# Patient Record
Sex: FEMALE | Race: WHITE | NOT HISPANIC OR LATINO | Employment: OTHER | URBAN - METROPOLITAN AREA
[De-identification: names, ages, dates, MRNs, and addresses within clinical notes are randomized per-mention and may not be internally consistent; named-entity substitution may affect disease eponyms.]

---

## 2017-01-03 ENCOUNTER — OFFICE VISIT (OUTPATIENT)
Dept: CARDIOLOGY | Facility: HOSPITAL | Age: 77
End: 2017-01-03

## 2017-01-03 VITALS
BODY MASS INDEX: 19.15 KG/M2 | WEIGHT: 122 LBS | HEIGHT: 67 IN | SYSTOLIC BLOOD PRESSURE: 101 MMHG | TEMPERATURE: 97.5 F | RESPIRATION RATE: 22 BRPM | HEART RATE: 60 BPM | OXYGEN SATURATION: 99 % | DIASTOLIC BLOOD PRESSURE: 57 MMHG

## 2017-01-03 DIAGNOSIS — I10 ESSENTIAL HYPERTENSION: ICD-10-CM

## 2017-01-03 DIAGNOSIS — I48.0 PAROXYSMAL ATRIAL FIBRILLATION (HCC): Primary | ICD-10-CM

## 2017-01-03 DIAGNOSIS — R00.1 BRADYCARDIA: ICD-10-CM

## 2017-01-03 PROCEDURE — 99214 OFFICE O/P EST MOD 30 MIN: CPT | Performed by: NURSE PRACTITIONER

## 2017-01-03 RX ORDER — SOTALOL HYDROCHLORIDE 80 MG/1
80 TABLET ORAL NIGHTLY
Refills: 0 | COMMUNITY
Start: 2016-12-26 | End: 2018-01-23 | Stop reason: HOSPADM

## 2017-01-03 RX ORDER — ALPRAZOLAM 0.5 MG/1
0.25 TABLET ORAL 2 TIMES DAILY PRN
Status: ON HOLD | COMMUNITY
End: 2022-03-15 | Stop reason: SDUPTHER

## 2017-01-03 RX ORDER — MULTIVIT-MIN/IRON/FOLIC ACID/K 18-600-40
2000 CAPSULE ORAL DAILY
COMMUNITY

## 2017-01-03 NOTE — MR AVS SNAPSHOT
Jenna Russell   1/3/2017 10:00 AM   Office Visit    Dept Phone:  [phone]   Encounter #:  [id number]    Provider:  NEREYDA Crabtree   Department:  [facility]                Your Full Care Plan              Today's Medication Changes          These changes are accurate as of: 1/3/17 10:46 AM.  If you have any questions, ask your nurse or doctor.               Medication(s)that have changed:     ALPRAZolam 0.5 MG tablet   Commonly known as:  XANAX   Take 0.25 mg by mouth 2 (Two) Times a Day As Needed.   What changed:  Another medication with the same name was removed. Continue taking this medication, and follow the directions you see here.   Changed by:  NEREYDA Crabtree         Stop taking medication(s)listed here:     CALCIUM & VIT D3 BONE HEALTH PO   Stopped by:  NEREYDA Crabtree           gabapentin 100 MG capsule   Commonly known as:  NEURONTIN   Stopped by:  NEREYDA Crabtree                      Your Updated Medication List          This list is accurate as of: 1/3/17 10:46 AM.  Always use your most recent med list.                ALPRAZolam 0.5 MG tablet   Commonly known as:  XANAX       apixaban 5 MG tablet tablet   Commonly known as:  ELIQUIS       aspirin 81 MG EC tablet       atorvastatin 40 MG tablet   Commonly known as:  LIPITOR       calcium carbonate  MG chewable tablet   Commonly known as:  TUMS EX       folic acid 1 MG tablet   Commonly known as:  FOLVITE       hyoscyamine 0.125 MG tablet   Commonly known as:  ANASPAZ,LEVSIN       * sotalol 120 MG tablet   Commonly known as:  BETAPACE       * sotalol 80 MG tablet   Commonly known as:  BETAPACE       Vitamin D 2000 UNITS capsule       * Notice:  This list has 2 medication(s) that are the same as other medications prescribed for you. Read the directions carefully, and ask your doctor or other care provider to review them with you.            Instructions     None    "Patient Instructions History      Upcoming Appointments     Visit Type Date Time Department    NEW PATIENT 1/3/2017 10:00 AM [facility] [place]      MyChart Signup     Our records indicate that you have declined [facility] [employer] signup. If you would like to sign up for [employer], please email [email or web address] or call [phone] to obtain an activation code.             Other Info from Your Visit           Allergies     Penicillins  Anaphylaxis      Reason for Visit     Establish Care s/p ED Visit on 12/15/16     Atrial Fibrillation           Vital Signs     Blood Pressure Pulse Temperature Respirations Height Weight    101/57 (BP Location: Left arm, Patient Position: Standing) 60 97.5 °F (36.4 °C) (Temporal Artery ) 22 67\" (170.2 cm) 122 lb (55.3 kg)    Oxygen Saturation Body Mass Index Smoking Status             99% 19.11 kg/m2 Former Smoker           "

## 2017-01-03 NOTE — PROGRESS NOTES
[facility]      Encounter Date:01/03/2017     Jenna Russell  [address]  [facility] [address]  [phone]    1940    Henrietta Torres MD    Jenna Russell is a 76 y.o. female.      Subjective:     Chief Complaint:  Establish Care (s/p ED Visit on 12/15/16 ) and Atrial Fibrillation       HPI     Pt with a hx of PAF for greater than 4 years.  Hx of CVA.  Chadsvasc 5 (female, CVA, HTN, Age) on Eliquis.  Currently on Sotalol.  Reports her Afib episodes are infrequent, but she had 1 episode lasting 3 hours on 12/15/16 and she when to ED.  Found to be in SR at that time.  Denies CP, pressure, dyspnea, dizzness, syncope while in AFib.  HR in AFib typically 80-90s.  Questioning today if she should increase Sotalol dose or be concerned when she goes into afib.    Patient Active Problem List   Diagnosis   • Pneumothorax, right   • Fracture of multiple ribs of right side with routine healing   • Paroxysmal atrial fibrillation   • High cholesterol   • Hypertension   • Atrial fibrillation   • Bradycardia         Past Surgical History   Procedure Laterality Date   • Total hip arthroplasty Left      Staph aureus wound infection   • Abdominoplasty     • Breast abscess incision and drainage         Allergies   Allergen Reactions   • Penicillins Anaphylaxis         Current Outpatient Prescriptions:   •  ALPRAZolam (XANAX) 0.5 MG tablet, Take 0.25 mg by mouth 2 (Two) Times a Day As Needed., Disp: , Rfl:   •  apixaban (ELIQUIS) 5 MG tablet tablet, Take 5 mg by mouth 2 (two) times a day., Disp: , Rfl:   •  aspirin 81 MG EC tablet, Take 81 mg by mouth daily., Disp: , Rfl:   •  atorvastatin (LIPITOR) 40 MG tablet, Take 40 mg by mouth Every Night., Disp: , Rfl:   •  calcium carbonate EX (TUMS EX) 750 MG chewable tablet, Chew 750 mg 2 (Two) Times a Day., Disp: , Rfl:   •  Cholecalciferol (VITAMIN D) 2000 UNITS capsule, Take 2,000 Units by mouth Daily., Disp: , Rfl:   •  folic acid  (FOLVITE) 1 MG tablet, Take 1 mg by mouth Daily., Disp: , Rfl: 0  •  hyoscyamine (ANASPAZ,LEVSIN) 0.125 MG tablet, Take 0.125 mg by mouth every 4 (four) hours as needed for cramping., Disp: , Rfl:   •  sotalol (BETAPACE) 120 MG tablet, Take 120 mg by mouth Every Morning., Disp: , Rfl:   •  sotalol (BETAPACE) 80 MG tablet, Take 80 mg by mouth Every Night., Disp: , Rfl: 0    The following portions of the patient's history were reviewed and updated as appropriate: allergies, current medications, past family history, past medical history, past social history, past surgical history and problem list.    Review of Systems   Constitution: Negative for chills, decreased appetite, diaphoresis, fever, weakness, malaise/fatigue, night sweats, weight gain and weight loss.   HENT: Positive for congestion and nosebleeds. Negative for headaches.         Patient has a cold   Eyes: Negative for blurred vision, visual disturbance and visual halos.   Cardiovascular: Positive for irregular heartbeat and palpitations. Negative for chest pain, claudication, cyanosis, dyspnea on exertion, leg swelling, near-syncope, orthopnea, paroxysmal nocturnal dyspnea and syncope.   Respiratory: Negative for cough, hemoptysis, shortness of breath, sleep disturbances due to breathing, snoring, sputum production and wheezing.    Endocrine: Negative for cold intolerance, heat intolerance, polydipsia, polyphagia and polyuria.   Hematologic/Lymphatic: Does not bruise/bleed easily.   Skin: Negative for dry skin, itching and rash.   Musculoskeletal: Negative for falls, joint pain, joint swelling, muscle weakness and myalgias.   Gastrointestinal: Positive for diarrhea. Negative for bloating, abdominal pain, constipation, dysphagia, heartburn, melena, nausea and vomiting.   Genitourinary: Negative for dysuria, flank pain, hematuria and nocturia.   Neurological: Positive for difficulty with concentration. Negative for excessive daytime sleepiness, dizziness and  "loss of balance.   Psychiatric/Behavioral: Negative for altered mental status and depression. The patient is not nervous/anxious.    Allergic/Immunologic: Negative for environmental allergies.       Objective:     Vitals:    01/03/17 0957 01/03/17 0958 01/03/17 0959   BP: 112/64 105/63 101/57   BP Location: Right arm Left arm Left arm   Patient Position: Sitting Sitting Standing   Cuff Size: Adult     Pulse: 60  60   Resp: 22     Temp: 97.5 °F (36.4 °C)     TempSrc: Temporal Artery      SpO2: 99%     Weight: 122 lb (55.3 kg)     Height: 67\" (170.2 cm)           Physical Exam   Constitutional: She is oriented to person, place, and time. She appears well-developed and well-nourished. No distress.   HENT:   Head: Normocephalic and atraumatic.   Mouth/Throat: Oropharynx is clear and moist.   Eyes: Conjunctivae are normal. Pupils are equal, round, and reactive to light. No scleral icterus.   Neck: No hepatojugular reflux and no JVD present. Carotid bruit is not present. No tracheal deviation present. No thyromegaly present.   Cardiovascular: Normal rate, regular rhythm, normal heart sounds and intact distal pulses.  Exam reveals no friction rub.    No murmur heard.  Pulmonary/Chest: Effort normal and breath sounds normal.   Abdominal: Soft. Bowel sounds are normal. She exhibits no distension. There is no tenderness.   Musculoskeletal: She exhibits no edema.   Lymphadenopathy:     She has no cervical adenopathy.   Neurological: She is alert and oriented to person, place, and time.   Skin: Skin is warm, dry and intact. No rash noted. No cyanosis or erythema. No pallor.   Psychiatric: She has a normal mood and affect. Her behavior is normal. Thought content normal.   Vitals reviewed.      Lab and Diagnostic Review:    Admission on 12/15/2016, Discharged on 12/15/2016   Component Date Value Ref Range Status   • Glucose 12/15/2016 84  70 - 100 mg/dL Final   • BUN 12/15/2016 12  9 - 23 mg/dL Final   • Creatinine 12/15/2016 " 0.60  0.60 - 1.30 mg/dL Final   • Sodium 12/15/2016 139  132 - 146 mmol/L Final   • Potassium 12/15/2016 3.9  3.5 - 5.5 mmol/L Final   • Chloride 12/15/2016 104  99 - 109 mmol/L Final   • CO2 12/15/2016 33.0* 20.0 - 31.0 mmol/L Final   • Calcium 12/15/2016 9.8  8.7 - 10.4 mg/dL Final   • Total Protein 12/15/2016 7.4  5.7 - 8.2 g/dL Final   • Albumin 12/15/2016 4.40  3.20 - 4.80 g/dL Final   • ALT (SGPT) 12/15/2016 24  7 - 40 U/L Final   • AST (SGOT) 12/15/2016 37* 0 - 33 U/L Final   • Alkaline Phosphatase 12/15/2016 108* 25 - 100 U/L Final   • Total Bilirubin 12/15/2016 0.6  0.3 - 1.2 mg/dL Final   • eGFR Non African Amer 12/15/2016 98  >60 mL/min/1.73 Final   • Globulin 12/15/2016 3.0  gm/dL Final   • A/G Ratio 12/15/2016 1.5  g/dL Final   • BUN/Creatinine Ratio 12/15/2016 20.0  7.0 - 25.0 Final   • Anion Gap 12/15/2016 2.0* 3.0 - 11.0 mmol/L Final   • Lipase 12/15/2016 40  6 - 51 U/L Final   • BNP 12/15/2016 125.0* 0.0 - 100.0 pg/mL Final   • Extra Tube 12/15/2016 hold for add-on   Final    Auto resulted   • Extra Tube 12/15/2016 Hold for add-ons.   Final    Auto resulted.   • Extra Tube 12/15/2016 hold for add-on   Final    Auto resulted   • Extra Tube 12/15/2016 Hold for add-ons.   Final    Auto resulted.   • WBC 12/15/2016 7.68  3.50 - 10.80 10*3/mm3 Final   • RBC 12/15/2016 4.37  3.89 - 5.14 10*6/mm3 Final   • Hemoglobin 12/15/2016 13.1  11.5 - 15.5 g/dL Final   • Hematocrit 12/15/2016 41.1  34.5 - 44.0 % Final   • MCV 12/15/2016 94.1  80.0 - 99.0 fL Final   • MCH 12/15/2016 30.0  27.0 - 31.0 pg Final   • MCHC 12/15/2016 31.9* 32.0 - 36.0 g/dL Final   • RDW 12/15/2016 13.5  11.3 - 14.5 % Final   • RDW-SD 12/15/2016 46.5  37.0 - 54.0 fl Final   • MPV 12/15/2016 9.6  6.0 - 12.0 fL Final   • Platelets 12/15/2016 270  150 - 450 10*3/mm3 Final   • Neutrophil % 12/15/2016 63.7  41.0 - 71.0 % Final   • Lymphocyte % 12/15/2016 27.3  24.0 - 44.0 % Final   • Monocyte % 12/15/2016 7.8  0.0 - 12.0 % Final   • Eosinophil %  12/15/2016 0.8  0.0 - 3.0 % Final   • Basophil % 12/15/2016 0.1  0.0 - 1.0 % Final   • Immature Grans % 12/15/2016 0.3  0.0 - 0.6 % Final   • Neutrophils, Absolute 12/15/2016 4.89  1.50 - 8.30 10*3/mm3 Final   • Lymphocytes, Absolute 12/15/2016 2.10  0.60 - 4.80 10*3/mm3 Final   • Monocytes, Absolute 12/15/2016 0.60  0.00 - 1.00 10*3/mm3 Final   • Eosinophils, Absolute 12/15/2016 0.06* 0.10 - 0.30 10*3/mm3 Final   • Basophils, Absolute 12/15/2016 0.01  0.00 - 0.20 10*3/mm3 Final   • Immature Grans, Absolute 12/15/2016 0.02  0.00 - 0.03 10*3/mm3 Final   • Troponin I 12/15/2016 0.00  0.00 - 0.60 ng/mL Final    Serial Number: [id number]    : [id number]     EKG sinus bradycardia 52 bpm, 12/15/2016  Assessment and Plan:         1. Paroxysmal atrial fibrillation  Heart rate appears regular and controlled  Continue current dose of sotalol.  Will not up titrate due to bradycardia.  Continue Eliquis for stroke prevention    A. fib education completed: What his atrial fibrillation, causes, triggers, signs and symptoms, medication management (rate control versus rhythm control) and stroke prevention, procedural management and indications, and the role of the atrial fibrillation center and when to call.  Patient aware that it is okay to be in atrial fibrillation will and adequately protected from stroke and heart rate is controlled, and asymptomatic or if minimal symptoms.  If there are any questions or concerns encouraged call the atrial fibrillation center prior to going to the emergency room.    2. Essential hypertension  Controlled    3. Bradycardia  Appears asymptomatic.  Reviewed signs and symptoms of bradycardia.    Follow-up as needed in the heart and valve Center.  Encouraged to follow-up as scheduled with Dr. Yan.  Total visit 30 minutes.  Education as listed above, 20 minutes      *Please note that portions of this note were completed with a voice recognition program. Efforts were made to edit the dictations,  but occasionally words are mistranscribed.

## 2017-02-25 ENCOUNTER — APPOINTMENT (OUTPATIENT)
Dept: GENERAL RADIOLOGY | Facility: HOSPITAL | Age: 77
End: 2017-02-25

## 2017-02-25 ENCOUNTER — HOSPITAL ENCOUNTER (EMERGENCY)
Facility: HOSPITAL | Age: 77
Discharge: HOME | End: 2017-02-25
Attending: EMERGENCY MEDICINE | Admitting: EMERGENCY MEDICINE

## 2017-02-25 VITALS
HEART RATE: 48 BPM | WEIGHT: 122 LBS | BODY MASS INDEX: 19.15 KG/M2 | OXYGEN SATURATION: 98 % | DIASTOLIC BLOOD PRESSURE: 81 MMHG | TEMPERATURE: 97.5 F | RESPIRATION RATE: 16 BRPM | HEIGHT: 67 IN | SYSTOLIC BLOOD PRESSURE: 159 MMHG

## 2017-02-25 DIAGNOSIS — M25.411 SHOULDER EFFUSION, RIGHT: Primary | ICD-10-CM

## 2017-02-25 DIAGNOSIS — M25.511 ACUTE PAIN OF RIGHT SHOULDER: ICD-10-CM

## 2017-02-25 LAB
ALBUMIN SERPL-MCNC: 4.6 G/DL (ref 3.2–4.8)
ALBUMIN/GLOB SERPL: 1.4 G/DL (ref 1.5–2.5)
ALP SERPL-CCNC: 107 U/L (ref 25–100)
ALT SERPL W P-5'-P-CCNC: 20 U/L (ref 7–40)
ANION GAP SERPL CALCULATED.3IONS-SCNC: 8 MMOL/L (ref 3–11)
APPEARANCE FLD: ABNORMAL
AST SERPL-CCNC: 34 U/L (ref 0–33)
BASOPHILS # BLD AUTO: 0.01 10*3/MM3 (ref 0–0.2)
BASOPHILS NFR BLD AUTO: 0.1 % (ref 0–1)
BILIRUB SERPL-MCNC: 0.7 MG/DL (ref 0.3–1.2)
BUN BLD-MCNC: 9 MG/DL (ref 9–23)
BUN/CREAT SERPL: 22.5 (ref 7–25)
CALCIUM SPEC-SCNC: 10.1 MG/DL (ref 8.7–10.4)
CHLORIDE SERPL-SCNC: 102 MMOL/L (ref 99–109)
CO2 SERPL-SCNC: 27 MMOL/L (ref 20–31)
COLOR FLD: ABNORMAL
CREAT BLD-MCNC: 0.4 MG/DL (ref 0.6–1.3)
CRP SERPL-MCNC: 14.1 MG/DL (ref 0–10)
CRYSTALS FLD MICRO: NORMAL
DEPRECATED RDW RBC AUTO: 45.9 FL (ref 37–54)
EOSINOPHIL # BLD AUTO: 0 10*3/MM3 (ref 0.1–0.3)
EOSINOPHIL NFR BLD AUTO: 0 % (ref 0–3)
ERYTHROCYTE [DISTWIDTH] IN BLOOD BY AUTOMATED COUNT: 13.2 % (ref 11.3–14.5)
ERYTHROCYTE [SEDIMENTATION RATE] IN BLOOD: 16 MM/HR (ref 0–30)
GFR SERPL CREATININE-BSD FRML MDRD: >150 ML/MIN/1.73
GLOBULIN UR ELPH-MCNC: 3.4 GM/DL
GLUCOSE BLD-MCNC: 199 MG/DL (ref 70–100)
HCT VFR BLD AUTO: 41.8 % (ref 34.5–44)
HGB BLD-MCNC: 13.5 G/DL (ref 11.5–15.5)
IMM GRANULOCYTES # BLD: 0.03 10*3/MM3 (ref 0–0.03)
IMM GRANULOCYTES NFR BLD: 0.2 % (ref 0–0.6)
LYMPHOCYTES # BLD AUTO: 0.9 10*3/MM3 (ref 0.6–4.8)
LYMPHOCYTES NFR BLD AUTO: 5.9 % (ref 24–44)
LYMPHOCYTES NFR FLD MANUAL: 3 %
MCH RBC QN AUTO: 30.7 PG (ref 27–31)
MCHC RBC AUTO-ENTMCNC: 32.3 G/DL (ref 32–36)
MCV RBC AUTO: 95 FL (ref 80–99)
MONOCYTES # BLD AUTO: 0.62 10*3/MM3 (ref 0–1)
MONOCYTES NFR BLD AUTO: 4.1 % (ref 0–12)
MONOCYTES NFR FLD: 1 %
NEUTROPHILS # BLD AUTO: 13.63 10*3/MM3 (ref 1.5–8.3)
NEUTROPHILS NFR BLD AUTO: 89.7 % (ref 41–71)
NEUTROPHILS NFR FLD MANUAL: 96 %
PLATELET # BLD AUTO: 297 10*3/MM3 (ref 150–450)
PMV BLD AUTO: 10.8 FL (ref 6–12)
POTASSIUM BLD-SCNC: 3.7 MMOL/L (ref 3.5–5.5)
PROT SERPL-MCNC: 8 G/DL (ref 5.7–8.2)
RBC # BLD AUTO: 4.4 10*6/MM3 (ref 3.89–5.14)
RBC # FLD AUTO: ABNORMAL /MM3
SODIUM BLD-SCNC: 137 MMOL/L (ref 132–146)
WBC # FLD: ABNORMAL /MM3
WBC NRBC COR # BLD: 15.19 10*3/MM3 (ref 3.5–10.8)

## 2017-02-25 PROCEDURE — 96376 TX/PRO/DX INJ SAME DRUG ADON: CPT

## 2017-02-25 PROCEDURE — 96374 THER/PROPH/DIAG INJ IV PUSH: CPT

## 2017-02-25 PROCEDURE — 85025 COMPLETE CBC W/AUTO DIFF WBC: CPT | Performed by: EMERGENCY MEDICINE

## 2017-02-25 PROCEDURE — 73030 X-RAY EXAM OF SHOULDER: CPT

## 2017-02-25 PROCEDURE — 25010000002 FENTANYL CITRATE (PF) 100 MCG/2ML SOLUTION: Performed by: EMERGENCY MEDICINE

## 2017-02-25 PROCEDURE — 80053 COMPREHEN METABOLIC PANEL: CPT | Performed by: EMERGENCY MEDICINE

## 2017-02-25 PROCEDURE — 89051 BODY FLUID CELL COUNT: CPT | Performed by: EMERGENCY MEDICINE

## 2017-02-25 PROCEDURE — 87070 CULTURE OTHR SPECIMN AEROBIC: CPT | Performed by: EMERGENCY MEDICINE

## 2017-02-25 PROCEDURE — 36415 COLL VENOUS BLD VENIPUNCTURE: CPT

## 2017-02-25 PROCEDURE — 87040 BLOOD CULTURE FOR BACTERIA: CPT | Performed by: EMERGENCY MEDICINE

## 2017-02-25 PROCEDURE — 89060 EXAM SYNOVIAL FLUID CRYSTALS: CPT | Performed by: EMERGENCY MEDICINE

## 2017-02-25 PROCEDURE — 25010000002 KETOROLAC TROMETHAMINE PER 15 MG: Performed by: EMERGENCY MEDICINE

## 2017-02-25 PROCEDURE — 96375 TX/PRO/DX INJ NEW DRUG ADDON: CPT

## 2017-02-25 PROCEDURE — 99284 EMERGENCY DEPT VISIT MOD MDM: CPT

## 2017-02-25 PROCEDURE — 86140 C-REACTIVE PROTEIN: CPT | Performed by: EMERGENCY MEDICINE

## 2017-02-25 PROCEDURE — 87205 SMEAR GRAM STAIN: CPT | Performed by: EMERGENCY MEDICINE

## 2017-02-25 PROCEDURE — 25010000002 MORPHINE PER 10 MG: Performed by: EMERGENCY MEDICINE

## 2017-02-25 PROCEDURE — 85652 RBC SED RATE AUTOMATED: CPT | Performed by: EMERGENCY MEDICINE

## 2017-02-25 RX ORDER — OXYCODONE AND ACETAMINOPHEN 10; 325 MG/1; MG/1
1 TABLET ORAL EVERY 6 HOURS PRN
Qty: 20 TABLET | Refills: 0 | Status: SHIPPED | OUTPATIENT
Start: 2017-02-25 | End: 2017-02-25 | Stop reason: HOSPADM

## 2017-02-25 RX ORDER — LIDOCAINE 50 MG/G
2 PATCH TOPICAL
Status: DISCONTINUED | OUTPATIENT
Start: 2017-02-25 | End: 2017-02-25 | Stop reason: HOSPADM

## 2017-02-25 RX ORDER — KETOROLAC TROMETHAMINE 15 MG/ML
15 INJECTION, SOLUTION INTRAMUSCULAR; INTRAVENOUS ONCE
Status: COMPLETED | OUTPATIENT
Start: 2017-02-25 | End: 2017-02-25

## 2017-02-25 RX ORDER — KETAMINE HCL IN 0.9 % NACL 50 MG/5 ML
20 SYRINGE (ML) INTRAVENOUS ONCE
Status: COMPLETED | OUTPATIENT
Start: 2017-02-25 | End: 2017-02-25

## 2017-02-25 RX ORDER — LIDOCAINE HYDROCHLORIDE 10 MG/ML
INJECTION, SOLUTION EPIDURAL; INFILTRATION; INTRACAUDAL; PERINEURAL
Status: DISCONTINUED
Start: 2017-02-25 | End: 2017-02-25 | Stop reason: HOSPADM

## 2017-02-25 RX ORDER — MORPHINE SULFATE 4 MG/ML
4 INJECTION, SOLUTION INTRAMUSCULAR; INTRAVENOUS ONCE
Status: COMPLETED | OUTPATIENT
Start: 2017-02-25 | End: 2017-02-25

## 2017-02-25 RX ORDER — FENTANYL CITRATE 50 UG/ML
50 INJECTION, SOLUTION INTRAMUSCULAR; INTRAVENOUS
Status: DISCONTINUED | OUTPATIENT
Start: 2017-02-25 | End: 2017-02-25 | Stop reason: HOSPADM

## 2017-02-25 RX ORDER — LIDOCAINE HYDROCHLORIDE 10 MG/ML
10 INJECTION, SOLUTION INFILTRATION; PERINEURAL ONCE
Status: COMPLETED | OUTPATIENT
Start: 2017-02-25 | End: 2017-02-25

## 2017-02-25 RX ORDER — HYDROCODONE BITARTRATE AND ACETAMINOPHEN 10; 325 MG/1; MG/1
1 TABLET ORAL EVERY 6 HOURS PRN
Qty: 20 TABLET | Refills: 0 | Status: SHIPPED | OUTPATIENT
Start: 2017-02-25 | End: 2017-02-25

## 2017-02-25 RX ORDER — SODIUM CHLORIDE 0.9 % (FLUSH) 0.9 %
10 SYRINGE (ML) INJECTION AS NEEDED
Status: DISCONTINUED | OUTPATIENT
Start: 2017-02-25 | End: 2017-02-25 | Stop reason: HOSPADM

## 2017-02-25 RX ORDER — HYDROCODONE BITARTRATE AND ACETAMINOPHEN 10; 325 MG/1; MG/1
1 TABLET ORAL EVERY 6 HOURS PRN
Qty: 20 TABLET | Refills: 0 | Status: SHIPPED | OUTPATIENT
Start: 2017-02-25 | End: 2022-03-15 | Stop reason: HOSPADM

## 2017-02-25 RX ADMIN — KETAMINE HCL-NACL SOLN PREF SY 50 MG/5ML-0.9% (10MG/ML) 20 MG: 10 SOLUTION PREFILLED SYRINGE at 10:55

## 2017-02-25 RX ADMIN — FENTANYL CITRATE 50 MCG: 50 INJECTION, SOLUTION INTRAMUSCULAR; INTRAVENOUS at 12:57

## 2017-02-25 RX ADMIN — LIDOCAINE 2 PATCH: 50 PATCH CUTANEOUS at 10:13

## 2017-02-25 RX ADMIN — FENTANYL CITRATE 50 MCG: 50 INJECTION, SOLUTION INTRAMUSCULAR; INTRAVENOUS at 14:32

## 2017-02-25 RX ADMIN — MORPHINE SULFATE 4 MG: 4 INJECTION, SOLUTION INTRAMUSCULAR; INTRAVENOUS at 10:02

## 2017-02-25 RX ADMIN — KETOROLAC TROMETHAMINE 15 MG: 15 INJECTION, SOLUTION INTRAMUSCULAR; INTRAVENOUS at 10:04

## 2017-02-25 RX ADMIN — LIDOCAINE HYDROCHLORIDE 5 ML: 10 INJECTION, SOLUTION EPIDURAL; INFILTRATION; INTRACAUDAL; PERINEURAL at 13:17

## 2017-02-27 LAB
BACTERIA SPEC AEROBE CULT: NORMAL
GRAM STN SPEC: NORMAL

## 2017-03-02 LAB — BACTERIA SPEC AEROBE CULT: NORMAL

## 2018-01-20 ENCOUNTER — APPOINTMENT (OUTPATIENT)
Dept: CT IMAGING | Facility: HOSPITAL | Age: 78
End: 2018-01-20

## 2018-01-20 ENCOUNTER — APPOINTMENT (OUTPATIENT)
Dept: MRI IMAGING | Facility: HOSPITAL | Age: 78
End: 2018-01-20

## 2018-01-20 ENCOUNTER — HOSPITAL ENCOUNTER (INPATIENT)
Facility: HOSPITAL | Age: 78
LOS: 3 days | Discharge: HOME | End: 2018-01-23
Attending: EMERGENCY MEDICINE | Admitting: HOSPITALIST

## 2018-01-20 ENCOUNTER — APPOINTMENT (OUTPATIENT)
Dept: GENERAL RADIOLOGY | Facility: HOSPITAL | Age: 78
End: 2018-01-20

## 2018-01-20 DIAGNOSIS — Z74.09 IMPAIRED MOBILITY AND ADLS: ICD-10-CM

## 2018-01-20 DIAGNOSIS — E78.00 HIGH CHOLESTEROL: ICD-10-CM

## 2018-01-20 DIAGNOSIS — Z86.79 HISTORY OF ATRIAL FIBRILLATION: ICD-10-CM

## 2018-01-20 DIAGNOSIS — Z78.9 IMPAIRED MOBILITY AND ADLS: ICD-10-CM

## 2018-01-20 DIAGNOSIS — Z86.73 HISTORY OF STROKE: ICD-10-CM

## 2018-01-20 DIAGNOSIS — I10 ESSENTIAL HYPERTENSION: ICD-10-CM

## 2018-01-20 DIAGNOSIS — R47.01 COMBINED RECEPTIVE AND EXPRESSIVE APHASIA: Primary | ICD-10-CM

## 2018-01-20 DIAGNOSIS — Z74.09 IMPAIRED FUNCTIONAL MOBILITY, BALANCE, GAIT, AND ENDURANCE: ICD-10-CM

## 2018-01-20 LAB
ALT SERPL W P-5'-P-CCNC: 23 U/L (ref 7–40)
APTT PPP: 28.3 SECONDS (ref 24–31)
AST SERPL-CCNC: 27 U/L (ref 0–33)
BASOPHILS # BLD AUTO: 0.01 10*3/MM3 (ref 0–0.2)
BASOPHILS NFR BLD AUTO: 0.1 % (ref 0–1)
BUN BLDA-MCNC: 13 MG/DL (ref 8–26)
CA-I BLDA-SCNC: 1.24 MMOL/L (ref 1.2–1.32)
CHLORIDE BLDA-SCNC: 103 MMOL/L (ref 98–109)
CO2 BLDA-SCNC: 25 MMOL/L (ref 24–29)
CREAT BLDA-MCNC: 0.5 MG/DL (ref 0.6–1.3)
DEPRECATED RDW RBC AUTO: 43 FL (ref 37–54)
EOSINOPHIL # BLD AUTO: 0.15 10*3/MM3 (ref 0–0.3)
EOSINOPHIL NFR BLD AUTO: 1.5 % (ref 0–3)
ERYTHROCYTE [DISTWIDTH] IN BLOOD BY AUTOMATED COUNT: 12.7 % (ref 11.3–14.5)
GLUCOSE BLDC GLUCOMTR-MCNC: 128 MG/DL (ref 70–130)
HCT VFR BLD AUTO: 39.2 % (ref 34.5–44)
HCT VFR BLDA CALC: 38 % (ref 38–51)
HGB BLD-MCNC: 12.8 G/DL (ref 11.5–15.5)
HGB BLDA-MCNC: 12.9 G/DL (ref 12–17)
HOLD SPECIMEN: NORMAL
HOLD SPECIMEN: NORMAL
IMM GRANULOCYTES # BLD: 0.02 10*3/MM3 (ref 0–0.03)
IMM GRANULOCYTES NFR BLD: 0.2 % (ref 0–0.6)
INR PPP: 1.1 (ref 0.8–1.2)
LYMPHOCYTES # BLD AUTO: 2.28 10*3/MM3 (ref 0.6–4.8)
LYMPHOCYTES NFR BLD AUTO: 23.2 % (ref 24–44)
MCH RBC QN AUTO: 30.3 PG (ref 27–31)
MCHC RBC AUTO-ENTMCNC: 32.7 G/DL (ref 32–36)
MCV RBC AUTO: 92.7 FL (ref 80–99)
MONOCYTES # BLD AUTO: 0.73 10*3/MM3 (ref 0–1)
MONOCYTES NFR BLD AUTO: 7.4 % (ref 0–12)
NEUTROPHILS # BLD AUTO: 6.65 10*3/MM3 (ref 1.5–8.3)
NEUTROPHILS NFR BLD AUTO: 67.6 % (ref 41–71)
PLATELET # BLD AUTO: 229 10*3/MM3 (ref 150–450)
PMV BLD AUTO: 9.8 FL (ref 6–12)
POTASSIUM BLDA-SCNC: 3.5 MMOL/L (ref 3.5–4.9)
PROTHROMBIN TIME: 12.9 SECONDS (ref 12.8–15.2)
RBC # BLD AUTO: 4.23 10*6/MM3 (ref 3.89–5.14)
SODIUM BLDA-SCNC: 141 MMOL/L (ref 138–146)
TROPONIN I SERPL-MCNC: 0 NG/ML (ref 0–0.07)
WBC NRBC COR # BLD: 9.84 10*3/MM3 (ref 3.5–10.8)
WHOLE BLOOD HOLD SPECIMEN: NORMAL
WHOLE BLOOD HOLD SPECIMEN: NORMAL

## 2018-01-20 PROCEDURE — 25010000003 LEVETIRACETAM IN NACL 0.75% 1000 MG/100ML SOLUTION: Performed by: PSYCHIATRY & NEUROLOGY

## 2018-01-20 PROCEDURE — 99284 EMERGENCY DEPT VISIT MOD MDM: CPT

## 2018-01-20 PROCEDURE — G0378 HOSPITAL OBSERVATION PER HR: HCPCS

## 2018-01-20 PROCEDURE — 70450 CT HEAD/BRAIN W/O DYE: CPT

## 2018-01-20 PROCEDURE — 0 IOPAMIDOL PER 1 ML: Performed by: EMERGENCY MEDICINE

## 2018-01-20 PROCEDURE — 99223 1ST HOSP IP/OBS HIGH 75: CPT | Performed by: PSYCHIATRY & NEUROLOGY

## 2018-01-20 PROCEDURE — 0042T HC CT CEREBRAL PERFUSION W/WO CONTRAST: CPT

## 2018-01-20 PROCEDURE — 85610 PROTHROMBIN TIME: CPT

## 2018-01-20 PROCEDURE — 85014 HEMATOCRIT: CPT

## 2018-01-20 PROCEDURE — 70496 CT ANGIOGRAPHY HEAD: CPT

## 2018-01-20 PROCEDURE — 85025 COMPLETE CBC W/AUTO DIFF WBC: CPT | Performed by: EMERGENCY MEDICINE

## 2018-01-20 PROCEDURE — 70551 MRI BRAIN STEM W/O DYE: CPT

## 2018-01-20 PROCEDURE — 85730 THROMBOPLASTIN TIME PARTIAL: CPT | Performed by: EMERGENCY MEDICINE

## 2018-01-20 PROCEDURE — 84484 ASSAY OF TROPONIN QUANT: CPT

## 2018-01-20 PROCEDURE — 99223 1ST HOSP IP/OBS HIGH 75: CPT | Performed by: FAMILY MEDICINE

## 2018-01-20 PROCEDURE — 84450 TRANSFERASE (AST) (SGOT): CPT | Performed by: EMERGENCY MEDICINE

## 2018-01-20 PROCEDURE — 25010000002 HYDROMORPHONE PER 4 MG: Performed by: FAMILY MEDICINE

## 2018-01-20 PROCEDURE — 71045 X-RAY EXAM CHEST 1 VIEW: CPT

## 2018-01-20 PROCEDURE — 93005 ELECTROCARDIOGRAM TRACING: CPT | Performed by: EMERGENCY MEDICINE

## 2018-01-20 PROCEDURE — 70498 CT ANGIOGRAPHY NECK: CPT

## 2018-01-20 PROCEDURE — 25010000002 PROMETHAZINE PER 50 MG: Performed by: FAMILY MEDICINE

## 2018-01-20 PROCEDURE — 80047 BASIC METABLC PNL IONIZED CA: CPT

## 2018-01-20 PROCEDURE — 84460 ALANINE AMINO (ALT) (SGPT): CPT | Performed by: EMERGENCY MEDICINE

## 2018-01-20 RX ORDER — ALPRAZOLAM 0.25 MG/1
0.25 TABLET ORAL 2 TIMES DAILY PRN
Status: DISCONTINUED | OUTPATIENT
Start: 2018-01-20 | End: 2018-01-23 | Stop reason: HOSPADM

## 2018-01-20 RX ORDER — POTASSIUM CHLORIDE 1.5 G/1.77G
40 POWDER, FOR SOLUTION ORAL AS NEEDED
Status: DISCONTINUED | OUTPATIENT
Start: 2018-01-20 | End: 2018-01-23 | Stop reason: HOSPADM

## 2018-01-20 RX ORDER — HYDROMORPHONE HYDROCHLORIDE 1 MG/ML
0.5 INJECTION, SOLUTION INTRAMUSCULAR; INTRAVENOUS; SUBCUTANEOUS ONCE
Status: COMPLETED | OUTPATIENT
Start: 2018-01-20 | End: 2018-01-20

## 2018-01-20 RX ORDER — ASPIRIN 81 MG/1
81 TABLET ORAL DAILY
Status: DISCONTINUED | OUTPATIENT
Start: 2018-01-21 | End: 2018-01-20

## 2018-01-20 RX ORDER — POTASSIUM CHLORIDE 750 MG/1
40 CAPSULE, EXTENDED RELEASE ORAL AS NEEDED
Status: DISCONTINUED | OUTPATIENT
Start: 2018-01-20 | End: 2018-01-23 | Stop reason: HOSPADM

## 2018-01-20 RX ORDER — SODIUM CHLORIDE 0.9 % (FLUSH) 0.9 %
10 SYRINGE (ML) INJECTION AS NEEDED
Status: DISCONTINUED | OUTPATIENT
Start: 2018-01-20 | End: 2018-01-23 | Stop reason: HOSPADM

## 2018-01-20 RX ORDER — ASPIRIN 300 MG/1
300 SUPPOSITORY RECTAL DAILY
Status: DISCONTINUED | OUTPATIENT
Start: 2018-01-21 | End: 2018-01-23 | Stop reason: HOSPADM

## 2018-01-20 RX ORDER — LEVETIRACETAM 10 MG/ML
1000 INJECTION INTRAVASCULAR EVERY 12 HOURS SCHEDULED
Status: COMPLETED | OUTPATIENT
Start: 2018-01-20 | End: 2018-01-20

## 2018-01-20 RX ORDER — HYDROCODONE BITARTRATE AND ACETAMINOPHEN 10; 325 MG/1; MG/1
1 TABLET ORAL EVERY 6 HOURS PRN
Status: DISCONTINUED | OUTPATIENT
Start: 2018-01-20 | End: 2018-01-23 | Stop reason: HOSPADM

## 2018-01-20 RX ORDER — SODIUM CHLORIDE 0.9 % (FLUSH) 0.9 %
1-10 SYRINGE (ML) INJECTION AS NEEDED
Status: DISCONTINUED | OUTPATIENT
Start: 2018-01-20 | End: 2018-01-23 | Stop reason: HOSPADM

## 2018-01-20 RX ORDER — FOLIC ACID 1 MG/1
1 TABLET ORAL DAILY
Status: DISCONTINUED | OUTPATIENT
Start: 2018-01-21 | End: 2018-01-23 | Stop reason: HOSPADM

## 2018-01-20 RX ORDER — BISACODYL 10 MG
10 SUPPOSITORY, RECTAL RECTAL DAILY PRN
Status: DISCONTINUED | OUTPATIENT
Start: 2018-01-20 | End: 2018-01-23 | Stop reason: HOSPADM

## 2018-01-20 RX ORDER — ASPIRIN 325 MG
325 TABLET ORAL ONCE
Status: DISCONTINUED | OUTPATIENT
Start: 2018-01-20 | End: 2018-01-20

## 2018-01-20 RX ORDER — SOTALOL HYDROCHLORIDE 80 MG/1
80 TABLET ORAL NIGHTLY
Status: DISCONTINUED | OUTPATIENT
Start: 2018-01-20 | End: 2018-01-20

## 2018-01-20 RX ORDER — SOTALOL HYDROCHLORIDE 80 MG/1
120 TABLET ORAL EVERY MORNING
Status: DISCONTINUED | OUTPATIENT
Start: 2018-01-21 | End: 2018-01-20

## 2018-01-20 RX ORDER — BISACODYL 5 MG/1
5 TABLET, DELAYED RELEASE ORAL DAILY PRN
Status: DISCONTINUED | OUTPATIENT
Start: 2018-01-20 | End: 2018-01-23 | Stop reason: HOSPADM

## 2018-01-20 RX ORDER — ACETAMINOPHEN 325 MG/1
650 TABLET ORAL EVERY 4 HOURS PRN
Status: DISCONTINUED | OUTPATIENT
Start: 2018-01-20 | End: 2018-01-23 | Stop reason: HOSPADM

## 2018-01-20 RX ORDER — ATORVASTATIN CALCIUM 40 MG/1
80 TABLET, FILM COATED ORAL NIGHTLY
Status: DISCONTINUED | OUTPATIENT
Start: 2018-01-20 | End: 2018-01-23 | Stop reason: HOSPADM

## 2018-01-20 RX ORDER — ASPIRIN 300 MG/1
300 SUPPOSITORY RECTAL ONCE
Status: COMPLETED | OUTPATIENT
Start: 2018-01-20 | End: 2018-01-20

## 2018-01-20 RX ORDER — PROMETHAZINE HYDROCHLORIDE 25 MG/ML
12.5 INJECTION, SOLUTION INTRAMUSCULAR; INTRAVENOUS EVERY 6 HOURS PRN
Status: DISCONTINUED | OUTPATIENT
Start: 2018-01-20 | End: 2018-01-23 | Stop reason: HOSPADM

## 2018-01-20 RX ORDER — ACETAMINOPHEN 650 MG/1
650 SUPPOSITORY RECTAL EVERY 4 HOURS PRN
Status: DISCONTINUED | OUTPATIENT
Start: 2018-01-20 | End: 2018-01-23 | Stop reason: HOSPADM

## 2018-01-20 RX ORDER — LEVETIRACETAM 5 MG/ML
500 INJECTION INTRAVASCULAR EVERY 12 HOURS
Status: DISCONTINUED | OUTPATIENT
Start: 2018-01-21 | End: 2018-01-22

## 2018-01-20 RX ORDER — ASPIRIN 325 MG
325 TABLET ORAL DAILY
Status: DISCONTINUED | OUTPATIENT
Start: 2018-01-21 | End: 2018-01-23 | Stop reason: HOSPADM

## 2018-01-20 RX ADMIN — PROMETHAZINE HYDROCHLORIDE 12.5 MG: 25 INJECTION INTRAMUSCULAR; INTRAVENOUS at 14:23

## 2018-01-20 RX ADMIN — SODIUM CHLORIDE 1000 ML: 9 INJECTION, SOLUTION INTRAVENOUS at 12:17

## 2018-01-20 RX ADMIN — HYDROMORPHONE HYDROCHLORIDE 0.5 MG: 2 INJECTION INTRAMUSCULAR; INTRAVENOUS; SUBCUTANEOUS at 14:24

## 2018-01-20 RX ADMIN — LEVETIRACETAM 1000 MG: 1000 INJECTION, SOLUTION INTRAVENOUS at 16:21

## 2018-01-20 RX ADMIN — ASPIRIN 300 MG: 300 SUPPOSITORY RECTAL at 12:18

## 2018-01-20 RX ADMIN — IOPAMIDOL 115 ML: 755 INJECTION, SOLUTION INTRAVENOUS at 11:37

## 2018-01-20 NOTE — ED PROVIDER NOTES
"Subjective   HPI Comments: Ms. Jenna Russell is a 77 y.o. female who presents to the ED with c/o neurologic complaints. At 0830 this morning, she had a sudden onset of occipital head pain. She subsequently developed expressive aphasia and cannot speak complete sentences other than \"yup\" or \"no\" responses. 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 She is anticoagulated on Eliquis for afib. Family does note that although she still has some residual aphasia, she is normally able to answer questions perfectly.     Patient is a 77 y.o. female presenting with neurologic complaint.   History provided by:  Patient, EMS personnel and spouse  History limited by:  Acuity of condition (aphasia)  Neurologic Problem   Primary symptoms comment: Difficulty Speaking, headache. This is a new problem. The current episode started today. The neurological problem developed suddenly. The last time the patient was known to be well was 1/20/2018 8:30 AM.  The problem is unchanged. There was no focality noted. Associated symptoms include headaches. Her past medical history is significant for a CVA.       Review of Systems   Unable to perform ROS: Acuity of condition   Neurological: Positive for speech difficulty and headaches.       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 Normal LVSF.Normal LV diastolic filling. Left atrium midly dilated. RV mild- mod dilated. MIld aortic cusp sclerosis. No evidence of mitral valve prolapse. Mild mitral regurgitation.No pulmonary hypertension or perdicardial effusion. No dilation of aortic root. Venous system appears normal   • H/O: stroke  "   • HLD (hyperlipidemia) 9/8/2016   • Left temporal lobe infarction    • LOM (loss of memory)    • Osteoarthritis 9/8/2016   • Paroxysmal atrial fibrillation 9/8/2016   • Thyroid nodule 9/8/2016    1.8x1.2cm   • Weakness generalized    • Wound infection after surgery     Left hip arthroplasty, staph aureus, 6 weeks IV Rocephin       Allergies   Allergen Reactions   • Penicillins Anaphylaxis       Past Surgical History:   Procedure Laterality Date   • ABDOMINOPLASTY     • BREAST ABSCESS INCISION AND DRAINAGE     • TOTAL HIP ARTHROPLASTY Left     Staph aureus wound infection       Family History   Problem Relation Age of Onset   • Stroke Mother    • Other Mother      Cardiac disorder   • Diabetes Mother    • Hypertension Mother    • Mental illness Mother    • Heart attack Father    • Heart failure Father    • No Known Problems Sister    • Heart attack Maternal Grandfather    • Heart disease Paternal Grandfather    • No Known Problems Sister        Social History     Social History   • Marital status:      Spouse name: N/A   • Number of children: 3   • Years of education: N/A     Occupational History   • retired,       Social History Main Topics   • Smoking status: Former Smoker     Years: 2.00     Quit date: 1972   • Smokeless tobacco: Never Used      Comment: OV says she quit smoking within the past year   • Alcohol use 8.4 oz/week     14 Glasses of wine per week   • Drug use: No   • Sexual activity: Not Asked     Other Topics Concern   • None     Social History Narrative    With her partner Lianna for 36yrs.. She drinks 1 cup of coffee per day.         Objective   Physical Exam   Constitutional: She appears well-developed and well-nourished. No distress.   HENT:   Head: Normocephalic and atraumatic.   Nose: Nose normal.   Eyes: Conjunctivae are normal. No scleral icterus.   Neck: Normal range of motion. Neck supple.   Cardiovascular: Normal rate, regular rhythm, normal heart sounds and intact distal  pulses.    No murmur heard.  Pulmonary/Chest: Effort normal and breath sounds normal. No respiratory distress.   Abdominal: Soft. There is no tenderness.   Musculoskeletal: Normal range of motion.   Neurological: She is alert.   Significant expressive aphasia with likely receptive aphasia.    Skin: Skin is warm and dry. She is not diaphoretic.   Psychiatric: She has a normal mood and affect. Her behavior is normal.   Nursing note and vitals reviewed.      Critical Care  Performed by: GA FIGUEROA  Authorized by: GA FIGUEROA     Critical care provider statement:     Critical care time (minutes):  45    Critical care time was exclusive of:  Separately billable procedures and treating other patients and teaching time    Critical care was necessary to treat or prevent imminent or life-threatening deterioration of the following conditions:  CNS failure or compromise    Critical care was time spent personally by me on the following activities:  Evaluation of patient's response to treatment, examination of patient, obtaining history from patient or surrogate, ordering and performing treatments and interventions, ordering and review of laboratory studies, ordering and review of radiographic studies, re-evaluation of patient's condition, development of treatment plan with patient or surrogate, pulse oximetry, discussions with consultants and review of old charts               ED Course  ED Course   Comment By Time   Dr. Figueroa paged for Dr. Godwin [facility] 01/20 1119   Dr. Figueroa discussed case with Dr. Godwin, on call stroke neurology. He agrees plan to with hold tPA and continue with stroke protocol imaging. He recommends admission to the hospital as well. [facility] 01/20 1121   Dr. Figueroa discussed case with Dr. Bai, hospitalist, who will admit the patient. Aum Cooley 01/20 1230     Recent Results (from the past 24 hour(s))   POC Protime / INR    Collection Time: 01/20/18 11:22 AM   Result Value Ref  Range    Protime 12.9 12.8 - 15.2 seconds    INR 1.1 0.8 - 1.2   POC CHEM 8    Collection Time: 01/20/18 11:23 AM   Result Value Ref Range    Glucose 128 70 - 130 mg/dL    BUN, Arterial 13 8 - 26 mg/dL    Creatinine 0.50 (L) 0.60 - 1.30 mg/dL    Sodium 141 138 - 146 mmol/L    Potassium 3.5 3.5 - 4.9 mmol/L    Chloride 103 98 - 109 mmol/L    Total CO2 25 24 - 29 mmol/L    Hemoglobin 12.9 12.0 - 17.0 g/dL    Hematocrit 38 38 - 51 %    Ionized Calcium 1.24 1.20 - 1.32 mmol/L   aPTT    Collection Time: 01/20/18 11:24 AM   Result Value Ref Range    PTT 28.3 24.0 - 31.0 seconds   AST    Collection Time: 01/20/18 11:24 AM   Result Value Ref Range    AST (SGOT) 27 0 - 33 U/L   ALT    Collection Time: 01/20/18 11:24 AM   Result Value Ref Range    ALT (SGPT) 23 7 - 40 U/L   Light Blue Top    Collection Time: 01/20/18 11:24 AM   Result Value Ref Range    Extra Tube hold for add-on    Green Top (Gel)    Collection Time: 01/20/18 11:24 AM   Result Value Ref Range    Extra Tube Hold for add-ons.    Lavender Top    Collection Time: 01/20/18 11:24 AM   Result Value Ref Range    Extra Tube hold for add-on    Gold Top - SST    Collection Time: 01/20/18 11:24 AM   Result Value Ref Range    Extra Tube Hold for add-ons.    CBC Auto Differential    Collection Time: 01/20/18 11:24 AM   Result Value Ref Range    WBC 9.84 3.50 - 10.80 10*3/mm3    RBC 4.23 3.89 - 5.14 10*6/mm3    Hemoglobin 12.8 11.5 - 15.5 g/dL    Hematocrit 39.2 34.5 - 44.0 %    MCV 92.7 80.0 - 99.0 fL    MCH 30.3 27.0 - 31.0 pg    MCHC 32.7 32.0 - 36.0 g/dL    RDW 12.7 11.3 - 14.5 %    RDW-SD 43.0 37.0 - 54.0 fl    MPV 9.8 6.0 - 12.0 fL    Platelets 229 150 - 450 10*3/mm3    Neutrophil % 67.6 41.0 - 71.0 %    Lymphocyte % 23.2 (L) 24.0 - 44.0 %    Monocyte % 7.4 0.0 - 12.0 %    Eosinophil % 1.5 0.0 - 3.0 %    Basophil % 0.1 0.0 - 1.0 %    Immature Grans % 0.2 0.0 - 0.6 %    Neutrophils, Absolute 6.65 1.50 - 8.30 10*3/mm3    Lymphocytes, Absolute 2.28 0.60 - 4.80 10*3/mm3     Monocytes, Absolute 0.73 0.00 - 1.00 10*3/mm3    Eosinophils, Absolute 0.15 0.00 - 0.30 10*3/mm3    Basophils, Absolute 0.01 0.00 - 0.20 10*3/mm3    Immature Grans, Absolute 0.02 0.00 - 0.03 10*3/mm3   POC Troponin, Rapid    Collection Time: 01/20/18 11:30 AM   Result Value Ref Range    Troponin I 0.00 0.00 - 0.07 ng/mL     Note: In addition to lab results from this visit, the labs listed above may include labs taken at another facility or during a different encounter within the last 24 hours. Please correlate lab times with ED admission and discharge times for further clarification of the services performed during this visit.    XR Chest 1 View   Final Result   Borderline cardiomegaly with tortuous ectatic appearance of   the thoracic aorta however no focal consolidation or parenchymal   disease.       DICTATED:     01/20/2018   EDITED    :     01/20/2018        This report was finalized on 1/20/2018 6:28 PM by Dr. Sunny Ramirez.          MRI Brain Without Contrast   Final Result   No acute intracranial abnormality with encephalomalacia from   prior left MCA insult noted involving the left frontotemporal region.       DICTATED:     01/20/2018   EDITED    :     01/20/2018             This report was finalized on 1/20/2018 5:05 PM by Dr. Sunny Ramirez.          CT Head Without Contrast Stroke Protocol   Final Result   1. No acute intracranial abnormality.   2. Encephalomalacia from prior left MCA infarction involving the   frontotemporal region.       Patient scanned 1/20/2018 at 1111 hours. Scan report given to ER   physician and team at 1114 hours on 1/20/2018.       DICTATED:     01/20/2018   EDITED    :     01/20/2018            This report was finalized on 1/20/2018 4:10 PM by Dr. Sunny Ramirez.          CT Cerebral Perfusion With & Without Contrast   Final Result   Normal CT cerebral perfusion.       DICTATED:     01/20/2018   EDITED    :     01/20/2018        This report was finalized on 1/20/2018 4:10 PM by  Dr. Sunny Ramirez.          CT Angiogram Head With & Without Contrast   Final Result   CTA of the head and neck without hemodynamically significant   stenosis, aneurysm or occlusion identified. Minimal atherosclerotic   involvement of the carotid bulbs and distal V4 segments of the vertebral   arteries without hemodynamically significant stenosis noted.       DICTATED:     01/20/2018   EDITED    :     01/20/2018        This report was finalized on 1/20/2018 4:10 PM by Dr. Sunny Ramirez.          CT Angiogram Neck With & Without Contrast   Final Result   CTA of the head and neck without hemodynamically significant   stenosis, aneurysm or occlusion identified. Minimal atherosclerotic   involvement of the carotid bulbs and distal V4 segments of the vertebral   arteries without hemodynamically significant stenosis noted.       DICTATED:     01/20/2018   EDITED    :     01/20/2018        This report was finalized on 1/20/2018 4:10 PM by Dr. Sunny Ramirez.            Vitals:    01/20/18 1207 01/20/18 1300 01/20/18 1624 01/20/18 1900   BP:  171/74 164/78 142/81   BP Location:  Right arm Left arm Left arm   Patient Position:  Lying Lying Lying   Pulse: 72 72 83 83   Resp:  18 18 18   Temp:  98 °F (36.7 °C) 98.9 °F (37.2 °C) 98.9 °F (37.2 °C)   TempSrc:  Oral Oral Oral   SpO2: 99% 96% 96% 93%   Weight:       Height:         Medications   sodium chloride 0.9 % flush 10 mL (not administered)   folic acid (FOLVITE) tablet 1 mg (not administered)   ALPRAZolam (XANAX) tablet 0.25 mg (not administered)   HYDROcodone-acetaminophen (NORCO)  MG per tablet 1 tablet (not administered)   atorvastatin (LIPITOR) tablet 80 mg ( Oral Canceled Entry 1/20/18 2100)   sodium chloride 0.9 % flush 1-10 mL (not administered)   sodium chloride 0.9 % flush 1-10 mL (not administered)   acetaminophen (TYLENOL) tablet 650 mg (not administered)     Or   acetaminophen (TYLENOL) suppository 650 mg (not administered)   potassium chloride (MICRO-K) CR  capsule 40 mEq (not administered)     Or   potassium chloride (KLOR-CON) packet 40 mEq (not administered)     Or   potassium chloride 20 mEq in 250 mL IVPB (not administered)   aspirin tablet 325 mg (not administered)     Or   aspirin suppository 300 mg (not administered)   bisacodyl (DULCOLAX) EC tablet 5 mg (not administered)   bisacodyl (DULCOLAX) suppository 10 mg (not administered)   promethazine (PHENERGAN) injection 12.5 mg (12.5 mg Intravenous Given 1/20/18 1423)   levETIRAcetam in NaCl 0.82% (KEPPRA) IVPB 500 mg (not administered)   iopamidol (ISOVUE-370) 76 % injection 150 mL (115 mL Intravenous Given 1/20/18 1137)   sodium chloride 0.9 % bolus 1,000 mL (1,000 mL Intravenous New Bag 1/20/18 1217)   aspirin suppository 300 mg (300 mg Rectal Given 1/20/18 1218)   HYDROmorphone (DILAUDID) injection 0.5 mg (0.5 mg Intravenous Given 1/20/18 1424)   levETIRAcetam in NaCl 0.75% (KEPPRA) IVPB 1,000 mg (1,000 mg Intravenous New Bag 1/20/18 1621)     ECG/EMG Results (last 24 hours)     ** No results found for the last 24 hours. **                          MDM  Number of Diagnoses or Management Options  Combined receptive and expressive aphasia:   Essential hypertension:   High cholesterol:   History of atrial fibrillation:   History of stroke:   Diagnosis management comments: ECG/EMG Results (last 24 hours)     Procedure Component Value Units Date/Time    ECG 12 Lead ([id number]) Collected:  01/20/18 1141     Updated:  01/20/18 2032    Narrative:       Test Reason : Acute Stroke Protocol (onset < 12 hrs)  Blood Pressure : **/** mmHG  Vent. Rate : 072 BPM     Atrial Rate : 072 BPM     P-R Int : 162 ms          QRS Dur : 082 ms      QT Int : 418 ms       P-R-T Axes : 074 014 064 degrees     QTc Int : 457 ms    Normal sinus rhythm  When compared with ECG of 15-DEC-2016 12:17,  Nonspecific T wave abnormality has replaced inverted T waves in Inferior  leads  Nonspecific T wave abnormality no longer evident in Anterior  leads  QT has lengthened  Confirmed by GA BRENNAN MD ([id number]) on 1/20/2018 8:32:43 PM    Referred By:  HENRY           Confirmed By:GA BRENNAN MD             Amount and/or Complexity of Data Reviewed  Clinical lab tests: reviewed  Tests in the radiology section of CPT®: reviewed  Decide to obtain previous medical records or to obtain history from someone other than the patient: yes  Obtain history from someone other than the patient: yes  Review and summarize past medical records: yes  Discuss the patient with other providers: yes  Independent visualization of images, tracings, or specimens: yes    Critical Care  Total time providing critical care: 30-74 minutes      Final diagnoses:   Combined receptive and expressive aphasia   History of stroke   History of atrial fibrillation   High cholesterol   Essential hypertension       Documentation assistance provided by fabiana MENJIVAR.  Information recorded by the scribe was done at my direction and has been verified and validated by me.     Sloane Menjivar  01/20/18 1132       Sloane Menjivar  01/20/18 1231       Ga Brennan MD  01/20/18 1936

## 2018-01-20 NOTE — NURSING NOTE
Pt transported to MRI via bed when returned to floor iv keppra given pt more awake but still postictal but responding verbally and appropiate daughter and spouse at bedside Dr. Godwin called with his results of MRI and I explained his news to all family members and appears that they understand results will continue to monitor

## 2018-01-20 NOTE — CONSULTS
"Subjective     CC: stroke    History of Present Illness   Jenna Russell is a 77 y.o. female is seen today in consultation at the request of Dr. Brennan for stroke. This morning her partner noted her to have sudden onset speech impairment. Her ability to verbalize was severely limited. She had an associated headache but no other associated symptoms. She has a prior history of stroke with aphasia, but had largely recovered from this.     Her partner noted improvement until about 2:30 pm this afternoon when her face jerilyn and she subsequently had generalized tonic-clonic movements. She is now post-ictal. Ms Russell is unable to provide any history herself, and so the history is primarily obtained through discussions with family, nursing and review of her records.    I have reviewed and confirmed the past family, social and medical history as accurate on 1/20/18.     Review of Systems   Unable to perform ROS: Mental status change       Objective   General appearance today is normal.   Peripheral pulses were present and symmetric.   The ophthalmoscopic exam today is unremarkable. This discs and posterior elements are unremarkable.    /74 (BP Location: Right arm, Patient Position: Lying)  Pulse 72  Temp 98 °F (36.7 °C) (Oral)   Resp 18  Ht 165.1 cm (65\")  Wt 55.3 kg (122 lb)  SpO2 96%  BMI 20.3 kg/m2    Physical Exam   Neurological:   Reflex Scores:       Tricep reflexes are 1+ on the right side and 1+ on the left side.       Bicep reflexes are 1+ on the right side and 1+ on the left side.       Brachioradialis reflexes are 1+ on the right side and 1+ on the left side.       Patellar reflexes are 1+ on the right side and 1+ on the left side.       Achilles reflexes are 1+ on the right side and 1+ on the left side.       Neurologic Exam     Mental Status        Ms Russell is unresponsive with sonorous respirations (post-ictal), making testing of orientation, memory, attention, language and fund of knowledge " impossible     Cranial Nerves        CN testing is limited by depressed mental status    CN II-no blink to threat  CN III, IV, VI- VOR's intact  CN V-corneals intact bilaterally  CN VII-facies symmetric  CN VIII, IX, X, XI, XII-not testable     Motor Exam   Muscle bulk: normal  Overall muscle tone: normal       No withdrawal to noxious stimuli     Sensory Exam        No withdrawal to noxious stimuli     Gait, Coordination, and Reflexes     Gait  Gait: (not testable due to depressed mental status)    Reflexes   Right brachioradialis: 1+  Left brachioradialis: 1+  Right biceps: 1+  Left biceps: 1+  Right triceps: 1+  Left triceps: 1+  Right patellar: 1+  Left patellar: 1+  Right achilles: 1+  Left achilles: 1+  Right plantar: upgoing  Left plantar: normal       Gait, station and coordination is untestable due to depressed mental state       Laboratory and radiological testing are notable for troponin=0.00, normal CBC/AST/ALT. Head CT is consistent with prior left temporal infarct (I reviewed images personally). MRI is pending.      Assessment/Plan     Jenna Russell presents with suspected stroke and now seizure. I will start Keppra initially and await her MRI. If this shows no evidence of new stroke it is possible that her presenting symptoms were related to unrecognized partial seizure. Alternatively, seizure could be related to new or prior stroke. She is on both Eliquis and ASA under the direction of cardiology, and I will not change this unless a large infarct is seen on MRI, at which point de-escalation of treatment may need to be considered.     As part of this visit I reviewed prior lab results, reviewed radiology images, obtained additional history from the family which is incorporated in the HPI and reviewed records from the current hospitalization which is incorporated in the HPI. Please see above for details.

## 2018-01-20 NOTE — H&P
"    [facility] Medicine Services  HISTORY AND PHYSICAL    Patient Name: Jenna Russell  : 1940  MRN: [id number]  Primary Care Physician: Henrietta Torres MD    Subjective   Subjective     Chief Complaint:  Difficulty speaking    HPI:  Jenna Russell is a 77 y.o.  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  She presented to [facility] ER this morning with spouse and family reporting that she had acute onset of worsened expressive aphasia at 0830 today.  CT head showed known left temporal defect, CTA head and neck did not show any significant stenosis, CT perfusion scan is read as \"normal.\"  She has remained in SR since her CHARLI ablation and has weaned off her sotolol.  However, she was left on Eliquis for \"stroke prophylaxis\" per her last cardiology note with Dr. Gilliam on 2017 as well as ASA 81mg.      As I was preparing to evaluate the patient, the bedside RN told me that the patient had just had a witnessed seizure.  Dr. Godwin was also nearby and we evaluated the patient together.  The patient's spouse was present and she stated that the patient's expressive aphasia had continued to improve until the onset of the seizure at approximately 2:30 PM.       Review of Systems   Unable to obtain due to patient's post ictal status    Otherwise 10-system ROS reviewed and is negative except as mentioned in the HPI.    Personal History     Past Medical History:   Diagnosis Date   • Acute ischemic stroke    • Arterial ischemic stroke 2016   • Ataxic aphasia 2016   • BP (high blood pressure) 2016   • Cerebral infarction, left hemisphere 2016   • Cyst of left ovary 2016    3cm   • Expressive aphasia    • H/O echocardiogram 2015    Global LV wall motion and contractility within normal limits. Normal " LVSF.Normal LV diastolic filling. Left atrium midly dilated. RV mild- mod dilated. MIld aortic cusp sclerosis. No evidence of mitral valve prolapse. Mild mitral regurgitation.No pulmonary hypertension or perdicardial effusion. No dilation of aortic root. Venous system appears normal   • H/O: stroke    • HLD (hyperlipidemia) 9/8/2016   • Left temporal lobe infarction    • LOM (loss of memory)    • Osteoarthritis 9/8/2016   • Paroxysmal atrial fibrillation 9/8/2016   • Thyroid nodule 9/8/2016    1.8x1.2cm   • Weakness generalized    • Wound infection after surgery     Left hip arthroplasty, staph aureus, 6 weeks IV Rocephin       Past Surgical History:   Procedure Laterality Date   • ABDOMINOPLASTY     • BREAST ABSCESS INCISION AND DRAINAGE     • TOTAL HIP ARTHROPLASTY Left     Staph aureus wound infection       Family History: family history includes Diabetes in her mother; Heart attack in her father and maternal grandfather; Heart disease in her paternal grandfather; Heart failure in her father; Hypertension in her mother; Mental illness in her mother; No Known Problems in her sister and sister; Other in her mother; Stroke in her mother.     Social History:  reports that she quit smoking about 46 years ago. She quit after 2.00 years of use. She has never used smokeless tobacco. She reports that she drinks about 8.4 oz of alcohol per week  She reports that she does not use illicit drugs.  Social History     Social History Narrative    With her partner Lianna for 36yrs.. She drinks 1 cup of coffee per day.       Medications:  Prescriptions Prior to Admission   Medication Sig Dispense Refill Last Dose   • ALPRAZolam (XANAX) 0.5 MG tablet Take 0.25 mg by mouth 2 (Two) Times a Day As Needed.      • apixaban (ELIQUIS) 5 MG tablet tablet Take 5 mg by mouth 2 (two) times a day.   Taking   • aspirin 81 MG EC tablet Take 81 mg by mouth daily.   Taking   • atorvastatin (LIPITOR) 40 MG tablet Take 40 mg by mouth Every Night.    Taking   • calcium carbonate EX (TUMS EX) 750 MG chewable tablet Chew 750 mg 2 (Two) Times a Day.   Taking   • Cholecalciferol (VITAMIN D) 2000 UNITS capsule Take 2,000 Units by mouth Daily.      • folic acid (FOLVITE) 1 MG tablet Take 1 mg by mouth Daily.  0    • hyoscyamine (ANASPAZ,LEVSIN) 0.125 MG tablet Take 0.125 mg by mouth every 4 (four) hours as needed for cramping.   Taking   • diclofenac (FLECTOR) 1.3 % patch patch Apply 1 patch topically 2 (Two) Times a Day. 14 patch 0    • HYDROcodone-acetaminophen (NORCO)  MG per tablet Take 1 tablet by mouth Every 6 (Six) Hours As Needed for moderate pain (4-6) for up to 20 doses. 20 tablet 0    • sotalol (BETAPACE) 120 MG tablet Take 120 mg by mouth Every Morning.   Taking   • sotalol (BETAPACE) 80 MG tablet Take 80 mg by mouth Every Night.  0        Allergies   Allergen Reactions   • Penicillins Anaphylaxis       Objective   Objective     Vital Signs:   Temp:  [97.9 °F (36.6 °C)-98.9 °F (37.2 °C)] 98.9 °F (37.2 °C)  Heart Rate:  [71-83] 83  Resp:  [14-18] 18  BP: (148-171)/(74-78) 164/78   Total (NIH Stroke Scale): 3    Physical Exam   Constitutional: No acute distress, post-ictal, snoring.  Eyes: PERRLA, sclerae anicteric, no conjunctival injection  HENT: NCAT, mucous membranes moist  Neck: Supple, no thyromegaly, no lymphadenopathy, trachea midline  Respiratory: Clear to auscultation bilaterally, nonlabored respirations   Cardiovascular: RRR,palpable pedal pulses bilaterally  Gastrointestinal: Positive bowel sounds, soft, nontender, nondistended  Musculoskeletal: No bilateral ankle edema, no clubbing or cyanosis to extremities  Psychiatric: Unable to assess  Neurologic: Post-ictal, snoring, see Dr. Godwin's exam for details.  Skin: No rashes      Results Reviewed:  I have personally reviewed current lab, radiology, and data and agree.      Results from last 7 days  Lab Units 01/20/18  1124  01/20/18  1122   WBC 10*3/mm3 9.84  --   --    HEMOGLOBIN g/dL 12.8   --   --    HEMOGLOBIN, POC   --   < >  --    HEMATOCRIT % 39.2  --   --    HEMATOCRIT POC   --   < >  --    PLATELETS 10*3/mm3 229  --   --    INR   --   --  1.1   < > = values in this interval not displayed.    Results from last 7 days  Lab Units 01/20/18  1124 01/20/18  1123   CREATININE mg/dL  --  0.50*   ALT (SGPT) U/L 23  --    AST (SGOT) U/L 27  --      Brief Urine Lab Results     None        No results found for: BNP  No results found for: PHART  Imaging Results (last 24 hours)     Procedure Component Value Units Date/Time    XR Chest 1 View [[id number]] Updated:  01/20/18 1239    CT Angiogram Neck With & Without Contrast [[id number]] Collected:  01/20/18 1543     Updated:  01/20/18 1613    Narrative:       EXAMINATION: CT ANGIOGRAM HEAD W WO CONTRAST, CT ANGIOGRAM NECK W WO  CONTRAST - 01/20/2018     INDICATION: Stroke.      TECHNIQUE: CT angiogram head and neck with and without intravenous  contrast. 2D reconstructions performed.     The radiation dose reduction device was turned on for each scan per the  ALARA (As Low as Reasonably Achievable) protocol.     COMPARISON: None.     FINDINGS:      NECK: Normal 3 vessel arch with patent great vessel origins despite mild  to moderate atherosclerotic involvement of the left subclavian takeoff.  Lung apices grossly clear. Right dominant vertebral artery system  demonstrating patency without evidence for hemodynamically significant  stenosis, aneurysm or occlusion of the cervical segments. Cervical  carotid systems in normal course and branching pattern with minimal  atherosclerotic involvement of the carotid bulbs bilaterally with  approximately 10% luminal narrowing right and 0% left by NASCET  criteria. Noted heterogeneous enhancing thyroid with 17 mm right thyroid  nodule.     HEAD: Distal internal carotid arteries demonstrate minimal  atherosclerotic involvement and calcifications however patent without  aneurysm, hemodynamically significant stenosis or  occlusion. [place] branch vessels including anterior cerebral, middle cerebral and  posterior cerebral arteries without hemodynamically significant  stenosis, aneurysm or occlusion. Vertebrobasilar system demonstrates  atherosclerotic involvement of the V4 segments bilateral vertebral  arteries with only moderate luminal narrowing however no hematemesis,  significant stenosis, aneurysm or occlusion otherwise identified.       Impression:       CTA of the head and neck without hemodynamically significant  stenosis, aneurysm or occlusion identified. Minimal atherosclerotic  involvement of the carotid bulbs and distal V4 segments of the vertebral  arteries without hemodynamically significant stenosis noted.     DICTATED:     01/20/2018  EDITED    :     01/20/2018      This report was finalized on 1/20/2018 4:10 PM by Dr. Sunny Ramirez.       CT Head Without Contrast Stroke Protocol [[id number]] Collected:  01/20/18 1524     Updated:  01/20/18 1613    Narrative:       EXAMINATION: CT HEAD WO CONTRAST - 01/20/2018     INDICATION: Ataxia.      TECHNIQUE: Axial CT of the head without intravenous contrast  administration.     The radiation dose reduction device was turned on for each scan per the  ALARA (As Low as Reasonably Achievable) protocol.     COMPARISON: MRI 10/23/2015.     FINDINGS: Encephalomalacia left frontotemporal region consistent with  prior insult however no new area of infarct,  intracranial hemorrhage or  extra-axial fluid collection is identified with midline structures  symmetric. No evidence for midline shift. Globes and orbits  unremarkable. Visualized paranasal sinuses and mastoid air cells are  grossly clear with calvarium intact.       Impression:       1. No acute intracranial abnormality.  2. Encephalomalacia from prior left MCA infarction involving the  frontotemporal region.     Patient scanned 1/20/2018 at 1111 hours. Scan report given to ER  physician and team at 1114 hours on  1/20/2018.     DICTATED:     01/20/2018  EDITED    :     01/20/2018         This report was finalized on 1/20/2018 4:10 PM by Dr. Sunny Ramirez.       CT Cerebral Perfusion With & Without Contrast [[id number]] Collected:  01/20/18 1540     Updated:  01/20/18 1613    Narrative:       EXAMINATION: CT CEREBRAL PERFUSION W WO CONTRAST - 01/20/2018     INDICATION: Neuro deficit(s).     TECHNIQUE: CT cerebral perfusion with and without intravenous contrast  administration. Multiparametric maps including mean transit time, time  to drain, cerebral blood flow and cerebral blood volume were calculated.     The radiation dose reduction device was turned on for each scan per the  ALARA (As Low as Reasonably Achievable) protocol.     COMPARISON: CT stroke protocol performed earlier same day.     FINDINGS: No focal area of prolongation of mean transit time or time to  drain. Cerebral blood flow and cerebral blood volume are within normal  limits and without defect.       Impression:       Normal CT cerebral perfusion.     DICTATED:     01/20/2018  EDITED    :     01/20/2018      This report was finalized on 1/20/2018 4:10 PM by Dr. Sunny Ramirez.       CT Angiogram Head With & Without Contrast [[id number]] Collected:  01/20/18 1543     Updated:  01/20/18 1613    Narrative:       EXAMINATION: CT ANGIOGRAM HEAD W WO CONTRAST, CT ANGIOGRAM NECK W WO  CONTRAST - 01/20/2018     INDICATION: Stroke.      TECHNIQUE: CT angiogram head and neck with and without intravenous  contrast. 2D reconstructions performed.     The radiation dose reduction device was turned on for each scan per the  ALARA (As Low as Reasonably Achievable) protocol.     COMPARISON: None.     FINDINGS:      NECK: Normal 3 vessel arch with patent great vessel origins despite mild  to moderate atherosclerotic involvement of the left subclavian takeoff.  Lung apices grossly clear. Right dominant vertebral artery system  demonstrating patency without evidence for  hemodynamically significant  stenosis, aneurysm or occlusion of the cervical segments. Cervical  carotid systems in normal course and branching pattern with minimal  atherosclerotic involvement of the carotid bulbs bilaterally with  approximately 10% luminal narrowing right and 0% left by NASCET  criteria. Noted heterogeneous enhancing thyroid with 17 mm right thyroid  nodule.     HEAD: Distal internal carotid arteries demonstrate minimal  atherosclerotic involvement and calcifications however patent without  aneurysm, hemodynamically significant stenosis or occlusion. [place] of  [place] branch vessels including anterior cerebral, middle cerebral and  posterior cerebral arteries without hemodynamically significant  stenosis, aneurysm or occlusion. Vertebrobasilar system demonstrates  atherosclerotic involvement of the V4 segments bilateral vertebral  arteries with only moderate luminal narrowing however no hematemesis,  significant stenosis, aneurysm or occlusion otherwise identified.       Impression:       CTA of the head and neck without hemodynamically significant  stenosis, aneurysm or occlusion identified. Minimal atherosclerotic  involvement of the carotid bulbs and distal V4 segments of the vertebral  arteries without hemodynamically significant stenosis noted.     DICTATED:     2018  EDITED    :     2018      This report was finalized on 2018 4:10 PM by Dr. Sunny Ramirez.       MRI Brain Without Contrast [[id number]] Updated:  18 1614        Results for orders placed during the hospital encounter of 04/04/15   Echo - Converted    Narrative Patient:      IRINA WILBURN    Med Rec#:     [id number]               :          1940            Date:         2015            Age:          74y                   Height:       170.18 cm / 67.0 in  Weight:       58.06 kg / 128.0 lbs  Sex:          F                     BSA:          1.67    Sonographer:  Eben Melgar RDCS,RVT  Referring:     [id number]  Reading:      Eben Calabrese MD  Unit:         3 ICU Excel  ______________________________________________________________________    Transthoracic Echocardiogram    Indication:  CVA  BP:           105/68  Rhythm:       NSR    Conclusions  1. Global left ventricular wall motion and contractility are within  normal limits.  2. There is normal left ventricular systolic function.  3. Normal left ventricular diastolic filling is observed.  4. The left atrium is mildly dilated.  5. The right ventricle is mild to moderately dilated.   6. Mild aortic cusp sclerosis is present.  7. There is no evidence of mitral valve prolapse.  8. There is mild mitral regurgitation.   9. No pulmonary hypertension is noted.  10. There is no evidence of pericardial effusion.  11. There is no dilatation of the aortic root.  12. The venous system appears normal.    Findings       Technical Comments:  The study quality is fair.  The study is technically limited due to poor  apical windows.      Left Ventricle:  The left ventricular chamber size is normal. Global left ventricular  wall motion and contractility are within normal limits. There is normal  left ventricular systolic function. The estimated ejection fraction is  greater than 65%.  Normal left ventricular diastolic filling is  observed.     Left Atrium:  The left atrium is mildly dilated.     Right Ventricle:  The right ventricle is mild to moderately dilated.  The right  ventricular global systolic function is normal.     Right Atrium:  The right atrium is mildly dilated.  No atrial septal defect is  visualized.     Aortic Valve:  The aortic valve is trileaflet. The aortic valve leaflets are mildly  thickened. Mild aortic cusp sclerosis is present. There is no evidence  of aortic regurgitation. There is no evidence of aortic stenosis.     Mitral Valve:  The mitral valve leaflets appear normal. There is no evidence of mitral  valve prolapse. There is mild mitral  regurgitation.  There is no  evidence of mitral stenosis.     Tricuspid Valve:  The tricuspid valve leaflets are normal.  There is mild tricuspid  regurgitation. The right ventricular systolic pressure is estimated to  be 30-35 mmHg.  No pulmonary hypertension is noted.     Pulmonic Valve:  The pulmonic valve appears normal. There is a trace pulmonic  regurgitation.      Pericardium:  There is no evidence of pericardial effusion.     Aorta:  There is no dilatation of the aortic root.     Pulmonary Artery:  The main pulmonary artery appears normal.     Venous:  The venous system appears normal.     Measurements   Chambers  Name                    Value           RVIDd (AP) 2D           3.5 cm          IVSd (2D)               1.1 cm          LVIDd (2D)              4.8 cm          LVIDs (2D)              3 cm            LVPWd (2D)              1.1 cm          EF (2D)                 70 %            Ao root diameter (2D)   3.3 cm          LA dimension (AP) 2D    4.2 cm          LA:Ao ratio (2D)        1.3 ratio         Volumes  Name                    Value           LA ESV SP 4CH (MOD)     63 ml           LA ESV SP 2CH (MOD)     75 ml           LA ESV BP (MOD)         72 ml           LA ESV BP (MOD) index   43.1 ml/m2      LV EDV SP 4CH (MOD)     106 ml          LV ESV SP 4CH (MOD)     34 ml           EF SP 4CH (MOD)         68 %            LV EDV SP 2CH (MOD)     98 ml           LV ESV SP 2CH (MOD)     24 ml           EF SP 2CH (MOD)         76 %            LV EDV BP               104 ml          LV ESV BP               30 ml           BP EF (MOD)             71 %              Diastolic/Systolic Function  Name                    Value           MV E-wave Vmax          0.83 m/sec      MV deceleration time    187 msec        MV A-wave Vmax          0.44 m/sec      MV E:A ratio            1.9 ratio       LV septal e' Vmax       0.1 m/sec       LV lateral e' Vmax      0.1 m/sec       LV E:e' septal ratio    8.2 ratio      "  LV E:e' lateral ratio   8.1 ratio         Aortic Valve  Name                    Value           LVOT diameter           2 cm            LVOT Vmax               0.98 m/sec      LVOT VTI                20.2 cm         LVOT peak gradient      4 mmHg          LVOT mean gradient      2 mmHg          SV LVOT                 63 ml             Tricuspid Valve  Name                    Value           TV Vmax                 2.62 m/sec      TV peak gradient        27 mmHg           Pulmonic Valve/Qp:Qs  Name                    Value           PV acceleration time    120 msec          Electronically signed by: Eben Calabrese MD on 04/05/2015 13:53:51       Assessment/Plan   Assessment / Plan     Hospital Problem List     * (Principal)Aphasia    PAF (paroxysmal atrial fibrillation)    Hypertension    Overview Signed 9/16/2016  9:43 AM by Leonela Sheffield     H/o         History of stroke (Chronic)    Combined receptive and expressive aphasia            Assessment & Plan:  Ms. Russell is a 77 year old  woman who presented to [facility] ED with worsened expressive aphasia and then had a witnessed seizure.  She has a history of prior stroke, atrial fib s/p CHARLI ablation at [facility], in NSR but still on Eliquis.    TIA versus stroke versus generalizing partial seizure  --MRI negative for new stroke, could still be TIA  --Stroke workup including ECHO, CTA complete.  Neuro checks, neuro consultation, statin, continue ASA  --WIll d/c eliquis for now considering cardiology noted it was for \"stroke prophylaxis\" and based on my conversation with Dr. Godwin the eliquis is not required for stroke prophylaxis from his perspective.      Witnessed T/C seizure:  --Kushal per Dr. Andujar  --Seizure precautions    PAF:  --Remains in NSR off sotolol  --COntinue tele  --WIll d/c eliquis for now; may follow up with cardiology as scheduled    HTN: RUnning 140's-160's/70's.  Will allow to ride high for now.  HLD - continue statis      DVT " prophylaxis: Mechanical    CODE STATUS:  Full Code    OBS    Junie Bai MD   01/20/18   4:36 PM

## 2018-01-20 NOTE — NURSING NOTE
Right after I gave pt her dose of dilaudid and phenergan pt had a grandmal seizure .5mg dilaudid and 12.5 phenergan her mouth  Drooped to the right  And she started grunting and clamped her mouth tightly pt nor responsive at this time family was a t bedside and got very upset screaming at saying what did you do to her and what did you give her told them it was dilaudid and phenergan and team members came in to assist until seizure was over seizure pads placed on bed will continue to monitor.

## 2018-01-20 NOTE — SIGNIFICANT NOTE
01/20/18 1443   SLP Deferred Reason   SLP Deferred Reason Unable to evaluate, medical status change  (Unable to eval - pt seizing at time of attempt. With MD. Helder f/u for status 1/21. )

## 2018-01-21 ENCOUNTER — APPOINTMENT (OUTPATIENT)
Dept: CARDIOLOGY | Facility: HOSPITAL | Age: 78
End: 2018-01-21
Attending: FAMILY MEDICINE

## 2018-01-21 LAB
ANION GAP SERPL CALCULATED.3IONS-SCNC: 12 MMOL/L (ref 3–11)
ARTICHOKE IGE QN: 69 MG/DL (ref 0–130)
BH CV ECHO MEAS - LAT PEAK E' VEL: 7.8 CM/SEC
BH CV ECHO MEAS - MED PEAK E' VEL: 7 CM/SEC
BH CV ECHO MEAS - TAPSE (>1.6): 2.2 CM2
BH CV XLRA - RV BASE: 2.1 CM
BH CV XLRA - RV LENGTH: 6 CM
BH CV XLRA - RV MID: 1.5 CM
BH CV XLRA - TDI S': 15.8 CM/SEC
BUN BLD-MCNC: 8 MG/DL (ref 9–23)
BUN/CREAT SERPL: 16 (ref 7–25)
CALCIUM SPEC-SCNC: 8.9 MG/DL (ref 8.7–10.4)
CHLORIDE SERPL-SCNC: 107 MMOL/L (ref 99–109)
CHOLEST SERPL-MCNC: 147 MG/DL (ref 0–200)
CO2 SERPL-SCNC: 23 MMOL/L (ref 20–31)
CREAT BLD-MCNC: 0.5 MG/DL (ref 0.6–1.3)
DEPRECATED RDW RBC AUTO: 43.5 FL (ref 37–54)
E/E' RATIO: 8.8
ERYTHROCYTE [DISTWIDTH] IN BLOOD BY AUTOMATED COUNT: 12.9 % (ref 11.3–14.5)
GFR SERPL CREATININE-BSD FRML MDRD: 120 ML/MIN/1.73
GLUCOSE BLD-MCNC: 75 MG/DL (ref 70–100)
HBA1C MFR BLD: 5.6 % (ref 4.8–5.6)
HCT VFR BLD AUTO: 34.8 % (ref 34.5–44)
HDLC SERPL-MCNC: 62 MG/DL (ref 40–60)
HGB BLD-MCNC: 11.2 G/DL (ref 11.5–15.5)
LEFT ATRIUM VOLUME INDEX: 23.1 ML/M2
MAXIMAL PREDICTED HEART RATE: 145 BPM
MCH RBC QN AUTO: 29.7 PG (ref 27–31)
MCHC RBC AUTO-ENTMCNC: 32.2 G/DL (ref 32–36)
MCV RBC AUTO: 92.3 FL (ref 80–99)
PLATELET # BLD AUTO: 215 10*3/MM3 (ref 150–450)
PMV BLD AUTO: 10.7 FL (ref 6–12)
POTASSIUM BLD-SCNC: 3.3 MMOL/L (ref 3.5–5.5)
RBC # BLD AUTO: 3.77 10*6/MM3 (ref 3.89–5.14)
SODIUM BLD-SCNC: 142 MMOL/L (ref 132–146)
STRESS TARGET HR: 123 BPM
TRIGL SERPL-MCNC: 80 MG/DL (ref 0–150)
WBC NRBC COR # BLD: 11.24 10*3/MM3 (ref 3.5–10.8)

## 2018-01-21 PROCEDURE — 93306 TTE W/DOPPLER COMPLETE: CPT

## 2018-01-21 PROCEDURE — 80061 LIPID PANEL: CPT | Performed by: FAMILY MEDICINE

## 2018-01-21 PROCEDURE — 92610 EVALUATE SWALLOWING FUNCTION: CPT

## 2018-01-21 PROCEDURE — 83036 HEMOGLOBIN GLYCOSYLATED A1C: CPT | Performed by: FAMILY MEDICINE

## 2018-01-21 PROCEDURE — 92523 SPEECH SOUND LANG COMPREHEN: CPT

## 2018-01-21 PROCEDURE — 99233 SBSQ HOSP IP/OBS HIGH 50: CPT | Performed by: INTERNAL MEDICINE

## 2018-01-21 PROCEDURE — G8998 SWALLOW D/C STATUS: HCPCS

## 2018-01-21 PROCEDURE — 99232 SBSQ HOSP IP/OBS MODERATE 35: CPT | Performed by: PSYCHIATRY & NEUROLOGY

## 2018-01-21 PROCEDURE — 85027 COMPLETE CBC AUTOMATED: CPT | Performed by: FAMILY MEDICINE

## 2018-01-21 PROCEDURE — 25010000002 LORAZEPAM PER 2 MG: Performed by: PSYCHIATRY & NEUROLOGY

## 2018-01-21 PROCEDURE — 97166 OT EVAL MOD COMPLEX 45 MIN: CPT

## 2018-01-21 PROCEDURE — 25010000002 LEVETIRACETAM IN NACL 0.82% 500 MG/100ML SOLUTION: Performed by: PSYCHIATRY & NEUROLOGY

## 2018-01-21 PROCEDURE — 80048 BASIC METABOLIC PNL TOTAL CA: CPT | Performed by: FAMILY MEDICINE

## 2018-01-21 PROCEDURE — G8996 SWALLOW CURRENT STATUS: HCPCS

## 2018-01-21 PROCEDURE — G8997 SWALLOW GOAL STATUS: HCPCS

## 2018-01-21 RX ORDER — LEVETIRACETAM 5 MG/ML
500 INJECTION INTRAVASCULAR ONCE
Status: DISCONTINUED | OUTPATIENT
Start: 2018-01-21 | End: 2018-01-23 | Stop reason: HOSPADM

## 2018-01-21 RX ORDER — LORAZEPAM 2 MG/ML
1 INJECTION INTRAMUSCULAR EVERY 6 HOURS PRN
Status: DISCONTINUED | OUTPATIENT
Start: 2018-01-21 | End: 2018-01-22

## 2018-01-21 RX ADMIN — APIXABAN 5 MG: 5 TABLET, FILM COATED ORAL at 12:59

## 2018-01-21 RX ADMIN — LORAZEPAM 1 MG: 2 INJECTION INTRAMUSCULAR; INTRAVENOUS at 19:25

## 2018-01-21 RX ADMIN — ASPIRIN 325 MG ORAL TABLET 325 MG: 325 PILL ORAL at 12:57

## 2018-01-21 RX ADMIN — ATORVASTATIN CALCIUM 80 MG: 40 TABLET, FILM COATED ORAL at 20:44

## 2018-01-21 RX ADMIN — LEVETIRACETAM 500 MG: 5 INJECTION INTRAVENOUS at 03:15

## 2018-01-21 RX ADMIN — APIXABAN 5 MG: 5 TABLET, FILM COATED ORAL at 20:44

## 2018-01-21 RX ADMIN — FOLIC ACID 1 MG: 1 TABLET ORAL at 12:58

## 2018-01-21 RX ADMIN — LEVETIRACETAM 500 MG: 5 INJECTION INTRAVENOUS at 15:26

## 2018-01-21 RX ADMIN — ACETAMINOPHEN 650 MG: 325 TABLET, FILM COATED ORAL at 12:59

## 2018-01-21 NOTE — THERAPY EVALUATION
Acute Care - Speech Language Pathology Initial Evaluation  [facility]   Cognitive-Communication Evaluation       Patient Name: Jenna Russell  : 1940  MRN: [id number]  Today's Date: 2018     Date of Referral to SLP: 18         Admit Date: 2018     Visit Dx:    ICD-10-CM ICD-9-CM   1. Combined receptive and expressive aphasia R47.01 784.3   2. History of stroke Z86.73 V12.54   3. History of atrial fibrillation Z86.79 V12.59   4. High cholesterol E78.00 272.0   5. Essential hypertension I10 401.9   6. Impaired mobility and ADLs Z74.09 799.89     Patient Active Problem List   Diagnosis   • Pneumothorax, right   • Fracture of multiple ribs of right side with routine healing   • PAF (paroxysmal atrial fibrillation)   • High cholesterol   • Hypertension   • Atrial fibrillation   • Bradycardia   • History of stroke   • Aphasia   • Combined receptive and expressive aphasia     Past Medical History:   Diagnosis Date   • Acute ischemic stroke    • Arterial ischemic stroke 2016   • Ataxic aphasia 2016   • BP (high blood pressure) 2016   • Cerebral infarction, left hemisphere 2016   • Cyst of left ovary 2016    3cm   • Expressive aphasia    • H/O echocardiogram 2015    Global LV wall motion and contractility within normal limits. Normal LVSF.Normal LV diastolic filling. Left atrium midly dilated. RV mild- mod dilated. MIld aortic cusp sclerosis. No evidence of mitral valve prolapse. Mild mitral regurgitation.No pulmonary hypertension or perdicardial effusion. No dilation of aortic root. Venous system appears normal   • H/O: stroke    • HLD (hyperlipidemia) 2016   • Left temporal lobe infarction    • LOM (loss of memory)    • Osteoarthritis 2016   • Paroxysmal atrial fibrillation 2016   • Thyroid nodule 2016    1.8x1.2cm   • Weakness generalized    • Wound infection after surgery     Left hip arthroplasty, staph aureus, 6 weeks IV Rocephin     Past Surgical  History:   Procedure Laterality Date   • ABDOMINOPLASTY     • BREAST ABSCESS INCISION AND DRAINAGE     • TOTAL HIP ARTHROPLASTY Left     Staph aureus wound infection          SLP EVALUATION (last 72 hours)      SLP Evaluation       01/21/18 1100                Rehab Evaluation    Patient Effort, Rehab Treatment excellent  -SG        Symptoms Noted During/After Treatment none  -SG        General Information    Patient Profile Review yes  -SG        Onset of Illness/Injury 01/20/18  -SG        Subjective Patient Observations pt alert and cooperative  -SG        Pertinent History Of Current Problem adm w/ seizure, aphasia  -SG        Current Diet Limitations regular solid;thin liquids  -SG        Precautions/Limitations, Vision WFL with corrective lenses   no present at hospital, partner will obtain  -SG        Precautions/Limitations, Hearing WFL  -SG        Prior Level of Function- Communication functional for ADL's without assistance  -SG        Prior Level of Function- Swallowing no diet consistency restrictions  -SG        Plans/Goals Discussed With patient;spouse/S.O.;agreed upon  -SG        Barriers to Rehab none identified  -SG        Clinical Impression    Date of Referral to SLP 01/21/18  -SG        SLP Diagnosis severe expressive/receptive aphasia  -SG        Prognosis Good  -SG        Functional Level At Time Of Evaluation impaired  -SG        Patient's Goals For Discharge return to all previous roles/activities  -SG        Family Goals For Discharge patient able to return to all previous activities/roles  -SG        Criteria for Skilled Therapeutic Interventions Met skilled criteria for speech language intervention met  -SG        Rehab Potential good, to achieve stated therapy goals  -SG        Therapy Frequency 5 times/wk  -SG        Predicted Duration of Therapy Intervention (days/wks) until discharge  -SG        Anticipated Discharge Disposition home with outpatient services  -SG        Pain Assessment     Pain Assessment No/denies pain  -SG        Auditory Comprehen Assess/Intervention    Auditory Comprehension severe impairment  -SG        Auditory Comprehen Assess/Intervention    Able to Identify Objects severe impairment  -SG        Identify Objects Comment Max cues for 1 object identification  -SG        Able to Identify Pictures severe impairment  -SG        Answers Yes/No Questions moderate impairment;severe impairment;successful, basic questions  -SG        Yes/No Questions Comment Used Y/N cards which enhanced pt's ability to communicate Y/N  -SG        Able to Follow Commands moderate impairment;severe impairment;success, 1 step commands   Max cues  -SG        Successful Auditory Strategies repetition;visual cues;verbal cues;eliminate environmental distractions  -SG        Auditory Strategies Intervention [place]', repetition, elimination distractions. Pt appears to process bed auditorily  -        Verbal Expression Assess/Intervention    Automatic Speech WNL/WFL;successful, spontaneous greeting  -        Speech Repetition severe impairment  -SG        Conversational Speech severe impairment  -SG        Initiation of Phonation WNL/WFL  -        Verbal Expression - Sequential Rapid Sounds severe impairment  -SG        Verbal Expression - Alternating Rapid Sounds severe impairment  -SG        Reading Assessment/Intervention    Reading Skills moderate impairment;severe impairment;other (see comments)   at word level and max cues, pt able to match word to picture  -        Reading, Matching Ability moderate impairment;severe impairment;successful, picture/word  -        Oral Reading Ability moderate impairment;severe impairment;unsuccessful, words  -        Reading Comprehension moderate impairment;severe impairment;unsuccessful, single words  -        Writing Assessment/Intervention    Writing Skills moderate impairment;severe impairment;uses dominant hand  -        Writing, Tracing Ability  moderate impairment;successful, shapes;unsuccessful, letters  -SG        Writing to Dictation severe impairment;successful, letters;unsuccessful, names;unsuccessful, words  -SG        Motor Speech Assess/Intervention    Motor Speech-Apraxia Observations substitutions;distortions;unable/difficult to assess   Difficult to assess 2' pt's severe comrehension deficits,  -SG        Motor Speech- Apraxia successful, words   w/ max cueing  -SG        Motor Speech-Dysarthria Observations no concerns  -SG        Aug/Alt Communication Assess/Interv    Aug/Alt Comm, Communication Book Y/N board  -SG        Improve ability to comprehend words/phrases/sentences    Improve ability to comprehend words/phrases/sentences through: identify objects, field of;80%;with consistent cues   2  -SG        Status: Improve ability to comprehend words/phrases/sentences New  -SG        Ability to Comprehend Words/Phrases/Sentences Progress continue to address  -SG        Improve ability to follow directions    Improve ability to follow directions: one-step directions with objects;80%;with consistent cues  -SG        Status: Improve ability to follow directions New  -SG        Ability to Follow Directions Progress continue to address  -SG        Improve word retrieval skills    Improve word retrieval skills by: naming an object/pic;80%;with consistent cues  -SG        Status: Improve word retrieval skills New  -SG        Word Retrieval Skills Progress continue to address  -SG        Improve will implement strategies of (Expressive)    Implement strategies of: pointing to pictures;imitating object use;imitating gestures;writing;80%;with consistent cues  -SG        Status: Implement strategies of New  -SG        Strategy Implementation Progress continue to address  -SG        Improve motor planning    Improve motor planning to reduce apraxia by: imitating mouth postures;80%;with consistent cues  -SG        Status: Improve motor planning to Status:  Reduce apraxia New  -SG        Motor Planning Progress continue to address  -SG          User Key  (r) = Recorded By, (t) = Taken By, (c) = Cosigned By    Initials Name Effective Dates    SG Carolann Vitale-Oli, MS [facility]-SLP 06/22/15 -            EDUCATION  The patient has been educated in the following areas:   Cognitive Impairment Communication Impairment.    SLP Recommendation and Plan  SLP Diagnosis: severe expressive/receptive aphasia  Prognosis: Good  Rehab Potential: good, to achieve stated therapy goals  Criteria for Skilled Therapeutic Interventions Met: skilled criteria for speech language intervention met  Anticipated Discharge Disposition: home with outpatient services     Therapy Frequency: 5 times/wk  Predicted Duration of Therapy Intervention (days/wks): until discharge       Plan of Care Review  Plan Of Care Reviewed With: patient  Progress:  (Evaluation)  Outcome Summary/Follow up Plan: S/L celia completed this date. Pt presented w/ severe expressive and receptive aphasia and characterisitics of apraxia of speech. Pt has h/o CVA, seizure yesterday. SLp used Y/N board, visual cues, imitation, repetition and [place] during evaluation. Pt stimulable for all responses. Pt appeared to process command best when presented in an auditory manner with no distractions and repetition provided prn. Frequent paraphasias and intermittent jargon during spontaneous converstaion. Writing successful only when pt copying responses, colored communication board too high level for pt's current deficits. Reading/writing responses correct at the word level only w/ copying/repetition. All of these deficits are much worse then pt's baseline according to her partner, Lianna, who was present for evaluation. Multiple pages of exp/rec langauge exercises provided to pt/her partner w/ edu on how to complete each task. Pt would benefit from OP SLP services. Rec: ST, edu, arranging OP SLP services for after d/c.           IP SLP Goals        01/21/18 1138          Receptive Language- Participate Meaningfully    Receptive Language Participate Meaningfully- SLP, Date Established 01/21/18  -SG      Receptive Language Participate Meaningfully- SLP, Time to Achieve by discharge  -SG      Receptive Language Participate Meaningfully- SLP, Date Goal Reviewed 01/21/18  -SG      Receptive Language Participate Meaningfully- SLP, Outcome goal ongoing  -SG      Expressive- Express Needs, Wants, and Emotions    Expressive Express Needs, Wants, and Emotions- SLP, Date Established 01/21/18  -SG      Expressive Express Needs, Wants, and Emotions- SLP, Time to Achieve by discharge  -SG      Expressive Express Needs, Wants, and Emotions- SLP, Date Goal Reviewed 01/21/18  -SG      Expressive Express Needs, Wants, and Emotions- SLP, Outcome goal ongoing  -      Apraxia- Express Needs, Wants, and Emotions    Apraxia Express Needs, Wants, and Emotions- SLP, Date Established 01/21/18  -SG      Apraxia Express Needs, Wants, and Emotions- SLP, Time to Achieve by discharge  -SG      Apraxia Express Needs, Wants, and Emotions- SLP, Date Goal Reviewed 01/21/18  -SG      Apraxia Express Needs, Wants, and Emotions- SLP, Outcome goal ongoing  -        User Key  (r) = Recorded By, (t) = Taken By, (c) = Cosigned By    Initials Name Provider Type    WILBERTO Bingham MS CCC-SLP Speech and Language Pathologist              Time Calculation:         Time Calculation- SLP       01/21/18 1145 01/21/18 0926       Time Calculation- SLP    SLP Start Time 1100  -SG 0840  -SG     SLP Received On 01/21/18  -SG 01/21/18  -SG       User Key  (r) = Recorded By, (t) = Taken By, (c) = Cosigned By    Initials Name Provider Type    WILBERTO Bingham MS CCC-SLP Speech and Language Pathologist          Therapy Charges for Today     Code Description Service Date Service Provider Modifiers Qty    89176042078  ST EVAL ORAL PHARYNG SWALLOW 4 1/21/2018 Carolann Hernandes  Zachery, MS CCC-SLP GN 1    [id number] HC ST SWALLOWING CURRENT STATUS 1/21/2018 Carolann Bingham, MS CCC-SLP GN, CH 1    [id number] HC ST SWALLOWING PROJECTED 1/21/2018 Carolann Bingham, MS CCC-SLP GN, CH 1    [id number] HC ST SWALLOWING DISCHARGE 1/21/2018 Carolann Bingham, MS CCC-SLP GN, CH 1    [id number] HC ST SWALLOWING CURRENT STATUS 1/21/2018 Carolann Bingham, MS CCC-SLP GN, CM 1    [id number] HC ST SWALLOWING PROJECTED 1/21/2018 Carolann Bingham, MS CCC-SLP GN, CL 1    [id number] HC ST EVAL SPEECH AND PROD W LANG  5 1/21/2018 Carolann AlonzoOli, MS CCC-SLP GN 1              SLP G-Codes  Functional Limitations: Spoken language expressive  Swallow Current Status (): At least 80 percent but less than 100 percent impaired, limited or restricted  Swallow Goal Status (): At least 60 percent but less than 80 percent impaired, limited or restricted  Swallow Discharge Status (): 0 percent impaired, limited or restricted      Carolann Bingham, MS CCC-SLP  1/21/2018

## 2018-01-21 NOTE — THERAPY DISCHARGE NOTE
Acute Care - Occupational Therapy Initial Eval/Discharge  [facility]     Patient Name: Jenna Russell  : 1940  MRN: [id number]  Today's Date: 2018  Onset of Illness/Injury or Date of Surgery Date: 18  Date of Referral to OT: 18  Referring Physician: Richardson      Admit Date: 2018       ICD-10-CM ICD-9-CM   1. Combined receptive and expressive aphasia R47.01 784.3   2. History of stroke Z86.73 V12.54   3. History of atrial fibrillation Z86.79 V12.59   4. High cholesterol E78.00 272.0   5. Essential hypertension I10 401.9   6. Impaired mobility and ADLs Z74.09 799.89     Patient Active Problem List   Diagnosis   • Pneumothorax, right   • Fracture of multiple ribs of right side with routine healing   • PAF (paroxysmal atrial fibrillation)   • High cholesterol   • Hypertension   • Atrial fibrillation   • Bradycardia   • History of stroke   • Aphasia   • Combined receptive and expressive aphasia     Past Medical History:   Diagnosis Date   • Acute ischemic stroke    • Arterial ischemic stroke 2016   • Ataxic aphasia 2016   • BP (high blood pressure) 2016   • Cerebral infarction, left hemisphere 2016   • Cyst of left ovary 2016    3cm   • Expressive aphasia    • H/O echocardiogram 2015    Global LV wall motion and contractility within normal limits. Normal LVSF.Normal LV diastolic filling. Left atrium midly dilated. RV mild- mod dilated. MIld aortic cusp sclerosis. No evidence of mitral valve prolapse. Mild mitral regurgitation.No pulmonary hypertension or perdicardial effusion. No dilation of aortic root. Venous system appears normal   • H/O: stroke    • HLD (hyperlipidemia) 2016   • Left temporal lobe infarction    • LOM (loss of memory)    • Osteoarthritis 2016   • Paroxysmal atrial fibrillation 2016   • Thyroid nodule 2016    1.8x1.2cm   • Weakness generalized    • Wound infection after surgery     Left hip arthroplasty, staph aureus, 6 weeks IV  Rocephin     Past Surgical History:   Procedure Laterality Date   • ABDOMINOPLASTY     • BREAST ABSCESS INCISION AND DRAINAGE     • TOTAL HIP ARTHROPLASTY Left     Staph aureus wound infection          OT ASSESSMENT FLOWSHEET (last 72 hours)      OT Evaluation       01/21/18 1157 01/21/18 1103 01/21/18 1100 01/21/18 0840 01/20/18 1322    Rehab Evaluation    Document Type  evaluation;discharge summary  -TB evaluation  -SG evaluation  -SG     Subjective Information  no complaints;agree to therapy  -TB no complaints;agree to therapy  -SG no complaints;agree to therapy  -SG     Patient Effort, Rehab Treatment  excellent  -TB excellent  -SG good  -SG     Symptoms Noted During/After Treatment  none  -TB none  -SG none  -SG     General Information    Patient Profile Review  yes  -TB       Onset of Illness/Injury or Date of Surgery Date  01/20/18  -TB       Referring Physician  Bai  -TB       General Observations  Pt rec'vd [facility]IC, room air; partner present and encouraging pt  -TB       Pertinent History Of Current Problem  Pt with h/o remote L MCA CVA and residual aphasia to ED with c/o worsening global aphasia and acute headache. Pt with witnessed seizure; admitted for further work-up r/o CVA vs TIA vs Sz;  Imaging (-) for acute findings; EEG tomorrow  -TB       Precautions/Limitations  fall precautions  -TB       Prior Level of Function  independent:;all household mobility;community mobility;transfer;bed mobility;ADL's  -TB       Equipment Currently Used at Home  shower chair  -TB   none  -KS    Plans/Goals Discussed With  patient;spouse/S.O.;agreed upon  -TB       Risks Reviewed  patient:;spouse/S.O.:;LOB;increased discomfort;lines disloged  -TB       Benefits Reviewed  patient:;spouse/S.O.:;improve function;increase independence;increase knowledge  -TB       Barriers to Rehab  --   global aphasia; mild confusion  -TB       Living Environment    Lives With  significant other  -TB   spouse  -KS    Living Arrangements   condominium  -TB   condominium  -KS    Home Accessibility  no concerns  -TB   no concerns  -KS    Stair Railings at Home     none  -KS    Type of Financial/Environmental Concern     none  -KS    Transportation Available     none  -KS    Living Environment Comment  Pt independent for mobility and ADLs   -TB       Clinical Impression    Date of Referral to OT  01/20/18  -TB       OT Diagnosis  Impaired mobility and ADL  -TB       Criteria for Skilled Therapeutic Interventions Met  no problems identified which require skilled intervention   Pt at baseline independence for mobility and ADL  -TB       Therapy Frequency  evaluation only  -TB       Anticipated Discharge Disposition home with outpatient services  -TB        Functional Level Prior    Ambulation     0-->independent  -KS    Transferring     0-->independent  -KS    Toileting     0-->independent  -KS    Bathing     0-->independent  -KS    Dressing     0-->independent  -KS    Eating     0-->independent  -KS    Communication     2-->difficulty speaking (not related to language barrier)  -KS    Swallowing     0-->swallows foods/liquids without difficulty  -KS    Prior Functional Level Comment  Independent  -TB       Vital Signs    Pre Systolic BP Rehab  --   RN cleared OT  -TB       O2 Delivery Post Treatment  room air  -TB       Pre Patient Position  Sitting  -TB       Intra Patient Position  Standing  -TB       Post Patient Position  Sitting  -TB       Pain Assessment    Pain Assessment  No/denies pain  -TB No/denies pain  -SG No/denies pain  -SG     Vision Assessment/Intervention    Visual Impairment  WFL  -TB       Cognitive Assessment/Intervention    Current Cognitive/Communication Assessment  impaired  -TB       Orientation Status  oriented to;person;place;disoriented to;situation   Intermittent memory for events surrounding ED visit and sz  -TB       Follows Commands/Answers Questions  able to follow single-step instructions;needs cueing;needs repetition;75%  of the time  -TB       Personal Safety  mild impairment  -TB       Personal Safety Interventions  fall prevention program maintained  -TB       Short/Long Term Memory  unable/difficult to assess  -TB       ROM (Range of Motion)    General ROM  no range of motion deficits identified  -TB       MMT (Manual Muscle Testing)    General MMT Assessment  no strength deficits identified  -TB       Muscle Tone Assessment    Muscle Tone Assessment     RUE  -KS    RUE Muscle Tone Assessment     mildly decreased tone  -KS    Bed Mobility, Assessment/Treatment    Bed Mobility, Comment  Deferred - pt rec'vd [facility]IC; partner reports independent  -TB       Transfer Assessment/Treatment    Transfers, Sit-Stand [place]  supervision required  -TB       Transfers, Stand-Sit [place]  supervision required  -TB       Toilet Transfer, [place]  supervision required  -TB       Transfer, Comment  mild confusion due to global aphasia  -TB       Functional Mobility    Functional Mobility- Ind. Level  supervision required  -TB       Functional Mobility- Comment  mild confusion due to global aphasia  -TB       Lower Body Dressing Assessment/Training    LB Dressing Assess/Train, [place]  supervision required  -TB       Toileting Assessment/Training    Toileting Assess/Train, Indepen Level  supervision required  -TB       Grooming Assessment/Training    Grooming Assess/Train, Indepen Level  supervision required  -TB       Balance Skills Training    Sitting-Level of Assistance  Close supervision  -TB       Standing-Level of Assistance  Close supervision  -TB       Therapy Exercises    Bilateral Upper Extremity  AROM:;sitting;shoulder extension/flexion;shoulder abduction/adduction;shoulder horizontal abd/add;shoulder ER/IR;elbow flexion/extension;pronation/supination;hand pumps  -TB       Fine Motor Coordination Training    Opposition  Right:;Left:;intact  -TB       Positioning and Restraints    Pre-Treatment Position  sitting in  chair/recliner  -TB       Post Treatment Position  chair  -TB       In Chair  sitting;call light within reach;encouraged to call for assist;exit alarm on;with family/caregiver  -TB         User Key  (r) = Recorded By, (t) = Taken By, (c) = Cosigned By    Initials Name Effective Dates    TB Rohini Mcleod OT 06/22/15 -     SG Carolann Gretablaise Vitale-Oli, MS CCC-SLP 06/22/15 -     SILVANO Gomez RN 09/18/16 -           Occupational Therapy Education     Title: PT OT SLP Therapies (Done)     Topic: Occupational Therapy (Done)     Point: ADL training (Done)    Description: Instruct learner(s) on proper safety adaptation and remediation techniques during self care or transfers.   Instruct in proper use of assistive devices.    Learning Progress Summary    Learner Readiness Method Response Comment Documented by Status   Patient Acceptance MANSI ALEXIS DU Education completed for benefits of activity/therapy, role of OT and home safety in light of new onset seizure activity TB 01/21/18 1153 Done   Significant Other Acceptance MANSI ALEXIS DU Education completed for benefits of activity/therapy, role of OT and home safety in light of new onset seizure activity TB 01/21/18 1153 Done                      User Key     Initials Effective Dates Name Provider Type Discipline     06/22/15 -  Rohini Mcleod OT Occupational Therapist OT                OT Recommendation and Plan  Anticipated Discharge Disposition: home with outpatient services  Therapy Frequency: evaluation only  Plan of Care Review  Plan Of Care Reviewed With: patient, spouse  Outcome Summary/Follow up Plan: OT IE completed. Pt presents with confusion and global aphasia deficits from baseline aphasia. Pt Supervision for all mobility/transfer and ADL. OT to d/c at this time as skilled OT is not indicating. Recommend home with 24/7 supervision and outpt ST at d/c.           OT Goals       01/21/18 1154          Patient Education OT STG    Patient Education OT  STG, Education Type home safety  -TB      Patient Education OT STG, Education Understanding demonstrates adequately;verbalizes understanding  -TB      Patient Education OT STG Outcome goal met  -TB        User Key  (r) = Recorded By, (t) = Taken By, (c) = Cosigned By    Initials Name Provider Type    BINDU Mcleod OT Occupational Therapist                Outcome Measures       01/21/18 1103          How much help from another is currently needed...    Putting on and taking off regular lower body clothing? 3  -TB      Bathing (including washing, rinsing, and drying) 3  -TB      Toileting (which includes using toilet bed pan or urinal) 3  -TB      Putting on and taking off regular upper body clothing 3  -TB      Taking care of personal grooming (such as brushing teeth) 3  -TB      Eating meals 3  -TB      Score 18  -TB      Modified [place] Scale    Modified [place] Scale 3 - Moderate disability.  Requiring some help, but able to walk without assistance.  -TB      Functional Assessment    Outcome Measure Options AM-PAC 6 Clicks Daily Activity (OT);Modified [place]  -TB        User Key  (r) = Recorded By, (t) = Taken By, (c) = Cosigned By    Initials Name Provider Type    BINDU Mcleod OT Occupational Therapist          Time Calculation:         Time Calculation- OT       01/21/18 1158          Time Calculation- OT    OT Start Time 1103  -TB      OT Received On 01/21/18  -TB        User Key  (r) = Recorded By, (t) = Taken By, (c) = Cosigned By    Initials Name Provider Type    BINDU Mcleod OT Occupational Therapist          Therapy Charges for Today     Code Description Service Date Service Provider Modifiers Qty    [id number]  OT EVAL MOD COMPLEXITY 4 1/21/2018 Rohini Mcleod OT GO 1               OT Discharge Summary  Anticipated Discharge Disposition: home with outpatient services  Reason for Discharge: At baseline function (ADLs)  Discharge Destination: Home with  outpatient services (ST)    Rohini Mcleod, OT  1/21/2018

## 2018-01-21 NOTE — PLAN OF CARE
Problem: Patient Care Overview (Adult)  Goal: Plan of Care Review  Outcome: Outcome(s) achieved Date Met: 01/21/18 01/21/18 1154   Coping/Psychosocial Response Interventions   Plan Of Care Reviewed With patient;spouse   Outcome Evaluation   Outcome Summary/Follow up Plan OT IE completed. Pt presents with confusion and global aphasia deficits from baseline aphasia. Pt Supervision for all mobility/transfer and ADL. OT to d/c at this time as skilled OT is not indicating. Recommend home with 24/7 supervision and outpt ST at d/c.       Problem: Inpatient Occupational Therapy  Goal: Patient Education Goal STG- OT  Outcome: Outcome(s) achieved Date Met: 01/21/18 01/21/18 1154   Patient Education OT STG   Patient Education OT STG, Education Type home safety   Patient Education OT STG, Education Understanding demonstrates adequately;verbalizes understanding   Patient Education OT STG Outcome goal met

## 2018-01-21 NOTE — PLAN OF CARE
Problem: Pain, Acute (Adult)  Goal: Acceptable Pain Control/Comfort Level  Outcome: Ongoing (interventions implemented as appropriate)   01/21/18 0449   Pain, Acute (Adult)   Acceptable Pain Control/Comfort Level making progress toward outcome

## 2018-01-21 NOTE — PLAN OF CARE
Problem: Patient Care Overview (Adult)  Goal: Plan of Care Review  Outcome: Ongoing (interventions implemented as appropriate)   01/21/18 0915   Coping/Psychosocial Response Interventions   Plan Of Care Reviewed With patient   Patient Care Overview   Progress (Evaluation)   Outcome Evaluation   Outcome Summary/Follow up Plan Clinical Dys Eval completed this date. Pt alert and cooperative, neuro testing clear for CVA, recent seizure, presenting w/ significant expressive aphasia. OMEs WFL. No s/s w/ any PO even when pushed. Timing and elevation adequate per palpation w/ all. No oral issue/pocketing/etc. Rec: Reg and thins, S/L eval later today (in-room procedure pending when SLP completed dysphagia eval).

## 2018-01-21 NOTE — PROGRESS NOTES
"Jenna Russell    Subjective     CC: seizure    History of Present Illness     Jenna Russell is followed with new onset seizure. She has improved significantly overnight, though continues to have significant aphasia. No further seizures were reported. No other new concerns were raised this morning.    There have been no other changes in the patient's interval history since my consult note of 1/20/18    I have reviewed and confirmed the past family, social and medical history as accurate on 1/19/18.     Review of Systems   Unable to perform ROS: Other   ROS is prevented by aphasia    Objective     /90 (BP Location: Left arm, Patient Position: Lying)  Pulse 75  Temp 98 °F (36.7 °C) (Oral)   Resp 18  Ht 165.1 cm (65\")  Wt 55.3 kg (122 lb)  SpO2 97%  BMI 20.3 kg/m2    Physical Exam   Neurological: She has normal strength.        Neurologic Exam     Mental Status   Oriented to person.   Attention: normal.   Level of consciousness: alert       On language testing Ms Russell follows some simple commands and is anomic     Cranial Nerves   Cranial nerves II through XII intact.     Motor Exam   Muscle bulk: normal  Overall muscle tone: normal    Strength   Strength 5/5 throughout.       Laboratory and radiological testing is notable for an MRI showing only evidence of prior stroke, without new or acute pathology (I reviewed images personally). CTA's of the head/neck are without significant stenosis. CBC is essentially unremarkable.    Assessment/Plan       Jenna Russell is followed now with new onset seizures. I will request an EEG for tomorrow and continue Keppra. I am hopeful for a continued improvement back to baseline. I now suspect that her initial event yesterday may have represented a partial seizure rather than TIA/stroke. In terms of secondary stroke prevention, I agree that anticoagulation is required only if Afib continues to be a concern, or if needed for other cardiac reasons. In the absence of " the threat of Afib this may not be required. This was all discussed at length with Ms Russell and her partner.        As part of this visit I reviewed prior lab results, reviewed radiology results, reviewed radiology images, obtained additional history from the family which is incorporated in the HPI and reviewed records from the current hospitalization which is incorporated in the HPI.

## 2018-01-21 NOTE — PLAN OF CARE
Problem: Patient Care Overview (Adult)  Goal: Plan of Care Review  Outcome: Ongoing (interventions implemented as appropriate)   01/21/18 0449   Coping/Psychosocial Response Interventions   Plan Of Care Reviewed With patient   Patient Care Overview   Progress progress toward functional goals as expected   Outcome Evaluation   Outcome Summary/Follow up Plan Expressive aphasia. NPO this shift due to failed dysphagia screening

## 2018-01-21 NOTE — PLAN OF CARE
Problem: Patient Care Overview (Adult)  Goal: Plan of Care Review  Outcome: Ongoing (interventions implemented as appropriate)   01/21/18 1138   Coping/Psychosocial Response Interventions   Plan Of Care Reviewed With patient   Patient Care Overview   Progress (Evaluation)   Outcome Evaluation   Outcome Summary/Follow up Plan S/L celia completed this date. Pt presented w/ severe expressive and receptive aphasia and characterisitics of apraxia of speech. Pt has h/o CVA, seizure yesterday. SLp used Y/N board, visual cues, imitation, repetition and [place] during evaluation. Pt stimulable for all responses. Pt appeared to process command best when presented in an auditory manner with no distractions and repetition provided prn. Frequent paraphasias and intermittent jargon during spontaneous converstaion. Writing successful only when pt copying responses, colored communication board too high level for pt's current deficits. Reading/writing responses correct at the word level only w/ copying/repetition. All of these deficits are much worse then pt's baseline according to her partner, Lianna, who was present for evaluation. Multiple pages of exp/rec langauge exercises provided to pt/her partner w/ edu on how to complete each task. Pt would benefit from OP SLP services. Rec: ST, edu, arranging OP SLP services for after d/c.        Problem: Inpatient SLP  Goal: Apraxia-Patient will improve motor speech skills to be able to express needs, wants and emotions  Outcome: Ongoing (interventions implemented as appropriate)   01/21/18 1138   Apraxia- Express Needs, Wants, and Emotions   Apraxia Express Needs, Wants, and Emotions- SLP, Date Established 01/21/18   Apraxia Express Needs, Wants, and Emotions- SLP, Time to Achieve by discharge   Apraxia Express Needs, Wants, and Emotions- SLP, Date Goal Reviewed 01/21/18   Apraxia Express Needs, Wants, and Emotions- SLP, Outcome goal ongoing     Goal: Expressive: Patient will improve expressive  language skills to be able to express needs, wants and emotions  Outcome: Ongoing (interventions implemented as appropriate)   01/21/18 1138   Expressive- Express Needs, Wants, and Emotions   Expressive Express Needs, Wants, and Emotions- SLP, Date Established 01/21/18   Expressive Express Needs, Wants, and Emotions- SLP, Time to Achieve by discharge   Expressive Express Needs, Wants, and Emotions- SLP, Date Goal Reviewed 01/21/18   Expressive Express Needs, Wants, and Emotions- SLP, Outcome goal ongoing     Goal: Receptive Language-Patient will improve receptive language to participate meaningfully in communicative exchanges with medical personnel, family members and peers  Outcome: Ongoing (interventions implemented as appropriate)   01/21/18 1138   Receptive Language- Participate Meaningfully   Receptive Language Participate Meaningfully- SLP, Date Established 01/21/18   Receptive Language Participate Meaningfully- SLP, Time to Achieve by discharge   Receptive Language Participate Meaningfully- SLP, Date Goal Reviewed 01/21/18   Receptive Language Participate Meaningfully- SLP, Outcome goal ongoing

## 2018-01-21 NOTE — PROGRESS NOTES
[facility] Medicine Services  PROGRESS NOTE    Patient Name: Jenna Russell  : 1940  MRN: [id number]    Date of Admission: 2018  Length of Stay: 0  Primary Care Physician: Henrietta Torres MD    Subjective   Subjective     CC: f/u for aphasia and SZ    HPI:No acute events overnight patient remain aphasic, partner is in room, she passed her swallow test and is drinking coffee.    Review of Systems  UTO sec to aphaisa    Objective   Objective     Vital Signs:   Temp:  [97.9 °F (36.6 °C)-98.9 °F (37.2 °C)] 98 °F (36.7 °C)  Heart Rate:  [68-83] 75  Resp:  [14-18] 18  BP: (111-171)/(74-90) 111/90  Total (NIH Stroke Scale): 3     Physical Exam:  Constitutional: No acute distress, awake, alert  HENT: NCAT, mucous membranes moist  Respiratory: Clear to auscultation bilaterally, respiratory effort normal   Cardiovascular: RRR, no murmurs, rubs, or gallops, palpable pedal pulses bilaterally  Gastrointestinal: Positive bowel sounds, soft, nontender, nondistended  Musculoskeletal: No bilateral ankle edema  Psychiatric: Appropriate affect, cooperative  Neurologic: aphasic  Skin: No rashes    Results Reviewed:  I have personally reviewed current lab, radiology, and data and agree.      Results from last 7 days  Lab Units 18  11218  1123 18  1122   WBC 10*3/mm3 11.24* 9.84  --   --    HEMOGLOBIN g/dL 11.2* 12.8  --   --    HEMOGLOBIN, POC g/dL  --   --  12.9  --    HEMATOCRIT % 34.8 39.2  --   --    HEMATOCRIT POC %  --   --  38  --    PLATELETS 10*3/mm3 215 229  --   --    INR   --   --   --  1.1       Results from last 7 days  Lab Units 18  1124 18  1123   SODIUM mmol/L 142  --   --    POTASSIUM mmol/L 3.3*  --   --    CHLORIDE mmol/L 107  --   --    CO2 mmol/L 23.0  --   --    BUN mg/dL 8*  --   --    CREATININE mg/dL 0.50*  --  0.50*   GLUCOSE mg/dL 75  --   --    CALCIUM mg/dL 8.9  --   --    ALT (SGPT) U/L  --  23  --     AST (SGOT) U/L  --  27  --      No results found for: BNP  No results found for: PHART    Microbiology Results Abnormal     None          Imaging Results (last 24 hours)     Procedure Component Value Units Date/Time    CT Angiogram Neck With & Without Contrast [[id number]] Collected:  01/20/18 1543     Updated:  01/20/18 1613    Narrative:       EXAMINATION: CT ANGIOGRAM HEAD W WO CONTRAST, CT ANGIOGRAM NECK W WO  CONTRAST - 01/20/2018     INDICATION: Stroke.      TECHNIQUE: CT angiogram head and neck with and without intravenous  contrast. 2D reconstructions performed.     The radiation dose reduction device was turned on for each scan per the  ALARA (As Low as Reasonably Achievable) protocol.     COMPARISON: None.     FINDINGS:      NECK: Normal 3 vessel arch with patent great vessel origins despite mild  to moderate atherosclerotic involvement of the left subclavian takeoff.  Lung apices grossly clear. Right dominant vertebral artery system  demonstrating patency without evidence for hemodynamically significant  stenosis, aneurysm or occlusion of the cervical segments. Cervical  carotid systems in normal course and branching pattern with minimal  atherosclerotic involvement of the carotid bulbs bilaterally with  approximately 10% luminal narrowing right and 0% left by NASCET  criteria. Noted heterogeneous enhancing thyroid with 17 mm right thyroid  nodule.     HEAD: Distal internal carotid arteries demonstrate minimal  atherosclerotic involvement and calcifications however patent without  aneurysm, hemodynamically significant stenosis or occlusion. [place] branch vessels including anterior cerebral, middle cerebral and  posterior cerebral arteries without hemodynamically significant  stenosis, aneurysm or occlusion. Vertebrobasilar system demonstrates  atherosclerotic involvement of the V4 segments bilateral vertebral  arteries with only moderate luminal narrowing however no hematemesis,  significant  stenosis, aneurysm or occlusion otherwise identified.       Impression:       CTA of the head and neck without hemodynamically significant  stenosis, aneurysm or occlusion identified. Minimal atherosclerotic  involvement of the carotid bulbs and distal V4 segments of the vertebral  arteries without hemodynamically significant stenosis noted.     DICTATED:     01/20/2018  EDITED    :     01/20/2018      This report was finalized on 1/20/2018 4:10 PM by Dr. Sunny Ramirez.       CT Head Without Contrast Stroke Protocol [[id number]] Collected:  01/20/18 1524     Updated:  01/20/18 1613    Narrative:       EXAMINATION: CT HEAD WO CONTRAST - 01/20/2018     INDICATION: Ataxia.      TECHNIQUE: Axial CT of the head without intravenous contrast  administration.     The radiation dose reduction device was turned on for each scan per the  ALARA (As Low as Reasonably Achievable) protocol.     COMPARISON: MRI 10/23/2015.     FINDINGS: Encephalomalacia left frontotemporal region consistent with  prior insult however no new area of infarct,  intracranial hemorrhage or  extra-axial fluid collection is identified with midline structures  symmetric. No evidence for midline shift. Globes and orbits  unremarkable. Visualized paranasal sinuses and mastoid air cells are  grossly clear with calvarium intact.       Impression:       1. No acute intracranial abnormality.  2. Encephalomalacia from prior left MCA infarction involving the  frontotemporal region.     Patient scanned 1/20/2018 at 1111 hours. Scan report given to ER  physician and team at 1114 hours on 1/20/2018.     DICTATED:     01/20/2018  EDITED    :     01/20/2018         This report was finalized on 1/20/2018 4:10 PM by Dr. Sunny Ramirez.       CT Cerebral Perfusion With & Without Contrast [[id number]] Collected:  01/20/18 1540     Updated:  01/20/18 1613    Narrative:       EXAMINATION: CT CEREBRAL PERFUSION W WO CONTRAST - 01/20/2018     INDICATION: Neuro deficit(s).      TECHNIQUE: CT cerebral perfusion with and without intravenous contrast  administration. Multiparametric maps including mean transit time, time  to drain, cerebral blood flow and cerebral blood volume were calculated.     The radiation dose reduction device was turned on for each scan per the  ALARA (As Low as Reasonably Achievable) protocol.     COMPARISON: CT stroke protocol performed earlier same day.     FINDINGS: No focal area of prolongation of mean transit time or time to  drain. Cerebral blood flow and cerebral blood volume are within normal  limits and without defect.       Impression:       Normal CT cerebral perfusion.     DICTATED:     01/20/2018  EDITED    :     01/20/2018      This report was finalized on 1/20/2018 4:10 PM by Dr. Sunny Ramirez.       CT Angiogram Head With & Without Contrast [[id number]] Collected:  01/20/18 1543     Updated:  01/20/18 1613    Narrative:       EXAMINATION: CT ANGIOGRAM HEAD W WO CONTRAST, CT ANGIOGRAM NECK W WO  CONTRAST - 01/20/2018     INDICATION: Stroke.      TECHNIQUE: CT angiogram head and neck with and without intravenous  contrast. 2D reconstructions performed.     The radiation dose reduction device was turned on for each scan per the  ALARA (As Low as Reasonably Achievable) protocol.     COMPARISON: None.     FINDINGS:      NECK: Normal 3 vessel arch with patent great vessel origins despite mild  to moderate atherosclerotic involvement of the left subclavian takeoff.  Lung apices grossly clear. Right dominant vertebral artery system  demonstrating patency without evidence for hemodynamically significant  stenosis, aneurysm or occlusion of the cervical segments. Cervical  carotid systems in normal course and branching pattern with minimal  atherosclerotic involvement of the carotid bulbs bilaterally with  approximately 10% luminal narrowing right and 0% left by NASCET  criteria. Noted heterogeneous enhancing thyroid with 17 mm right thyroid  nodule.     HEAD:  Distal internal carotid arteries demonstrate minimal  atherosclerotic involvement and calcifications however patent without  aneurysm, hemodynamically significant stenosis or occlusion. [place]  [place] branch vessels including anterior cerebral, middle cerebral and  posterior cerebral arteries without hemodynamically significant  stenosis, aneurysm or occlusion. Vertebrobasilar system demonstrates  atherosclerotic involvement of the V4 segments bilateral vertebral  arteries with only moderate luminal narrowing however no hematemesis,  significant stenosis, aneurysm or occlusion otherwise identified.       Impression:       CTA of the head and neck without hemodynamically significant  stenosis, aneurysm or occlusion identified. Minimal atherosclerotic  involvement of the carotid bulbs and distal V4 segments of the vertebral  arteries without hemodynamically significant stenosis noted.     DICTATED:     01/20/2018  EDITED    :     01/20/2018      This report was finalized on 1/20/2018 4:10 PM by Dr. Sunny Ramirez.       MRI Brain Without Contrast [[id number]] Collected:  01/20/18 1648     Updated:  01/20/18 1707    Narrative:       EXAMINATION: MRI BRAIN WO CONTRAST - 01/20/2018     INDICATION: R47.01-Aphasia; Z86.73-Personal history of transient  ischemic attack (TIA), and cerebral infarction without residual  deficits; Z86.79-Personal history of other diseases of the circulatory  system; E78.00-Pure hypercholesterolemia, unspecified; I10-Essential  (primary) hypertension.      TECHNIQUE: Multiplanar MRI of the brain without intravenous contrast.     COMPARISON: CT and CT cerebral perfusion performed earlier same day.     FINDINGS: No restriction on diffusion-weighted sequences. Midline  structures are symmetric without evidence of mass, mass effect or  midline shift. Pituitary and sella within normal limits.  Cervicomedullary junction widely patent. Encephalomalacia is noted  within the left frontotemporal region  consistent with prior insult  creating prominence of the sulci in this region. Ventricles and sulci  otherwise within normal limits. Globes and orbits unremarkable.  Visualized paranasal sinuses and mastoid air cells are grossly clear and  well-pneumatized. Normal signal flow voids of the distal internal  carotid and basilar arteries.       Impression:       No acute intracranial abnormality with encephalomalacia from  prior left MCA insult noted involving the left frontotemporal region.     DICTATED:     2018  EDITED    :     2018          This report was finalized on 2018 5:05 PM by Dr. Sunny Ramirez.       XR Chest 1 View [[id number]] Collected:  18 1823     Updated:  18 1830    Narrative:          EXAMINATION: XR CHEST 1 VW - 2018     INDICATION: Stroke protocol.     COMPARISON: Chest x-ray 12/15/2016.     FINDINGS: Cardiac size is borderline enlarged with pulmonary vascularity  within normal limits. Tortuous appearance of the thoracic aorta  concerning for ectasia. No focal consolidation, pneumothorax or pleural  effusion.           Impression:       Borderline cardiomegaly with tortuous ectatic appearance of  the thoracic aorta however no focal consolidation or parenchymal  disease.     DICTATED:     2018  EDITED    :     2018      This report was finalized on 2018 6:28 PM by Dr. Sunny Ramirez.           Results for orders placed during the hospital encounter of 04/04/15   Echo - Converted    Narrative Patient:      IRINA WILBURN    Med Rec#:     [id number]               :          1940            Date:         2015            Age:          74y                   Height:       170.18 cm / 67.0 in  Weight:       58.06 kg / 128.0 lbs  Sex:          F                     BSA:          1.67    Sonographer:  Eben Melgar RDCS,RVT  Referring:    LEIGH  Reading:      Eben Calabrese MD  Unit:         3 ICU  [place]  ______________________________________________________________________    Transthoracic Echocardiogram    Indication:  CVA  BP:           105/68  Rhythm:       NSR    Conclusions  1. Global left ventricular wall motion and contractility are within  normal limits.  2. There is normal left ventricular systolic function.  3. Normal left ventricular diastolic filling is observed.  4. The left atrium is mildly dilated.  5. The right ventricle is mild to moderately dilated.   6. Mild aortic cusp sclerosis is present.  7. There is no evidence of mitral valve prolapse.  8. There is mild mitral regurgitation.   9. No pulmonary hypertension is noted.  10. There is no evidence of pericardial effusion.  11. There is no dilatation of the aortic root.  12. The venous system appears normal.    Findings       Technical Comments:  The study quality is fair.  The study is technically limited due to poor  apical windows.      Left Ventricle:  The left ventricular chamber size is normal. Global left ventricular  wall motion and contractility are within normal limits. There is normal  left ventricular systolic function. The estimated ejection fraction is  greater than 65%.  Normal left ventricular diastolic filling is  observed.     Left Atrium:  The left atrium is mildly dilated.     Right Ventricle:  The right ventricle is mild to moderately dilated.  The right  ventricular global systolic function is normal.     Right Atrium:  The right atrium is mildly dilated.  No atrial septal defect is  visualized.     Aortic Valve:  The aortic valve is trileaflet. The aortic valve leaflets are mildly  thickened. Mild aortic cusp sclerosis is present. There is no evidence  of aortic regurgitation. There is no evidence of aortic stenosis.     Mitral Valve:  The mitral valve leaflets appear normal. There is no evidence of mitral  valve prolapse. There is mild mitral regurgitation.  There is no  evidence of mitral stenosis.     Tricuspid  Valve:  The tricuspid valve leaflets are normal.  There is mild tricuspid  regurgitation. The right ventricular systolic pressure is estimated to  be 30-35 mmHg.  No pulmonary hypertension is noted.     Pulmonic Valve:  The pulmonic valve appears normal. There is a trace pulmonic  regurgitation.      Pericardium:  There is no evidence of pericardial effusion.     Aorta:  There is no dilatation of the aortic root.     Pulmonary Artery:  The main pulmonary artery appears normal.     Venous:  The venous system appears normal.     Measurements   Chambers  Name                    Value           RVIDd (AP) 2D           3.5 cm          IVSd (2D)               1.1 cm          LVIDd (2D)              4.8 cm          LVIDs (2D)              3 cm            LVPWd (2D)              1.1 cm          EF (2D)                 70 %            Ao root diameter (2D)   3.3 cm          LA dimension (AP) 2D    4.2 cm          LA:Ao ratio (2D)        1.3 ratio         Volumes  Name                    Value           LA ESV SP 4CH (MOD)     63 ml           LA ESV SP 2CH (MOD)     75 ml           LA ESV BP (MOD)         72 ml           LA ESV BP (MOD) index   43.1 ml/m2      LV EDV SP 4CH (MOD)     106 ml          LV ESV SP 4CH (MOD)     34 ml           EF SP 4CH (MOD)         68 %            LV EDV SP 2CH (MOD)     98 ml           LV ESV SP 2CH (MOD)     24 ml           EF SP 2CH (MOD)         76 %            LV EDV BP               104 ml          LV ESV BP               30 ml           BP EF (MOD)             71 %              Diastolic/Systolic Function  Name                    Value           MV E-wave Vmax          0.83 m/sec      MV deceleration time    187 msec        MV A-wave Vmax          0.44 m/sec      MV E:A ratio            1.9 ratio       LV septal e' Vmax       0.1 m/sec       LV lateral e' Vmax      0.1 m/sec       LV E:e' septal ratio    8.2 ratio       LV E:e' lateral ratio   8.1 ratio         Aortic Valve  Name           "          Value           LVOT diameter           2 cm            LVOT Vmax               0.98 m/sec      LVOT VTI                20.2 cm         LVOT peak gradient      4 mmHg          LVOT mean gradient      2 mmHg          SV LVOT                 63 ml             Tricuspid Valve  Name                    Value           TV Vmax                 2.62 m/sec      TV peak gradient        27 mmHg           Pulmonic Valve/Qp:Qs  Name                    Value           PV acceleration time    120 msec          Electronically signed by: Eben Calabrese MD on 04/05/2015 13:53:51       I have reviewed the medications.    Assessment/Plan   Assessment / Plan     Hospital Problem List     * (Principal)Aphasia    PAF (paroxysmal atrial fibrillation)    Hypertension    Overview Signed 9/16/2016  9:43 AM by Leonela Sheffield     H/o         History of stroke (Chronic)    Combined receptive and expressive aphasia         Brief Hospital Course to date:  Jenna Russell is a 77 y.o. female with hx of PAF, hypertension, presented to [facility] ED with worsened expressive aphasia and then had a witnessed seizure.  She has a history of prior stroke, atrial fib s/p CHARLI ablation at [facility], in NSR but still on Eliquis.     # TIA versus stroke versus generalizing partial seizure  --MRI negative for new stroke, etiology likely new onset seizure  --Stroke workup including ECHO, CTA complete.  Neuro checks, neuro following, plan for EEG tommorow, continue statin and ASA  --Eliquis d/c at admission considering cardiology noted it was for \"stroke prophylaxis\" and per Dr. Godwin the eliquis is not required for stroke prophylaxis. However, patient and partner are hesitant on discontinuing this, as such I will restart iit and they will f/u with their cardiologist and EP in [facility] to get some clarity.     # Witnessed T/C seizure:  --Keppra per Dr. Godwin  --Seizure precautions     # PAF:  --Remains in NSR off sotalol, she s/p ablation at " [facility], will continue tele  --will restart Eliquis as above     # HTN: Running 140's-160's/70's. permissive hypertension for now.    # HLD - continue statis      DVT prophylaxis: Mechanical     CODE STATUS:  Full Code    Tess Hilario MD  01/21/18  9:28 AM

## 2018-01-22 ENCOUNTER — APPOINTMENT (OUTPATIENT)
Dept: NEUROLOGY | Facility: HOSPITAL | Age: 78
End: 2018-01-22
Attending: PSYCHIATRY & NEUROLOGY

## 2018-01-22 PROCEDURE — 25010000002 LEVETIRACETAM IN NACL 0.82% 500 MG/100ML SOLUTION: Performed by: PSYCHIATRY & NEUROLOGY

## 2018-01-22 PROCEDURE — 99232 SBSQ HOSP IP/OBS MODERATE 35: CPT | Performed by: PSYCHIATRY & NEUROLOGY

## 2018-01-22 PROCEDURE — 95819 EEG AWAKE AND ASLEEP: CPT

## 2018-01-22 PROCEDURE — 92507 TX SP LANG VOICE COMM INDIV: CPT

## 2018-01-22 PROCEDURE — 99233 SBSQ HOSP IP/OBS HIGH 50: CPT | Performed by: INTERNAL MEDICINE

## 2018-01-22 PROCEDURE — 97161 PT EVAL LOW COMPLEX 20 MIN: CPT

## 2018-01-22 PROCEDURE — 25010000002 LORAZEPAM PER 2 MG: Performed by: PSYCHIATRY & NEUROLOGY

## 2018-01-22 RX ORDER — THIAMINE MONONITRATE (VIT B1) 100 MG
100 TABLET ORAL DAILY
Status: DISCONTINUED | OUTPATIENT
Start: 2018-01-22 | End: 2018-01-23 | Stop reason: HOSPADM

## 2018-01-22 RX ORDER — LEVETIRACETAM 500 MG/1
500 TABLET ORAL 2 TIMES DAILY
Status: DISCONTINUED | OUTPATIENT
Start: 2018-01-22 | End: 2018-01-22

## 2018-01-22 RX ORDER — LORAZEPAM 2 MG/ML
1 INJECTION INTRAMUSCULAR EVERY 6 HOURS PRN
Status: DISCONTINUED | OUTPATIENT
Start: 2018-01-22 | End: 2018-01-23 | Stop reason: HOSPADM

## 2018-01-22 RX ORDER — DIVALPROEX SODIUM 250 MG/1
250 TABLET, DELAYED RELEASE ORAL EVERY 8 HOURS SCHEDULED
Status: DISCONTINUED | OUTPATIENT
Start: 2018-01-22 | End: 2018-01-23 | Stop reason: HOSPADM

## 2018-01-22 RX ADMIN — ATORVASTATIN CALCIUM 80 MG: 40 TABLET, FILM COATED ORAL at 20:46

## 2018-01-22 RX ADMIN — ALPRAZOLAM 0.25 MG: 0.25 TABLET ORAL at 13:30

## 2018-01-22 RX ADMIN — VALPROATE SODIUM 750 MG: 100 INJECTION, SOLUTION INTRAVENOUS at 18:28

## 2018-01-22 RX ADMIN — APIXABAN 5 MG: 5 TABLET, FILM COATED ORAL at 20:46

## 2018-01-22 RX ADMIN — LEVETIRACETAM 500 MG: 5 INJECTION INTRAVENOUS at 04:30

## 2018-01-22 RX ADMIN — Medication 100 MG: at 20:46

## 2018-01-22 RX ADMIN — ASPIRIN 325 MG ORAL TABLET 325 MG: 325 PILL ORAL at 08:44

## 2018-01-22 RX ADMIN — FOLIC ACID 1 MG: 1 TABLET ORAL at 08:44

## 2018-01-22 RX ADMIN — APIXABAN 5 MG: 5 TABLET, FILM COATED ORAL at 08:44

## 2018-01-22 RX ADMIN — LORAZEPAM 1 MG: 2 INJECTION INTRAMUSCULAR; INTRAVENOUS at 08:44

## 2018-01-22 RX ADMIN — DIVALPROEX SODIUM 250 MG: 250 TABLET, DELAYED RELEASE ORAL at 20:46

## 2018-01-22 NOTE — PROGRESS NOTES
[facility] Medicine Services  PROGRESS NOTE    Patient Name: Jenna Russell  : 1940  MRN: [id number]    Date of Admission: 2018  Length of Stay: 1  Primary Care Physician: Henrietta Torres MD    Subjective   Subjective     CC: f/u for aphasia and sz    HPI: No acute events overnight, patient is awake and alert, was confused overnight with some hallucinatins did receive some ativan     Review of Systems  Gen- No fevers, chills  CV- No chest pain, palpitations  Resp- No cough, dyspnea  GI- No N/V/D, abd pain    Otherwise ROS is negative except as mentioned in the HPI.    Objective   Objective     Vital Signs:   Temp:  [97.6 °F (36.4 °C)-97.9 °F (36.6 °C)] 97.9 °F (36.6 °C)  Heart Rate:  [67-75] 67  Resp:  [18] 18  BP: (110-137)/(67-90) 110/67  Total (NIH Stroke Scale): 3     Physical Exam:  Constitutional: No acute distress, awake, alert,   HENT: NCAT, mucous membranes moist  Respiratory: Clear to auscultation bilaterally, respiratory effort normal   Cardiovascular: RRR, no murmurs, rubs, or gallops, palpable pedal pulses bilaterally  Gastrointestinal: Positive bowel sounds, soft, nontender, nondistended  Musculoskeletal: No bilateral ankle edema  Psychiatric: Appropriate affect, cooperative  Neurologic: still aphasic  Skin: No rashes    Results Reviewed:  I have personally reviewed current lab, radiology, and data and agree.      Results from last 7 days  Lab Units 18  04518  11218  1123 18  1122   WBC 10*3/mm3 11.24* 9.84  --   --    HEMOGLOBIN g/dL 11.2* 12.8  --   --    HEMOGLOBIN, POC g/dL  --   --  12.9  --    HEMATOCRIT % 34.8 39.2  --   --    HEMATOCRIT POC %  --   --  38  --    PLATELETS 10*3/mm3 215 229  --   --    INR   --   --   --  1.1       Results from last 7 days  Lab Units 18  04518  1124 18  1123   SODIUM mmol/L 142  --   --    POTASSIUM mmol/L 3.3*  --   --    CHLORIDE mmol/L 107  --   --    CO2 mmol/L 23.0  " --   --    BUN mg/dL 8*  --   --    CREATININE mg/dL 0.50*  --  0.50*   GLUCOSE mg/dL 75  --   --    CALCIUM mg/dL 8.9  --   --    ALT (SGPT) U/L  --  23  --    AST (SGOT) U/L  --  27  --      No results found for: BNP  No results found for: PHART    Microbiology Results Abnormal     None          Results for orders placed during the hospital encounter of 01/20/18   Adult Transthoracic Echo Complete W/ Cont if Necessary Per Protocol (With Agitated Saline)    Narrative · Left ventricular wall thickness is consistent with hypertrophy.  · Left ventricular systolic function is normal.  · Moderate aortic valve regurgitation is present.  · Mild mitral valve regurgitation is present  · Mild tricuspid valve regurgitation is present.  · Mild pulmonic valve regurgitation is present.          I have reviewed the medications.    Assessment/Plan   Assessment / Plan     Hospital Problem List     * (Principal)Aphasia    PAF (paroxysmal atrial fibrillation)    Hypertension    Overview Signed 9/16/2016  9:43 AM by Leonela Sheffield     H/o         History of stroke (Chronic)    Combined receptive and expressive aphasia           Brief Hospital Course to date:  Jenna Russell is a 77 y.o. female with hx of PAF, hypertension, presented to [facility] ED with worsened expressive aphasia and then had a witnessed seizure.  She has a history of prior stroke, atrial fib s/p CHARLI ablation at [facility], in NSR but still on Eliquis.      # TIA versus stroke versus generalizing partial seizure  --MRI negative for new stroke, etiology likely new onset seizure  --Stroke workup including ECHO, CTA complete. neuro following, plan for EEG today, continue statin and ASA  --Eliquis d/c at admission considering cardiology noted it was for \"stroke prophylaxis\" and per Dr. Godwin the eliquis is not required for stroke prophylaxis. However, patient and partner are hesitant on discontinuing this, as such I will restart iit and they will f/u with their " cardiologist and EP in [facility] to get some clarity.      # Witnessed T/C seizure:  --Keppra per Dr. Godwin  --Seizure precautions      # PAF:  --Remains in NSR off sotalol, she s/p ablation at [facility], will continue tele  --will restart Eliquis as above      # HTN: Running 140's-160's/70's. permissive hypertension for now.     # HLD - continue statis       DVT prophylaxis: Mechanical      CODE STATUS:  Full Code    Tess Hilario MD  01/22/18  7:57 AM

## 2018-01-22 NOTE — PROGRESS NOTES
Discharge Planning Assessment  [facility]     Patient Name: Jenna Russell  MRN: [id number]  Today's Date: 1/22/2018    Admit Date: 1/20/2018          Discharge Needs Assessment       01/22/18 1313    Living Environment    Lives With spouse    Living Arrangements condominium    Home Accessibility no concerns    Stair Railings at Home none    Type of Financial/Environmental Concern none    Transportation Available car;family or friend will provide    Living Environment    Provides Primary Care For no one    Quality Of Family Relationships supportive;involved    Able to Return to Prior Living Arrangements yes    Discharge Needs Assessment    Concerns To Be Addressed no discharge needs identified    Readmission Within The Last 30 Days no previous admission in last 30 days    Anticipated Changes Related to Illness none    Equipment Currently Used at Home none    Equipment Needed After Discharge none    Discharge Disposition still a patient            Discharge Plan       01/22/18 1313    Case Management/Social Work Plan    Plan Home    Patient/Family In Agreement With Plan yes    Additional Comments Spoke with patient and spouse in room to initiate discharge planning.  Patient lives with her spouse in [facility].  She is independent with ADL's.  Currently no DME in home.  Patient has had home health services in the past but does not remember the name of the provider.  Patient has RX coverage and has her scripts filled at [facility].  Patient's plan is home with spouse at discharge.  Would benefit from outpatient speech therapy.  Would need an order.  CM will continue to follow.  Lita Montalvo RN [phone]        Discharge Placement     No information found        Expected Discharge Date and Time     Expected Discharge Date Expected Discharge Time    Jan 24, 2018               Demographic Summary       01/22/18 1311    Referral Information    Admission Type inpatient    Arrived From home or  self-care    Referral Source admission list    Reason For Consult discharge planning    Record Reviewed history and physical;medical record    Primary Care Physician Information    Name Henrietta Torres            Functional Status       01/22/18 1312    Functional Status Current    Current Functional Level Comment see nurse's notes    Change in Functional Status Since Onset of Current Illness/Injury no    Functional Status Prior    Ambulation 0-->independent    Transferring 0-->independent    Toileting 0-->independent    Bathing 0-->independent    Dressing 0-->independent    Eating 0-->independent    Communication 0-->understands/communicates without difficulty    IADL    Medications independent    Meal Preparation independent    Housekeeping independent    Laundry independent    Shopping independent    Oral Care independent    Activity Tolerance    Current Activity Limitations none    Usual Activity Tolerance good    Current Activity Tolerance good    Employment/Financial    Employment/Finance Comments Verified with patient that she has [employer] M[employer].  No issues obtaining medications.            Psychosocial     None            Abuse/Neglect     None            Legal     None            Substance Abuse     None            Patient Forms     None          Lita Montalvo RN

## 2018-01-22 NOTE — PLAN OF CARE
Problem: Patient Care Overview (Adult)  Goal: Plan of Care Review  Outcome: Ongoing (interventions implemented as appropriate)   01/22/18 0850   Coping/Psychosocial Response Interventions   Plan Of Care Reviewed With patient;spouse   Outcome Evaluation   Outcome Summary/Follow up Plan Pt. 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 Pt. able to follow 1 and 2 step directions, imitate mouth postures, and ID common objects. Pt. with significant improvement in naming objects, though paraphasias were noted. Goals were advanced based on improved performance. SLP to continue to address speech and language skills. OP SLP is recommended and info was given for [facility] OP SLP services.        Problem: Inpatient SLP  Goal: Apraxia-Patient will improve motor speech skills to be able to express needs, wants and emotions  Outcome: Ongoing (interventions implemented as appropriate)   01/21/18 1138 01/22/18 0850   Apraxia- Express Needs, Wants, and Emotions   Apraxia Express Needs, Wants, and Emotions- SLP, Time to Achieve by discharge --    Apraxia Express Needs, Wants, and Emotions- SLP, Date Goal Reviewed --  01/22/18   Apraxia Express Needs, Wants, and Emotions- SLP, Outcome --  goal ongoing     Goal: Expressive: Patient will improve expressive language skills to be able to express needs, wants and emotions  Outcome: Ongoing (interventions implemented as appropriate)   01/21/18 1138 01/22/18 0850   Expressive- Express Needs, Wants, and Emotions   Expressive Express Needs, Wants, and Emotions- SLP, Date Established 01/21/18 --    Expressive Express Needs, Wants, and Emotions- SLP, Time to Achieve by discharge --    Expressive Express Needs, Wants, and Emotions- SLP, Date Goal Reviewed --  01/22/18   Expressive Express Needs, Wants, and Emotions- SLP, Outcome --  goal ongoing     Goal: Receptive Language-Patient will improve receptive language to participate  meaningfully in communicative exchanges with medical personnel, family members and peers  Outcome: Ongoing (interventions implemented as appropriate)   01/21/18 1138 01/22/18 0850   Receptive Language- Participate Meaningfully   Receptive Language Participate Meaningfully- SLP, Date Established 01/21/18 --    Receptive Language Participate Meaningfully- SLP, Time to Achieve by discharge --    Receptive Language Participate Meaningfully- SLP, Date Goal Reviewed --  01/22/18   Receptive Language Participate Meaningfully- SLP, Outcome --  goal ongoing

## 2018-01-22 NOTE — THERAPY EVALUATION
Acute Care - Physical Therapy Initial Evaluation  [facility]     Patient Name: Jenna Russell  : 1940  MRN: [id number]  Today's Date: 2018   Onset of Illness/Injury or Date of Surgery Date: 18  Date of Referral to PT: 18  Referring Physician: MD LISA      Admit Date: 2018     Visit Dx:    ICD-10-CM ICD-9-CM   1. Combined receptive and expressive aphasia R47.01 784.3   2. History of stroke Z86.73 V12.54   3. History of atrial fibrillation Z86.79 V12.59   4. High cholesterol E78.00 272.0   5. Essential hypertension I10 401.9   6. Impaired mobility and ADLs Z74.09 799.89   7. Impaired functional mobility, balance, gait, and endurance Z74.09 V49.89     Patient Active Problem List   Diagnosis   • Pneumothorax, right   • Fracture of multiple ribs of right side with routine healing   • PAF (paroxysmal atrial fibrillation)   • High cholesterol   • Hypertension   • Atrial fibrillation   • Bradycardia   • History of stroke   • Aphasia   • Combined receptive and expressive aphasia     Past Medical History:   Diagnosis Date   • Acute ischemic stroke    • Arterial ischemic stroke 2016   • Ataxic aphasia 2016   • BP (high blood pressure) 2016   • Cerebral infarction, left hemisphere 2016   • Cyst of left ovary 2016    3cm   • Expressive aphasia    • H/O echocardiogram 2015    Global LV wall motion and contractility within normal limits. Normal LVSF.Normal LV diastolic filling. Left atrium midly dilated. RV mild- mod dilated. MIld aortic cusp sclerosis. No evidence of mitral valve prolapse. Mild mitral regurgitation.No pulmonary hypertension or perdicardial effusion. No dilation of aortic root. Venous system appears normal   • H/O: stroke    • HLD (hyperlipidemia) 2016   • Left temporal lobe infarction    • LOM (loss of memory)    • Osteoarthritis 2016   • Paroxysmal atrial fibrillation 2016   • Thyroid nodule 2016    1.8x1.2cm   • Weakness generalized     • Wound infection after surgery     Left hip arthroplasty, staph aureus, 6 weeks IV Rocephin     Past Surgical History:   Procedure Laterality Date   • ABDOMINOPLASTY     • BREAST ABSCESS INCISION AND DRAINAGE     • TOTAL HIP ARTHROPLASTY Left     Staph aureus wound infection          PT ASSESSMENT (last 72 hours)      PT Evaluation       01/22/18 1313 01/22/18 1115    Rehab Evaluation    Document Type  evaluation  -CD    Subjective Information  no complaints;agree to therapy   PT WITH GLOBAL APHASIA.   -CD    Patient Effort, Rehab Treatment  excellent  -CD    Symptoms Noted During/After Treatment  --   IMPULSIVE- MOVES VERY QUICKLY.   -CD    General Information    Patient Profile Review  yes  -CD    Onset of Illness/Injury or Date of Surgery Date  01/20/18  -CD    Referring Physician  MD LISA  -CD    General Observations  PT IN BATHROOM WITH SPOUSE.   -CD    Pertinent History Of Current Problem  Pt with h/o remote L MCA CVA and residual aphasia to ED with c/o worsening global aphasia and acute headache. Pt with witnessed seizure; admitted for further work-up r/o CVA vs TIA vs Sz; Imaging (-) for acute findings. EEG PENDING.   -CD    Precautions/Limitations  fall precautions  -CD    Prior Level of Function  independent:;all household mobility;community mobility;ADL's  -CD    Equipment Currently Used at Home none  -PS shower chair  -CD    Plans/Goals Discussed With  patient;spouse/S.O.;agreed upon  -CD    Risks Reviewed  patient:;spouse/S.O.:;LOB;change in vital signs  -CD    Benefits Reviewed  patient:;spouse/S.O.:;improve function;increase knowledge;increase independence  -CD    Barriers to Rehab  --   GLOBAL APHASIA  -CD    Living Environment    Lives With spouse  -PS significant other  -CD    Living Arrangements condominium  -PS condominium  -CD    Home Accessibility no concerns  -PS     Stair Railings at Home none  -PS     Type of Financial/Environmental Concern none  -PS     Transportation Available  car;family or friend will provide  -PS     Clinical Impression    Date of Referral to PT  01/20/18  -CD    PT Diagnosis  IMPAIRED FUNCTIONAL MOBILITY.   -CD    Patient/Family Goals Statement  TO GO HOME.   -CD    Criteria for Skilled Therapeutic Interventions Met  yes  -CD    Rehab Potential  good, to achieve stated therapy goals  -CD    Vital Signs    Pre Systolic BP Rehab  130  -CD    Pre Treatment Diastolic BP  79  -CD    Post Systolic BP Rehab  164  -CD    Post Treatment Diastolic BP  85  -CD    Pain Assessment    Pain Assessment  Romero-Doyle FACES  -CD    Vision Assessment/Intervention    Visual Impairment  --   able to identify number of fingers and duplicate on her hand  -CD    Cognitive Assessment/Intervention    Current Cognitive/Communication Assessment  impaired  -CD    Orientation Status  oriented to;person  -CD    Follows Commands/Answers Questions  50% of the time   IMPROVED RESPONSE TO GESTURES FOR MMT/ROM TESTING.   -CD    Personal Safety  moderate impairment;impulsive;decreased insight to deficits;decreased awareness, need for safety;decreased awareness, need for assist  -CD    Personal Safety Interventions  fall prevention program maintained;gait belt;muscle strengthening facilitated;nonskid shoes/slippers when out of bed;supervised activity  -CD    ROM (Range of Motion)    General ROM  no range of motion deficits identified  -CD    MMT (Manual Muscle Testing)    General MMT Assessment  no strength deficits identified  -CD    Bed Mobility, Assessment/Treatment    Bed Mobility, Comment  DEFERRED- PT UP IN ROOM WITH SPOUSE AND THEN SITTING EOB.   -CD    Transfer Assessment/Treatment    Transfers, Sit-Stand [place]  contact guard assist  -CD    Transfers, Stand-Sit [place]  contact guard assist  -CD    Transfers, Sit-Stand-Sit, Assist Device  --   GAIT BELT.   -CD    Transfer, Comment  IMPULSIVE.   -CD    Gait Assessment/Treatment    Gait, Arco Level  contact guard assist  -CD     Gait, Assistive Device  --   GAIT BELT.   -CD    Gait, Distance (Feet)  500  -CD    Gait, Gait Deviations  --   STAGGER GAIT ON OCCASION WITH MINOR LOB.   -CD    Gait, Safety Issues  balance decreased during turns  -CD    Gait, Impairments  impaired balance  -CD    Gait, Comment  FLUCTUATING LINDSEY, MINOR LOB X 3, MORE UNSTEADY WITH TURNS. VERY IMPULSIVE AND QUICK WITH MOVEMENTS AND CHANGING DIRECTION.   -CD    Motor Skills/Interventions    Additional Documentation  Balance Skills Training (Group)  -CD    Balance Skills Training    Sitting-Level of Assistance  Close supervision  -CD    Standing-Level of Assistance  Contact guard  -CD    Gait Balance-Level of Assistance  Contact guard  -CD    Positioning and Restraints    Pre-Treatment Position  bathroom  -CD    Post Treatment Position  chair  -CD    In Chair  reclined;call light within reach;encouraged to call for assist;with family/caregiver;legs elevated;notified nsg  -CD      01/22/18 0850 01/21/18 2000    Rehab Evaluation    Document Type therapy note (daily note)  -AM     Subjective Information no complaints;agree to therapy  -AM     Patient Effort, Rehab Treatment excellent  -AM     Symptoms Noted During/After Treatment none  -AM     Symptoms Noted Comment PER S.O. PT WAS HALLUCINATION AND HEARING ECHOS LAST NIGHT AND GIVEN ATIVAN. NOT SURE IF UNSTEADINESS IS RELATED TO MEDICATION  -CD     Pain Assessment    Pain Assessment No/denies pain  -AM     Muscle Tone Assessment    Bilateral Upper Extremities Muscle Tone Assessment  associated movements noted  -DH    Bilateral Lower Extremities Muscle Tone Assessment  associated movements noted  -DH      01/21/18 1103 01/21/18 1100    Rehab Evaluation    Document Type evaluation;discharge summary  -TB evaluation  -SG    Subjective Information no complaints;agree to therapy  -TB no complaints;agree to therapy  -SG    Patient Effort, Rehab Treatment excellent  -TB excellent  -SG    Symptoms Noted During/After Treatment none   -TB none  -SG    General Information    Patient Profile Review yes  -TB     Onset of Illness/Injury or Date of Surgery Date 01/20/18  -TB     Referring Physician Bai  -TB     General Observations Pt rec'vd [facility], room air; partner present and encouraging pt  -TB     Pertinent History Of Current Problem Pt with h/o remote L MCA CVA and residual aphasia to ED with c/o worsening global aphasia and acute headache. Pt with witnessed seizure; admitted for further work-up r/o CVA vs TIA vs Sz;  Imaging (-) for acute findings; EEG tomorrow  -TB     Precautions/Limitations fall precautions  -TB     Prior Level of Function independent:;all household mobility;community mobility;transfer;bed mobility;ADL's  -TB     Equipment Currently Used at Home shower chair  -TB     Plans/Goals Discussed With patient;spouse/S.O.;agreed upon  -TB     Risks Reviewed patient:;spouse/S.O.:;LOB;increased discomfort;lines disloged  -TB     Benefits Reviewed patient:;spouse/S.O.:;improve function;increase independence;increase knowledge  -TB     Barriers to Rehab --   global aphasia; mild confusion  -TB     Living Environment    Lives With significant other  -TB     Living Arrangements condominium  -TB     Home Accessibility no concerns  -TB     Living Environment Comment Pt independent for mobility and ADLs   -TB     Vital Signs    Pre Systolic BP Rehab --   RN cleared OT  -TB     O2 Delivery Post Treatment room air  -TB     Pre Patient Position Sitting  -TB     Intra Patient Position Standing  -TB     Post Patient Position Sitting  -TB     Pain Assessment    Pain Assessment No/denies pain  -TB No/denies pain  -SG    Vision Assessment/Intervention    Visual Impairment WFL  -TB     Cognitive Assessment/Intervention    Current Cognitive/Communication Assessment impaired  -TB     Orientation Status oriented to;person;place;disoriented to;situation   Intermittent memory for events surrounding ED visit and sz  -TB     Follows Commands/Answers Questions  able to follow single-step instructions;needs cueing;needs repetition;75% of the time  -TB     Personal Safety mild impairment  -TB     Personal Safety Interventions fall prevention program maintained  -TB     Short/Long Term Memory unable/difficult to assess  -TB     ROM (Range of Motion)    General ROM no range of motion deficits identified  -TB     MMT (Manual Muscle Testing)    General MMT Assessment no strength deficits identified  -TB     Bed Mobility, Assessment/Treatment    Bed Mobility, Comment Deferred - pt rec'vd [facility]; partner reports independent  -TB     Transfer Assessment/Treatment    Transfers, Sit-Stand [place] supervision required  -TB     Transfers, Stand-Sit [place] supervision required  -TB     Toilet Transfer, [place] supervision required  -TB     Transfer, Comment mild confusion due to global aphasia  -TB     Balance Skills Training    Sitting-Level of Assistance Close supervision  -TB     Standing-Level of Assistance Close supervision  -TB     Therapy Exercises    Bilateral Upper Extremity AROM:;sitting;shoulder extension/flexion;shoulder abduction/adduction;shoulder horizontal abd/add;shoulder ER/IR;elbow flexion/extension;pronation/supination;hand pumps  -TB     Fine Motor Coordination Training    Opposition Right:;Left:;intact  -TB     Positioning and Restraints    Pre-Treatment Position sitting in chair/recliner  -TB     Post Treatment Position chair  -TB     In Chair sitting;call light within reach;encouraged to call for assist;exit alarm on;with family/caregiver  -TB       01/21/18 0840 01/21/18 0815    Rehab Evaluation    Document Type evaluation  -SG     Subjective Information no complaints;agree to therapy  -SG     Patient Effort, Rehab Treatment good  -SG     Symptoms Noted During/After Treatment none  -SG     Pain Assessment    Pain Assessment No/denies pain  -SG     Muscle Tone Assessment    Muscle Tone Assessment  Bilateral Upper Extremities;Bilateral Lower  Extremities  -SK    Bilateral Upper Extremities Muscle Tone Assessment  associated movements noted  -SK    Bilateral Lower Extremities Muscle Tone Assessment  associated movements noted  -SK      01/20/18 1322       General Information    Equipment Currently Used at Home none  -KS     Living Environment    Lives With spouse  -KS     Living Arrangements condominium  -KS     Home Accessibility no concerns  -KS     Stair Railings at Home none  -KS     Type of Financial/Environmental Concern none  -KS     Transportation Available none  -KS     Muscle Tone Assessment    Muscle Tone Assessment RUE  -KS     RUE Muscle Tone Assessment mildly decreased tone  -KS       User Key  (r) = Recorded By, (t) = Taken By, (c) = Cosigned By    Initials Name Provider Type    TB Rohini Mcleod, OT Occupational Therapist    CD Gabby Salas, PT Physical Therapist    SG Carolann Bingham, MS CCC-SLP Speech and Language Pathologist    PS Lita Montalvo, RN Registered Nurse    KS Lexy Gomez, RN Registered Nurse    ARIANNA Saez, CCC-SLP Speech and Language Pathologist    JACINTO Beckett, RN Registered Nurse    SK Kelli Quintanilla, RN Registered Nurse          Physical Therapy Education     Title: PT OT SLP Therapies (Done)     Topic: Physical Therapy (Done)     Point: Mobility training (Done)    Learning Progress Summary    Learner Readiness Method Response Comment Documented by Status   Patient Acceptance E NR,VU BENEFITS OF OOB ACTIVITY, SAFETY WITH MOBILTY, D/C PLANNING. S.O. VERBALIZED UNDERSTANDING.  01/22/18 1326 Done   Significant Other Acceptance E NR,VU BENEFITS OF OOB ACTIVITY, SAFETY WITH MOBILTY, D/C PLANNING. S.O. VERBALIZED UNDERSTANDING.  01/22/18 1326 Done               Point: Home exercise program (Done)    Learning Progress Summary    Learner Readiness Method Response Comment Documented by Status   Patient Acceptance E NR,VU BENEFITS OF OOB ACTIVITY, SAFETY WITH MOBILTY, D/C PLANNING.  S.O. VERBALIZED UNDERSTANDING.  01/22/18 1326 Done   Significant Other Acceptance E NR,VU BENEFITS OF OOB ACTIVITY, SAFETY WITH MOBILTY, D/C PLANNING. S.O. VERBALIZED UNDERSTANDING.  01/22/18 1326 Done               Point: Body mechanics (Done)    Learning Progress Summary    Learner Readiness Method Response Comment Documented by Status   Patient Acceptance E NR,VU BENEFITS OF OOB ACTIVITY, SAFETY WITH MOBILTY, D/C PLANNING. S.O. VERBALIZED UNDERSTANDING.  01/22/18 1326 Done   Significant Other Acceptance E NR,VU BENEFITS OF OOB ACTIVITY, SAFETY WITH MOBILTY, D/C PLANNING. S.O. VERBALIZED UNDERSTANDING.  01/22/18 1326 Done               Point: Precautions (Done)    Learning Progress Summary    Learner Readiness Method Response Comment Documented by Status   Patient Acceptance E NR,VU BENEFITS OF OOB ACTIVITY, SAFETY WITH MOBILTY, D/C PLANNING. S.O. VERBALIZED UNDERSTANDING.  01/22/18 1326 Done   Significant Other Acceptance E NR,VU BENEFITS OF OOB ACTIVITY, SAFETY WITH MOBILTY, D/C PLANNING. S.O. VERBALIZED UNDERSTANDING.  01/22/18 1326 Done                      User Key     Initials Effective Dates Name Provider Type Discipline     06/19/15 -  Gabby Salas, PT Physical Therapist PT                PT Recommendation and Plan  Anticipated Equipment Needs At Discharge:  (tbd)  Anticipated Discharge Disposition: home with assist, home with outpatient services (depending on inpt progress with balance/gait. )  Planned Therapy Interventions: balance training, gait training, transfer training  PT Frequency: daily  Plan of Care Review  Plan Of Care Reviewed With: patient, spouse  Outcome Summary/Follow up Plan: PT HAS GOOD STRENGTH BUT DEMONSTRATES IMPAIRED BALANCE AND IS IMPULSIVE WITH MOVEMENTS AND CHANGING DIRECTION. DEMONSTRATED SEVERAL EPISODES OF MILD LOB DURING GAIT X 400. CONTINUES WITH GLOBAL APHASIA. RESPONDED BETTER TO GESTURES. RECOMMEND CONTINUED INPT P.T. TO INCREASE INDEPENDENCE/SAFETY WITH  MOBILITY PRIOR TO D/C HOME. MIGHT NEED TO CONSIDER OPPT FOR BALANCE.           IP PT Goals       01/22/18 1327          Bed Mobility PT LTG    Bed Mobility PT LTG, Time to Achieve 2 wks  -CD      Bed Mobility PT LTG, Activity Type all bed mobility  -CD      Bed Mobility PT LTG, [place] Level independent  -CD      Transfer Training PT LTG    Transfer Training PT LTG, Time to Achieve 2 wks  -CD      Transfer Training PT LTG, Activity Type all transfers  -CD      Transfer Training PT LTG, [place] Level independent  -CD      Gait Training PT LTG    Gait Training Goal PT LTG, Time to Achieve 2 wks  -CD      Gait Training Goal PT LTG, [place] Level independent  -CD      Gait Training Goal PT LTG, Distance to Achieve 800  -CD        User Key  (r) = Recorded By, (t) = Taken By, (c) = Cosigned By    Initials Name Provider Type    LITO Salas, PT Physical Therapist                Outcome Measures       01/22/18 1115 01/21/18 1103       How much help from another person do you currently need...    Turning from your back to your side while in flat bed without using bedrails? 4  -CD      Moving from lying on back to sitting on the side of a flat bed without bedrails? 4  -CD      Moving to and from a bed to a chair (including a wheelchair)? 3  -CD      Standing up from a chair using your arms (e.g., wheelchair, bedside chair)? 3  -CD      Climbing 3-5 steps with a railing? 3  -CD      To walk in hospital room? 3  -CD      AM-PAC 6 Clicks Score 20  -CD      How much help from another is currently needed...    Putting on and taking off regular lower body clothing?  3  -TB     Bathing (including washing, rinsing, and drying)  3  -TB     Toileting (which includes using toilet bed pan or urinal)  3  -TB     Putting on and taking off regular upper body clothing  3  -TB     Taking care of personal grooming (such as brushing teeth)  3  -TB     Eating meals  3  -TB     Score  18  -TB     Modified Ankur Scale     Modified McBee Scale 3 - Moderate disability.  Requiring some help, but able to walk without assistance.  -CD 3 - Moderate disability.  Requiring some help, but able to walk without assistance.  -TB     Functional Assessment    Outcome Measure Options AM-PAC 6 Clicks Basic Mobility (PT);Modified McBee  -CD AM-PAC 6 Clicks Daily Activity (OT);Modified Ankur  -TB       User Key  (r) = Recorded By, (t) = Taken By, (c) = Cosigned By    Initials Name Provider Type    TB Rohini Mcleod, OT Occupational Therapist    CD Gabby Salas, PT Physical Therapist           Time Calculation:         PT Charges       01/22/18 1332          Time Calculation    Start Time 1115  -CD      PT Received On 01/22/18  -CD      PT Goal Re-Cert Due Date 02/01/18  -CD        User Key  (r) = Recorded By, (t) = Taken By, (c) = Cosigned By    Initials Name Provider Type    LITO Salas, PT Physical Therapist          Therapy Charges for Today     Code Description Service Date Service Provider Modifiers Qty    74099952413 HC PT EVAL LOW COMPLEXITY 4 1/22/2018 Gabby Salas, PT GP 1          PT G-Codes  Outcome Measure Options: AM-PAC 6 Clicks Basic Mobility (PT), Elizabeth Salas, PT  1/22/2018

## 2018-01-22 NOTE — PLAN OF CARE
Problem: Pain, Acute (Adult)  Goal: Acceptable Pain Control/Comfort Level  Outcome: Ongoing (interventions implemented as appropriate)   01/22/18 0459   Pain, Acute (Adult)   Acceptable Pain Control/Comfort Level making progress toward outcome

## 2018-01-22 NOTE — NURSING NOTE
Unable to complete NIHSS on patient due to patient having expressive aphasia.  Patient also not following commands at this time.  Word salad for speech.

## 2018-01-22 NOTE — PLAN OF CARE
Problem: Patient Care Overview (Adult)  Goal: Plan of Care Review  Outcome: Ongoing (interventions implemented as appropriate)   01/22/18 1327   Coping/Psychosocial Response Interventions   Plan Of Care Reviewed With patient;spouse   Outcome Evaluation   Outcome Summary/Follow up Plan PT HAS GOOD STRENGTH BUT DEMONSTRATES IMPAIRED BALANCE AND IS IMPULSIVE WITH MOVEMENTS AND CHANGING DIRECTION. DEMONSTRATED SEVERAL EPISODES OF MILD LOB DURING GAIT X 400. CONTINUES WITH GLOBAL APHASIA. RESPONDED BETTER TO GESTURES. RECOMMEND CONTINUED INPT P.T. TO INCREASE INDEPENDENCE/SAFETY WITH MOBILITY PRIOR TO D/C HOME. MIGHT NEED TO CONSIDER OPPT FOR BALANCE.        Problem: Inpatient Physical Therapy  Goal: Bed Mobility Goal LTG- PT  Outcome: Ongoing (interventions implemented as appropriate)    Goal: Transfer Training Goal 1 LTG- PT  Outcome: Ongoing (interventions implemented as appropriate)   01/22/18 1327   Transfer Training PT LTG   Transfer Training PT LTG, Time to Achieve 2 wks   Transfer Training PT LTG, Activity Type all transfers   Transfer Training PT LTG, [place] Level independent     Goal: Gait Training Goal LTG- PT  Outcome: Ongoing (interventions implemented as appropriate)   01/22/18 1327   Gait Training PT LTG   Gait Training Goal PT LTG, Time to Achieve 2 wks   Gait Training Goal PT LTG, [place] Level independent   Gait Training Goal PT LTG, Distance to Achieve 800

## 2018-01-22 NOTE — PLAN OF CARE
Problem: Seizure Disorder/Epilepsy (Adult)  Intervention: Support/Optimize Psychosocial Response to Condition   01/22/18 1826   Coping/Psychosocial Interventions   Environmental Support calm environment promoted;caregiver consistency promoted;distractions minimized;environmental consistency promoted   Coping Strategies   Supportive Measures active listening utilized;self-care encouraged;decision-making supported     Intervention: Promote Airway Safety/Patency   01/22/18 1826   Respiratory Interventions   Airway/Ventilation Management airway patency maintained;calming measures promoted   Positioning   Head Of Bed (HOB) Position other (see comments)  (Patient ambulating frequently)

## 2018-01-22 NOTE — THERAPY TREATMENT NOTE
Acute Care - Speech Language Pathology Treatment Note  [facility]     Patient Name: Jenna Russell  : 1940  MRN: [id number]  Today's Date: 2018         Admit Date: 2018    Visit Dx:      ICD-10-CM ICD-9-CM   1. Combined receptive and expressive aphasia R47.01 784.3   2. History of stroke Z86.73 V12.54   3. History of atrial fibrillation Z86.79 V12.59   4. High cholesterol E78.00 272.0   5. Essential hypertension I10 401.9   6. Impaired mobility and ADLs Z74.09 799.89     Patient Active Problem List   Diagnosis   • Pneumothorax, right   • Fracture of multiple ribs of right side with routine healing   • PAF (paroxysmal atrial fibrillation)   • High cholesterol   • Hypertension   • Atrial fibrillation   • Bradycardia   • History of stroke   • Aphasia   • Combined receptive and expressive aphasia              Adult Rehabilitation Note       18 0850          Rehab Assessment/Intervention    Discipline speech language pathologist  -AM      Document Type therapy note (daily note)  -AM      Subjective Information no complaints;agree to therapy  -AM      Patient Effort, Rehab Treatment excellent  -AM      Symptoms Noted During/After Treatment none  -AM      Precautions/Limitations, Vision WFL   for tx  -AM      Precautions/Limitations, Hearing WFL   for tx  -AM      Patient Response to Treatment Positive, good progress noted  -AM      Recorded by [AM] MARIA ALEJANDRA Cote      Pain Assessment    Pain Assessment No/denies pain  -AM      Recorded by [AM] MARIA ALEJANDRA Cote      Communication Treatment Objective and Progress    Receptive Language Treatment Objectives Improve ability to follow directions;Improve ability to comprehend questions  -AM      Expressive Treatment Objectives Improve ability to construct phrase and sentence level responses  -AM      Apraxia Treatment Objectives Improve planning and execution of connected speech  -AM      Recorded by [AM] Dian Saez CCC-SLP       Improve ability to comprehend words/phrases/sentences    Improve ability to comprehend words/phrases/sentences through: identify objects, field of;80%;with consistent cues  -AM      Status: Improve ability to comprehend words/phrases/sentences Achieved  -AM      Ability to Comprehend Words/Phrases/Sentences Progress 90%;with inconsistent cues;achieved target accuracy on task  -AM      Comments: Improve ability to comprehend words/phrases/sentences ID objects on breakfast tray and in room  -AM      Recorded by [AM] MARIA ALEJANDRA Cote      Improve ability to follow directions    Improve ability to follow directions: one-step directions with objects;80%;with consistent cues  -AM      Status: Improve ability to follow directions Achieved  -AM      Ability to Follow Directions Progress 100%;with inconsistent cues;achieved target accuracy on task  -AM      Comments: Improve ability to follow directions 100% 1 step direction, 100% 2 step directions  -AM      Recorded by [AM] MARIA ALEJANDRA Cote      Improve ability to comprehend questions    Improve ability to comprehend questions: simple yes/no questions;80%;with inconsistent cues  -AM      Status: Improve ability to comprehend questions New  -AM      Ability to Comprehend Questions Progress continue to address  -AM      Recorded by [AM] ADRIÁN CoteSLP      Improve word retrieval skills    Improve word retrieval skills by: naming an object/pic;80%;with consistent cues  -AM      Status: Improve word retrieval skills Progressing as expected  -AM      Word Retrieval Skills Progress 70%;with inconsistent cues;continue to address  -AM      Comments: Improve word retrieval skills Some paraphasias noted  -AM      Recorded by [AM] MARIA ALEJANDRA Cote      Improve ability to construct phrase and sentence level responses    Improve ability to construct phrase and sentence level responses by: answering question with phrase;80%;with inconsistent cues  -AM       Status: Improve ability to construct phrase and sentence level responses New  -AM      Constructing Phrase and Sentence Level Responses Progress continue to address  -AM      Recorded by [AM] MARIA ALEJANDRA Cote      Improve will implement strategies of (Expressive)    Implement strategies of: pointing to pictures;imitating object use;imitating gestures;writing;80%;with consistent cues  -AM      Status: Implement strategies of Progressing as expected  -AM      Strategy Implementation Progress 80%;with inconsistent cues  -AM      Comments: Implement strategies of Delay noted  -AM      Recorded by [AM] MARIA ALEJANDRA Cote      Improve motor planning    Improve motor planning to reduce apraxia by: imitating mouth postures;80%;with consistent cues  -AM      Status: Improve motor planning to Status: Reduce apraxia Achieved  -AM      Motor Planning Progress 90%;following model;achieved target accuracy on task  -AM      Comments: Improve motor planning to Comments: Reduce apraxia Imitated mouth postures and sounds post model  -AM      Recorded by [AM] MARIA ALEJANDRA Cote      Improve planning and execution of connected speech    Improve planning and execution of connected speech by: imitating two syllable words;80%;following model;with inconsistent cues  -AM      Status: Improve planning and execution of connected speech New  -AM      Planning and Execution of Connected Speech Progress continue to address  -AM      Recorded by [AM] MARIA ALEJANDRA Cote        User Key  (r) = Recorded By, (t) = Taken By, (c) = Cosigned By    Initials Name Effective Dates    AM MARIA ALEJANDRA Cote 04/19/17 -               IP SLP Goals       01/22/18 0850 01/21/18 1138       Receptive Language- Participate Meaningfully    Receptive Language Participate Meaningfully- SLP, Date Established  01/21/18  -SG     Receptive Language Participate Meaningfully- SLP, Time to Achieve  by discharge  -SG     Receptive Language  Participate Meaningfully- SLP, Date Goal Reviewed 01/22/18  -AM 01/21/18  -SG     Receptive Language Participate Meaningfully- SLP, Outcome goal ongoing  -AM goal ongoing  -SG     Expressive- Express Needs, Wants, and Emotions    Expressive Express Needs, Wants, and Emotions- SLP, Date Established  01/21/18  -SG     Expressive Express Needs, Wants, and Emotions- SLP, Time to Achieve  by discharge  -SG     Expressive Express Needs, Wants, and Emotions- SLP, Date Goal Reviewed 01/22/18  -AM 01/21/18  -SG     Expressive Express Needs, Wants, and Emotions- SLP, Outcome goal ongoing  -AM goal ongoing  -SG     Apraxia- Express Needs, Wants, and Emotions    Apraxia Express Needs, Wants, and Emotions- SLP, Date Established  01/21/18  -SG     Apraxia Express Needs, Wants, and Emotions- SLP, Time to Achieve  by discharge  -SG     Apraxia Express Needs, Wants, and Emotions- SLP, Date Goal Reviewed 01/22/18  -AM 01/21/18  -SG     Apraxia Express Needs, Wants, and Emotions- SLP, Outcome goal ongoing  -AM goal ongoing  -SG       User Key  (r) = Recorded By, (t) = Taken By, (c) = Cosigned By    Initials Name Provider Type    WILBERTO Bingham MS CCC-SLP Speech and Language Pathologist    ARIANNA Saez CCC-SLP Speech and Language Pathologist          EDUCATION  The patient has been educated in the following areas:   Cognitive Impairment Communication Impairment.    SLP Recommendation and Plan                               Plan of Care Review  Plan Of Care Reviewed With: patient, spouse  Outcome Summary/Follow up Plan: Pt. 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 Pt. able to follow 1 and 2 step directions, imitate mouth postures, and  ID common objects. Pt. with significant improvement in naming objects, though paraphasias were noted. Goals were advanced based on improved performance. SLP to continue to address speech and language skills. OP  SLP is recommended and info was given for [facility] OP SLP services.           Time Calculation:         Time Calculation- SLP       01/22/18 0947          Time Calculation- SLP    SLP Start Time 0850  -AM      SLP Received On 01/22/18  -AM        User Key  (r) = Recorded By, (t) = Taken By, (c) = Cosigned By    Initials Name Provider Type    AM MARIA ALEJANDRA Cote Speech and Language Pathologist          Therapy Charges for Today     Code Description Service Date Service Provider Modifiers Qty    [id number] [facility] TREATMENT SPEECH 4 1/22/2018 MARIA ALEJANDRA Cote GN 1          SLP G-Codes  Functional Limitations: Spoken language expressive  Swallow Current Status (): At least 80 percent but less than 100 percent impaired, limited or restricted  Swallow Goal Status (): At least 60 percent but less than 80 percent impaired, limited or restricted  Swallow Discharge Status (): 0 percent impaired, limited or restricted    MARIA ALEJANDRA Cote  1/22/2018

## 2018-01-22 NOTE — PROGRESS NOTES
"Jenna Russell    Subjective     CC: seizure    History of Present Illness     Jenna Russell is followed with new onset seizure. She noted some hallucinations overnight. Her aphasia percepts, though with some fluctuation. There have been no other new symptoms or reported seizures.    There have been no other changes in the patient's interval history since my last progress note of 1/21/18    I have reviewed and confirmed the past family, social and medical history as accurate on 1/19/18.     Review of Systems   Unable to perform ROS: Other   ROS is prevented by aphasia    Objective     /67 (BP Location: Left arm, Patient Position: Lying)  Pulse 67  Temp 97.9 °F (36.6 °C)  Resp 18  Ht 165.1 cm (65\")  Wt 55.3 kg (122 lb)  SpO2 95%  BMI 20.3 kg/m2    Physical Exam   Neurological: She has normal strength.        Neurologic Exam     Mental Status   Oriented to person.   Attention: normal.   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     Cranial Nerves   Cranial nerves II through XII intact.     Motor Exam   Muscle bulk: normal  Overall muscle tone: normal    Strength   Strength 5/5 throughout.       Laboratory and radiological testing is notable for an MRI showing only evidence of prior stroke, without new or acute pathology (I reviewed images personally). CTA's of the head/neck are without significant stenosis. BMP/CBC are essentially unremarkable. EEG is pending.    Assessment/Plan       Jenna Russell is followed now with new onset seizures. I will await her EEG and continue Keppra for now. I am hopeful for a gradual improvement back to baseline. I now suspect that her initial event yesterday may have represented a partial seizure rather than TIA/stroke. In terms of secondary stroke prevention, I agree that anticoagulation is required only if Afib continues to be a concern, or if needed " for other cardiac reasons. In the absence of the threat of Afib this may not be required. I feel that consultation with her cardiologists is reasonable. If she is doing reasonably well today I am supportive of discharge on [facility] with follow-up with Dr. Groves (her current neurologist). This was all discussed at length with Ms Russell and her partner.        As part of this visit I reviewed prior lab results, obtained additional history from the family which is incorporated in the HPI and reviewed records from the current hospitalization which is incorporated in the HPI.

## 2018-01-22 NOTE — PLAN OF CARE
Problem: Pain, Acute (Adult)  Goal: Identify Related Risk Factors and Signs and Symptoms  Outcome: Ongoing (interventions implemented as appropriate)   01/22/18 7381   Pain, Acute   Related Risk Factors (Acute Pain) communication barrier;patient perception   Signs and Symptoms (Acute Pain) impaired thought process/concentration;pacing/restlessness;sleep pattern alteration

## 2018-01-23 VITALS
HEIGHT: 65 IN | DIASTOLIC BLOOD PRESSURE: 80 MMHG | RESPIRATION RATE: 18 BRPM | BODY MASS INDEX: 20.33 KG/M2 | WEIGHT: 122 LBS | SYSTOLIC BLOOD PRESSURE: 130 MMHG | OXYGEN SATURATION: 95 % | TEMPERATURE: 97.8 F | HEART RATE: 74 BPM

## 2018-01-23 PROCEDURE — 99232 SBSQ HOSP IP/OBS MODERATE 35: CPT | Performed by: PSYCHIATRY & NEUROLOGY

## 2018-01-23 PROCEDURE — 99239 HOSP IP/OBS DSCHRG MGMT >30: CPT | Performed by: HOSPITALIST

## 2018-01-23 PROCEDURE — 97116 GAIT TRAINING THERAPY: CPT

## 2018-01-23 RX ORDER — DIVALPROEX SODIUM 250 MG/1
250 TABLET, DELAYED RELEASE ORAL EVERY 8 HOURS SCHEDULED
Qty: 90 TABLET | Refills: 2 | Status: SHIPPED | OUTPATIENT
Start: 2018-01-23

## 2018-01-23 RX ADMIN — DIVALPROEX SODIUM 250 MG: 250 TABLET, DELAYED RELEASE ORAL at 06:46

## 2018-01-23 RX ADMIN — FOLIC ACID 1 MG: 1 TABLET ORAL at 08:25

## 2018-01-23 RX ADMIN — APIXABAN 5 MG: 5 TABLET, FILM COATED ORAL at 08:25

## 2018-01-23 RX ADMIN — Medication 100 MG: at 08:19

## 2018-01-23 RX ADMIN — ASPIRIN 325 MG ORAL TABLET 325 MG: 325 PILL ORAL at 08:20

## 2018-01-23 NOTE — THERAPY TREATMENT NOTE
Acute Care - Physical Therapy Treatment Note  [facility]     Patient Name: Jenna Russell  : 1940  MRN: [id number]  Today's Date: 2018  Onset of Illness/Injury or Date of Surgery Date: 18  Date of Referral to PT: 18  Referring Physician: MD LISA    Admit Date: 2018    Visit Dx:    ICD-10-CM ICD-9-CM   1. Combined receptive and expressive aphasia R47.01 784.3   2. History of stroke Z86.73 V12.54   3. History of atrial fibrillation Z86.79 V12.59   4. High cholesterol E78.00 272.0   5. Essential hypertension I10 401.9   6. Impaired mobility and ADLs Z74.09 799.89   7. Impaired functional mobility, balance, gait, and endurance Z74.09 V49.89     Patient Active Problem List   Diagnosis   • Pneumothorax, right   • Fracture of multiple ribs of right side with routine healing   • PAF (paroxysmal atrial fibrillation)   • High cholesterol   • Hypertension   • Atrial fibrillation   • Bradycardia   • History of stroke   • Aphasia   • Combined receptive and expressive aphasia               Adult Rehabilitation Note       18 0850 18 0850       Rehab Assessment/Intervention    Discipline physical therapy assistant  -AS speech language pathologist  -AM     Document Type therapy note (daily note)  -AS therapy note (daily note)  -AM     Subjective Information no complaints;agree to therapy  -AS no complaints;agree to therapy  -AM     Patient Effort, Rehab Treatment excellent  -AS excellent  -AM     Symptoms Noted During/After Treatment  none  -AM     Precautions/Limitations fall precautions  -AS      Precautions/Limitations, Vision  WFL   for tx  -AM     Precautions/Limitations, Hearing  WFL   for tx  -AM     Patient Response to Treatment  Positive, good progress noted  -AM     Recorded by [AS] Dian Lynne PTA [AM] Dian Saez CCC-SLP     Pain Assessment    Pain Assessment No/denies pain  -AS No/denies pain  -AM     Recorded by [AS] Dian Lynne PTA [AM] Dian MATT  MARIA ALEJANDRA Saez     Cognitive Assessment/Intervention    Current Cognitive/Communication Assessment impaired  -AS      Orientation Status oriented to;person;place  -AS      Follows Commands/Answers Questions 75% of the time;needs cueing  -AS      Personal Safety mild impairment  -AS      Personal Safety Interventions fall prevention program maintained;gait belt;nonskid shoes/slippers when out of bed  -AS      Recorded by [AS] Dian Lynne, CRISTHIAN      Communication Treatment Objective and Progress    Receptive Language Treatment Objectives  Improve ability to follow directions;Improve ability to comprehend questions  -AM     Expressive Treatment Objectives  Improve ability to construct phrase and sentence level responses  -AM     Apraxia Treatment Objectives  Improve planning and execution of connected speech  -AM     Recorded by  [AM] MARIA ALEJANDRA Cote     Improve ability to comprehend words/phrases/sentences    Improve ability to comprehend words/phrases/sentences through:  identify objects, field of;80%;with consistent cues  -AM     Status: Improve ability to comprehend words/phrases/sentences  Achieved  -AM     Ability to Comprehend Words/Phrases/Sentences Progress  90%;with inconsistent cues;achieved target accuracy on task  -AM     Comments: Improve ability to comprehend words/phrases/sentences  ID objects on breakfast tray and in room  -AM     Recorded by  [AM] MARIA ALEJANDRA Cote     Improve ability to follow directions    Improve ability to follow directions:  one-step directions with objects;80%;with consistent cues  -AM     Status: Improve ability to follow directions  Achieved  -AM     Ability to Follow Directions Progress  100%;with inconsistent cues;achieved target accuracy on task  -AM     Comments: Improve ability to follow directions  100% 1 step direction, 100% 2 step directions  -AM     Recorded by  [AM] MARIA ALEJANDRA Cote     Improve ability to comprehend questions    Improve  ability to comprehend questions:  simple yes/no questions;80%;with inconsistent cues  -AM     Status: Improve ability to comprehend questions  New  -AM     Ability to Comprehend Questions Progress  continue to address  -AM     Recorded by  [AM] MARIA ALEJANDRA Cote     Improve word retrieval skills    Improve word retrieval skills by:  naming an object/pic;80%;with consistent cues  -AM     Status: Improve word retrieval skills  Progressing as expected  -AM     Word Retrieval Skills Progress  70%;with inconsistent cues;continue to address  -AM     Comments: Improve word retrieval skills  Some paraphasias noted  -AM     Recorded by  [AM] MARIA ALEJANDRA Cote     Improve ability to construct phrase and sentence level responses    Improve ability to construct phrase and sentence level responses by:  answering question with phrase;80%;with inconsistent cues  -AM     Status: Improve ability to construct phrase and sentence level responses  New  -AM     Constructing Phrase and Sentence Level Responses Progress  continue to address  -AM     Recorded by  [AM] MARIA ALEJANDRA Cote     Improve will implement strategies of (Expressive)    Implement strategies of:  pointing to pictures;imitating object use;imitating gestures;writing;80%;with consistent cues  -AM     Status: Implement strategies of  Progressing as expected  -AM     Strategy Implementation Progress  80%;with inconsistent cues  -AM     Comments: Implement strategies of  Delay noted  -AM     Recorded by  [AM] MARIA ALEJANDRA Cote     Improve motor planning    Improve motor planning to reduce apraxia by:  imitating mouth postures;80%;with consistent cues  -AM     Status: Improve motor planning to Status: Reduce apraxia  Achieved  -AM     Motor Planning Progress  90%;following model;achieved target accuracy on task  -AM     Comments: Improve motor planning to Comments: Reduce apraxia  Imitated mouth postures and sounds post model  -AM     Recorded by   "[AM] MARIA ALEJANDRA Cote     Improve planning and execution of connected speech    Improve planning and execution of connected speech by:  imitating two syllable words;80%;following model;with inconsistent cues  -AM     Status: Improve planning and execution of connected speech  New  -AM     Planning and Execution of Connected Speech Progress  continue to address  -AM     Recorded by  [AM] MARIA ALEJANDRA Cote     Bed Mobility, Assessment/Treatment    Bed Mobility, Comment deferred patient up   -AS      Recorded by [AS] Dian Lynne PTA      Transfer Assessment/Treatment    Transfers, Sit-Stand [place] verbal cues required;stand by assist  -AS      Transfers, Stand-Sit [place] verbal cues required;stand by assist  -AS      Transfers, Sit-Stand-Sit, Assist Device --   no AD needed  -AS      Recorded by [AS] Dian Lynne PTA      Gait Assessment/Treatment    Gait, [place] Level stand by assist  -AS      Gait, Assistive Device --   gait belt  -AS      Gait, Distance (Feet) 500  -AS      Gait, Safety Issues balance decreased during turns;sequencing ability decreased  -AS      Gait, Impairments strength decreased;impaired balance  -AS      Gait, Comment patient did well with no LOB with turns or side stepping, fast pace with increased step length which spouse reported \"that's the way she always walks\".   -AS      Recorded by [AS] Dian Lynne PTA      Positioning and Restraints    Pre-Treatment Position other (comment)  -AS      Post Treatment Position other  -AS      Other Position --   patient standing with spouse in room  -AS      Recorded by [AS] Dian Lynne PTA        User Key  (r) = Recorded By, (t) = Taken By, (c) = Cosigned By    Initials Name Effective Dates    AS Dian Lynne PTA 06/22/15 -     AM MARIA ALEJANDRA Cote 04/19/17 -                 IP PT Goals       01/23/18 0945 01/22/18 1327       Bed Mobility PT LTG    Bed Mobility PT LTG, Time to " Achieve  2 wks  -CD     Bed Mobility PT LTG, Activity Type  all bed mobility  -CD     Bed Mobility PT LTG, [place] Level  independent  -CD     Bed Mobility PT LTG, Date Goal Reviewed 01/23/18  -AS      Bed Mobility PT LTG, Outcome goal ongoing  -AS      Transfer Training PT LTG    Transfer Training PT LTG, Time to Achieve  2 wks  -CD     Transfer Training PT LTG, Activity Type  all transfers  -CD     Transfer Training PT LTG, [place] Level  independent  -CD     Transfer Training PT  LTG, Date Goal Reviewed 01/23/18  -AS      Transfer Training PT LTG, Outcome goal ongoing  -AS      Gait Training PT LTG    Gait Training Goal PT LTG, Time to Achieve  2 wks  -CD     Gait Training Goal PT LTG, [place] Level  independent  -CD     Gait Training Goal PT LTG, Distance to Achieve  800  -CD     Gait Training Goal PT LTG, Date Goal Reviewed 01/23/18  -AS      Gait Training Goal PT LTG, Outcome goal ongoing  -AS        User Key  (r) = Recorded By, (t) = Taken By, (c) = Cosigned By    Initials Name Provider Type    CD Gabby Salas, PT Physical Therapist    AS Dian Lynne, PTA Physical Therapy Assistant          Physical Therapy Education     Title: PT OT SLP Therapies (Active)     Topic: Physical Therapy (Active)     Point: Mobility training (Active)    Learning Progress Summary    Learner Readiness Method Response Comment Documented by Status   Patient Acceptance E NR  AS 01/23/18 0944 Active    Acceptance E NR,VU BENEFITS OF OOB ACTIVITY, SAFETY WITH MOBILTY, D/C PLANNING. S.O. VERBALIZED UNDERSTANDING.  01/22/18 1326 Done   Family Acceptance E NR  AS 01/23/18 0944 Active   Significant Other Acceptance E NR,VU BENEFITS OF OOB ACTIVITY, SAFETY WITH MOBILTY, D/C PLANNING. S.O. VERBALIZED UNDERSTANDING.  01/22/18 1326 Done               Point: Home exercise program (Active)    Learning Progress Summary    Learner Readiness Method Response Comment Documented by Status   Patient Acceptance E NR  AS  01/23/18 0944 Active    Acceptance E NR,VU BENEFITS OF OOB ACTIVITY, SAFETY WITH MOBILTY, D/C PLANNING. S.O. VERBALIZED UNDERSTANDING.  01/22/18 1326 Done   Family Acceptance E NR  AS 01/23/18 0944 Active   Significant Other Acceptance E NR,VU BENEFITS OF OOB ACTIVITY, SAFETY WITH MOBILTY, D/C PLANNING. S.O. VERBALIZED UNDERSTANDING.  01/22/18 1326 Done               Point: Body mechanics (Active)    Learning Progress Summary    Learner Readiness Method Response Comment Documented by Status   Patient Acceptance E NR  AS 01/23/18 0944 Active    Acceptance E NR,VU BENEFITS OF OOB ACTIVITY, SAFETY WITH MOBILTY, D/C PLANNING. S.O. VERBALIZED UNDERSTANDING.  01/22/18 1326 Done   Family Acceptance E NR  AS 01/23/18 0944 Active   Significant Other Acceptance E NR,VU BENEFITS OF OOB ACTIVITY, SAFETY WITH MOBILTY, D/C PLANNING. S.O. VERBALIZED UNDERSTANDING.  01/22/18 1326 Done               Point: Precautions (Active)    Learning Progress Summary    Learner Readiness Method Response Comment Documented by Status   Patient Acceptance E NR  AS 01/23/18 0944 Active    Acceptance E NR,VU BENEFITS OF OOB ACTIVITY, SAFETY WITH MOBILTY, D/C PLANNING. S.O. VERBALIZED UNDERSTANDING.  01/22/18 1326 Done   Family Acceptance E NR  AS 01/23/18 0944 Active   Significant Other Acceptance E NR,VU BENEFITS OF OOB ACTIVITY, SAFETY WITH MOBILTY, D/C PLANNING. S.O. VERBALIZED UNDERSTANDING.  01/22/18 1326 Done                      User Key     Initials Effective Dates Name Provider Type Discipline     06/19/15 -  Gabby Salas, PT Physical Therapist PT    AS 06/22/15 -  Dian Lynne, CRISTHIAN Physical Therapy Assistant PT                    PT Recommendation and Plan  Anticipated Equipment Needs At Discharge:  (tbd)  Anticipated Discharge Disposition: home with assist, home with outpatient services (depending on inpt progress with balance/gait. )  Planned Therapy Interventions: balance training, gait training, transfer  training  PT Frequency: daily  Plan of Care Review  Plan Of Care Reviewed With: patient  Progress: progress toward functional goals as expected  Outcome Summary/Follow up Plan: patient with improved high level balance activities, side stepping and retro walking, did not have any epsiodes of LOB.           Outcome Measures       01/23/18 0850 01/22/18 1115 01/21/18 1103    How much help from another person do you currently need...    Turning from your back to your side while in flat bed without using bedrails? 4  -AS 4  -CD     Moving from lying on back to sitting on the side of a flat bed without bedrails? 4  -AS 4  -CD     Moving to and from a bed to a chair (including a wheelchair)? 3  -AS 3  -CD     Standing up from a chair using your arms (e.g., wheelchair, bedside chair)? 3  -AS 3  -CD     Climbing 3-5 steps with a railing? 3  -AS 3  -CD     To walk in hospital room? 3  -AS 3  -CD     AM-PAC 6 Clicks Score 20  -AS 20  -CD     How much help from another is currently needed...    Putting on and taking off regular lower body clothing?   3  -TB    Bathing (including washing, rinsing, and drying)   3  -TB    Toileting (which includes using toilet bed pan or urinal)   3  -TB    Putting on and taking off regular upper body clothing   3  -TB    Taking care of personal grooming (such as brushing teeth)   3  -TB    Eating meals   3  -TB    Score   18  -TB    Modified [place] Scale    Modified [place] Scale  3 - Moderate disability.  Requiring some help, but able to walk without assistance.  -CD 3 - Moderate disability.  Requiring some help, but able to walk without assistance.  -TB    Functional Assessment    Outcome Measure Options AM-PAC 6 Clicks Basic Mobility (PT)  -AS AM-PAC 6 Clicks Basic Mobility (PT);Modified [place]der  -CD AM-PAC 6 Clicks Daily Activity (OT);Modified [place]  -TB      User Key  (r) = Recorded By, (t) = Taken By, (c) = Cosigned By    Initials Name Provider Type    TB Rohini Mcleod, OT  Occupational Therapist    CD Gabby Salas, PT Physical Therapist    AS Dian Lynne PTA Physical Therapy Assistant           Time Calculation:         PT Charges       01/23/18 0947          Time Calculation    Start Time 0850  -AS      PT Received On 01/23/18  -AS      PT Goal Re-Cert Due Date 02/01/18  -AS      Time Calculation- PT    Total Timed Code Minutes- PT 15 minute(s)  -AS        User Key  (r) = Recorded By, (t) = Taken By, (c) = Cosigned By    Initials Name Provider Type    AS Dian Lynne PTA Physical Therapy Assistant          Therapy Charges for Today     Code Description Service Date Service Provider Modifiers Qty    [id number] HC GAIT TRAINING EA 15 MIN 1/23/2018 Dian Lynne PTA GP 1          PT G-Codes  Outcome Measure Options: AM-PAC 6 Clicks Basic Mobility (PT)    Dian Lynne PTA  1/23/2018

## 2018-01-23 NOTE — PLAN OF CARE
Problem: Patient Care Overview (Adult)  Goal: Plan of Care Review  Outcome: Ongoing (interventions implemented as appropriate)   01/23/18 0945   Coping/Psychosocial Response Interventions   Plan Of Care Reviewed With patient   Patient Care Overview   Progress progress toward functional goals as expected   Outcome Evaluation   Outcome Summary/Follow up Plan patient with improved high level balance activities, side stepping and retro walking, did not have any epsiodes of LOB.        Problem: Inpatient Physical Therapy  Goal: Bed Mobility Goal LTG- PT  Outcome: Ongoing (interventions implemented as appropriate)   01/22/18 1327 01/23/18 0945   Bed Mobility PT LTG   Bed Mobility PT LTG, Time to Achieve 2 wks --    Bed Mobility PT LTG, Activity Type all bed mobility --    Bed Mobility PT LTG, [place] Level independent --    Bed Mobility PT LTG, Date Goal Reviewed --  01/23/18   Bed Mobility PT LTG, Outcome --  goal ongoing     Goal: Transfer Training Goal 1 LTG- PT  Outcome: Ongoing (interventions implemented as appropriate)   01/22/18 1327 01/23/18 0945   Transfer Training PT LTG   Transfer Training PT LTG, Time to Achieve 2 wks --    Transfer Training PT LTG, Activity Type all transfers --    Transfer Training PT LTG, [place] Level independent --    Transfer Training PT LTG, Date Goal Reviewed --  01/23/18   Transfer Training PT LTG, Outcome --  goal ongoing     Goal: Gait Training Goal LTG- PT  Outcome: Ongoing (interventions implemented as appropriate)   01/22/18 1327 01/23/18 0945   Gait Training PT LTG   Gait Training Goal PT LTG, Time to Achieve 2 wks --    Gait Training Goal PT LTG, [place] Level independent --    Gait Training Goal PT LTG, Distance to Achieve 800 --    Gait Training Goal PT LTG, Date Goal Reviewed --  01/23/18   Gait Training Goal PT LTG, Outcome --  goal ongoing

## 2018-01-23 NOTE — PLAN OF CARE
Problem: Patient Care Overview (Adult)  Goal: Plan of Care Review  Outcome: Ongoing (interventions implemented as appropriate)    Goal: Adult Individualization and Mutuality  Outcome: Ongoing (interventions implemented as appropriate)    Goal: Discharge Needs Assessment  Outcome: Ongoing (interventions implemented as appropriate)      Problem: Pain, Acute (Adult)  Goal: Identify Related Risk Factors and Signs and Symptoms  Outcome: Ongoing (interventions implemented as appropriate)    Goal: Acceptable Pain Control/Comfort Level  Outcome: Ongoing (interventions implemented as appropriate)      Problem: Seizure Disorder/Epilepsy (Adult)  Goal: Signs and Symptoms of Listed Potential Problems Will be Absent or Manageable (Seizure Disorder/Epilepsy)  Outcome: Ongoing (interventions implemented as appropriate)

## 2018-01-23 NOTE — PROGRESS NOTES
"Jenna Russell    Subjective     CC: seizure    History of Present Illness     Jenna Russell is followed with new onset seizure. She is much improved this morning, and nearly back to her prior baseline. No further seizures have been reported. However, her partner was concerned about Keppra causing confusion, and so Dr. Velazquez changed her regimen to  mg tid overnight. She is tolerating this well.    There have been no other changes in the patient's interval history since my last progress note of 1/22/18    I have reviewed and confirmed the past family, social and medical history as accurate on 1/19/18.     Review of Systems   Unable to perform ROS: Other   Constitutional: Negative.    Respiratory: Negative.    Cardiovascular: Negative.    Gastrointestinal: Negative.    Genitourinary: Negative.        Objective     /80 (BP Location: Left arm, Patient Position: Sitting)  Pulse 74  Temp 97.8 °F (36.6 °C) (Oral)   Resp 18  Ht 165.1 cm (65\")  Wt 55.3 kg (122 lb)  SpO2 95%  BMI 20.3 kg/m2    Physical Exam   Neurological: She has normal strength.        Neurologic Exam     Mental Status   Oriented to person.   Follows 2 step commands.   Attention: normal.   Level of consciousness: alert       On language testing Ms Russell now has a relatively fluent speech with occasional anomia but no paraphasias in conversation and is able to follow 2-step commands.      Cranial Nerves   Cranial nerves II through XII intact.     Motor Exam   Muscle bulk: normal  Overall muscle tone: normal    Strength   Strength 5/5 throughout.       Laboratory and radiological testing is notable for an EEG showing left fronto-temporal slowing.    Assessment/Plan       Jenna Russell is followed now with new onset seizures. I think it is reasonable to have switched to VPA, and I discussed potential risks/side effects, the need for monitoring as well as risk of seizure recurrence in detail. For now I think it is perfectly " reasonable to go home on  mg tid as ordered by Dr. Velazquez with follow-up with Dr. Groves in the next week or so to check labs and adjust dosing if necessary.        As part of this visit I reviewed prior lab results, obtained additional history from the family which is incorporated in the HPI and reviewed records from the current hospitalization which is incorporated in the HPI.

## 2018-01-23 NOTE — DISCHARGE SUMMARY
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          Hospital Course:  Jenna Russell is a 77 y.o. 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 Her initial work up revealed no CVA. She was evaluated by neurology, and Dr. Godwin felt that this likely represented new onset partial seizures. She was initially started on Keppra, but eventually transitioned to Depakote. She seemed to tolerate this well and her symptoms have resolved. She will follow up with Dr. Groves in 1 week.    She has a history of atrial fibrillation and despite sinus rhythm and ablation has remained, and will remain on Eliquis therapy with aspirin.           Day of Discharge     HPI:   No complaints. Wants to go.    Review of Systems      Otherwise ROS is negative except as mentioned in the HPI.    Vital Signs:   Temp:  [97.8 °F (36.6 °C)-98 °F (36.7 °C)] 97.8 °F (36.6  °C)  Heart Rate:  [58-74] 74  Resp:  [18] 18  BP: (125-134)/(63-80) 130/80     Physical Exam:  NAD, alert and oriented  Seen ambulating in halls  OP clear, MMM  PERRL  Neck supple  RRR  CTAB  +BS, ND, NT  LOREDO  No rashes  Mild speech delay, otherwise fluent and clear    Pertinent  and/or Most Recent Results       Results from last 7 days  Lab Units 01/21/18  0459 01/20/18  1124 01/20/18  1123   WBC 10*3/mm3 11.24* 9.84  --    HEMOGLOBIN g/dL 11.2* 12.8  --    HEMOGLOBIN, POC g/dL  --   --  12.9   HEMATOCRIT % 34.8 39.2  --    HEMATOCRIT POC %  --   --  38   PLATELETS 10*3/mm3 215 229  --    SODIUM mmol/L 142  --   --    POTASSIUM mmol/L 3.3*  --   --    CHLORIDE mmol/L 107  --   --    CO2 mmol/L 23.0  --   --    BUN mg/dL 8*  --   --    CREATININE mg/dL 0.50*  --  0.50*   GLUCOSE mg/dL 75  --   --    CALCIUM mg/dL 8.9  --   --        Results from last 7 days  Lab Units 01/20/18  1124 01/20/18  1122   ALT (SGPT) U/L 23  --    AST (SGOT) U/L 27  --    PROTIME seconds  --  12.9   INR   --  1.1   APTT seconds 28.3  --        Results from last 7 days  Lab Units 01/21/18  0459   CHOLESTEROL mg/dL 147   TRIGLYCERIDES mg/dL 80   HDL CHOL mg/dL 62*   LDL CHOL mg/dL 69       Results from last 7 days  Lab Units 01/21/18  0459   HEMOGLOBIN A1C % 5.60     Brief Urine Lab Results     None          Microbiology Results Abnormal     None          Imaging Results (all)     Procedure Component Value Units Date/Time    CT Angiogram Neck With & Without Contrast [[id number]] Collected:  01/20/18 1543     Updated:  01/20/18 1613    Narrative:       EXAMINATION: CT ANGIOGRAM HEAD W WO CONTRAST, CT ANGIOGRAM NECK W WO  CONTRAST - 01/20/2018     INDICATION: Stroke.      TECHNIQUE: CT angiogram head and neck with and without intravenous  contrast. 2D reconstructions performed.     The radiation dose reduction device was turned on for each scan per the  ALARA (As Low as Reasonably Achievable) protocol.     COMPARISON: None.     FINDINGS:       NECK: Normal 3 vessel arch with patent great vessel origins despite mild  to moderate atherosclerotic involvement of the left subclavian takeoff.  Lung apices grossly clear. Right dominant vertebral artery system  demonstrating patency without evidence for hemodynamically significant  stenosis, aneurysm or occlusion of the cervical segments. Cervical  carotid systems in normal course and branching pattern with minimal  atherosclerotic involvement of the carotid bulbs bilaterally with  approximately 10% luminal narrowing right and 0% left by NASCET  criteria. Noted heterogeneous enhancing thyroid with 17 mm right thyroid  nodule.     HEAD: Distal internal carotid arteries demonstrate minimal  atherosclerotic involvement and calcifications however patent without  aneurysm, hemodynamically significant stenosis or occlusion. [place] of  [place] branch vessels including anterior cerebral, middle cerebral and  posterior cerebral arteries without hemodynamically significant  stenosis, aneurysm or occlusion. Vertebrobasilar system demonstrates  atherosclerotic involvement of the V4 segments bilateral vertebral  arteries with only moderate luminal narrowing however no hematemesis,  significant stenosis, aneurysm or occlusion otherwise identified.       Impression:       CTA of the head and neck without hemodynamically significant  stenosis, aneurysm or occlusion identified. Minimal atherosclerotic  involvement of the carotid bulbs and distal V4 segments of the vertebral  arteries without hemodynamically significant stenosis noted.     DICTATED:     01/20/2018  EDITED    :     01/20/2018      This report was finalized on 1/20/2018 4:10 PM by Dr. Sunny Ramirez.       CT Head Without Contrast Stroke Protocol [[id number]] Collected:  01/20/18 1524     Updated:  01/20/18 1613    Narrative:       EXAMINATION: CT HEAD WO CONTRAST - 01/20/2018     INDICATION: Ataxia.      TECHNIQUE: Axial CT of the head without intravenous  contrast  administration.     The radiation dose reduction device was turned on for each scan per the  ALARA (As Low as Reasonably Achievable) protocol.     COMPARISON: MRI 10/23/2015.     FINDINGS: Encephalomalacia left frontotemporal region consistent with  prior insult however no new area of infarct,  intracranial hemorrhage or  extra-axial fluid collection is identified with midline structures  symmetric. No evidence for midline shift. Globes and orbits  unremarkable. Visualized paranasal sinuses and mastoid air cells are  grossly clear with calvarium intact.       Impression:       1. No acute intracranial abnormality.  2. Encephalomalacia from prior left MCA infarction involving the  frontotemporal region.     Patient scanned 1/20/2018 at 1111 hours. Scan report given to ER  physician and team at 1114 hours on 1/20/2018.     DICTATED:     01/20/2018  EDITED    :     01/20/2018         This report was finalized on 1/20/2018 4:10 PM by Dr. Sunny Ramirez.       CT Cerebral Perfusion With & Without Contrast [[id number]] Collected:  01/20/18 1540     Updated:  01/20/18 1613    Narrative:       EXAMINATION: CT CEREBRAL PERFUSION W WO CONTRAST - 01/20/2018     INDICATION: Neuro deficit(s).     TECHNIQUE: CT cerebral perfusion with and without intravenous contrast  administration. Multiparametric maps including mean transit time, time  to drain, cerebral blood flow and cerebral blood volume were calculated.     The radiation dose reduction device was turned on for each scan per the  ALARA (As Low as Reasonably Achievable) protocol.     COMPARISON: CT stroke protocol performed earlier same day.     FINDINGS: No focal area of prolongation of mean transit time or time to  drain. Cerebral blood flow and cerebral blood volume are within normal  limits and without defect.       Impression:       Normal CT cerebral perfusion.     DICTATED:     01/20/2018  EDITED    :     01/20/2018      This report was finalized on 1/20/2018  4:10 PM by Dr. Sunny Ramirez.       CT Angiogram Head With & Without Contrast [[id number]] Collected:  01/20/18 1543     Updated:  01/20/18 1613    Narrative:       EXAMINATION: CT ANGIOGRAM HEAD W WO CONTRAST, CT ANGIOGRAM NECK W WO  CONTRAST - 01/20/2018     INDICATION: Stroke.      TECHNIQUE: CT angiogram head and neck with and without intravenous  contrast. 2D reconstructions performed.     The radiation dose reduction device was turned on for each scan per the  ALARA (As Low as Reasonably Achievable) protocol.     COMPARISON: None.     FINDINGS:      NECK: Normal 3 vessel arch with patent great vessel origins despite mild  to moderate atherosclerotic involvement of the left subclavian takeoff.  Lung apices grossly clear. Right dominant vertebral artery system  demonstrating patency without evidence for hemodynamically significant  stenosis, aneurysm or occlusion of the cervical segments. Cervical  carotid systems in normal course and branching pattern with minimal  atherosclerotic involvement of the carotid bulbs bilaterally with  approximately 10% luminal narrowing right and 0% left by NASCET  criteria. Noted heterogeneous enhancing thyroid with 17 mm right thyroid  nodule.     HEAD: Distal internal carotid arteries demonstrate minimal  atherosclerotic involvement and calcifications however patent without  aneurysm, hemodynamically significant stenosis or occlusion. [place] of  [place] branch vessels including anterior cerebral, middle cerebral and  posterior cerebral arteries without hemodynamically significant  stenosis, aneurysm or occlusion. Vertebrobasilar system demonstrates  atherosclerotic involvement of the V4 segments bilateral vertebral  arteries with only moderate luminal narrowing however no hematemesis,  significant stenosis, aneurysm or occlusion otherwise identified.       Impression:       CTA of the head and neck without hemodynamically significant  stenosis, aneurysm or occlusion identified.  Minimal atherosclerotic  involvement of the carotid bulbs and distal V4 segments of the vertebral  arteries without hemodynamically significant stenosis noted.     DICTATED:     01/20/2018  EDITED    :     01/20/2018      This report was finalized on 1/20/2018 4:10 PM by Dr. Sunny Ramirez.       MRI Brain Without Contrast [[id number]] Collected:  01/20/18 1648     Updated:  01/20/18 1707    Narrative:       EXAMINATION: MRI BRAIN WO CONTRAST - 01/20/2018     INDICATION: R47.01-Aphasia; Z86.73-Personal history of transient  ischemic attack (TIA), and cerebral infarction without residual  deficits; Z86.79-Personal history of other diseases of the circulatory  system; E78.00-Pure hypercholesterolemia, unspecified; I10-Essential  (primary) hypertension.      TECHNIQUE: Multiplanar MRI of the brain without intravenous contrast.     COMPARISON: CT and CT cerebral perfusion performed earlier same day.     FINDINGS: No restriction on diffusion-weighted sequences. Midline  structures are symmetric without evidence of mass, mass effect or  midline shift. Pituitary and sella within normal limits.  Cervicomedullary junction widely patent. Encephalomalacia is noted  within the left frontotemporal region consistent with prior insult  creating prominence of the sulci in this region. Ventricles and sulci  otherwise within normal limits. Globes and orbits unremarkable.  Visualized paranasal sinuses and mastoid air cells are grossly clear and  well-pneumatized. Normal signal flow voids of the distal internal  carotid and basilar arteries.       Impression:       No acute intracranial abnormality with encephalomalacia from  prior left MCA insult noted involving the left frontotemporal region.     DICTATED:     01/20/2018  EDITED    :     01/20/2018          This report was finalized on 1/20/2018 5:05 PM by Dr. Sunny Ramirez.       XR Chest 1 View [[id number]] Collected:  01/20/18 1823     Updated:  01/20/18 1830    Narrative:           EXAMINATION: XR CHEST 1 VW - 2018     INDICATION: Stroke protocol.     COMPARISON: Chest x-ray 12/15/2016.     FINDINGS: Cardiac size is borderline enlarged with pulmonary vascularity  within normal limits. Tortuous appearance of the thoracic aorta  concerning for ectasia. No focal consolidation, pneumothorax or pleural  effusion.           Impression:       Borderline cardiomegaly with tortuous ectatic appearance of  the thoracic aorta however no focal consolidation or parenchymal  disease.     DICTATED:     2018  EDITED    :     2018      This report was finalized on 2018 6:28 PM by Dr. Sunny Ramirez.                           [facility]  [address] [place], [place] [address] [phone]                 Jenna Russell   Echocardiogram   Order# [id number]    Reading physician: Levy Yan MD   Ordering physician: Junie Bai MD   Study date: 18         Patient Information      Patient Name MRN Sex  (Age)     Jenna Russell [id number] Female 1940 (77 y.o.)       Admission Information      Admission Date/Time Discharge Date/Time Room/Bed     18  1105  S312/1       Sedation Narrator Report      Sedation Narrator Report       Interpretation Summary      · Left ventricular wall thickness is consistent with hypertrophy.  · Left ventricular systolic function is normal.  · Moderate aortic valve regurgitation is present.  · Mild mitral valve regurgitation is present  · Mild tricuspid valve regurgitation is present.  · Mild pulmonic valve regurgitation is present.     Results    EEG (Order [id number])           Procedure Abnormality Status     EEG     EEG   Status:  Final result   Visible to patient:  No (Not Released) Order: [id number]     Details      Reading Physician Reading Date Result Priority     José Miguel Pugh MD 2018 Routine         Narrative & Impression           Reason for referral:     77 y.o.female with  new onset seizure, aphasia     Technical Summary:      A 19 channel digital EEG was performed using the international 10-20 placement system, including eye leads and EKG leads.     Findings:     The awake tracing demonstrates a moderate amplitude 11 Hz posterior rhythm seen symmetrically over the occipital head leads.  Moderate amplitude theta and intermixed EMG artifact are seen over the anterior head leads on the right.  Slower 2-5 Hz intermixed delta and theta activity seen over the left frontal temporal head leads.  Photic stimulation does not change the background.  Hyperventilation does not elicit abnormality.  Sleep is seen with mild slowing of the background and sleep spindles.     IMPRESSION:     Left frontotemporal slowing     No epileptiform activity is seen               Discharge Details      Jenna Russell   Home Medication Instructions DELTA:[id number]    Printed on:01/23/18 3945   Medication Information                      ALPRAZolam (XANAX) 0.5 MG tablet  Take 0.25 mg by mouth 2 (Two) Times a Day As Needed.             apixaban (ELIQUIS) 5 MG tablet tablet  Take 5 mg by mouth 2 (two) times a day.             aspirin 81 MG EC tablet  Take 81 mg by mouth daily.             atorvastatin (LIPITOR) 40 MG tablet  Take 40 mg by mouth Every Night.             calcium carbonate EX (TUMS EX) 750 MG chewable tablet  Chew 750 mg 2 (Two) Times a Day.             Cholecalciferol (VITAMIN D) 2000 UNITS capsule  Take 2,000 Units by mouth Daily.             diclofenac (FLECTOR) 1.3 % patch patch  Apply 1 patch topically 2 (Two) Times a Day.             divalproex (DEPAKOTE) 250 MG DR tablet  Take 1 tablet by mouth Every 8 (Eight) Hours.             folic acid (FOLVITE) 1 MG tablet  Take 1 mg by mouth Daily.             HYDROcodone-acetaminophen (NORCO)  MG per tablet  Take 1 tablet by mouth Every 6 (Six) Hours As Needed for moderate pain (4-6) for up to 20 doses.             hyoscyamine (ANASPAZ,LEVSIN)  0.125 MG tablet  Take 0.125 mg by mouth every 4 (four) hours as needed for cramping.                   Discharge Disposition:  Home or Self Care    Discharge Diet:  Diet Instructions     Advance Diet As Tolerated                     Discharge Activity:   Activity Instructions     Activity as Tolerated                     Special Instructions:  None  No future appointments.    Additional Instructions for the Follow-ups that You Need to Schedule     Discharge Follow-up with PCP    As directed    Follow Up Details:  1-2 weeks           Discharge Follow-up with Specified Provider: Germain 1 week    As directed    To:  Germain 1 week                     Time Spent on Discharge:  40 minutes    Kingsley Walsh MD  01/23/18  10:55 AM

## 2018-04-17 ENCOUNTER — LAB REQUISITION (OUTPATIENT)
Dept: LAB | Facility: HOSPITAL | Age: 78
End: 2018-04-17

## 2018-04-17 DIAGNOSIS — R19.4 CHANGE IN BOWEL HABITS: ICD-10-CM

## 2018-04-17 PROCEDURE — 88305 TISSUE EXAM BY PATHOLOGIST: CPT | Performed by: INTERNAL MEDICINE

## 2018-04-18 LAB
CYTO UR: NORMAL
LAB AP CASE REPORT: NORMAL
LAB AP CLINICAL INFORMATION: NORMAL
Lab: NORMAL
PATH REPORT.FINAL DX SPEC: NORMAL
PATH REPORT.GROSS SPEC: NORMAL

## 2018-08-08 ENCOUNTER — TRANSCRIBE ORDERS (OUTPATIENT)
Dept: ADMINISTRATIVE | Facility: HOSPITAL | Age: 78
End: 2018-08-08

## 2018-08-08 DIAGNOSIS — N93.9 ABNORMAL UTERINE BLEEDING: Primary | ICD-10-CM

## 2018-08-10 ENCOUNTER — HOSPITAL ENCOUNTER (OUTPATIENT)
Dept: ULTRASOUND IMAGING | Facility: HOSPITAL | Age: 78
Discharge: HOME | End: 2018-08-10
Admitting: INTERNAL MEDICINE

## 2018-08-10 DIAGNOSIS — N93.9 ABNORMAL UTERINE BLEEDING: ICD-10-CM

## 2018-08-10 PROCEDURE — 76830 TRANSVAGINAL US NON-OB: CPT

## 2018-12-26 ENCOUNTER — TRANSCRIBE ORDERS (OUTPATIENT)
Dept: ADMINISTRATIVE | Facility: HOSPITAL | Age: 78
End: 2018-12-26

## 2018-12-26 ENCOUNTER — HOSPITAL ENCOUNTER (OUTPATIENT)
Dept: GENERAL RADIOLOGY | Facility: HOSPITAL | Age: 78
Discharge: HOME | End: 2018-12-26
Admitting: INTERNAL MEDICINE

## 2018-12-26 DIAGNOSIS — T14.90XA TRAUMA: ICD-10-CM

## 2018-12-26 DIAGNOSIS — R52 PAIN: Primary | ICD-10-CM

## 2018-12-26 PROCEDURE — 73560 X-RAY EXAM OF KNEE 1 OR 2: CPT

## 2019-01-02 ENCOUNTER — TELEPHONE (OUTPATIENT)
Dept: GASTROENTEROLOGY | Facility: CLINIC | Age: 79
End: 2019-01-02

## 2019-01-02 DIAGNOSIS — R19.7 DIARRHEA, UNSPECIFIED TYPE: Primary | ICD-10-CM

## 2019-01-02 NOTE — TELEPHONE ENCOUNTER
She needs stool cultures and stool for C DIff toxin. If these are negative we can call in Entocort 3 mg, three in the AM,  but please get her in to see me.

## 2019-01-09 ENCOUNTER — TELEPHONE (OUTPATIENT)
Dept: GASTROENTEROLOGY | Facility: CLINIC | Age: 79
End: 2019-01-09

## 2019-01-09 RX ORDER — VANCOMYCIN HYDROCHLORIDE 125 MG/1
125 CAPSULE ORAL 4 TIMES DAILY
Qty: 40 CAPSULE | Refills: 0 | Status: SHIPPED | OUTPATIENT
Start: 2019-01-09 | End: 2019-01-29 | Stop reason: SDUPTHER

## 2019-01-09 NOTE — TELEPHONE ENCOUNTER
Calling regarding stool study results.  Results are positive for C-diff, will send in Vancomycin 125mg, 4 times daily x 10 days.

## 2019-01-18 LAB — C DIFF TOX GENS STL QL NAA+PROBE: POSITIVE

## 2019-01-24 ENCOUNTER — OFFICE VISIT (OUTPATIENT)
Dept: GASTROENTEROLOGY | Facility: CLINIC | Age: 79
End: 2019-01-24

## 2019-01-24 VITALS
WEIGHT: 120 LBS | HEART RATE: 71 BPM | BODY MASS INDEX: 19.97 KG/M2 | SYSTOLIC BLOOD PRESSURE: 154 MMHG | DIASTOLIC BLOOD PRESSURE: 67 MMHG

## 2019-01-24 DIAGNOSIS — R19.7 DIARRHEA, UNSPECIFIED TYPE: Primary | ICD-10-CM

## 2019-01-24 DIAGNOSIS — K52.831 COLLAGENOUS COLITIS: ICD-10-CM

## 2019-01-24 DIAGNOSIS — A04.72 COLITIS, CLOSTRIDIUM DIFFICILE: ICD-10-CM

## 2019-01-24 PROCEDURE — 99214 OFFICE O/P EST MOD 30 MIN: CPT | Performed by: INTERNAL MEDICINE

## 2019-01-24 NOTE — PROGRESS NOTES
PCP:  Judi Rodriguez MD     No referring provider defined for this encounter.    Chief Complaint   Patient presents with   • Diarrhea     follow up         HPI   The patient is a 78-year-old female with a history of microscopic colitis.  She recently had worsening diarrhea.  She had a C. difficile toxin which was positive.  She was treated with vancomycin which seemed to improve her symptoms.  She does periodically get diarrhea.  She had a colonoscopy back in April 2018 and did have biopsies again consistent with collagenous colitis.  She seems to be doing well at this point.  She stopped her medication about 10 days ago.    Allergies   Allergen Reactions   • Penicillins Anaphylaxis          Current Outpatient Medications:   •  ALPRAZolam (XANAX) 0.5 MG tablet, Take 0.25 mg by mouth 2 (Two) Times a Day As Needed., Disp: , Rfl:   •  apixaban (ELIQUIS) 5 MG tablet tablet, Take 5 mg by mouth 2 (two) times a day., Disp: , Rfl:   •  aspirin 81 MG EC tablet, Take 81 mg by mouth daily., Disp: , Rfl:   •  atorvastatin (LIPITOR) 40 MG tablet, Take 40 mg by mouth Every Night., Disp: , Rfl:   •  calcium carbonate EX (TUMS EX) 750 MG chewable tablet, Chew 750 mg 2 (Two) Times a Day., Disp: , Rfl:   •  Cholecalciferol (VITAMIN D) 2000 UNITS capsule, Take 2,000 Units by mouth Daily., Disp: , Rfl:   •  diclofenac (FLECTOR) 1.3 % patch patch, Apply 1 patch topically 2 (Two) Times a Day., Disp: 14 patch, Rfl: 0  •  divalproex (DEPAKOTE) 250 MG DR tablet, Take 1 tablet by mouth Every 8 (Eight) Hours., Disp: 90 tablet, Rfl: 2  •  folic acid (FOLVITE) 1 MG tablet, Take 1 mg by mouth Daily., Disp: , Rfl: 0  •  HYDROcodone-acetaminophen (NORCO)  MG per tablet, Take 1 tablet by mouth Every 6 (Six) Hours As Needed for moderate pain (4-6) for up to 20 doses., Disp: 20 tablet, Rfl: 0  •  hyoscyamine (ANASPAZ,LEVSIN) 0.125 MG tablet, Take 0.125 mg by mouth every 4 (four) hours as needed for cramping., Disp: , Rfl:   •  vancomycin  (VANCOCIN) 125 MG capsule, Take 1 capsule by mouth 4 (Four) Times a Day., Disp: 40 capsule, Rfl: 0     Past Medical History:   Diagnosis Date   • Acute ischemic stroke (CMS/HCC)    • Arterial ischemic stroke (CMS/HCC) 9/8/2016   • 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 Normal LVSF.Normal LV diastolic filling. Left atrium midly dilated. RV mild- mod dilated. MIld aortic cusp sclerosis. No evidence of mitral valve prolapse. Mild mitral regurgitation.No pulmonary hypertension or perdicardial effusion. No dilation of aortic root. Venous system appears normal   • H/O: stroke    • HLD (hyperlipidemia) 9/8/2016   • Left temporal lobe infarction    • LOM (loss of memory)    • Osteoarthritis 9/8/2016   • Paroxysmal atrial fibrillation (CMS/HCC) 9/8/2016   • Thyroid nodule 9/8/2016    1.8x1.2cm   • Weakness generalized    • Wound infection after surgery     Left hip arthroplasty, staph aureus, 6 weeks IV Rocephin       Past Surgical History:   Procedure Laterality Date   • ABDOMINOPLASTY     • BREAST ABSCESS INCISION AND DRAINAGE     • TOTAL HIP ARTHROPLASTY Left     Staph aureus wound infection        Social History     Socioeconomic History   • Marital status:      Spouse name: Not on file   • Number of children: 3   • Years of education: Not on file   • Highest education level: Not on file   Social Needs   • Financial resource strain: Not on file   • Food insecurity - worry: Not on file   • Food insecurity - inability: Not on file   • Transportation needs - medical: Not on file   • Transportation needs - non-medical: Not on file   Occupational History   • Occupation: retired,    Tobacco Use   • Smoking status: Former Smoker     Years: 2.00     Last attempt to quit: 1972      Years since quittin.0   • Smokeless tobacco: Never Used   • Tobacco comment: OV says she quit smoking within the past year   Substance and Sexual Activity   • Alcohol use: Yes     Alcohol/week: 8.4 oz     Types: 14 Glasses of wine per week   • Drug use: No   • Sexual activity: Not on file   Other Topics Concern   • Not on file   Social History Narrative    With her partner Lianna for 36yrs.. She drinks 1 cup of coffee per day.        Family History   Problem Relation Age of Onset   • Stroke Mother    • Other Mother         Cardiac disorder   • Diabetes Mother    • Hypertension Mother    • Mental illness Mother    • Heart attack Father    • Heart failure Father    • No Known Problems Sister    • Heart attack Maternal Grandfather    • Heart disease Paternal Grandfather    • No Known Problems Sister         Review of Systems   Constitutional: Negative for unexpected weight loss.   HENT: Negative for trouble swallowing.    Eyes: Negative.    Respiratory: Negative.    Gastrointestinal: Negative for abdominal distention, abdominal pain, anal bleeding, blood in stool, constipation, diarrhea, nausea, rectal pain, vomiting, GERD and indigestion.   Endocrine: Negative.    Genitourinary: Negative.    Musculoskeletal: Negative.    Skin: Negative.    Allergic/Immunologic: Negative.    Neurological: Negative.    Hematological: Negative.    Psychiatric/Behavioral: Negative.         Vitals:    19 1426   BP: 154/67   Pulse: 71        Physical Exam   General Appearance: Alert, in no acute distress   Head: Normocephalic, without obvious abnormality, atraumatic   Eyes: Lids and lashes normal, conjunctivae and sclerae normal, no icterus, no pallor, corneas clear, PERRLA   Ears: Ears appear intact with no abnormalities noted   Throat: No oral lesions, no thrush, oral mucosa moist   Neck: No adenopathy, supple, trachea midline, no thyromegaly, no JVD   Lungs: Clear to auscultation,respirations regular, even and unlabored  Heart: Regular rhythm and normal rate, normal S1 and S2, no murmur, no gallop, no rub, no click   Chest Wall: Symmetrical respiratory expansion   Abdomen: Normal bowel sounds, no masses, no organomegaly, soft non-tender, non-distended, no guarding, no rebound tenderness   Extremities: Moves all extremities well, no edema, no cyanosis, no redness   Skin: No bleeding, bruising or rash   Neurologic: Cranial nerves 2 - 12 grossly intact, no focal deficits       Jenna was seen today for diarrhea.    Diagnoses and all orders for this visit:    Diarrhea, unspecified type  -     Clostridium Difficile Toxin, PCR - Stool, Per Rectum; Future    Colitis, Clostridium difficile    Collagenous colitis    Impressions and plan #1 C. difficile colitis: The patient had Clostridium difficile colitis.  She is been treated.  She seems to be doing better symptomatically.  Her symptoms were not severe.  She is somewhat used to diarrhea as she has a history of collagenous colitis.  She has finished her course of medications.  She always has some element of diarrhea.  We are going to recheck her C. difficile toxin.  We talked about the fact that C. difficile does make spores which can cause recurrence of the colitis.  The Entocort that she typically uses for her microscopic colitis would not be a good idea if she has C. difficile.  I tried to explain to them that if she has worsening diarrhea we would likely have to recheck C. difficile again.  It would not be wise just to blindly start the Entocort, especially since she had one episode of C. difficile recently.     Freddy Reid MD

## 2019-01-29 RX ORDER — VANCOMYCIN HYDROCHLORIDE 125 MG/1
125 CAPSULE ORAL 4 TIMES DAILY
Qty: 56 CAPSULE | Refills: 0 | Status: SHIPPED | OUTPATIENT
Start: 2019-01-29 | End: 2019-02-12

## 2019-09-13 ENCOUNTER — APPOINTMENT (OUTPATIENT)
Dept: CT IMAGING | Facility: HOSPITAL | Age: 79
End: 2019-09-13

## 2019-09-13 ENCOUNTER — APPOINTMENT (OUTPATIENT)
Dept: GENERAL RADIOLOGY | Facility: HOSPITAL | Age: 79
End: 2019-09-13

## 2019-09-13 ENCOUNTER — HOSPITAL ENCOUNTER (EMERGENCY)
Facility: HOSPITAL | Age: 79
Discharge: HOME | End: 2019-09-13
Attending: EMERGENCY MEDICINE | Admitting: EMERGENCY MEDICINE

## 2019-09-13 VITALS
SYSTOLIC BLOOD PRESSURE: 144 MMHG | DIASTOLIC BLOOD PRESSURE: 88 MMHG | TEMPERATURE: 98.4 F | HEIGHT: 68 IN | RESPIRATION RATE: 16 BRPM | OXYGEN SATURATION: 96 % | HEART RATE: 68 BPM | BODY MASS INDEX: 17.73 KG/M2 | WEIGHT: 117 LBS

## 2019-09-13 DIAGNOSIS — Z79.01 CHRONIC ANTICOAGULATION: ICD-10-CM

## 2019-09-13 DIAGNOSIS — S09.90XA CLOSED HEAD INJURY, INITIAL ENCOUNTER: ICD-10-CM

## 2019-09-13 DIAGNOSIS — T07.XXXA MULTIPLE CONTUSIONS: ICD-10-CM

## 2019-09-13 DIAGNOSIS — D69.6 THROMBOCYTOPENIA (HCC): ICD-10-CM

## 2019-09-13 DIAGNOSIS — S01.81XA LACERATION OF LEFT SIDE OF FOREHEAD WITH COMPLICATION, INITIAL ENCOUNTER: ICD-10-CM

## 2019-09-13 DIAGNOSIS — T07.XXXA MULTIPLE ABRASIONS: ICD-10-CM
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
## 2019-09-13 DIAGNOSIS — R71.8 ELEVATED MCV: ICD-10-CM

## 2019-09-13 LAB
APTT PPP: 35.4 SECONDS (ref 24–37)
BASOPHILS # BLD AUTO: 0.02 10*3/MM3 (ref 0–0.2)
BASOPHILS # BLD AUTO: 0.04 10*3/MM3 (ref 0–0.2)
BASOPHILS NFR BLD AUTO: 0.3 % (ref 0–1.5)
BASOPHILS NFR BLD AUTO: 0.7 % (ref 0–1.5)
BURR CELLS BLD QL SMEAR: NORMAL
DEPRECATED RDW RBC AUTO: 49.1 FL (ref 37–54)
DEPRECATED RDW RBC AUTO: 51.5 FL (ref 37–54)
EOSINOPHIL # BLD AUTO: 0.1 10*3/MM3 (ref 0–0.4)
EOSINOPHIL # BLD AUTO: 0.12 10*3/MM3 (ref 0–0.4)
EOSINOPHIL NFR BLD AUTO: 1.7 % (ref 0.3–6.2)
EOSINOPHIL NFR BLD AUTO: 2.2 % (ref 0.3–6.2)
ERYTHROCYTE [DISTWIDTH] IN BLOOD BY AUTOMATED COUNT: 13 % (ref 12.3–15.4)
ERYTHROCYTE [DISTWIDTH] IN BLOOD BY AUTOMATED COUNT: 13.1 % (ref 12.3–15.4)
FOLATE SERPL-MCNC: >20 NG/ML (ref 4.78–24.2)
HCT VFR BLD AUTO: 37.8 % (ref 34–46.6)
HCT VFR BLD AUTO: 42.1 % (ref 34–46.6)
HGB BLD-MCNC: 12.3 G/DL (ref 12–15.9)
HGB BLD-MCNC: 13 G/DL (ref 12–15.9)
IMM GRANULOCYTES # BLD AUTO: 0.02 10*3/MM3 (ref 0–0.05)
IMM GRANULOCYTES # BLD AUTO: 0.04 10*3/MM3 (ref 0–0.05)
IMM GRANULOCYTES NFR BLD AUTO: 0.4 % (ref 0–0.5)
IMM GRANULOCYTES NFR BLD AUTO: 0.7 % (ref 0–0.5)
INR PPP: 1.4 (ref 0.85–1.16)
LYMPHOCYTES # BLD AUTO: 1.43 10*3/MM3 (ref 0.7–3.1)
LYMPHOCYTES # BLD AUTO: 1.88 10*3/MM3 (ref 0.7–3.1)
LYMPHOCYTES NFR BLD AUTO: 24.7 % (ref 19.6–45.3)
LYMPHOCYTES NFR BLD AUTO: 34.1 % (ref 19.6–45.3)
MCH RBC QN AUTO: 32.8 PG (ref 26.6–33)
MCH RBC QN AUTO: 33 PG (ref 26.6–33)
MCHC RBC AUTO-ENTMCNC: 30.9 G/DL (ref 31.5–35.7)
MCHC RBC AUTO-ENTMCNC: 32.5 G/DL (ref 31.5–35.7)
MCV RBC AUTO: 100.8 FL (ref 79–97)
MCV RBC AUTO: 106.9 FL (ref 79–97)
MONOCYTES # BLD AUTO: 0.53 10*3/MM3 (ref 0.1–0.9)
MONOCYTES # BLD AUTO: 0.6 10*3/MM3 (ref 0.1–0.9)
MONOCYTES NFR BLD AUTO: 10.9 % (ref 5–12)
MONOCYTES NFR BLD AUTO: 9.2 % (ref 5–12)
NEUTROPHILS # BLD AUTO: 2.85 10*3/MM3 (ref 1.7–7)
NEUTROPHILS # BLD AUTO: 3.67 10*3/MM3 (ref 1.7–7)
NEUTROPHILS NFR BLD AUTO: 51.7 % (ref 42.7–76)
NEUTROPHILS NFR BLD AUTO: 63.4 % (ref 42.7–76)
NRBC BLD AUTO-RTO: 0 /100 WBC (ref 0–0.2)
NRBC BLD AUTO-RTO: 1.5 /100 WBC (ref 0–0.2)
PLAT MORPH BLD: NORMAL
PLATELET # BLD AUTO: 110 10*3/MM3 (ref 140–450)
PLATELET # BLD AUTO: 72 10*3/MM3 (ref 140–450)
PMV BLD AUTO: 10.2 FL (ref 6–12)
PMV BLD AUTO: 10.7 FL (ref 6–12)
PROTHROMBIN TIME: 16.6 SECONDS (ref 11.2–14.3)
RBC # BLD AUTO: 3.75 10*6/MM3 (ref 3.77–5.28)
RBC # BLD AUTO: 3.94 10*6/MM3 (ref 3.77–5.28)
VALPROATE SERPL-MCNC: 85.3 MCG/ML (ref 50–125)
VIT B12 BLD-MCNC: 511 PG/ML (ref 211–946)
WBC MORPH BLD: NORMAL
WBC NRBC COR # BLD: 5.51 10*3/MM3 (ref 3.4–10.8)
WBC NRBC COR # BLD: 5.79 10*3/MM3 (ref 3.4–10.8)

## 2019-09-13 PROCEDURE — 70450 CT HEAD/BRAIN W/O DYE: CPT

## 2019-09-13 PROCEDURE — 80164 ASSAY DIPROPYLACETIC ACD TOT: CPT | Performed by: EMERGENCY MEDICINE

## 2019-09-13 PROCEDURE — 82746 ASSAY OF FOLIC ACID SERUM: CPT | Performed by: EMERGENCY MEDICINE

## 2019-09-13 PROCEDURE — 85007 BL SMEAR W/DIFF WBC COUNT: CPT | Performed by: EMERGENCY MEDICINE

## 2019-09-13 PROCEDURE — 99284 EMERGENCY DEPT VISIT MOD MDM: CPT

## 2019-09-13 PROCEDURE — 71046 X-RAY EXAM CHEST 2 VIEWS: CPT

## 2019-09-13 PROCEDURE — 85025 COMPLETE CBC W/AUTO DIFF WBC: CPT | Performed by: EMERGENCY MEDICINE

## 2019-09-13 PROCEDURE — 85730 THROMBOPLASTIN TIME PARTIAL: CPT | Performed by: EMERGENCY MEDICINE

## 2019-09-13 PROCEDURE — 82607 VITAMIN B-12: CPT | Performed by: EMERGENCY MEDICINE

## 2019-09-13 PROCEDURE — 85610 PROTHROMBIN TIME: CPT | Performed by: EMERGENCY MEDICINE

## 2019-09-13 RX ORDER — ACETAMINOPHEN 325 MG/1
650 TABLET ORAL ONCE
Status: DISCONTINUED | OUTPATIENT
Start: 2019-09-13 | End: 2019-09-13 | Stop reason: SDUPTHER

## 2019-09-13 RX ORDER — ACETAMINOPHEN 500 MG
1000 TABLET ORAL ONCE
Status: COMPLETED | OUTPATIENT
Start: 2019-09-13 | End: 2019-09-13

## 2019-09-13 RX ADMIN — ACETAMINOPHEN 1000 MG: 500 TABLET, FILM COATED ORAL at 13:11

## 2019-09-13 NOTE — ED PROVIDER NOTES
Subjective   Jenna Russell is a 78 y.o.female who presents to the ED status post fall. The patient experienced a fall earlier today. During this time, she was walking when she tripped and fell to the ground. She did hit her head on the ground, but she did not lose consciousness. She also complains of bilateral knee pain. She has not taken any medication since the incident. She denies any dizziness, rhinorrhea, cough, chest pain, dysuria, or change in frequency of urination. Additionally, she has a history of a cerebrovascular accident and atrial fibrillation, for which she takes Eliquis daily. Of note, she has been ambulatory since the fall. Her most recent tetanus shot was less than five years ago. There are no other complaints at this time.         History provided by:  Patient  Fall   Mechanism of injury: fall    Injury location:  Face and leg  Facial injury location:  L eyebrow  Leg injury location:  L knee and R knee  Incident location:  Outdoors  Time since incident: earlier today.  Arrived directly from scene: yes    Fall:     Fall occurred:  Walking and tripped    Impact surface:  [place]    Point of impact:  Head    Entrapped after fall: no    Suspicion of alcohol use: no    Suspicion of drug use: no    Prior to arrival data:     Patient ambulatory at scene: yes      Blood loss:  Minimal    Responsiveness at scene:  Alert    Orientation at scene:  Person, place, situation and time    Loss of consciousness: no      Amnesic to event: no    Associated symptoms: headaches    Associated symptoms: no chest pain and no loss of consciousness        Review of Systems   HENT: Negative for rhinorrhea.    Respiratory: Negative for cough.    Cardiovascular: Negative for chest pain.   Genitourinary: Negative for dysuria and frequency.   Musculoskeletal: Positive for arthralgias (bilateral knees).   Skin: Positive for wound (laceration to left eyebrow).   Neurological: Positive for headaches. Negative for dizziness,  loss of consciousness and syncope.   All other systems reviewed and are negative.      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 Normal LVSF.Normal LV diastolic filling. Left atrium midly dilated. RV mild- mod dilated. MIld aortic cusp sclerosis. No evidence of mitral valve prolapse. Mild mitral regurgitation.No pulmonary hypertension or perdicardial effusion. No dilation of aortic root. Venous system appears normal   • H/O: stroke    • HLD (hyperlipidemia) 9/8/2016   • Left temporal lobe infarction    • LOM (loss of memory)    • Osteoarthritis 9/8/2016   • Paroxysmal atrial fibrillation (CMS/HCC) 9/8/2016   • Thyroid nodule 9/8/2016    1.8x1.2cm   • Weakness generalized    • Wound infection after surgery     Left hip arthroplasty, staph aureus, 6 weeks IV Rocephin       Allergies   Allergen Reactions   • Penicillins Anaphylaxis       Past Surgical History:   Procedure Laterality Date   • ABDOMINOPLASTY     • BREAST ABSCESS INCISION AND DRAINAGE     • TOTAL HIP ARTHROPLASTY Left     Staph aureus wound infection       Family History   Problem Relation Age of Onset   • Stroke Mother    • Other Mother         Cardiac disorder   • Diabetes Mother    • Hypertension Mother    • Mental illness Mother    • Heart attack Father    • Heart failure Father    • No Known Problems Sister    • Heart attack Maternal Grandfather    • Heart disease Paternal Grandfather    • No Known Problems Sister        Social History     Socioeconomic History   • Marital status:      Spouse name: Not on file   • Number of children: 3   • Years of education: Not on file   •  Highest education level: Not on file   Occupational History   • Occupation: retired,    Tobacco Use   • Smoking status: Former Smoker     Years: 2.00     Last attempt to quit: 1972     Years since quittin.7   • Smokeless tobacco: Never Used   • Tobacco comment: OV says she quit smoking within the past year   Substance and Sexual Activity   • Alcohol use: Yes     Alcohol/week: 8.4 oz     Types: 14 Glasses of wine per week   • Drug use: No   • Sexual activity: Defer   Social History Narrative    With her partner Lianna for 36yrs.. She drinks 1 cup of coffee per day.         Objective   Physical Exam   Constitutional: She is oriented to person, place, and time. She appears well-developed and well-nourished.   HENT:   Head: Normocephalic.   Mouth/Throat: Oropharynx is clear and moist.   Above the left eyebrow there is a two centimeter abrasion with surrounding bruising. No facial bony tenderness.    Eyes: Conjunctivae and EOM are normal. Pupils are equal, round, and reactive to light. No scleral icterus.   Neck: Normal range of motion. Neck supple.   Cardiovascular: Normal rate, regular rhythm, normal heart sounds and intact distal pulses.   No murmur heard.  Pulmonary/Chest: Effort normal and breath sounds normal. No respiratory distress.   Abdominal: Soft. Bowel sounds are normal. There is no tenderness.   Musculoskeletal: Normal range of motion. She exhibits no edema.   No bony tenderness at the knees with excellent range of motion.    Neurological: She is alert and oriented to person, place, and time. No cranial nerve deficit.   Normal strength to all extremities. Occasional slowed speech consistent with history of stroke.    Skin: Skin is warm and dry.   Bilateral knees with minor abrasions and minimal bleeding. Bilateral hands are scraped.    Psychiatric: She has a normal mood and affect. Her behavior is normal.   Nursing note and vitals reviewed.      Laceration Repair  Date/Time: 2019 2:42  PM  Performed by: Sae Van MD  Authorized by: Sae Van MD     Consent:     Consent obtained:  Verbal    Consent given by:  Patient    Risks discussed:  Infection, need for additional repair, pain, poor cosmetic result and retained foreign body  Anesthesia (see MAR for exact dosages):     Anesthesia method:  None  Laceration details:     Location:  Face    Face location:  L eyebrow    Length (cm):  2  Repair type:     Repair type:  Simple  Pre-procedure details:     Preparation:  Patient was prepped and draped in usual sterile fashion and imaging obtained to evaluate for foreign bodies  Exploration:     Wound exploration: wound explored through full range of motion and entire depth of wound probed and visualized      Contaminated: no    Treatment:     Wound cleansed with: Surgical scrub.    Amount of cleaning:  Standard    Irrigation solution:  Sterile saline    Irrigation method:  Tap and syringe  Skin repair:     Repair method:  Tissue adhesive  Approximation:     Approximation:  Close    [place] border: well-aligned    Post-procedure details:     Dressing:  Open (no dressing)    Patient tolerance of procedure:  Tolerated well, no immediate complications  Comments:      Horizontal laceration through the lateral left eyebrow with the vertical laceration just posterior to it. Small partial thickness 5 millimeter laceration that is inferior to the horizontal one.              ED Course  ED Course as of Sep 13 1700   Fri Sep 13, 2019   1409 I discussed the findings with the patient and significant other.  Her MCV is elevated and this is a new change from previous.  Platelet count is depressed and this is also new.  In view of her treatment with Eliquis and aspirin I felt that repeating the CBC was the most important initial  [HH]   1455 Repeat examination the patient has mild palpable tenderness in the left anterolateral chest wall without significant overlying visible contusion.  Patient reports this  is increased with palpation or movement.  X-ray is pending.  []   1521 I discussed the patient's thrombocytopenia with Dr. Yan who feels she should stay on her Eliquis  []   1658 Patient's MCV is 100.8 on recheck which is only minimally elevated.  Her platelets are 110,000.  We will leave her on her aspirin for the time being but I discussed with the patient and significant other the need for reevaluation by her PCP.  I have asked her to call Dr. Groves on Monday to discuss aspirin use in the setting of mild thrombocytopenia, but also again the need for close monitoring of this to make sure that her platelet count is not further diminished.We discussed indications for immediate return to the ED especially in view of closed head injury with a chronic anticoagulationVery pleasant, bright, and responsible patient and caring spouse verbalized understanding agreement plan of care, need for follow-up, and indications for immediate return.  A copy of the labs and studies have been given to them on discharge to facilitate their follow-up.  I have asked the patient and significant other to have  findable follow-up on the folate and B12 levels next week as they will not be back today  []      ED Course User Index  [] Sae Van MD     Recent Results (from the past 24 hour(s))   Valproic Acid Level, Total    Collection Time: 09/13/19 12:19 PM   Result Value Ref Range    Valproic Acid 85.3 50.0 - 125.0 mcg/mL   CBC Auto Differential    Collection Time: 09/13/19 12:19 PM   Result Value Ref Range    WBC 5.51 3.40 - 10.80 10*3/mm3    RBC 3.94 3.77 - 5.28 10*6/mm3    Hemoglobin 13.0 12.0 - 15.9 g/dL    Hematocrit 42.1 34.0 - 46.6 %    .9 (H) 79.0 - 97.0 fL    MCH 33.0 26.6 - 33.0 pg    MCHC 30.9 (L) 31.5 - 35.7 g/dL    RDW 13.0 12.3 - 15.4 %    RDW-SD 51.5 37.0 - 54.0 fl    MPV 10.7 6.0 - 12.0 fL    Platelets 72 (L) 140 - 450 10*3/mm3    Neutrophil % 51.7 42.7 - 76.0 %    Lymphocyte % 34.1 19.6 - 45.3 %     Monocyte % 10.9 5.0 - 12.0 %    Eosinophil % 2.2 0.3 - 6.2 %    Basophil % 0.7 0.0 - 1.5 %    Immature Grans % 0.4 0.0 - 0.5 %    Neutrophils, Absolute 2.85 1.70 - 7.00 10*3/mm3    Lymphocytes, Absolute 1.88 0.70 - 3.10 10*3/mm3    Monocytes, Absolute 0.60 0.10 - 0.90 10*3/mm3    Eosinophils, Absolute 0.12 0.00 - 0.40 10*3/mm3    Basophils, Absolute 0.04 0.00 - 0.20 10*3/mm3    Immature Grans, Absolute 0.02 0.00 - 0.05 10*3/mm3    nRBC 1.5 (H) 0.0 - 0.2 /100 WBC   Scan Slide    Collection Time: 09/13/19 12:19 PM   Result Value Ref Range    Stephanie Cells Mod/2+ None Seen    WBC Morphology Normal Normal    Platelet Morphology Normal Normal   Protime-INR    Collection Time: 09/13/19  1:19 PM   Result Value Ref Range    Protime 16.6 (H) 11.2 - 14.3 Seconds    INR 1.40 (H) 0.85 - 1.16   aPTT    Collection Time: 09/13/19  1:19 PM   Result Value Ref Range    PTT 35.4 24.0 - 37.0 seconds   CBC Auto Differential    Collection Time: 09/13/19  1:58 PM   Result Value Ref Range    WBC 5.79 3.40 - 10.80 10*3/mm3    RBC 3.75 (L) 3.77 - 5.28 10*6/mm3    Hemoglobin 12.3 12.0 - 15.9 g/dL    Hematocrit 37.8 34.0 - 46.6 %    .8 (H) 79.0 - 97.0 fL    MCH 32.8 26.6 - 33.0 pg    MCHC 32.5 31.5 - 35.7 g/dL    RDW 13.1 12.3 - 15.4 %    RDW-SD 49.1 37.0 - 54.0 fl    MPV 10.2 6.0 - 12.0 fL    Platelets 110 (L) 140 - 450 10*3/mm3    Neutrophil % 63.4 42.7 - 76.0 %    Lymphocyte % 24.7 19.6 - 45.3 %    Monocyte % 9.2 5.0 - 12.0 %    Eosinophil % 1.7 0.3 - 6.2 %    Basophil % 0.3 0.0 - 1.5 %    Immature Grans % 0.7 (H) 0.0 - 0.5 %    Neutrophils, Absolute 3.67 1.70 - 7.00 10*3/mm3    Lymphocytes, Absolute 1.43 0.70 - 3.10 10*3/mm3    Monocytes, Absolute 0.53 0.10 - 0.90 10*3/mm3    Eosinophils, Absolute 0.10 0.00 - 0.40 10*3/mm3    Basophils, Absolute 0.02 0.00 - 0.20 10*3/mm3    Immature Grans, Absolute 0.04 0.00 - 0.05 10*3/mm3    nRBC 0.0 0.0 - 0.2 /100 WBC     Note: In addition to lab results from this visit, the labs listed above may  "include labs taken at another facility or during a different encounter within the last 24 hours. Please correlate lab times with ED admission and discharge times for further clarification of the services performed during this visit.    CT Head Without Contrast   Final Result   Large old left temporoparietal infarct. There are no acute   findings. There has been no change when compared with 01/20/2018.       D:  09/13/2019   E:  09/13/2019               This report was finalized on 9/13/2019 4:25 PM by Dr. Farhad Quiroga MD.          XR Chest 2 View   Preliminary Result   Normal chest x-ray                Vitals:    09/13/19 1100 09/13/19 1430 09/13/19 1500   BP: 146/80 125/77 137/76   Pulse: 68 79 69   Resp: 16     Temp: 98.4 °F (36.9 °C)     TempSrc: Oral     SpO2: 97% 96% 97%   Weight: 53.1 kg (117 lb)     Height: 172.7 cm (68\")       Medications   acetaminophen (TYLENOL) tablet 1,000 mg (1,000 mg Oral Given 9/13/19 1311)     ECG/EMG Results (last 24 hours)     ** No results found for the last 24 hours. **        No orders to display                       MDM    Final diagnoses:   Fall, initial encounter   Closed head injury, initial encounter   Chronic anticoagulation   Laceration of left side of forehead with complication, initial encounter   Multiple contusions   Multiple abrasions   Thrombocytopenia (CMS/HCC)   Elevated MCV       Documentation assistance provided by fabiana Denton.  Information recorded by the scribe was done at my direction and has been verified and validated by me.     Cliff Denton  09/13/19 1146       Cliff Denton  09/13/19 0884       Sae Van MD  09/13/19 1700    "

## 2019-09-13 NOTE — DISCHARGE INSTRUCTIONS
Call Dr. Groves on Monday or Tuesday in order to discuss Aspirin use in view of the low platelet count    Continue Eliquis as prescribed by Dr. Yan.     Return if you develop any acute neurological deficits or worsening symptoms.     Follow up with your primary care provider to discuss elevated MCV and low platelet count.

## 2020-03-03 ENCOUNTER — TRANSCRIBE ORDERS (OUTPATIENT)
Dept: ADMINISTRATIVE | Facility: HOSPITAL | Age: 80
End: 2020-03-03

## 2020-03-03 DIAGNOSIS — E04.1 THYROID NODULE: Primary | ICD-10-CM

## 2020-03-09 ENCOUNTER — HOSPITAL ENCOUNTER (OUTPATIENT)
Dept: ULTRASOUND IMAGING | Facility: HOSPITAL | Age: 80
Discharge: HOME | End: 2020-03-09
Admitting: INTERNAL MEDICINE

## 2020-03-09 DIAGNOSIS — E04.1 THYROID NODULE: ICD-10-CM

## 2020-03-09 PROCEDURE — 76536 US EXAM OF HEAD AND NECK: CPT

## 2021-02-05 ENCOUNTER — APPOINTMENT (OUTPATIENT)
Dept: VACCINE CLINIC | Facility: HOSPITAL | Age: 81
End: 2021-02-05

## 2021-10-03 ENCOUNTER — APPOINTMENT (OUTPATIENT)
Dept: GENERAL RADIOLOGY | Facility: HOSPITAL | Age: 81
End: 2021-10-03

## 2021-10-03 ENCOUNTER — HOSPITAL ENCOUNTER (INPATIENT)
Facility: HOSPITAL | Age: 81
LOS: 3 days | Discharge: NURSING, REHAB OR LONG-TERM CARE | End: 2021-10-06
Attending: EMERGENCY MEDICINE | Admitting: INTERNAL MEDICINE

## 2021-10-03 ENCOUNTER — APPOINTMENT (OUTPATIENT)
Dept: CT IMAGING | Facility: HOSPITAL | Age: 81
End: 2021-10-03

## 2021-10-03 DIAGNOSIS — I10 ELEVATED BLOOD PRESSURE READING WITH DIAGNOSIS OF HYPERTENSION: ICD-10-CM

## 2021-10-03 DIAGNOSIS — R47.01 APHASIA: ICD-10-CM

## 2021-10-03 DIAGNOSIS — I60.9: Primary | ICD-10-CM

## 2021-10-03 DIAGNOSIS — Z79.01 CHRONIC ANTICOAGULATION: ICD-10-CM

## 2021-10-03 DIAGNOSIS — Z86.73 HISTORY OF STROKE: ICD-10-CM

## 2021-10-03 PROBLEM — G40.909 SEIZURE DISORDER (HCC): Status: ACTIVE | Noted: 2021-10-03

## 2021-10-03 PROBLEM — R00.1 BRADYCARDIA: Status: RESOLVED | Noted: 2017-01-03 | Resolved: 2021-10-03

## 2021-10-03 LAB
ALT SERPL W P-5'-P-CCNC: 20 U/L (ref 1–33)
APTT PPP: 35.3 SECONDS (ref 50–95)
AST SERPL-CCNC: 43 U/L (ref 1–32)
BASE EXCESS BLDA CALC-SCNC: 3 MMOL/L (ref -5–5)
BASOPHILS # BLD AUTO: 0.04 10*3/MM3 (ref 0–0.2)
BASOPHILS NFR BLD AUTO: 0.5 % (ref 0–1.5)
CA-I BLDA-SCNC: 1.22 MMOL/L (ref 1.2–1.32)
CO2 BLDA-SCNC: 29 MMOL/L (ref 24–29)
CREAT BLDA-MCNC: 0.6 MG/DL
CREAT BLDA-MCNC: 0.6 MG/DL (ref 0.6–1.3)
DEPRECATED RDW RBC AUTO: 46.9 FL (ref 37–54)
EOSINOPHIL # BLD AUTO: 0.08 10*3/MM3 (ref 0–0.4)
EOSINOPHIL NFR BLD AUTO: 1.1 % (ref 0.3–6.2)
ERYTHROCYTE [DISTWIDTH] IN BLOOD BY AUTOMATED COUNT: 12.5 % (ref 12.3–15.4)
GLUCOSE BLDC GLUCOMTR-MCNC: 114 MG/DL (ref 70–130)
HCO3 BLDA-SCNC: 28 MMOL/L (ref 22–26)
HCT VFR BLD AUTO: 40.3 % (ref 34–46.6)
HCT VFR BLDA CALC: 40 % (ref 38–51)
HGB BLD-MCNC: 13.1 G/DL (ref 12–15.9)
HGB BLDA-MCNC: 13.6 G/DL (ref 12–17)
HOLD SPECIMEN: NORMAL
IMM GRANULOCYTES # BLD AUTO: 0.02 10*3/MM3 (ref 0–0.05)
IMM GRANULOCYTES NFR BLD AUTO: 0.3 % (ref 0–0.5)
INR PPP: 1.3 (ref 0.9–1.1)
LYMPHOCYTES # BLD AUTO: 1.92 10*3/MM3 (ref 0.7–3.1)
LYMPHOCYTES NFR BLD AUTO: 25.5 % (ref 19.6–45.3)
MCH RBC QN AUTO: 33 PG (ref 26.6–33)
MCHC RBC AUTO-ENTMCNC: 32.5 G/DL (ref 31.5–35.7)
MCV RBC AUTO: 101.5 FL (ref 79–97)
MONOCYTES # BLD AUTO: 1 10*3/MM3 (ref 0.1–0.9)
MONOCYTES NFR BLD AUTO: 13.3 % (ref 5–12)
NEUTROPHILS NFR BLD AUTO: 4.46 10*3/MM3 (ref 1.7–7)
NEUTROPHILS NFR BLD AUTO: 59.3 % (ref 42.7–76)
NRBC BLD AUTO-RTO: 0 /100 WBC (ref 0–0.2)
PCO2 BLDA: 47.1 MM HG (ref 35–45)
PH BLDA: 7.38 PH UNITS (ref 7.35–7.6)
PLATELET # BLD AUTO: 145 10*3/MM3 (ref 140–450)
PMV BLD AUTO: 9.9 FL (ref 6–12)
PO2 BLDA: 25 MMHG (ref 80–105)
POTASSIUM BLDA-SCNC: 3.5 MMOL/L (ref 3.5–4.9)
PROTHROMBIN TIME: 15.3 SECONDS
RBC # BLD AUTO: 3.97 10*6/MM3 (ref 3.77–5.28)
SAO2 % BLDA: 43 % (ref 95–98)
SODIUM BLD-SCNC: 135 MMOL/L (ref 138–146)
TROPONIN T SERPL-MCNC: <0.01 NG/ML (ref 0–0.03)
VALPROATE SERPL-MCNC: 66.5 MCG/ML (ref 50–125)
WBC # BLD AUTO: 7.52 10*3/MM3 (ref 3.4–10.8)
WHOLE BLOOD HOLD SPECIMEN: NORMAL
WHOLE BLOOD HOLD SPECIMEN: NORMAL

## 2021-10-03 PROCEDURE — 25010000002 LORAZEPAM PER 2 MG

## 2021-10-03 PROCEDURE — 85025 COMPLETE CBC W/AUTO DIFF WBC: CPT | Performed by: EMERGENCY MEDICINE

## 2021-10-03 PROCEDURE — U0004 COV-19 TEST NON-CDC HGH THRU: HCPCS | Performed by: INTERNAL MEDICINE

## 2021-10-03 PROCEDURE — 70450 CT HEAD/BRAIN W/O DYE: CPT

## 2021-10-03 PROCEDURE — 82565 ASSAY OF CREATININE: CPT

## 2021-10-03 PROCEDURE — 82330 ASSAY OF CALCIUM: CPT

## 2021-10-03 PROCEDURE — 99284 EMERGENCY DEPT VISIT MOD MDM: CPT

## 2021-10-03 PROCEDURE — 84295 ASSAY OF SERUM SODIUM: CPT

## 2021-10-03 PROCEDURE — 70496 CT ANGIOGRAPHY HEAD: CPT

## 2021-10-03 PROCEDURE — 99222 1ST HOSP IP/OBS MODERATE 55: CPT | Performed by: INTERNAL MEDICINE

## 2021-10-03 PROCEDURE — 84132 ASSAY OF SERUM POTASSIUM: CPT

## 2021-10-03 PROCEDURE — 82803 BLOOD GASES ANY COMBINATION: CPT

## 2021-10-03 PROCEDURE — 0 IOPAMIDOL PER 1 ML: Performed by: EMERGENCY MEDICINE

## 2021-10-03 PROCEDURE — 71045 X-RAY EXAM CHEST 1 VIEW: CPT

## 2021-10-03 PROCEDURE — 84450 TRANSFERASE (AST) (SGOT): CPT | Performed by: EMERGENCY MEDICINE

## 2021-10-03 PROCEDURE — 80164 ASSAY DIPROPYLACETIC ACD TOT: CPT | Performed by: PSYCHIATRY & NEUROLOGY

## 2021-10-03 PROCEDURE — 85730 THROMBOPLASTIN TIME PARTIAL: CPT | Performed by: EMERGENCY MEDICINE

## 2021-10-03 PROCEDURE — 85610 PROTHROMBIN TIME: CPT

## 2021-10-03 PROCEDURE — 25010000002 PROTHROMBIN COMPLEX CONC HUMAN 1000 UNITS KIT: Performed by: EMERGENCY MEDICINE

## 2021-10-03 PROCEDURE — 84460 ALANINE AMINO (ALT) (SGPT): CPT | Performed by: EMERGENCY MEDICINE

## 2021-10-03 PROCEDURE — 82947 ASSAY GLUCOSE BLOOD QUANT: CPT

## 2021-10-03 PROCEDURE — 70498 CT ANGIOGRAPHY NECK: CPT

## 2021-10-03 PROCEDURE — 25010000002 PROTHROMBIN COMPLEX CONC HUMAN 500 UNITS KIT: Performed by: EMERGENCY MEDICINE

## 2021-10-03 PROCEDURE — 85014 HEMATOCRIT: CPT

## 2021-10-03 PROCEDURE — 93005 ELECTROCARDIOGRAM TRACING: CPT | Performed by: EMERGENCY MEDICINE

## 2021-10-03 PROCEDURE — 84484 ASSAY OF TROPONIN QUANT: CPT | Performed by: EMERGENCY MEDICINE

## 2021-10-03 RX ORDER — SODIUM CHLORIDE 9 MG/ML
50 INJECTION, SOLUTION INTRAVENOUS CONTINUOUS
Status: DISCONTINUED | OUTPATIENT
Start: 2021-10-03 | End: 2021-10-05

## 2021-10-03 RX ORDER — LORAZEPAM 2 MG/ML
2 INJECTION INTRAMUSCULAR ONCE
Status: COMPLETED | OUTPATIENT
Start: 2021-10-03 | End: 2021-10-03

## 2021-10-03 RX ORDER — ONDANSETRON 2 MG/ML
4 INJECTION INTRAMUSCULAR; INTRAVENOUS EVERY 6 HOURS PRN
Status: DISCONTINUED | OUTPATIENT
Start: 2021-10-03 | End: 2021-10-06 | Stop reason: HOSPADM

## 2021-10-03 RX ORDER — LORAZEPAM 2 MG/ML
INJECTION INTRAMUSCULAR
Status: COMPLETED
Start: 2021-10-03 | End: 2021-10-03

## 2021-10-03 RX ORDER — SODIUM CHLORIDE 0.9 % (FLUSH) 0.9 %
10 SYRINGE (ML) INJECTION AS NEEDED
Status: DISCONTINUED | OUTPATIENT
Start: 2021-10-03 | End: 2021-10-06 | Stop reason: HOSPADM

## 2021-10-03 RX ORDER — DIVALPROEX SODIUM 250 MG/1
250 TABLET, DELAYED RELEASE ORAL EVERY 8 HOURS SCHEDULED
Status: DISCONTINUED | OUTPATIENT
Start: 2021-10-03 | End: 2021-10-03 | Stop reason: ALTCHOICE

## 2021-10-03 RX ORDER — SODIUM CHLORIDE 0.9 % (FLUSH) 0.9 %
10 SYRINGE (ML) INJECTION EVERY 12 HOURS SCHEDULED
Status: DISCONTINUED | OUTPATIENT
Start: 2021-10-03 | End: 2021-10-06 | Stop reason: HOSPADM

## 2021-10-03 RX ADMIN — LORAZEPAM 2 MG: 2 INJECTION INTRAMUSCULAR at 18:32

## 2021-10-03 RX ADMIN — NICARDIPINE HYDROCHLORIDE 5 MG/HR: 2.5 INJECTION, SOLUTION INTRAVENOUS at 12:40

## 2021-10-03 RX ADMIN — IOPAMIDOL 75 ML: 755 INJECTION, SOLUTION INTRAVENOUS at 12:28

## 2021-10-03 RX ADMIN — SODIUM CHLORIDE 50 ML/HR: 9 INJECTION, SOLUTION INTRAVENOUS at 20:20

## 2021-10-03 RX ADMIN — Medication 2500 UNITS: at 12:53

## 2021-10-03 RX ADMIN — LORAZEPAM 2 MG: 2 INJECTION INTRAMUSCULAR; INTRAVENOUS at 18:32

## 2021-10-03 RX ADMIN — SODIUM CHLORIDE, PRESERVATIVE FREE 10 ML: 5 INJECTION INTRAVENOUS at 20:21

## 2021-10-03 RX ADMIN — VALPROATE SODIUM 190 MG: 100 INJECTION, SOLUTION INTRAVENOUS at 20:21

## 2021-10-03 NOTE — H&P
ICU ADMISSION NOTE    Chief complaint     Subjective     Patient is a 80 y.o. female presents to the ED with aphasia. She has a history of left temporal stroke with residual aphasia as well as TIA and seizures and is followed by Dr. José Miguel Groves at [facility]. She  fell on Friday evening but did not hit her head. Last night she attended a wedding and she was in her usual state of health during the event and when she got home and went to bed. She awoke around 0100 complaining of a headache, took some Tylenol and returned to bed. When she awoke this morning her spouse noted that she had difficulty speaking and brought her to the ED. Initial NIHSS was 5. She has a history of PAF and takes Eliquis 5 mg BID.  She was evaluated at [facility] for atrial fibrillation and underwent ablation.  According to her significant other, she took a dose of Eliquis this morning. CT head revealed small subarachnoid hemorrhage in the left temporal parietal region but per report from [facility], Dr. Gamboa feels that it is an intraparenchymal hemorrhage in the old stroke bed and will be managed medically. CTA head/neck revealed moderate/severe stenosis of the distal left vertebral artery but no significant carotid artery disease.  Her previous stroke was in 2015 and was associated with atrial fibrillation.  She had receptive and expressive aphasia with her initial stroke.  Then in 2018 she developed a seizure disorder and has been taking Depakote with good control.    In the ED she received K-Centra and was started on Cardene for BP control.     On arrival to ICU she is alert but disoriented to place, moves all extremities equally, and has no sensory deficits. Face is symmetrical. She continues to have receptive and expressive aphasia.       Review of Systems  Review of Systems   Constitutional: Negative for fever.   HENT: Negative for postnasal drip and sore throat.    Eyes: Negative for visual disturbance.    Respiratory: Positive for cough.    Cardiovascular: Positive for palpitations.   Genitourinary: Negative for difficulty urinating.   Musculoskeletal: Positive for arthralgias.   Skin: Negative for rash.   Neurological: Positive for seizures, speech difficulty and headaches.   Hematological: Bruises/bleeds easily.   Psychiatric/Behavioral: Negative.         Home Medications  Medications Prior to Admission   Medication Sig Dispense Refill Last Dose   • apixaban (ELIQUIS) 5 MG tablet tablet Take 5 mg by mouth 2 (two) times a day.      • aspirin 81 MG EC tablet Take 81 mg by mouth daily.      • atorvastatin (LIPITOR) 40 MG tablet Take 40 mg by mouth Every Night.      • calcium carbonate EX (TUMS EX) 750 MG chewable tablet Chew 750 mg 2 (Two) Times a Day.      • Cholecalciferol (VITAMIN D) 2000 UNITS capsule Take 2,000 Units by mouth Daily.      • divalproex (DEPAKOTE) 250 MG DR tablet Take 1 tablet by mouth Every 8 (Eight) Hours. 90 tablet 2    • hyoscyamine (ANASPAZ,LEVSIN) 0.125 MG tablet Take 0.125 mg by mouth every 4 (four) hours as needed for cramping.      • ALPRAZolam (XANAX) 0.5 MG tablet Take 0.25 mg by mouth 2 (Two) Times a Day As Needed.      • diclofenac (FLECTOR) 1.3 % patch patch Apply 1 patch topically 2 (Two) Times a Day. 14 patch 0    • folic acid (FOLVITE) 1 MG tablet Take 1 mg by mouth Daily.  0    • HYDROcodone-acetaminophen (NORCO)  MG per tablet Take 1 tablet by mouth Every 6 (Six) Hours As Needed for moderate pain (4-6) for up to 20 doses. 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 Normal LVSF.Normal LV  diastolic filling. Left atrium midly dilated. RV mild- mod dilated. MIld aortic cusp sclerosis. No evidence of mitral valve prolapse. Mild mitral regurgitation.No pulmonary hypertension or perdicardial effusion. No dilation of aortic root. Venous system appears normal   • H/O: stroke    • HLD (hyperlipidemia) 2016   • Left temporal lobe infarction (HCC)    • LOM (loss of memory)    • Osteoarthritis 2016   • Paroxysmal atrial fibrillation (HCC) 2016   • Thyroid nodule 2016    1.8x1.2cm   • Weakness generalized    • Wound infection after surgery     Left hip arthroplasty, staph aureus, 6 weeks IV Rocephin     Past Surgical History:   Procedure Laterality Date   • ABDOMINOPLASTY     • BREAST ABSCESS INCISION AND DRAINAGE     • TOTAL HIP ARTHROPLASTY Left     Staph aureus wound infection     Family History   Problem Relation Age of Onset   • Stroke Mother    • Other Mother         Cardiac disorder   • Diabetes Mother    • Hypertension Mother    • Mental illness Mother    • Heart attack Father    • Heart failure Father    • No Known Problems Sister    • Heart attack Maternal Grandfather    • Heart disease Paternal Grandfather    • No Known Problems Sister      Social History     Tobacco Use   • Smoking status: Former Smoker     Years: 2.00     Quit date:      Years since quittin.7   • Smokeless tobacco: Never Used   • Tobacco comment: OV says she quit smoking within the past year   Substance Use Topics   • Alcohol use: Yes     Alcohol/week: 14.0 standard drinks     Types: 14 Glasses of wine per week   • Drug use: No     Medications Prior to Admission   Medication Sig Dispense Refill Last Dose   • apixaban (ELIQUIS) 5 MG tablet tablet Take 5 mg by mouth 2 (two) times a day.      • aspirin 81 MG EC tablet Take 81 mg by mouth daily.      • atorvastatin (LIPITOR) 40 MG tablet Take 40 mg by mouth Every Night.      • calcium carbonate EX (TUMS EX) 750 MG chewable tablet Chew 750 mg 2 (Two) Times a Day.  "     • Cholecalciferol (VITAMIN D) 2000 UNITS capsule Take 2,000 Units by mouth Daily.      • divalproex (DEPAKOTE) 250 MG DR tablet Take 1 tablet by mouth Every 8 (Eight) Hours. 90 tablet 2    • hyoscyamine (ANASPAZ,LEVSIN) 0.125 MG tablet Take 0.125 mg by mouth every 4 (four) hours as needed for cramping.      • ALPRAZolam (XANAX) 0.5 MG tablet Take 0.25 mg by mouth 2 (Two) Times a Day As Needed.      • diclofenac (FLECTOR) 1.3 % patch patch Apply 1 patch topically 2 (Two) Times a Day. 14 patch 0    • folic acid (FOLVITE) 1 MG tablet Take 1 mg by mouth Daily.  0    • HYDROcodone-acetaminophen (NORCO)  MG per tablet Take 1 tablet by mouth Every 6 (Six) Hours As Needed for moderate pain (4-6) for up to 20 doses. 20 tablet 0      Allergies:  Penicillins      Objective     Vital Signs  Blood pressure 110/58, pulse 78, temperature 98.1 °F (36.7 °C), temperature source Oral, resp. rate 14, height 170.2 cm (67\"), weight 53.1 kg (117 lb), SpO2 98 %,     Physical Exam:  General Appearance:   Well-developed older woman in no respiratory distress   Head:   Atraumatic   Eyes:          Pupils equal and reactive to light, conjunctiva pink   Ears:     Throat:  Oral mucosa moist   Neck:  Supple.  Trachea midline   Back:      Lungs:    Symmetric chest expansion without wheeze or rhonchi    Heart:   Normal rate, regular, sinus rhythm, no murmur   Abdomen:    Flat, bowel sounds present, soft   Rectal:     Deferred   Extremities:    No pretibial edema   Pulses:  Pedal pulses present   Skin:  Warm and dry without rash   Lymph nodes:    Neurologic:  Alert.  Both expressive and receptive aphasia.  Expressive aphasia is more dominant.  No focal weakness.  Follows commands with all extremities.  Face is symmetric       Results Review:   Lab Results (last 24 hours)     Procedure Component Value Units Date/Time    [place] Draw [[id number]] Collected: 10/03/21 1220    Specimen: Blood Updated: 10/03/21 1331    Narrative:      The " following orders were created for panel order [place] Draw.  Procedure                               Abnormality         Status                     ---------                               -----------         ------                     Green Top (Gel)[[id number]]                                  Final result               Lavender Top[[id number]]                                     Final result               Gold Top - SST[[id number]]                                   Final result               Barker Top[[id number]]                                         In process                 Light Blue Top[[id number]]                                   Final result                 Please view results for these tests on the individual orders.    Lavender Top [[id number]] Collected: 10/03/21 1220    Specimen: Blood Updated: 10/03/21 1331     Extra Tube hold for add-on     Comment: Auto resulted       Light Blue Top [[id number]] Collected: 10/03/21 1220    Specimen: Blood Updated: 10/03/21 1331     Extra Tube hold for add-on     Comment: Auto resulted       Green Top (Gel) [[id number]] Collected: 10/03/21 1220    Specimen: Blood Updated: 10/03/21 1331     Extra Tube Hold for add-ons.     Comment: Auto resulted.       Gold Top - SST [[id number]] Collected: 10/03/21 1220    Specimen: Blood Updated: 10/03/21 1331     Extra Tube Hold for add-ons.     Comment: Auto resulted.       AST [[id number]]  (Abnormal) Collected: 10/03/21 1220    Specimen: Blood Updated: 10/03/21 1248     AST (SGOT) 43 U/L     ALT [[id number]]  (Normal) Collected: 10/03/21 1220    Specimen: Blood Updated: 10/03/21 1248     ALT (SGPT) 20 U/L     Troponin [[id number]]  (Normal) Collected: 10/03/21 1220    Specimen: Blood Updated: 10/03/21 1247     Troponin T <0.010 ng/mL     Narrative:      Troponin T Reference Range:  <= 0.03 ng/mL-   Negative for AMI  >0.03 ng/mL-     Abnormal for myocardial necrosis.  Clinicians would have to utilize clinical acumen, EKG, Troponin and  serial changes to determine if it is an Acute Myocardial Infarction or myocardial injury due to an underlying chronic condition.       Results may be falsely decreased if patient taking Biotin.      aPTT [[id number]]  (Abnormal) Collected: 10/03/21 1220    Specimen: Blood Updated: 10/03/21 1241     PTT 35.3 seconds     Narrative:      PTT = The equivalent PTT values for the therapeutic range of heparin levels at 0.3 to 0.5 U/ml are 55 to 70 seconds.    POC Creatinine [[id number]]  (Normal) Collected: 10/03/21 1218    Specimen: Blood Updated: 10/03/21 1237     Creatinine 0.60 mg/dL      Comment: Serial Number: [id number]:  [id number]       CBC & Differential [[id number]]  (Abnormal) Collected: 10/03/21 1220    Specimen: Blood Updated: 10/03/21 1229    Narrative:      The following orders were created for panel order CBC & Differential.  Procedure                               Abnormality         Status                     ---------                               -----------         ------                     CBC Auto Differential[[id number]]        Abnormal            Final result                 Please view results for these tests on the individual orders.    CBC Auto Differential [[id number]]  (Abnormal) Collected: 10/03/21 1220    Specimen: Blood Updated: 10/03/21 1229     WBC 7.52 10*3/mm3      RBC 3.97 10*6/mm3      Hemoglobin 13.1 g/dL      Hematocrit 40.3 %      .5 fL      MCH 33.0 pg      MCHC 32.5 g/dL      RDW 12.5 %      RDW-SD 46.9 fl      MPV 9.9 fL      Platelets 145 10*3/mm3      Neutrophil % 59.3 %      Lymphocyte % 25.5 %      Monocyte % 13.3 %      Eosinophil % 1.1 %      Basophil % 0.5 %      Immature Grans % 0.3 %      Neutrophils, Absolute 4.46 10*3/mm3      Lymphocytes, Absolute 1.92 10*3/mm3      Monocytes, Absolute 1.00 10*3/mm3      Eosinophils, Absolute 0.08 10*3/mm3      Basophils, Absolute 0.04 10*3/mm3      Immature Grans, Absolute 0.02 10*3/mm3      nRBC 0.0 /100 WBC     Gray Top  [[id number]] Collected: 10/03/21 1220    Specimen: Blood Updated: 10/03/21 1225    POCT Protime/INR [[id number]]  (Abnormal) Collected: 10/03/21 1222    Specimen: Blood Updated: 10/03/21 1222     Protime 15.3 seconds      INR 1.3    POC Creatinine [[id number]]  (Normal) Collected: 10/03/21 1221    Specimen: Blood Updated: 10/03/21 1222     Creatinine 0.60 mg/dL     POC Surgery Labs [[id number]]  (Abnormal) Collected: 10/03/21 1218    Specimen: Blood Updated: 10/03/21 1221     Ionized Calcium 1.22 mmol/L      POC Potassium 3.5 mmol/L      Sodium 135 mmol/L      Total CO2 29 mmol/L      Hemoglobin 13.6 g/dL      Hematocrit 40 %      pCO2, Arterial 47.1 mm Hg      pO2, Arterial 25 mmHg      Comment: Serial Number: [id number]:  [id number]        Base Excess 3.0000 mmol/L      O2 Saturation, Arterial 43 %      pH, Arterial 7.38 pH units      HCO3, Arterial 28.0 mmol/L      Glucose 114 mg/dL         Imaging Results (Last 24 Hours)     Procedure Component Value Units Date/Time    XR Chest 1 View [[id number]] Collected: 10/03/21 1257     Updated: 10/03/21 1257    Narrative:         EXAMINATION: XR CHEST 1 VW-      INDICATION: Acute Stroke Protocol (onset < 12 hrs); I60.9-Nontraumatic  subarachnoid hemorrhage, unspecified; R47.01-Aphasia; Z86.73-Personal  history of transient ischemic attack (TIA), and cerebral infarction  without residual deficits; I10-Essential (primary) hypertension;  Z79.01-Long term (current) use of anticoagulants      COMPARISON: 09/13/2019     FINDINGS: Portable chest reveals dilated loops of bowel underneath the  hemidiaphragm bilaterally. Cardiac silhouettes borderline enlarged. Lung  fields are grossly clear. No focal right opacification present. A  pleural effusion or pneumothorax. Degenerative changes seen within the  spine. Degenerative changes as well seen within the shoulders  bilaterally. Pulmonary vascularity is within normal limits.           Impression:      No acute cardiopulmonary disease.           CT Angiogram Head w AI Analysis of LVO [[id number]] Collected: 10/03/21 1239     Updated: 10/03/21 1243    Narrative:      EXAMINATION: CT ANGIOGRAM NECK-, CT ANGIOGRAM HEAD W AI ANALYSIS OF LVO-        INDICATION: Stroke, follow up stroke symptoms     TECHNIQUE: Multiple axial CT imaging is obtained of the head from skull  base to skull vertex following the ministration of intravenous contrast  according to the CT anterior protocol. Three-D reformatted images  persuaded to further facilitate diagnostic accuracy and treatment  planning.     Stenosis measurement was performed by the NASCET or similar method.     The radiation dose reduction device was turned on for each scan per the  ALARA (As Low as Reasonably Achievable) protocol.     COMPARISON: 01/20/2018     FINDINGS: Lung apices are grossly clear. The thyroid is heterogeneous  with nodules bilaterally. Great vessels are unremarkable. No bulky  adenopathy in the neck. Both common carotid arteries are without  significant stenosis. There is atherosclerotic disease of the carotid  bifurcations bilaterally with no significant stenosis of either the  internal carotid arteries. The vertebral arteries reveal slight  dominance on the right. No significant stenosis identified of either of  the vertebral arteries. Tortuosity identified of the vertebral arteries  with distally significant atherosclerotic disease bilaterally with  moderate to severe stenosis identified within the distal left vertebral  artery. No significant stenosis on the right. The basilar artery is  intact and unremarkable. There is fetal origin identified of the  posterior cerebral arteries bilaterally. No significant stenosis of the  posterior cerebral arteries. The intracranial internal carotid arteries  reveal some atherosclerotic disease in the cavernous portions. Both  anterior cerebral arteries are unremarkable and patent with no  significant stenosis. The M1 and M2 branches bilaterally  are  unremarkable.             Impression:      Moderate to severe stenosis identified of the distal left  vertebral artery however there is fetal origin identified of the  posterior cerebral arteries bilaterally. The basilar artery is small in  caliber. No significant stenosis in the right vertebral artery. There is  atherosclerotic disease seen in both carotid bifurcations however no  significant stenosis seen of the common carotid arteries or internal  carotid arteries. The anterior circulation is intact and unremarkable.          CT Angiogram Neck [[id number]] Collected: 10/03/21 1239     Updated: 10/03/21 1243    Narrative:      EXAMINATION: CT ANGIOGRAM NECK-, CT ANGIOGRAM HEAD W AI ANALYSIS OF LVO-        INDICATION: Stroke, follow up stroke symptoms     TECHNIQUE: Multiple axial CT imaging is obtained of the head from skull  base to skull vertex following the ministration of intravenous contrast  according to the CT anterior protocol. Three-D reformatted images  persuaded to further facilitate diagnostic accuracy and treatment  planning.     Stenosis measurement was performed by the NASCET or similar method.     The radiation dose reduction device was turned on for each scan per the  ALARA (As Low as Reasonably Achievable) protocol.     COMPARISON: 01/20/2018     FINDINGS: Lung apices are grossly clear. The thyroid is heterogeneous  with nodules bilaterally. Great vessels are unremarkable. No bulky  adenopathy in the neck. Both common carotid arteries are without  significant stenosis. There is atherosclerotic disease of the carotid  bifurcations bilaterally with no significant stenosis of either the  internal carotid arteries. The vertebral arteries reveal slight  dominance on the right. No significant stenosis identified of either of  the vertebral arteries. Tortuosity identified of the vertebral arteries  with distally significant atherosclerotic disease bilaterally with  moderate to severe stenosis  identified within the distal left vertebral  artery. No significant stenosis on the right. The basilar artery is  intact and unremarkable. There is fetal origin identified of the  posterior cerebral arteries bilaterally. No significant stenosis of the  posterior cerebral arteries. The intracranial internal carotid arteries  reveal some atherosclerotic disease in the cavernous portions. Both  anterior cerebral arteries are unremarkable and patent with no  significant stenosis. The M1 and M2 branches bilaterally are  unremarkable.             Impression:      Moderate to severe stenosis identified of the distal left  vertebral artery however there is fetal origin identified of the  posterior cerebral arteries bilaterally. The basilar artery is small in  caliber. No significant stenosis in the right vertebral artery. There is  atherosclerotic disease seen in both carotid bifurcations however no  significant stenosis seen of the common carotid arteries or internal  carotid arteries. The anterior circulation is intact and unremarkable.          CT Head Without Contrast Stroke Protocol [[id number]] Collected: 10/03/21 1217     Updated: 10/03/21 1229    Narrative:      EXAMINATION: CT HEAD WO CONTRAST STROKE PROTOCOL-      INDICATION: Neuro deficit, acute, stroke suspected a Gaitan, headache     TECHNIQUE: Multiple axial CT imaging is obtained of the head from skull  base to skull vertex without the ministration of intravenous contrast.     The radiation dose reduction device was turned on for each scan per the  ALARA (As Low as Reasonably Achievable) protocol.     COMPARISON: NONE     FINDINGS: Small amount of subarachnoid hemorrhage identified in the left  temporoparietal lobe. Just posterior to the area of hemorrhage there is  an cephalization change from large prior insult. No other abnormal  extra-axial fluid collection identified. Bony structures reveal no  evidence of osseous abnormality. Visualized paranasal  sinuses are clear.  The mastoid air cells are patent.             Impression:      Small subarachnoid hemorrhage identified in the left  temporal parietal region just anterior to the encephalomalacia change  within the left temporal parietal lobe from prior insult.     Examination was performed 10/03/2021 at 12 0 9:00 PM. Examination  results were given to the emergency room physician 10/03/2021 at 12:17  PM                 PROBLEM LIST    Subarachnoid hemorrhage without loss of consciousness  Intraparenchymal hemorrhage in her old stroke bed  Expressive and receptive aphasia  Paroxysmal atrial fibrillation, current sinus rhythm  Seizure disorder      Assessment/Plan     80-year-old woman with a previous history of atrial fibrillation, stroke in 2015 with some aphasia.  She was diagnosed with atrial fibrillation and underwent an ablation at the [facility].  She has remained on chronic Eliquis.  Symptoms improved and she subsequently developed a seizure disorder and was placed on Depakote with good symptom control.  She now has an intraparenchymal hemorrhage in the old stroke site with small amount of subarachnoid extension.  Her NIH stroke score is a 5.  She did receive Kcentra for reversal.  She was started on a Cardene drip for blood pressure control.     Monitor NIH stroke score  CT scan for any deterioration  Start Depakote 250 mg IV every 8 hours  Maintain systolic blood pressure less than 140  Monitor for cardiac arrhythmias, use metoprolol for rate control if needed  Obviously stop Eliquis   Speech therapy to evaluate swallowing and aphasia        Estela Gordon MD  10/03/21  15:26 EDT    Time: Critical care 40 min    This note was produced with a voice recognition program and may have uncorrected errors.

## 2021-10-03 NOTE — PLAN OF CARE
Goal Outcome Evaluation:  Plan of Care Reviewed With: patient, spouse        Progress: no change  Outcome Summary: Arrived to 2B ICU around 1445. NIHSS 4-5 expressive and receptive aphasia present. VSS. Cardene to keep SBP <140.

## 2021-10-03 NOTE — SIGNIFICANT NOTE
Stroke Navigator Note    1800: Contacted by primary RN for right upper extremity weakness.  Verbal order given to obtain CT head without contrast.  This was reviewed and was stable from AM scan.      1825: Contacted by charge RN as patient is having rhythmic RUE jerking.  Discussed with Dr. Velazquez.  Will give ativan 2mg IV now, obtain a valproic acid level now, and obtain EEG in AM.    Rhythmic movements ceased once Ativan given.  Please call if patient experiences further seizure activity.    NEREYDA Thornton

## 2021-10-03 NOTE — NURSING NOTE
Stroke Navigator CODE STROKE    TIME NOTIFIED OF PATIENTS ARRIVAL 1202, TIME OF PATIENT EVALUATION 1204    HPI    Jenna Russell is a 80 y.o.  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     His Drew arrives to our facility via private vehicle after awakening this morning with a headache and difficulty speaking.  Her significant other is at the bedside and provides the majority of the history as the patient has severe expressive aphasia.  She states that the patient experienced a fall on Friday evening however did not hit her head, and she was noted to hit her right hip and her right elbow.  They went to a wedding last evening and the patient was in her normal state of health including when she went to bed.  When she awoke this morning at 0100 she complained of a headache and went and took some Tylenol and went back to lie down.  Upon reawakening her significant other found her to have difficulty speaking therefore she brought her to our facility for further evaluation.    My assessment the patient is alert.  She has moderate expressive aphasia and intermittent receptive aphasia.  Pupils are equal, round, and reactive.  EOMI with no evidence of visual deficits.  Face is symmetrical and speech is clear.  She moves all extremities equally bilaterally.  Strength in bilateral upper extremities is 5/5, strength in bilateral lower extremities is 4+/5.  Sensation is intact bilaterally.  No evidence of dysmetria.  Gait is steady.  Blood pressure in CT scan is 143/87.      Per the patient's significant other she is compliant with her ASA 81 mg and Eliquis 5 mg daily.    Code Stroke location: ED    · Last known well: 10/3/2021 0030    · GCS: 14  · Baseline level of function known: yes. Modified [place]: 1;  patient had mild expressive/receptive aphasia from prior stroke however is independent of all of her ADLs.  · Current symptoms include; global aphasia.  Symptoms are currently unchanged.   · Patient is not a candidate for thrombolytic therapy due to Current anticoagulation use      Stroke risk factors: atrial fibrillation, hyperlipidemia, hypertension and prior stroke.     Prior stroke history: yes  Location: Left MCA  Antiplatelet therapy: Aspirin 81mg  Anticoagulation: Eliquis     · Imaging performed: CT head, CTA head and CTA neck    CT head without contrast was reviewed by Dr. Gamboa; intraparenchymal hemorrhage within the stroke bed.      NIHSS    Interval: baseline  1a. Level of Consciousness: 0-->Alert, keenly responsive  1b. LOC Questions: 2-->Answers neither question correctly  1c. LOC Commands: 1-->Performs one task correctly  2. Best Gaze: 0-->Normal  3. Visual: 0-->No visual loss  4. Facial Palsy: 0-->Normal symmetrical movements  5a. Motor Arm, Left: 0-->No drift, limb holds 90 (or 45) degrees for full 10 secs  5b. Motor Arm, Right: 0-->No drift, limb holds 90 (or 45) degrees for full 10 secs  6a. Motor Leg, Left: 0-->No drift, leg holds 30 degree position for full 5 secs  6b. Motor Leg, Right: 0-->No drift, leg holds 30 degree position for full 5 secs  7. Limb Ataxia: 0-->Absent  8. Sensory: 0-->Normal, no sensory loss  9. Best Language: 2-->Severe aphasia, all communication is through fragmentary expression, great need for inference, questioning, and guessing by the listener. Range of information that can be exchanged is limited, listener carries burden of. . . (see row details)  10. Dysarthria: 0-->Normal  11. Extinction and Inattention (formerly Neglect): 0-->No abnormality    Total (NIH Stroke Scale): 5     ICH Score:  0 Points (GCS 13 to 15)  0 Points (ICH volume < 30 cm3)  0 Points (Intraventricular Extension Absent)   0 Points (Infratentorial Origin - No )  0 Points (Age < 80 years)  The total  ICS Score for this patient is 0 at 1225 EDT on 10/03/21    PLAN    CT of the head without contrast reveals evidence of intraparenchymal hemorrhage within the stroke bed of her prior left MCA stroke.  This was reviewed by Dr. Gamboa who will be by shortly to see the patient.    Dr. Brennan has ordered Kcentra for Eliquis reversal and nicardipine to keep SBP <140.  She will be admitted to the ICU for close neurological monitoring.    Hemorrhagic stroke order set has been initiated  Keep SBP <140  Kcentra for Eliquis reversal  Hold all anticoagulant/antithrombotic medications at this time  Neurosurgery to see  Bedrest for now    Discussed plan of care with the patient's significant other who is currently at bedside as well as primary RN.  All questions were answered.    Please call with any questions.      NEREYDA Thornton EXTENDER/STROKE NAVIGATOR

## 2021-10-03 NOTE — ED PROVIDER NOTES
Subjective   Patient is a 80 y.o. female presenting to the ED complaining of aphasia. The patient has baseline aphasia after a history of strokes and seizures. The patient was at a wedding last night where she fell after attempting to get out her car. This morning, the patient woke up and was found to have difficulty getting words out and complaining of a headache. As the morning went on, the patient's speech deteriorated to the point where she would be speaking nonsense words, prompting presentation to the ED. The patient currently is taking Eliquis 2 times per day. There are no other known symptoms at this time.      History provided by:  Spouse  History limited by:  Patient nonverbal  Altered Mental Status  Presenting symptoms comment:  Aphasia  Severity:  Moderate  Most recent episode:  Today  Episode history:  Single  Duration:  5 hours  Timing:  Constant  Progression:  Worsening  Chronicity:  Chronic  Associated symptoms: headaches        Review of Systems   Unable to perform ROS: Patient nonverbal   Neurological: Positive for speech difficulty and headaches.       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 Normal LVSF.Normal LV diastolic filling. Left atrium midly dilated. RV mild- mod dilated. MIld aortic cusp sclerosis. No evidence of mitral valve prolapse. Mild mitral regurgitation.No pulmonary hypertension or perdicardial effusion. No dilation of aortic root. Venous system appears normal   • H/O: stroke    • HLD (hyperlipidemia) 9/8/2016   • Left temporal lobe infarction    • LOM (loss of memory)    • Osteoarthritis 9/8/2016   • Paroxysmal  atrial fibrillation (CMS/HCC) 2016   • Thyroid nodule 2016    1.8x1.2cm   • Weakness generalized    • Wound infection after surgery     Left hip arthroplasty, staph aureus, 6 weeks IV Rocephin       Allergies   Allergen Reactions   • Penicillins Anaphylaxis       Past Surgical History:   Procedure Laterality Date   • ABDOMINOPLASTY     • BREAST ABSCESS INCISION AND DRAINAGE     • TOTAL HIP ARTHROPLASTY Left     Staph aureus wound infection       Family History   Problem Relation Age of Onset   • Stroke Mother    • Other Mother         Cardiac disorder   • Diabetes Mother    • Hypertension Mother    • Mental illness Mother    • Heart attack Father    • Heart failure Father    • No Known Problems Sister    • Heart attack Maternal Grandfather    • Heart disease Paternal Grandfather    • No Known Problems Sister        Social History     Socioeconomic History   • Marital status:      Spouse name: Not on file   • Number of children: 3   • Years of education: Not on file   • Highest education level: Not on file   Tobacco Use   • Smoking status: Former Smoker     Years: 2.00     Quit date:      Years since quittin.7   • Smokeless tobacco: Never Used   • Tobacco comment: OV says she quit smoking within the past year   Substance and Sexual Activity   • Alcohol use: Yes     Alcohol/week: 14.0 standard drinks     Types: 14 Glasses of wine per week   • Drug use: No   • Sexual activity: Defer         Objective   Physical Exam  Vitals and nursing note reviewed.   Constitutional:       General: She is not in acute distress.     Appearance: Normal appearance.   HENT:      Head: Normocephalic and atraumatic.      Left Ear: External ear normal.   Eyes:      Extraocular Movements: Extraocular movements intact.      Pupils: Pupils are equal, round, and reactive to light.   Cardiovascular:      Rate and Rhythm: Normal rate and regular rhythm.      Heart sounds: Normal heart sounds.   Pulmonary:      Effort:  Pulmonary effort is normal.      Breath sounds: Normal breath sounds.   Abdominal:      General: There is no distension.      Palpations: Abdomen is soft.      Tenderness: There is no abdominal tenderness.   Musculoskeletal:         General: Normal range of motion.      Cervical back: Normal range of motion and neck supple.   Skin:     General: Skin is warm and dry.   Neurological:      Mental Status: She is alert.      Comments: Expressive and receptive aphasia.         Critical Care  Performed by: Orestes Brennan MD  Authorized by: Orestes Brennan MD     Critical care provider statement:     Critical care time (minutes):  45    Critical care end time:  10/3/2021 12:51 PM    Critical care was necessary to treat or prevent imminent or life-threatening deterioration of the following conditions:  CNS failure or compromise and circulatory failure    Critical care was time spent personally by me on the following activities:  Discussions with consultants, development of treatment plan with patient or surrogate, discussions with primary provider, examination of patient, ordering and performing treatments and interventions, ordering and review of laboratory studies, ordering and review of radiographic studies, pulse oximetry and review of old charts             ED Course  ED Course as of Oct 03 1252   Sun Oct 03, 2021   1221 BP: 166/87 [RS]   1221 I evaluated the patient with the stroke navigator.  Patient with approximately 11hours aphasia.  CT scan demonstrates hemorrhage into the left temporal lobe.  Evidence of prior CVA noted.  I reviewed the case with Dr. Gamboa with neurosurgery who will evaluate.  Patient is on chronic anticoagulation and we will reverse that.  We will plan admission to the ICU for further evaluation and management.  NIH stroke scale is 4.    [RS]   1238 Case discussed with the intensivist who will admit.    [RS]      ED Course User Index  [RS] Orestes Brennan MD          Recent  Results (from the past 24 hour(s))   POC Surgery Labs    Collection Time: 10/03/21 12:18 PM    Specimen: Blood   Result Value Ref Range    Ionized Calcium 1.22 1.20 - 1.32 mmol/L    POC Potassium 3.5 3.5 - 4.9 mmol/L    Sodium 135 (L) 138 - 146 mmol/L    Total CO2 29 24 - 29 mmol/L    Hemoglobin 13.6 12.0 - 17.0 g/dL    Hematocrit 40 38 - 51 %    pCO2, Arterial 47.1 (H) 35 - 45 mm Hg    pO2, Arterial 25 (L) 80 - 105 mmHg    Base Excess 3.0000 -5 - 5 mmol/L    O2 Saturation, Arterial 43 (L) 95 - 98 %    pH, Arterial 7.38 7.35 - 7.6 pH units    HCO3, Arterial 28.0 (H) 22 - 26 mmol/L    Glucose 114 70 - 130 mg/dL   POC Creatinine    Collection Time: 10/03/21 12:18 PM    Specimen: Blood   Result Value Ref Range    Creatinine 0.60 0.60 - 1.30 mg/dL   Troponin    Collection Time: 10/03/21 12:20 PM    Specimen: Blood   Result Value Ref Range    Troponin T <0.010 0.000 - 0.030 ng/mL   aPTT    Collection Time: 10/03/21 12:20 PM    Specimen: Blood   Result Value Ref Range    PTT 35.3 (L) 50.0 - 95.0 seconds   AST    Collection Time: 10/03/21 12:20 PM    Specimen: Blood   Result Value Ref Range    AST (SGOT) 43 (H) 1 - 32 U/L   ALT    Collection Time: 10/03/21 12:20 PM    Specimen: Blood   Result Value Ref Range    ALT (SGPT) 20 1 - 33 U/L   CBC Auto Differential    Collection Time: 10/03/21 12:20 PM    Specimen: Blood   Result Value Ref Range    WBC 7.52 3.40 - 10.80 10*3/mm3    RBC 3.97 3.77 - 5.28 10*6/mm3    Hemoglobin 13.1 12.0 - 15.9 g/dL    Hematocrit 40.3 34.0 - 46.6 %    .5 (H) 79.0 - 97.0 fL    MCH 33.0 26.6 - 33.0 pg    MCHC 32.5 31.5 - 35.7 g/dL    RDW 12.5 12.3 - 15.4 %    RDW-SD 46.9 37.0 - 54.0 fl    MPV 9.9 6.0 - 12.0 fL    Platelets 145 140 - 450 10*3/mm3    Neutrophil % 59.3 42.7 - 76.0 %    Lymphocyte % 25.5 19.6 - 45.3 %    Monocyte % 13.3 (H) 5.0 - 12.0 %    Eosinophil % 1.1 0.3 - 6.2 %    Basophil % 0.5 0.0 - 1.5 %    Immature Grans % 0.3 0.0 - 0.5 %    Neutrophils, Absolute 4.46 1.70 - 7.00  10*3/mm3    Lymphocytes, Absolute 1.92 0.70 - 3.10 10*3/mm3    Monocytes, Absolute 1.00 (H) 0.10 - 0.90 10*3/mm3    Eosinophils, Absolute 0.08 0.00 - 0.40 10*3/mm3    Basophils, Absolute 0.04 0.00 - 0.20 10*3/mm3    Immature Grans, Absolute 0.02 0.00 - 0.05 10*3/mm3    nRBC 0.0 0.0 - 0.2 /100 WBC   POC Creatinine    Collection Time: 10/03/21 12:21 PM    Specimen: Blood   Result Value Ref Range    Creatinine 0.60 mg/dL   POCT Protime/INR    Collection Time: 10/03/21 12:22 PM    Specimen: Blood   Result Value Ref Range    Protime 15.3 seconds    INR 1.3 (A) 0.9 - 1.1     Note: In addition to lab results from this visit, the labs listed above may include labs taken at another facility or during a different encounter within the last 24 hours. Please correlate lab times with ED admission and discharge times for further clarification of the services performed during this visit.    CT Angiogram Head w AI Analysis of LVO   Preliminary Result   Moderate to severe stenosis identified of the distal left   vertebral artery however there is fetal origin identified of the   posterior cerebral arteries bilaterally. The basilar artery is small in   caliber. No significant stenosis in the right vertebral artery. There is   atherosclerotic disease seen in both carotid bifurcations however no   significant stenosis seen of the common carotid arteries or internal   carotid arteries. The anterior circulation is intact and unremarkable.              CT Angiogram Neck   Preliminary Result   Moderate to severe stenosis identified of the distal left   vertebral artery however there is fetal origin identified of the   posterior cerebral arteries bilaterally. The basilar artery is small in   caliber. No significant stenosis in the right vertebral artery. There is   atherosclerotic disease seen in both carotid bifurcations however no   significant stenosis seen of the common carotid arteries or internal   carotid arteries. The anterior  "circulation is intact and unremarkable.              CT Head Without Contrast Stroke Protocol   Preliminary Result   Small subarachnoid hemorrhage identified in the left   temporal parietal region just anterior to the encephalomalacia change   within the left temporal parietal lobe from prior insult.       Examination was performed 10/03/2021 at 12 0 9:00 PM. Examination   results were given to the emergency room physician 10/03/2021 at 12:17   PM              XR Chest 1 View    (Results Pending)     Vitals:    10/03/21 1201   BP: 166/87   BP Location: Left arm   Patient Position: Sitting   Pulse: 84   Resp: 16   Temp: 98 °F (36.7 °C)   SpO2: 99%   Weight: 53.5 kg (118 lb)   Height: 170.2 cm (67\")     Medications   sodium chloride 0.9 % flush 10 mL (has no administration in time range)   prothrombin complex conc human (KCentra) IV solution 2,500 Units (has no administration in time range)   niCARdipine (CARDENE) 25mg in 250mL NS infusion (5 mg/hr Intravenous New Bag 10/3/21 1240)   iopamidol (ISOVUE-370) 76 % injection 100 mL (75 mL Intravenous Given 10/3/21 1228)     ECG/EMG Results (last 24 hours)     ** No results found for the last 24 hours. **        ECG 12 Lead                                               MDM  Number of Diagnoses or Management Options     Amount and/or Complexity of Data Reviewed  Clinical lab tests: reviewed  Tests in the radiology section of CPT®: reviewed  Decide to obtain previous medical records or to obtain history from someone other than the patient: yes  Obtain history from someone other than the patient: yes  Discuss the patient with other providers: yes  Independent visualization of images, tracings, or specimens: yes    Risk of Complications, Morbidity, and/or Mortality  Presenting problems: high    Critical Care  Total time providing critical care: 30-74 minutes    Admit ICU    Final diagnoses:   Subarachnoid hemorrhage without loss of consciousness (HCC)   Aphasia   History of " stroke   Elevated blood pressure reading with diagnosis of hypertension   Chronic anticoagulation       Documentation assistance provided by fabiana Rosario.  Information recorded by the fabiana was done at my direction and has been verified and validated by me.     Dat Rosario  10/03/21 1232       Orestes Brennan MD  10/03/21 [id number]

## 2021-10-04 ENCOUNTER — APPOINTMENT (OUTPATIENT)
Dept: NEUROLOGY | Facility: HOSPITAL | Age: 81
End: 2021-10-04

## 2021-10-04 ENCOUNTER — APPOINTMENT (OUTPATIENT)
Dept: MRI IMAGING | Facility: HOSPITAL | Age: 81
End: 2021-10-04

## 2021-10-04 LAB
AMMONIA BLD-SCNC: 31 UMOL/L (ref 11–51)
ANION GAP SERPL CALCULATED.3IONS-SCNC: 12 MMOL/L (ref 5–15)
BUN SERPL-MCNC: 6 MG/DL (ref 8–23)
BUN/CREAT SERPL: 13.6 (ref 7–25)
CALCIUM SPEC-SCNC: 8.6 MG/DL (ref 8.6–10.5)
CHLORIDE SERPL-SCNC: 101 MMOL/L (ref 98–107)
CHOLEST SERPL-MCNC: 169 MG/DL (ref 0–200)
CO2 SERPL-SCNC: 23 MMOL/L (ref 22–29)
CREAT SERPL-MCNC: 0.44 MG/DL (ref 0.57–1)
DEPRECATED RDW RBC AUTO: 45 FL (ref 37–54)
ERYTHROCYTE [DISTWIDTH] IN BLOOD BY AUTOMATED COUNT: 12.4 % (ref 12.3–15.4)
GFR SERPL CREATININE-BSD FRML MDRD: 138 ML/MIN/1.73
GLUCOSE BLDC GLUCOMTR-MCNC: 119 MG/DL (ref 70–130)
GLUCOSE BLDC GLUCOMTR-MCNC: 89 MG/DL (ref 70–130)
GLUCOSE SERPL-MCNC: 100 MG/DL (ref 65–99)
HBA1C MFR BLD: 4.8 % (ref 4.8–5.6)
HCT VFR BLD AUTO: 37.9 % (ref 34–46.6)
HDLC SERPL-MCNC: 91 MG/DL (ref 40–60)
HGB BLD-MCNC: 12.6 G/DL (ref 12–15.9)
INR PPP: 1.3 (ref 0.8–1.2)
LDLC SERPL CALC-MCNC: 67 MG/DL (ref 0–100)
LDLC/HDLC SERPL: 0.73 {RATIO}
MAGNESIUM SERPL-MCNC: 1.8 MG/DL (ref 1.6–2.4)
MCH RBC QN AUTO: 32.6 PG (ref 26.6–33)
MCHC RBC AUTO-ENTMCNC: 33.2 G/DL (ref 31.5–35.7)
MCV RBC AUTO: 98.2 FL (ref 79–97)
PLATELET # BLD AUTO: 138 10*3/MM3 (ref 140–450)
PMV BLD AUTO: 10 FL (ref 6–12)
POTASSIUM SERPL-SCNC: 3.2 MMOL/L (ref 3.5–5.2)
POTASSIUM SERPL-SCNC: 4.2 MMOL/L (ref 3.5–5.2)
PROTHROMBIN TIME: 15.3 SECONDS (ref 12.8–15.2)
QT INTERVAL: 386 MS
QTC INTERVAL: 445 MS
RBC # BLD AUTO: 3.86 10*6/MM3 (ref 3.77–5.28)
SARS-COV-2 RNA PNL SPEC NAA+PROBE: NOT DETECTED
SODIUM SERPL-SCNC: 136 MMOL/L (ref 136–145)
TRIGL SERPL-MCNC: 56 MG/DL (ref 0–150)
VLDLC SERPL-MCNC: 11 MG/DL (ref 5–40)
WBC # BLD AUTO: 6.57 10*3/MM3 (ref 3.4–10.8)

## 2021-10-04 PROCEDURE — 80048 BASIC METABOLIC PNL TOTAL CA: CPT | Performed by: INTERNAL MEDICINE

## 2021-10-04 PROCEDURE — 83036 HEMOGLOBIN GLYCOSYLATED A1C: CPT

## 2021-10-04 PROCEDURE — 25010000003 MAGNESIUM SULFATE 4 GM/100ML SOLUTION: Performed by: INTERNAL MEDICINE

## 2021-10-04 PROCEDURE — 0 GADOBENATE DIMEGLUMINE 529 MG/ML SOLUTION: Performed by: INTERNAL MEDICINE

## 2021-10-04 PROCEDURE — 82962 GLUCOSE BLOOD TEST: CPT

## 2021-10-04 PROCEDURE — 92523 SPEECH SOUND LANG COMPREHEN: CPT | Performed by: SPEECH-LANGUAGE PATHOLOGIST

## 2021-10-04 PROCEDURE — 97165 OT EVAL LOW COMPLEX 30 MIN: CPT

## 2021-10-04 PROCEDURE — A9577 INJ MULTIHANCE: HCPCS | Performed by: INTERNAL MEDICINE

## 2021-10-04 PROCEDURE — 84132 ASSAY OF SERUM POTASSIUM: CPT | Performed by: INTERNAL MEDICINE

## 2021-10-04 PROCEDURE — 70553 MRI BRAIN STEM W/O & W/DYE: CPT

## 2021-10-04 PROCEDURE — 92610 EVALUATE SWALLOWING FUNCTION: CPT | Performed by: SPEECH-LANGUAGE PATHOLOGIST

## 2021-10-04 PROCEDURE — 82140 ASSAY OF AMMONIA: CPT | Performed by: STUDENT IN AN ORGANIZED HEALTH CARE EDUCATION/TRAINING PROGRAM

## 2021-10-04 PROCEDURE — 99232 SBSQ HOSP IP/OBS MODERATE 35: CPT | Performed by: INTERNAL MEDICINE

## 2021-10-04 PROCEDURE — 97161 PT EVAL LOW COMPLEX 20 MIN: CPT

## 2021-10-04 PROCEDURE — 95819 EEG AWAKE AND ASLEEP: CPT

## 2021-10-04 PROCEDURE — 83735 ASSAY OF MAGNESIUM: CPT | Performed by: INTERNAL MEDICINE

## 2021-10-04 PROCEDURE — 80061 LIPID PANEL: CPT

## 2021-10-04 PROCEDURE — 85027 COMPLETE CBC AUTOMATED: CPT | Performed by: INTERNAL MEDICINE

## 2021-10-04 PROCEDURE — 99222 1ST HOSP IP/OBS MODERATE 55: CPT | Performed by: STUDENT IN AN ORGANIZED HEALTH CARE EDUCATION/TRAINING PROGRAM

## 2021-10-04 RX ORDER — MAGNESIUM SULFATE HEPTAHYDRATE 40 MG/ML
2 INJECTION, SOLUTION INTRAVENOUS AS NEEDED
Status: DISCONTINUED | OUTPATIENT
Start: 2021-10-04 | End: 2021-10-06 | Stop reason: HOSPADM

## 2021-10-04 RX ORDER — MAGNESIUM SULFATE HEPTAHYDRATE 40 MG/ML
4 INJECTION, SOLUTION INTRAVENOUS AS NEEDED
Status: DISCONTINUED | OUTPATIENT
Start: 2021-10-04 | End: 2021-10-06 | Stop reason: HOSPADM

## 2021-10-04 RX ORDER — METOPROLOL TARTRATE 5 MG/5ML
5 INJECTION INTRAVENOUS EVERY 8 HOURS SCHEDULED
Status: DISCONTINUED | OUTPATIENT
Start: 2021-10-04 | End: 2021-10-05

## 2021-10-04 RX ORDER — POTASSIUM CHLORIDE 7.45 MG/ML
10 INJECTION INTRAVENOUS
Status: DISCONTINUED | OUTPATIENT
Start: 2021-10-04 | End: 2021-10-06 | Stop reason: HOSPADM

## 2021-10-04 RX ORDER — POTASSIUM CHLORIDE 1.5 G/1.77G
40 POWDER, FOR SOLUTION ORAL AS NEEDED
Status: DISCONTINUED | OUTPATIENT
Start: 2021-10-04 | End: 2021-10-06 | Stop reason: HOSPADM

## 2021-10-04 RX ORDER — POTASSIUM CHLORIDE 750 MG/1
40 CAPSULE, EXTENDED RELEASE ORAL AS NEEDED
Status: DISCONTINUED | OUTPATIENT
Start: 2021-10-04 | End: 2021-10-06 | Stop reason: HOSPADM

## 2021-10-04 RX ADMIN — METOPROLOL TARTRATE 5 MG: 1 INJECTION, SOLUTION INTRAVENOUS at 20:42

## 2021-10-04 RX ADMIN — SODIUM CHLORIDE 50 ML/HR: 9 INJECTION, SOLUTION INTRAVENOUS at 18:11

## 2021-10-04 RX ADMIN — POTASSIUM CHLORIDE 40 MEQ: 1.5 POWDER, FOR SOLUTION ORAL at 18:12

## 2021-10-04 RX ADMIN — MAGNESIUM SULFATE HEPTAHYDRATE 4 G: 40 INJECTION, SOLUTION INTRAVENOUS at 10:46

## 2021-10-04 RX ADMIN — GADOBENATE DIMEGLUMINE 10 ML: 529 INJECTION, SOLUTION INTRAVENOUS at 22:15

## 2021-10-04 RX ADMIN — SODIUM CHLORIDE, PRESERVATIVE FREE 10 ML: 5 INJECTION INTRAVENOUS at 21:00

## 2021-10-04 RX ADMIN — VALPROATE SODIUM 190 MG: 100 INJECTION, SOLUTION INTRAVENOUS at 02:03

## 2021-10-04 RX ADMIN — VALPROATE SODIUM 190 MG: 100 INJECTION, SOLUTION INTRAVENOUS at 15:56

## 2021-10-04 RX ADMIN — SODIUM CHLORIDE, PRESERVATIVE FREE 10 ML: 5 INJECTION INTRAVENOUS at 09:12

## 2021-10-04 RX ADMIN — NICARDIPINE HYDROCHLORIDE 2.5 MG/HR: 2.5 INJECTION, SOLUTION INTRAVENOUS at 03:48

## 2021-10-04 RX ADMIN — POTASSIUM CHLORIDE 40 MEQ: 1.5 POWDER, FOR SOLUTION ORAL at 12:49

## 2021-10-04 RX ADMIN — VALPROATE SODIUM 190 MG: 100 INJECTION, SOLUTION INTRAVENOUS at 09:11

## 2021-10-04 RX ADMIN — VALPROATE SODIUM 190 MG: 100 INJECTION, SOLUTION INTRAVENOUS at 20:06

## 2021-10-04 NOTE — PROGRESS NOTES
"Clinical Nutrition Note      Patient Name: Jenna Russell  MRN: [id number]  Admission date: 10/3/2021      Multidisciplinary Rounds    Additional information obtained during MDR:  RN reports pt adm w/ IPH and global aphasia; pt has hx of L MCA stroke and residual mild aphasia.  She has seizure yesterday  ativan given, she takes depakote at home for seizure d.o. SHe's on cardene drip, SLP to evaluate for diet.     Current diet: Diet Regular; Thin; Cardiac    Oral Nutrition Supplement:     Pertinent medical data reviewed:  No nutrition risk identified on nursing screen; MST score \"0\"    Average oral intake per documentation: 25% -1              Intervention:  Plan of care and goals of care reviewed    Monitor:  RD to follow per protocol      Mely Boyce MS,RD,LD  10/04/21 16:37 EDT  Time: 15  mins       "

## 2021-10-04 NOTE — THERAPY EVALUATION
Patient Name: Jenna Russell  : 1940    MRN: [id number]                              Today's Date: 10/4/2021       Admit Date: 10/3/2021    Visit Dx:     ICD-10-CM ICD-9-CM   1. Subarachnoid hemorrhage without loss of consciousness (HCC)  I60.9 430   2. Aphasia  R47.01 784.3   3. History of stroke  Z86.73 V12.54   4. Elevated blood pressure reading with diagnosis of hypertension  I10 401.9   5. Chronic anticoagulation  Z79.01 V58.61     Patient Active Problem List   Diagnosis   • PAF (paroxysmal atrial fibrillation) ([facility])   • High cholesterol   • Hypertension   • Combined receptive and expressive aphasia   • Colitis, Clostridium difficile   • Collagenous colitis   • Subarachnoid hemorrhage without loss of consciousness ([facility])   • Seizure disorder ([facility])     Past Medical History:   Diagnosis Date   • Acute ischemic stroke ([facility])    • 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 Normal LVSF.Normal LV diastolic filling. Left atrium midly dilated. RV mild- mod dilated. MIld aortic cusp sclerosis. No evidence of mitral valve prolapse. Mild mitral regurgitation.No pulmonary hypertension or perdicardial effusion. No dilation of aortic root. Venous system appears normal   • H/O: stroke    • HLD (hyperlipidemia) 2016   • Left temporal lobe infarction (HCC)    • LOM (loss of memory)    • Osteoarthritis 2016   • Paroxysmal atrial fibrillation (HCC) 2016   • Thyroid nodule 2016    1.8x1.2cm   • Weakness generalized    • Wound infection after surgery     Left hip arthroplasty, staph aureus, 6 weeks IV Rocephin     Past Surgical History:   Procedure Laterality Date   • ABDOMINOPLASTY     • BREAST ABSCESS INCISION AND DRAINAGE     •  TOTAL HIP ARTHROPLASTY Left     Staph aureus wound infection     General Information     Row Name 10/04/21 1351          Physical Therapy Time and Intention    Document Type  evaluation  -AY     Mode of Treatment  physical therapy  -AY     Row Name 10/04/21 [facility]          General Information    Patient Profile Reviewed  yes  -AY     Existing Precautions/Restrictions  fall;other (see comments) R sided weakness, impulsive, exp aphasia at baseline  -AY     Barriers to Rehab  medically complex;cognitive status  -AY     Row Name 10/04/21 1351          Living Environment    Lives With  spouse  -AY     Row Name 10/04/21 [facility]          Home Main Entrance    Number of Stairs, Main Entrance  none  -AY     Row Name 10/04/21 [facility]          Stairs Within Home, Primary    Stairs, Within Home, Primary  elevator in building, but apartment on first floor  -AY     Number of Stairs, Within Home, Primary  none  -AY     Row Name 10/04/21 [facility]          Cognition    Orientation Status (Cognition)  oriented to;person;verbal cues/prompts needed for orientation;place;disoriented to;time;situation  -AY     Row Name 10/04/21 [facility]          Safety Issues, Functional Mobility    Safety Issues Affecting Function (Mobility)  ability to follow commands;awareness of need for assistance;impulsivity;insight into deficits/self-awareness  -AY     Impairments Affecting Function (Mobility)  cognition;endurance/activity tolerance;motor planning;strength;balance;postural/trunk control  -AY     Cognitive Impairments, Mobility Safety/Performance  attention;impulsivity  -AY       User Key  (r) = Recorded By, (t) = Taken By, (c) = Cosigned By    Initials Name Provider Type    AY Dian West PT Physical Therapist        Mobility     Row Name 10/04/21 1352          Bed Mobility    Comment (Bed Mobility)  Pt received sitting EOB  -AY     Row Name 10/04/21 [facility]          Transfers    Comment (Transfers)  pt impulsive with standing. VC for sequencing and safety  awareness  -AY     Row Name 10/04/21 1352          Sit-Stand Transfer    Sit-Stand [place] (Transfers)  minimum assist (75% patient effort);1 person assist;verbal cues  -AY     Row Name 10/04/21 1352          Gait/Stairs (Locomotion)    [place] Level (Gait)  1 person assist;1 person to manage equipment;moderate assist (50% patient effort);verbal cues  -AY     Distance in Feet (Gait)  110  -AY     Deviations/Abnormal Patterns (Gait)  bhavik decreased;gait speed decreased;bilateral deviations;stride length decreased  -AY     Bilateral Gait Deviations  leans right;forward flexed posture;heel strike decreased  -AY     Comment (Gait/Stairs)  pt demonstrated step through gait pattern with shuffling decreased bhavik. VC for posture, increased stride length, heel strike, and anterior lean. Moderate posterior and R lateral lean present, which improved temporarily with cueing. Gait distance limited by fatigue.  -AY       User Key  (r) = Recorded By, (t) = Taken By, (c) = Cosigned By    Initials Name Provider Type    AY Dian West, PT Physical Therapist        Obj/Interventions     Row Name 10/04/21 1358          Range of Motion Comprehensive    General Range of Motion  bilateral lower extremity ROM WFL  -AY     Row Name 10/04/21 1358          Strength Comprehensive (MMT)    General Manual Muscle Testing (MMT) Assessment  lower extremity strength deficits identified  -AY     Comment, General Manual Muscle Testing (MMT) Assessment  RLE grossly 3+/5. LLE grossly 4/5  -AY     Row Name 10/04/21 1358          Motor Skills    Motor Skills  coordination  -AY     Coordination  gross motor deficit;right;lower extremity;minimal impairment;heel to elias  -AY     Row Name 10/04/21 1358          Balance    Balance Assessment  sitting static balance;sitting dynamic balance;standing static balance;standing dynamic balance  -AY     Static Sitting Balance  WFL;sitting, edge of bed  -AY     Dynamic Sitting Balance  mild  impairment;sitting, edge of bed  -AY     Static Standing Balance  mild impairment;standing;supported  -AY     Dynamic Standing Balance  moderate impairment;supported;standing  -AY     Row Name 10/04/21 1358          Sensory Assessment (Somatosensory)    Sensory Assessment (Somatosensory)  unable/difficult to assess  -AY       User Key  (r) = Recorded By, (t) = Taken By, (c) = Cosigned By    Initials Name Provider Type    Dian Horne PT Physical Therapist        Goals/Plan     Row Name 10/04/21 1403          Bed Mobility Goal 1 (PT)    Activity/Assistive Device (Bed Mobility Goal 1, PT)  sit to supine/supine to sit  -AY     [place] Level/Cues Needed (Bed Mobility Goal 1, PT)  supervision required  -AY     Time Frame (Bed Mobility Goal 1, PT)  long term goal (LTG);2 weeks  -AY     Row Name 10/04/21 1403          Transfer Goal 1 (PT)    Activity/Assistive Device (Transfer Goal 1, PT)  sit-to-stand/stand-to-sit  -AY     [place] Level/Cues Needed (Transfer Goal 1, PT)  supervision required  -AY     Time Frame (Transfer Goal 1, PT)  long term goal (LTG);2 weeks  -AY     Row Name 10/04/21 1403          Gait Training Goal 1 (PT)    Activity/Assistive Device (Gait Training Goal 1, PT)  gait (walking locomotion)  -AY     [place] Level (Gait Training Goal 1, PT)  supervision required  -AY     Distance (Gait Training Goal 1, PT)  500  -AY     Time Frame (Gait Training Goal 1, PT)  long term goal (LTG);2 weeks  -AY       User Key  (r) = Recorded By, (t) = Taken By, (c) = Cosigned By    Initials Name Provider Type    Dian Horne PT Physical Therapist        Clinical Impression     Row Name 10/04/21 1400          Pain    Additional Documentation  Pain Scale: Numbers Pre/Post-Treatment (Group)  -AY     Row Name 10/04/21 1400          Pain Scale: Numbers Pre/Post-Treatment    Pretreatment Pain Rating  0/10 - no pain  -AY     Posttreatment Pain Rating  0/10 - no pain  -AY     Pain Intervention(s)   Ambulation/increased activity;Repositioned  -AY     Row Name 10/04/21 1400          Plan of Care Review    Plan of Care Reviewed With  patient;spouse  -AY     Outcome Summary  PT initial eval completed. Pt limited by decreased functional endurance, R sided weakness, balance deficit, impaired cognition, and baseline expressive aphasia. Pt amb 110ft with mod Ax1+1 for line management. Mild to moderate posterior and R lateral lean present with mobility. d/c rec for IRF based on current functional status.  -AY     Row Name 10/04/21 1400          Therapy Assessment/Plan (PT)    Rehab Potential (PT)  good, to achieve stated therapy goals  -AY     Criteria for Skilled Interventions Met (PT)  yes;meets criteria;skilled treatment is necessary  -AY     Row Name 10/04/21 1400          Vital Signs    Pre Systolic BP Rehab  124  -AY     Pre Treatment Diastolic BP  61  -AY     Post Systolic BP Rehab  115  -AY     Post Treatment Diastolic BP  64  -AY     Pretreatment Heart Rate (beats/min)  94  -AY     Posttreatment Heart Rate (beats/min)  85  -AY     Pre SpO2 (%)  94  -AY     O2 Delivery Pre Treatment  room air  -AY     O2 Delivery Intra Treatment  room air  -AY     Post SpO2 (%)  92  -AY     O2 Delivery Post Treatment  room air  -AY     Pre Patient Position  Sitting  -AY     Intra Patient Position  Standing  -AY     Post Patient Position  Sitting  -AY     Row Name 10/04/21 1400          Positioning and Restraints    Pre-Treatment Position  in bed sitting EOB  -AY     Post Treatment Position  chair  -AY     In Chair  notified nsg;reclined;sitting;call light within reach;encouraged to call for assist;exit alarm on;waffle cushion;legs elevated;on mechanical lift sling;with family/caregiver;RUE elevated;LUE elevated  -AY       User Key  (r) = Recorded By, (t) = Taken By, (c) = Cosigned By    Initials Name Provider Type    Dian Honre, PT Physical Therapist        Outcome Measures     Row Name 10/04/21 1403          How much  help from another person do you currently need...    Turning from your back to your side while in flat bed without using bedrails?  4  -AY     Moving from lying on back to sitting on the side of a flat bed without bedrails?  3  -AY     Moving to and from a bed to a chair (including a wheelchair)?  3  -AY     Standing up from a chair using your arms (e.g., wheelchair, bedside chair)?  3  -AY     Climbing 3-5 steps with a railing?  2  -AY     To walk in hospital room?  3  -AY     AM-PAC 6 Clicks Score (PT)  18  -AY     Row Name 10/04/21 1403 10/04/21 1224       Modified [place] Scale    Pre-Stroke Modified [place] Scale  1 - No significant disability despite symptoms.  Able to carry out all usual duties and activities.  -AY  1 - No significant disability despite symptoms.  Able to carry out all usual duties and activities.  -CS    Modified [place] Scale  3 - Moderate disability.  Requiring some help, but able to walk without assistance.  -AY  3 - Moderate disability.  Requiring some help, but able to walk without assistance.  -CS    Row Name 10/04/21 1403 10/04/21 1224       Functional Assessment    Outcome Measure Options  AM-PAC 6 Clicks Basic Mobility (PT);Modified [place]  -AY  AM-PAC 6 Clicks Daily Activity (OT);Modified [place]  -CS      User Key  (r) = Recorded By, (t) = Taken By, (c) = Cosigned By    Initials Name Provider Type    January Carter OT Occupational Therapist    Dian Horne, PT Physical Therapist                       Physical Therapy Education                 Title: PT OT SLP Therapies (In Progress)     Topic: Physical Therapy (In Progress)     Point: Mobility training (Done)     Learning Progress Summary           Patient Acceptance, E,TB, VU,NR by AY at 10/4/2021 1407   Significant Other Acceptance, E,TB, VU,NR by AY at 10/4/2021 1407                   Point: Home exercise program (Not Started)     Learner Progress:  Not documented in this visit.          Point: Body mechanics (Done)      Learning Progress Summary           Patient Acceptance, E,TB, VU,NR by AY at 10/4/2021 1407   Significant Other Acceptance, E,TB, VU,NR by AY at 10/4/2021 1407                   Point: Precautions (Done)     Learning Progress Summary           Patient Acceptance, E,TB, VU,NR by AY at 10/4/2021 1407   Significant Other Acceptance, E,TB, VU,NR by AY at 10/4/2021 1407                               User Key     Initials Effective Dates Name Provider Type Discipline    AY 11/10/20 -  Dian West PT Physical Therapist PT              PT Recommendation and Plan  Planned Therapy Interventions (PT): balance training, bed mobility training, gait training, home exercise program, patient/family education, strengthening, transfer training, postural re-education  Plan of Care Reviewed With: patient, spouse  Outcome Summary: PT initial eval completed. Pt limited by decreased functional endurance, R sided weakness, balance deficit, impaired cognition, and baseline expressive aphasia. Pt amb 110ft with mod Ax1+1 for line management. Mild to moderate posterior and R lateral lean present with mobility. d/c rec for IRF based on current functional status.     Time Calculation:   PT Charges     Row Name 10/04/21 1407             Time Calculation    Start Time  1110  -AY      PT Received On  10/04/21  -AY      PT Goal Re-Cert Due Date  10/14/21  -AY         Untimed Charges    PT Eval/Re-eval Minutes  46  -AY         Total Minutes    Untimed Charges Total Minutes  46  -AY       Total Minutes  46  -AY        User Key  (r) = Recorded By, (t) = Taken By, (c) = Cosigned By    Initials Name Provider Type    AY Dian West PT Physical Therapist        Therapy Charges for Today     Code Description Service Date Service Provider Modifiers Qty    65820283129 HC PT EVAL LOW COMPLEXITY 4 10/4/2021 Dian West PT GP 1          PT G-Codes  Outcome Measure Options: AM-PAC 6 Clicks Basic Mobility (PT), Modified [place]  AM-PAC 6 Clicks Score  (PT): 18  AM-PAC 6 Clicks Score (OT): 14  Modified [place] Scale: 3 - Moderate disability.  Requiring some help, but able to walk without assistance.    Dian West, PT  10/4/2021

## 2021-10-04 NOTE — THERAPY EVALUATION
Acute Care - Speech Language Pathology Initial Evaluation  [facility]   Cognitive-Communication Evaluation  Clinical Swallow Evaluation       Patient Name: Jenna Russell  : 1940  MRN: [id number]  Today's Date: 10/4/2021               Admit Date: 10/3/2021     Visit Dx:    ICD-10-CM ICD-9-CM   1. Subarachnoid hemorrhage without loss of consciousness (HCC)  I60.9 430   2. Aphasia  R47.01 784.3   3. History of stroke  Z86.73 V12.54   4. Elevated blood pressure reading with diagnosis of hypertension  I10 401.9   5. Chronic anticoagulation  Z79.01 V58.61     Patient Active Problem List   Diagnosis   • PAF (paroxysmal atrial fibrillation) (Formerly [facility])   • High cholesterol   • Hypertension   • Combined receptive and expressive aphasia   • Colitis, Clostridium difficile   • Collagenous colitis   • Subarachnoid hemorrhage without loss of consciousness (HCC)   • Seizure disorder (HCC)     Past Medical History:   Diagnosis Date   • Acute ischemic stroke (Formerly [facility])    • Arterial ischemic stroke (HCC) 2016   • Ataxic aphasia 2016   • BP (high blood pressure) 2016   • Cerebral infarction, left hemisphere (HCC) 2016   • Cyst of left ovary 2016    3cm   • Expressive aphasia    • H/O echocardiogram 2015    Global LV wall motion and contractility within normal limits. Normal LVSF.Normal LV diastolic filling. Left atrium midly dilated. RV mild- mod dilated. MIld aortic cusp sclerosis. No evidence of mitral valve prolapse. Mild mitral regurgitation.No pulmonary hypertension or perdicardial effusion. No dilation of aortic root. Venous system appears normal   • H/O: stroke    • HLD (hyperlipidemia) 2016   • Left temporal lobe infarction (HCC)    • LOM (loss of memory)    • Osteoarthritis 2016   • Paroxysmal atrial fibrillation (HCC) 2016   • Thyroid nodule 2016    1.8x1.2cm   • Weakness generalized    • Wound infection after surgery     Left hip arthroplasty, staph aureus, 6 weeks IV Rocephin  "    Past Surgical History:   Procedure Laterality Date   • ABDOMINOPLASTY     • BREAST ABSCESS INCISION AND DRAINAGE     • TOTAL HIP ARTHROPLASTY Left     Staph aureus wound infection       SLP Recommendation and Plan  SLP Diagnosis: Per this evaluation Mrs. Russell presents w/ a receptive/expressive aphasia worse than her premorbid baseline. Unable to fully assess 2/2 severity of aphasia. Family member at bedside reports \"getting better by the hour\".  (10/04/21 1030)        Swallow Criteria for Skilled Therapeutic Interventions Met: no problems identified which require skilled intervention (10/04/21 1030)  SLC Criteria for Skilled Therapy Interventions Met: yes (10/04/21 1030)  Anticipated Discharge Disposition (SLP): unknown, anticipate therapy at next level of care (10/04/21 1030)     Therapy Frequency (Swallow): evaluation only (10/04/21 1030)  Predicted Duration Therapy Intervention (Days): until discharge (10/04/21 1030)     Plan for Continued Treatment (SLP): SLC completed, will f/u to address aphasia as pt's family member reports worse than her premorbid baseline.  (10/04/21 1030)       Plan of Care Reviewed With: patient, spouse (10/04/21 1329)  Progress: no change (10/04/21 1329)         SLP EVALUATION (last 72 hours)      SLP SLC Evaluation     Row Name 10/04/21 1030       Communication Assessment/Intervention    Document Type  evaluation  -CJ    Subjective Information  no complaints  -CJ    Patient Observations  alert;cooperative  -CJ    Care Plan Review  evaluation/treatment results reviewed;care plan/treatment goals reviewed;patient/other agree to care plan  -CJ    Patient Effort  good  -CJ    Symptoms Noted During/After Treatment  none  -CJ       General Information    Patient Profile Reviewed  yes  -CJ    Pertinent History Of Current Problem  Pt adm w/ [facility] w/o loss of consciousness; Pt has sig h/o PAF, high cholesterol, HTN, residual aphasia from prior stroke in 2018. Pt failed RN dysphagia screener. " Pt's imaging revealed hemorrhage of L tempoparietal region.  -CJ    Precautions/Limitations, Vision  WFL;for purposes of eval  -CJ    Precautions/Limitations, Hearing  WFL;for purposes of eval  -CJ    Prior Level of Function-Communication  expressive language impairment;receptive language impairment;other (see comments) residual aphasia from prior stroke  -CJ    Plans/Goals Discussed with  patient;agreed upon  -CJ    Barriers to Rehab  previous functional deficit  -CJ    Patient's Goals for Discharge  patient did not state  -CJ    Family Goals for Discharge  family did not state  -CJ       Pain    Additional Documentation  Pain Scale: FACES Pre/Post-Treatment (Group)  -CJ       Pain Scale: FACES Pre/Post-Treatment    Pain: FACES Scale, Pretreatment  0-->no hurt  -CJ    Posttreatment Pain Rating  0-->no hurt  -CJ       Comprehension Assessment/Intervention    Comprehension Assessment/Intervention  Auditory Comprehension  -CJ       Auditory Comprehension Assessment/Intervention    Able to Identify Objects/Pictures (Communication)  familiar objects;pictures of common objects;WFL  -CJ    Answers Questions (Communication)  mulit-unit;concrete;simple;moderate impairment  -CJ    Able to Follow Commands (Communication)  2-step;3-step;body part;moderate impairment  -CJ    Narrative Discourse  unable/difficult to assess  -CJ       Expression Assessment/Intervention    Expression Assessment/Intervention  verbal expression  -CJ       Verbal Expression Assessment/Intervention    Verbal Expression  moderate impairment;severe impairment  -CJ    Automatic Speech (Communication)  response to greeting;alphabet;counting 1-20;days of week;months of year;moderate impairment  -CJ    Repetition  words;phrases;moderate impairment  -CJ    Phrase Completion  unable/difficult to assess  -CJ    Responsive Naming  unable/difficult to assess  -CJ    Confrontational Naming  high frequency;moderate impairment  -CJ    Spontaneous/Functional Words   "simple;moderate impairment;severe impairment  -CJ    Sentence Formulation  unable/difficult to assess  -CJ    Conversational Discourse/Fluency  unable/difficult to assess  -CJ       Oral Motor Structure and Function    Oral Motor Structure and Function  WF  -CJ    Dentition Assessment  natural, present and adequate  -CJ    Mucosal Quality  moist, healthy  -CJ       Oral Musculature and Cranial Nerve Assessment    Oral Motor General Assessment  WFL  -CJ       Motor Speech Assessment/Intervention    Motor Speech Function  WFL  -CJ       Cognitive Assessment Intervention- SLP    Cognitive Function (Cognition)  unable/difficult to assess;other (see comments) 2/2 severity of aphasia  -CJ       SLP Clinical Impressions    SLP Diagnosis  Per this evaluation Mrs. Russell presents w/ a receptive/expressive aphasia worse than her premorbid baseline. Unable to fully assess 2/2 severity of aphasia. Family member at bedside reports \"getting better by the hour\".   -CJ    Rehab Potential/Prognosis  good  -    SLC Criteria for Skilled Therapy Interventions Met  yes  -CJ    Functional Impact  functional impact in ADLs;difficulty communicating wants, needs;difficulty in expressing complex messages;restrictions in personal and social life  -CJ    Plan for Continued Treatment (SLP)  SLC completed, will f/u to address aphasia as pt's family member reports worse than her premorbid baseline.   -CJ       Recommendations    Therapy Frequency (SLP SLC)  5 days per week  -CJ    Predicted Duration Therapy Intervention (Days)  until discharge  -CJ    Anticipated Discharge Disposition (SLP)  unknown;anticipate therapy at next level of care  -      User Key  (r) = Recorded By, (t) = Taken By, (c) = Cosigned By    Initials Name Effective Dates    Eleni Muniz, MS CCC-SLP 06/16/21 -              EDUCATION  The patient has been educated in the following areas:     Cognitive Impairment Communication Impairment.          SLP GOALS     Row " Name 10/04/21 1030             Comprehend Questions Goal 1 (SLP)    Improve Ability to Comprehend Questions Goal 1 (SLP)  simple yes/no questions;simple wh questions;simple general questions;concrete general questions;questions about personal information;90%;with moderate cues (50-74%)  -CJ      Time Frame (Comprehend Questions Goal 1, SLP)  short term goal (STG)  -CJ         Follow Directions Goal 2 (SLP)    Improve Ability to Follow Directions Goal 1 (SLP)  2 step commands;3 step commands;90%;with moderate cues (50-74%)  -CJ      Time Frame (Follow Directions Goal 1, SLP)  short term goal (STG)  -CJ         Word Retrieval Skills Goal 1 (SLP)    Improve Word Retrieval Skills By Goal 1 (SLP)  completing automatic speech task, counting;completing automatic speech task, alphabet;completing automatic speech task, days of the week;completing automatic speech task, months;confrontational naming task;high frequency;responsive naming task;simple;90%;with moderate cues (50-74%)  -CJ      Time Frame (Word Retrieval Goal 1, SLP)  short term goal (STG)  -CJ         Additional Goal 1 (SLP)    Additional Goal 1, SLP  LTG: Pt will improve language skills to be able to effectively communicate wants and needs to participate in her care  -CJ      Time Frame (Additional Goal 1, SLP)  by discharge  -        User Key  (r) = Recorded By, (t) = Taken By, (c) = Cosigned By    Initials Name Provider Type    Eleni Muniz MS CCC-SLP Speech and Language Pathologist                  Time Calculation:     Time Calculation- SLP     Row Name 10/04/21 1330             Time Calculation- SLP    SLP Start Time  1030  -CJ      SLP Received On  10/04/21  -         Untimed Charges    06246-NI Eval Speech and Production w/ Language Minutes  38  -CJ      03279-WQ Eval Oral Pharyng Swallow Minutes  24  -CJ         Total Minutes    Untimed Charges Total Minutes  62  -CJ       Total Minutes  62  -CJ        User Key  (r) = Recorded By, (t) = Taken  By, (c) = Cosigned By    Initials Name Provider Type    CJ Eleni Ho, MS CCC-SLP Speech and Language Pathologist          Therapy Charges for Today     Code Description Service Date Service Provider Modifiers Qty    57770384948 HC ST EVAL ORAL PHARYNG SWALLOW 2 10/4/2021 Eleni Ho, MS CCC-SLP GN 1    58696347020 HC ST EVAL SPEECH AND PROD W LANG  3 10/4/2021 Vic Eleni L, MS CCC-SLP GN 1                     Eleni MARVIN MS Vic CCC-SLP  10/4/2021 and Acute Care - Speech Language Pathology   Swallow Initial Evaluation [facility]     Patient Name: Jenna Russell  : 1940  MRN: [id number]  Today's Date: 10/4/2021               Admit Date: 10/3/2021    Visit Dx:     ICD-10-CM ICD-9-CM   1. Subarachnoid hemorrhage without loss of consciousness (HCC)  I60.9 430   2. Aphasia  R47.01 784.3   3. History of stroke  Z86.73 V12.54   4. Elevated blood pressure reading with diagnosis of hypertension  I10 401.9   5. Chronic anticoagulation  Z79.01 V58.61     Patient Active Problem List   Diagnosis   • PAF (paroxysmal atrial fibrillation) ([facility])   • High cholesterol   • Hypertension   • Combined receptive and expressive aphasia   • Colitis, Clostridium difficile   • Collagenous colitis   • Subarachnoid hemorrhage without loss of consciousness (HCC)   • Seizure disorder (HCC)     Past Medical History:   Diagnosis Date   • Acute ischemic stroke (HCC)    • Arterial ischemic stroke (HCC) 2016   • Ataxic aphasia 2016   • BP (high blood pressure) 2016   • Cerebral infarction, left hemisphere (HCC) 2016   • Cyst of left ovary 2016    3cm   • Expressive aphasia    • H/O echocardiogram 2015    Global LV wall motion and contractility within normal limits. Normal LVSF.Normal LV diastolic filling. Left atrium midly dilated. RV mild- mod dilated. MIld aortic cusp sclerosis. No evidence of mitral valve prolapse. Mild mitral regurgitation.No pulmonary hypertension or perdicardial effusion. No  dilation of aortic root. Venous system appears normal   • H/O: stroke    • HLD (hyperlipidemia) 9/8/2016   • Left temporal lobe infarction (HCC)    • LOM (loss of memory)    • Osteoarthritis 9/8/2016   • Paroxysmal atrial fibrillation (HCC) 9/8/2016   • Thyroid nodule 9/8/2016    1.8x1.2cm   • Weakness generalized    • Wound infection after surgery     Left hip arthroplasty, staph aureus, 6 weeks IV Rocephin     Past Surgical History:   Procedure Laterality Date   • ABDOMINOPLASTY     • BREAST ABSCESS INCISION AND DRAINAGE     • TOTAL HIP ARTHROPLASTY Left     Staph aureus wound infection       SLP Recommendation and Plan  SLP Swallowing Diagnosis: functional oral phase, functional pharyngeal phase (10/04/21 1030)  SLP Diet Recommendation: regular textures, thin liquids (10/04/21 1030)  Recommended Precautions and Strategies: upright posture during/after eating, small bites of food and sips of liquid, general aspiration precautions (10/04/21 1030)  SLP Rec. for Method of Medication Administration: meds whole, with thin liquids, with pudding or applesauce, as tolerated (10/04/21 1030)     Monitor for Signs of Aspiration: yes, notify SLP if any concerns (10/04/21 1030)  Recommended Diagnostics: No further SLP services recommended (10/04/21 1030)  Swallow Criteria for Skilled Therapeutic Interventions Met: no problems identified which require skilled intervention (10/04/21 1030)  Anticipated Discharge Disposition (SLP): unknown, anticipate therapy at next level of care (10/04/21 1030)     Therapy Frequency (Swallow): evaluation only (10/04/21 1030)  Predicted Duration Therapy Intervention (Days): until discharge (10/04/21 1030)          Plan for Continued Treatment (SLP): SLC completed, will f/u to address aphasia as pt's family member reports worse than her premorbid baseline.  (10/04/21 1030)              Plan of Care Reviewed With: patient, spouse  Progress: no change         SWALLOW EVALUATION (last 72 hours)       SLP Adult Swallow Evaluation     Row Name 10/04/21 1030       General Information    Current Method of Nutrition  NPO  -    Prior Level of Function-Communication  receptive language impairment;expressive language impairment  -    Prior Level of Function-Swallowing  no diet consistency restrictions;safe, efficient swallowing in all situations  -    Patient's Goals for Discharge  patient did not state  -    Family Goals for Discharge  patient able to return to PO diet  -       Oral Motor Structure and Function    Secretion Management  WNL/WFL  -CJ    Volitional Swallow  WFL  -CJ    Volitional Cough  WFL  -CJ       General Eating/Swallowing Observations    Respiratory Support Currently in Use  room air  -       Clinical Swallow Eval    Oral Prep Phase  WFL  -CJ    Oral Transit  WFL  -CJ    Oral Residue  WFL  -CJ    Pharyngeal Phase  no overt signs/symptoms of pharyngeal impairment  -    Clinical Swallow Evaluation Summary  CSE completed this am. Mrs. Russell is positioned upright and centered in bed to accept po presentations of pudding, solid cracker, ice chips and thin liquids via spoon, cup and straw. Oral phase is seemingly wfl. No overt s/s of aspiration. No clinical s/s concerning for silent aspiration as pt is w/o changes in vocal quality, respirations or secretions post po presentations. Will initiate regular diet w/ thin liquids. SLP will sign off for dysphagia  -       Clinical Impression    SLP Swallowing Diagnosis  functional oral phase;functional pharyngeal phase  -    Functional Impact  no impact on function  -    Swallow Criteria for Skilled Therapeutic Interventions Met  no problems identified which require skilled intervention  -       Recommendations    Therapy Frequency (Swallow)  evaluation only  -    SLP Diet Recommendation  regular textures;thin liquids  -    Recommended Diagnostics  No further SLP services recommended  -    Recommended Precautions and Strategies   upright posture during/after eating;small bites of food and sips of liquid;general aspiration precautions  -    Oral Care Recommendations  Oral Care BID/PRN  -CJ    SLP Rec. for Method of Medication Administration  meds whole;with thin liquids;with pudding or applesauce;as tolerated  -CJ    Monitor for Signs of Aspiration  yes;notify SLP if any concerns  -      User Key  (r) = Recorded By, (t) = Taken By, (c) = Cosigned By    Initials Name Effective Dates     Eleni Ho, MS CCC-SLP 06/16/21 -           EDUCATION  The patient has been educated in the following areas:   Dysphagia (Swallowing Impairment) Oral Care/Hydration.       SLP GOALS     Row Name 10/04/21 1030             Comprehend Questions Goal 1 (SLP)    Improve Ability to Comprehend Questions Goal 1 (SLP)  simple yes/no questions;simple wh questions;simple general questions;concrete general questions;questions about personal information;90%;with moderate cues (50-74%)  -CJ      Time Frame (Comprehend Questions Goal 1, SLP)  short term goal (STG)  -CJ         Follow Directions Goal 2 (SLP)    Improve Ability to Follow Directions Goal 1 (SLP)  2 step commands;3 step commands;90%;with moderate cues (50-74%)  -CJ      Time Frame (Follow Directions Goal 1, SLP)  short term goal (STG)  -CJ         Word Retrieval Skills Goal 1 (SLP)    Improve Word Retrieval Skills By Goal 1 (SLP)  completing automatic speech task, counting;completing automatic speech task, alphabet;completing automatic speech task, days of the week;completing automatic speech task, months;confrontational naming task;high frequency;responsive naming task;simple;90%;with moderate cues (50-74%)  -CJ      Time Frame (Word Retrieval Goal 1, SLP)  short term goal (STG)  -CJ         Additional Goal 1 (SLP)    Additional Goal 1, SLP  LTG: Pt will improve language skills to be able to effectively communicate wants and needs to participate in her care  -CJ      Time Frame (Additional Goal 1,  SLP)  by discharge  -        User Key  (r) = Recorded By, (t) = Taken By, (c) = Cosigned By    Initials Name Provider Type    Eleni Muniz MS CCC-SLP Speech and Language Pathologist             Time Calculation:   Time Calculation- SLP     Row Name 10/04/21 1330             Time Calculation- SLP    SLP Start Time  1030  -      SLP Received On  10/04/21  -         Untimed Charges    23022-GL Eval Speech and Production w/ Language Minutes  38  -CJ      24544-MD Eval Oral Pharyng Swallow Minutes  24  -CJ         Total Minutes    Untimed Charges Total Minutes  62  -CJ       Total Minutes  62  -CJ        User Key  (r) = Recorded By, (t) = Taken By, (c) = Cosigned By    Initials Name Provider Type    Eleni Muniz MS CCC-SLP Speech and Language Pathologist          Therapy Charges for Today     Code Description Service Date Service Provider Modifiers Qty    03557898513 HC ST EVAL ORAL PHARYNG SWALLOW 2 10/4/2021 Eleni Ho MS CCC-SLP GN 1    61399876988 HC ST EVAL SPEECH AND PROD W LANG  3 10/4/2021 Eleni Ho MS CCC-IVETH GN 1            Patient was not wearing a face mask and did exhibit coughing during this therapy encounter.  Procedure performed was aerosolizing, involved close contact (within 6 feet for at least 15 minutes or longer), and did not involve contact with infectious secretions or specimens.  Therapist used appropriate personal protective equipment including gloves, standard procedure mask and eye protection.  Appropriate PPE was worn during the entire therapy session.  Hand hygiene was completed before and after therapy session.       MS MARIA ALEJANDRA Abrams  10/4/2021

## 2021-10-04 NOTE — PLAN OF CARE
Goal Outcome Evaluation:  Plan of Care Reviewed With: patient, spouse        Progress: no change   SLP evaluation completed. Will continue to address aphasia. Please see note for further details and recommendations.

## 2021-10-04 NOTE — CASE MANAGEMENT/SOCIAL WORK
Discharge Planning Assessment  [facility]     Patient Name: Jenna Russell  MRN: [id number]  Today's Date: 10/4/2021    Admit Date: 10/3/2021    Discharge Needs Assessment     Row Name 10/04/21 1453       Living Environment    Lives With  spouse    Current Living Arrangements  home/apartment/condo    Primary Care Provided by  self    Provides Primary Care For  no one    Family Caregiver if Needed  spouse    Quality of Family Relationships  involved;supportive    Able to Return to Prior Arrangements  yes       Resource/Environmental Concerns    Resource/Environmental Concerns  none       Transition Planning    Patient/Family Anticipates Transition to  inpatient rehabilitation facility    Patient/Family Anticipated Services at Transition  ;rehabilitation services    Transportation Anticipated  family or friend will provide       Discharge Needs Assessment    Readmission Within the Last 30 Days  no previous admission in last 30 days    Equipment Currently Used at Home  none    Concerns to be Addressed  discharge planning    Anticipated Changes Related to Illness  none        Discharge Plan     Row Name 10/04/21 1454       Plan    Plan  Inpatient Rehab    Patient/Family in Agreement with Plan  yes    Plan Comments  Spoke with patient's wife by phone to initiate discharge planning.  Rich elives with her in [facility].  Prior to admission, patient was independent with ADL's.  She has no DME at home and is not current with home health.  Her PCP is Judi Rodriguez.  She does not have an advanced directive.  Ms. Russell has RX coverage and has her scripts filled at [facility].  Therapy recommends inpatient rehab at discharge.  Wife agreeable and prefers [facility].  Referral called to April.  CM will continue to follow.  Lita Montalvo RN [phone]    Final Discharge Disposition Code  30 - still a patient        Continued Care and Services - Admitted Since 10/3/2021    Coordination has not been started for  this encounter.       Expected Discharge Date and Time     Expected Discharge Date Expected Discharge Time    Oct 8, 2021         Demographic Summary     Row Name 10/04/21 1453       General Information    Admission Type  inpatient    Arrived From  emergency department    Referral Source  admission list    Reason for Consult  discharge planning    Preferred Language  English     Used During This Interaction  no        Functional Status     Row Name 10/04/21 1453       Functional Status    Usual Activity Tolerance  good    Current Activity Tolerance  fair       Functional Status, IADL    Medications  independent    Meal Preparation  independent    Housekeeping  independent    Laundry  independent    Shopping  independent        Psychosocial    No documentation.       Abuse/Neglect    No documentation.       Legal    No documentation.       Substance Abuse    No documentation.       Patient Forms    No documentation.           Lita Montalvo RN

## 2021-10-04 NOTE — CONSULTS
Stroke Consult Note    Patient Name: Jenna Russell   MRN: [id number]  Age: 80 y.o.  Sex: female  : 1940    Primary Care Physician: Judi Rodriguez MD  Referring Physician:  Provider, No Known    TIME NOTIFIED OF PATIENTS ARRIVAL 1202, TIME OF PATIENT EVALUATION 1204    Handedness: R  Race: W    Chief Complaint/Reason for Consultation:  Speech difficulty    Subjective .  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   · Last known well: 10/3/2021 0030      Review of Systems   Constitutional: No fatigue  HENT: Negative for nosebleeds and rhinorrhea.    Eyes: Negative for redness.   Respiratory: Negative for cough.    Gastrointestinal: Negative for anal bleeding.   Endocrine: Negative for polydipsia.   Genitourinary: Negative for enuresis and urgency.   Musculoskeletal: Negative for joint swelling.   Neurological: Negative for tremors.   Psychiatric/Behavioral: Negative for hallucinations.     Temp:  [97.8 °F (36.6 °C)-98.3 °F (36.8 °C)] 98.1 °F (36.7 °C)  Heart Rate:  [] 70  Resp:  [16-18] 18  BP: (105-149)/() 117/62        NEURO  MENTAL STATUS: AAOx2, memory not intact, fund of knowledge appropriate    LANG/SPEECH: Naming and repetition  Not intact,    CRANIAL NERVES:    Pupils equal and reactive, EOMI intact, no gaze deviation, no nystagmus  No facial droop, cough and gag intact, shoulder shrug intact, tongue midline     MOTOR:   RUE -4/5    SENSORY: Normal to light touch all throughout     COORDINATION: Normal finger to nose and heel to shin, no tremor, no dysmetria    STATION: Not assessed due to patient condition    GAIT: Not assessed  due to patient condition  Acute Stroke Data    Alteplase (tPA) Inclusion / Exclusion Criteria    Time: 15:32 EDT  Person Administering Scale: Manuel Berkowitz MD    Inclusion Criteria  [x]   18 years of age or greater   []   Onset of symptoms < 4.5 hours before beginning treatment (stroke onset = time patient was last seen well or without symptoms).   []   Diagnosis of acute ischemic stroke causing measurable disabling deficit (Complete Hemianopia, Any Aphasia, Visual or Sensory Extinction, Any weakness limiting sustained effort against gravity)   []   Any remaining deficit considered potentially disabling in view of patient and practitioner   Exclusion criteria (Do not proceed with Alteplase if any are checked under exclusion criteria)  [x]   Onset unknown or GREATER than 4.5 hours   [x]   ICH on CT/MRI   []   CT demonstrates hypodensity representing acute or subacute infarct   []   Significant head trauma or prior stroke in the previous 3 months   []   Symptoms suggestive of subarachnoid hemorrhage   []   History of un-ruptured intracranial aneurysm GREATER than 10 mm   []   Recent intracranial or intraspinal surgery within the last 3 months   []   Arterial puncture at a non-compressible site in the previous 7 days   []   Active internal bleeding   []   Acute bleeding tendency   []   Platelet count LESS than 100,000 for known hematological diseases such as leukemia, thrombocytopenia or chronic cirrhosis   []   Current use of anticoagulant with INR GREATER than 1.7 or PT GREATER than 15 seconds, aPTT GREATER than 40 seconds   []   Heparin received within 48 hours, resulting in abnormally elevated aPTT GREATER than upper limit of normal   []   Current use of direct thrombin inhibitors or direct factor Xa inhibitors in the past 48 hours   []   Elevated blood pressure refractory to treatment (systolic GREATER than 185 mm/Hg or diastolic  GREATER than 110 mm/Hg   []   Suspected infective endocarditis and aortic arch  dissection   []   Current use of therapeutic treatment dose of low-molecular-weight heparin (LMWH) within the previous 24 hours   []   Structural GI malignancy or bleed   Relative exclusion for all patients  []   Only minor non-disabling symptoms   []   Pregnancy   []   Seizure at onset with postictal residual neurological impairments   []   Major surgery or previous trauma within past 14 days   []   History of previous spontaneous ICH, intracranial neoplasm, or AV malformation   []   Postpartum (within previous 14 days)   []   Recent GI or urinary tract hemorrhage (within previous 21 days)   []   Recent acute MI (within previous 3 months)   []   History of un-ruptured intracranial aneurysm LESS than 10 mm   []   History of ruptured intracranial aneurysm   []   Blood glucose LESS than 50 mg/dL (2.7 mmol/L)   []   Dural puncture within the last 7 days   []   Known GREATER than 10 cerebral microbleeds   Additional exclusions for patients with symptoms onset between 3 and 4.5 hours.  []   Age > 80.   []   On any anticoagulants regardless of INR  >>> Warfarin (Coumadin), Heparin, Enoxaparin (Lovenox), fondaparinux (Arixtra), bivalirudin (Angiomax), Argatroban, dabigatran (Pradaxa), rivaroxaban (Xarelto), or apixaban (Eliquis)   []   Severe stroke (NIHSS > 25).   []   History of BOTH diabetes and previous ischemic stroke.   []   The risks and benefits have been discussed with the patient or family related to the administration of IV Alteplase for stroke symptoms.   []   I have discussed and reviewed the patient's case and imaging with the attending prior to IV Alteplase.    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 Normal LVSF.Normal LV diastolic filling. Left atrium midly dilated. RV mild- mod dilated. MIld aortic cusp sclerosis. No evidence of mitral valve prolapse. Mild mitral regurgitation.No pulmonary hypertension or perdicardial effusion. No dilation of aortic root. Venous system appears normal   • H/O: stroke    • HLD (hyperlipidemia) 2016   • Left temporal lobe infarction (HCC)    • LOM (loss of memory)    • Osteoarthritis 2016   • Paroxysmal atrial fibrillation (HCC) 2016   • Thyroid nodule 2016    1.8x1.2cm   • Weakness generalized    • Wound infection after surgery     Left hip arthroplasty, staph aureus, 6 weeks IV Rocephin     Past Surgical History:   Procedure Laterality Date   • ABDOMINOPLASTY     • BREAST ABSCESS INCISION AND DRAINAGE     • TOTAL HIP ARTHROPLASTY Left     Staph aureus wound infection     Family History   Problem Relation Age of Onset   • Stroke Mother    • Other Mother         Cardiac disorder   • Diabetes Mother    • Hypertension Mother    • Mental illness Mother    • Heart attack Father    • Heart failure Father    • No Known Problems Sister    • Heart attack Maternal Grandfather    • Heart disease Paternal Grandfather    • No Known Problems Sister      Social History     Socioeconomic History   • Marital status:      Spouse name: Not on file   • Number of children: 3   • Years of education: Not on file   • Highest education level: Not on file   Tobacco Use   • Smoking status: Former Smoker     Years: 2.00     Quit date:      Years since quittin.7   • Smokeless tobacco: Never Used   • Tobacco comment: OV says she quit smoking within the past year   Substance and Sexual Activity   • Alcohol use: Yes     Alcohol/week: 14.0 standard drinks     Types: 14 Glasses of wine per week   • Drug use: No   • Sexual activity: Defer     Allergies   Allergen Reactions   • Penicillins  Anaphylaxis     Prior to Admission medications    Medication Sig Start Date End Date Taking? Authorizing Provider   apixaban (ELIQUIS) 5 MG tablet tablet Take 5 mg by mouth 2 (two) times a day.   Yes Shahla Lau MD   aspirin 81 MG EC tablet Take 81 mg by mouth daily.   Yes Shahla Lau MD   atorvastatin (LIPITOR) 40 MG tablet Take 40 mg by mouth Every Night.   Yes Shahla Lau MD   calcium carbonate EX (TUMS EX) 750 MG chewable tablet Chew 750 mg 2 (Two) Times a Day.   Yes Shahla Lau MD   Cholecalciferol (VITAMIN D) 2000 UNITS capsule Take 2,000 Units by mouth Daily.   Yes Shahla Lau MD   divalproex (DEPAKOTE) 250 MG DR tablet Take 1 tablet by mouth Every 8 (Eight) Hours. 1/23/18  Yes Kingsley Walsh MD   hyoscyamine (ANASPAZ,LEVSIN) 0.125 MG tablet Take 0.125 mg by mouth every 4 (four) hours as needed for cramping.   Yes Shahla Lau MD   ALPRAZolam (XANAX) 0.5 MG tablet Take 0.25 mg by mouth 2 (Two) Times a Day As Needed.    Shahla Lau MD   diclofenac (FLECTOR) 1.3 % patch patch Apply 1 patch topically 2 (Two) Times a Day. 2/25/17   Orestes Brennan MD   folic acid (FOLVITE) 1 MG tablet Take 1 mg by mouth Daily. 9/20/16   Shahla Lau MD   HYDROcodone-acetaminophen (NORCO)  MG per tablet Take 1 tablet by mouth Every 6 (Six) Hours As Needed for moderate pain (4-6) for up to 20 doses. 2/25/17   Ric Paiz MD       [facility] Meds:  Scheduled- sodium chloride, 10 mL, Intravenous, Q12H  valproate sodium, 190 mg, Intravenous, Q6H      Infusions- niCARdipine, 5-15 mg/hr, Last Rate: Stopped (10/04/21 0935)  sodium chloride, 50 mL/hr, Last Rate: 50 mL/hr (10/03/21 2020)       PRNs- •  magnesium sulfate **OR** magnesium sulfate **OR** magnesium sulfate  •  ondansetron  •  potassium chloride **OR** potassium chloride **OR** potassium chloride  •  sodium chloride  •  sodium chloride    Functional Status Prior to Current  Stroke/Ankur Score: 0    NIH Stroke Scale  Time: 15:32 EDT  Person Administering Scale: Manuel Berkowitz MD    1a. Level of Consciousness: 0-->Alert, keenly responsive  1b. LOC Questions: 2-->Answers neither question correctly  1c. LOC Commands: 1-->Performs one task correctly  2. Best Gaze: 0-->Normal  3. Visual: 0-->No visual loss  4. Facial Palsy: 0-->Normal symmetrical movements  5a. Motor Arm, Left: 0-->No drift, limb holds 90 (or 45) degrees for full 10 secs  5b. Motor Arm, Right: 0-->No drift, limb holds 90 (or 45) degrees for full 10 secs  6a. Motor Leg, Left: 0-->No drift, leg holds 30 degree position for full 5 secs  6b. Motor Leg, Right: 0-->No drift, leg holds 30 degree position for full 5 secs  7. Limb Ataxia: 0-->Absent  8. Sensory: 0-->Normal, no sensory loss  9. Best Language: 2-->Severe aphasia, all communication is through fragmentary expression, great need for inference, questioning, and guessing by the listener. Range of information that can be exchanged is limited, listener carries burden of. . . (see row details)  10. Dysarthria: 0-->Normal  11. Extinction and Inattention (formerly Neglect): 0-->No abnormality       Results Reviewed:  I have personally reviewed current lab, radiology, and data and agree with results.    Results for orders placed during the hospital encounter of 01/20/18    Adult Transthoracic Echo Complete W/ Cont if Necessary Per Protocol (With Agitated Saline)    Interpretation Summary  · Left ventricular wall thickness is consistent with hypertrophy.  · Left ventricular systolic function is normal.  · Moderate aortic valve regurgitation is present.  · Mild mitral valve regurgitation is present  · Mild tricuspid valve regurgitation is present.  · Mild pulmonic valve regurgitation is present.            Assessment/Plan:    80 with history of hypertension, hyperlipidemia, old left MCA stroke, atrial fibrillation who underwent ablation at [facility],, on chronic  anticoagulation taking Eliquis, cardiologist is Dr. Yan, who had a intraparenchymal hemorrhage, received Kcentra and was admitted to ICU for further monitoring.      Left temporoparietal ICH  Risk factors of old stroke and being on anticoagulation. It is possible that patient might have had another vascular event in the territory of old stroke with hemorrhgic conversion.      -Hold off antiplatelets/antithrombotics at this time.  -Blood pressure goals systolic less than 140  -MRI brain with and without pending.    Atrial fibrillation-patient is in sinus rhythm, needs reevaluation regarding initiation of anticoagulation.    Stroke neurology will follow the patient.    Manuel Berkowitz MD  October 4, 2021  15:32 EDT

## 2021-10-04 NOTE — PROGRESS NOTES
"INTENSIVIST NOTE     Hospital Day: 1    Ms. Jenna Russell, 80 y.o. female is followed for:   Subarachnoid hemorrhage       SUBJECTIVE       Interval history:    NIH scale 5 today.  Cardene drip being weaned.  Fluid balance +900 mL.    The patient's relevant past medical, surgical and social history were reviewed and updated in Epic as appropriate.       OBJECTIVE     Vital Sign Min/Max for last 24 hours  Temp  Min: 97.8 °F (36.6 °C)  Max: 98.3 °F (36.8 °C)   BP  Min: 105/66  Max: 149/84   Pulse  Min: 67  Max: 101   Resp  Min: 16  Max: 18   SpO2  Min: 90 %  Max: 100 %   No data recorded   No data recorded      Intake/Output Summary (Last 24 hours) at 10/4/2021 1703  Last data filed at 10/4/2021 1600  Gross per 24 hour   Intake 1716.69 ml   Output 800 ml   Net 916.69 ml      Flowsheet Rows      First Filed Value   Admission Height  170.2 cm (67\") Documented at 10/03/2021 1201   Admission Weight  53.5 kg (118 lb) Documented at 10/03/2021 1201             10/03/21  1201 10/03/21  1507   Weight: 53.5 kg (118 lb) 53.1 kg (117 lb)            Objective:  General Appearance:  In no acute distress.    Vital signs: (most recent): Blood pressure 135/68, pulse 72, temperature 98.1 °F (36.7 °C), temperature source Oral, resp. rate 18, height 170.2 cm (67\"), weight 53.1 kg (117 lb), SpO2 97 %, not currently breastfeeding.    HEENT: Normal HEENT exam.    Lungs:  Normal effort and normal respiratory rate.  Breath sounds clear to auscultation.  She is not in respiratory distress.  No rales, wheezes or rhonchi.    Heart: Normal rate.  Regular rhythm.  S1 normal and S2 normal.  No murmur, gallop or friction rub.   Chest: Symmetric chest wall expansion.   Abdomen: Abdomen is soft and non-distended.  Bowel sounds are normal.   There is no abdominal tenderness.   There is no mass. There is no splenomegaly. There is no hepatomegaly.   Extremities: There is no deformity or dependent edema.    Neurological: Patient is alert and oriented to " person, place and time.    Pupils:  Pupils are equal, round, and reactive to light.    Skin:  Warm and dry.          Interval: 24 hrs post onset of symptoms +/- 20 mins  1a. Level of Consciousness: 0-->Alert, keenly responsive  1b. LOC Questions: 1-->Answers one question correctly  1c. LOC Commands: 1-->Performs one task correctly  2. Best Gaze: 0-->Normal  3. Visual: 0-->No visual loss  4. Facial Palsy: 0-->Normal symmetrical movements  5a. Motor Arm, Left: 0-->No drift, limb holds 90 (or 45) degrees for full 10 secs  5b. Motor Arm, Right: 0-->No drift, limb holds 90 (or 45) degrees for full 10 secs  6a. Motor Leg, Left: 0-->No drift, leg holds 30 degree position for full 5 secs  6b. Motor Leg, Right: 0-->No drift, leg holds 30 degree position for full 5 secs  7. Limb Ataxia: 0-->Absent  8. Sensory: 0-->Normal, no sensory loss  9. Best Language: 1-->Mild-to-moderate aphasia, some obvious loss of fluency or facility of comprehension, without significant limitation on ideas expressed or form of expression. Reduction of speech and/or comprehension, however, makes conversation. . . (see row details)  10. Dysarthria: 0-->Normal  11. Extinction and Inattention (formerly Neglect): 0-->No abnormality    Total (NIH Stroke Scale): 3      I reviewed the patient's new clinical results.  I reviewed the patient's new imaging results/reports including actual images and agree with reports.      Chest X-Ray: NAD    INFUSIONS  niCARdipine, 5-15 mg/hr, Last Rate: 2.5 mg/hr (10/04/21 1602)  sodium chloride, 50 mL/hr, Last Rate: 50 mL/hr (10/03/21 2020)        Results from last 7 days   Lab Units 10/04/21  0821 10/03/21  1220 10/03/21  1218   WBC 10*3/mm3 6.57 7.52  --    HEMOGLOBIN g/dL 12.6 13.1  --    HEMOGLOBIN, POC g/dL  --   --  13.6   HEMATOCRIT % 37.9 40.3  --    HEMATOCRIT POC %  --   --  40   PLATELETS 10*3/mm3 138* 145  --      Results from last 7 days   Lab Units 10/04/21  0821 10/03/21  1221 10/03/21  1218   SODIUM mmol/L  136  --   --    POTASSIUM mmol/L 3.2*  --   --    CHLORIDE mmol/L 101  --   --    CO2 mmol/L 23.0  --   --    BUN mg/dL 6*  --   --    GLUCOSE mg/dL 100*  --   --    CREATININE mg/dL 0.44* 0.60 0.60   MAGNESIUM mg/dL 1.8  --   --    CALCIUM mg/dL 8.6  --   --          Results from last 7 days   Lab Units 10/03/21  1218   PH, ARTERIAL pH units 7.38         [facility] Ventilation:      I reviewed the patient's medications.    Assessment/Plan   ASSESSMENT/PLAN     Active Hospital Problems    Diagnosis    • **Subarachnoid hemorrhage without loss of consciousness (HCC)    • Seizure disorder (HCC)    • Combined receptive and expressive aphasia    • Hypertension      H/o     • High cholesterol      H/o     • PAF (paroxysmal atrial fibrillation) (HCC)        80-year-old female with a history of atrial fibrillation and prior left MCA stroke in 2015 with some residual aphasia.  She underwent an ablation at the [facility].  She has remained on chronic Eliquis therapy.  She subsequently developed seizures and was placed on Depakote with good control.  She now presents with an intraparenchymal and subarachnoid hemorrhage in the region of the old stroke.  She received Kcentra and was started on a Cardene drip on admission on 10/3.    Left temporoparietal ICH  Hold antiplatelet agents and anticoagulants  Goal SBP less than 140 mmHg  History of atrial fibrillation  Currently in normal sinus rhythm  Future benefits of anticoagulation unclear  Seizure disorder  IV Depakote     Discussed with patient's wife in detail     I discussed the patient's findings and my recommendations with patient, family and nursing staff     Plan of care and goals reviewed with multidisciplinary team at daily rounds.    .    Abraham Johnson MD  Pulmonary and Critical Care Medicine  10/04/21 17:03 EDT

## 2021-10-04 NOTE — PLAN OF CARE
Goal Outcome Evaluation:  Plan of Care Reviewed With: patient, spouse        Progress: improving  Outcome Summary: OT eval complete. Pt is Min A for bed mobiliy, t/fs and grooming ADLs. Pt limited d/t expressive aphasia and difficulty initiating tasks, improved command following noted w/ visual cues. Recommend cont skilled IPOT POC. Recommend pt DC to IP rehab based on current level of performance.

## 2021-10-04 NOTE — THERAPY EVALUATION
Patient Name: Jenna Russell  : 1940    MRN: [id number]                              Today's Date: 10/4/2021       Admit Date: 10/3/2021    Visit Dx:     ICD-10-CM ICD-9-CM   1. Subarachnoid hemorrhage without loss of consciousness (HCC)  I60.9 430   2. Aphasia  R47.01 784.3   3. History of stroke  Z86.73 V12.54   4. Elevated blood pressure reading with diagnosis of hypertension  I10 401.9   5. Chronic anticoagulation  Z79.01 V58.61     Patient Active Problem List   Diagnosis   • PAF (paroxysmal atrial fibrillation) ([facility])   • High cholesterol   • Hypertension   • Combined receptive and expressive aphasia   • Colitis, Clostridium difficile   • Collagenous colitis   • Subarachnoid hemorrhage without loss of consciousness ([facility])   • Seizure disorder ([facility])     Past Medical History:   Diagnosis Date   • Acute ischemic stroke ([facility])    • Arterial ischemic stroke ([facility]) 2016   • Ataxic aphasia 2016   • BP (high blood pressure) 2016   • Cerebral infarction, left hemisphere ([facility]) 2016   • Cyst of left ovary 2016    3cm   • Expressive aphasia    • H/O echocardiogram 2015    Global LV wall motion and contractility within normal limits. Normal LVSF.Normal LV diastolic filling. Left atrium midly dilated. RV mild- mod dilated. MIld aortic cusp sclerosis. No evidence of mitral valve prolapse. Mild mitral regurgitation.No pulmonary hypertension or perdicardial effusion. No dilation of aortic root. Venous system appears normal   • H/O: stroke    • HLD (hyperlipidemia) 2016   • Left temporal lobe infarction (HCC)    • LOM (loss of memory)    • Osteoarthritis 2016   • Paroxysmal atrial fibrillation (HCC) 2016   • Thyroid nodule 2016    1.8x1.2cm   • Weakness generalized    • Wound infection after surgery     Left hip arthroplasty, staph aureus, 6 weeks IV Rocephin     Past Surgical History:   Procedure Laterality Date   • ABDOMINOPLASTY     • BREAST ABSCESS INCISION AND DRAINAGE     •  TOTAL HIP ARTHROPLASTY Left     Staph aureus wound infection     General Information     Row Name 10/04/21 1220          OT Time and Intention    Document Type  evaluation  -CS     Mode of Treatment  occupational therapy  -CS     Row Name 10/04/21 1220          General Information    Patient Profile Reviewed  yes  -CS     Prior Level of Function  independent:;all household mobility;ADL's  -CS     Existing Precautions/Restrictions  fall;other (see comments) R sided weakness, impulsive, exp aphasia at baseline  -CS     Barriers to Rehab  medically complex;cognitive status  -CS     Row Name 10/04/21 1220          Living Environment    Lives With  spouse  -CS     Row Name 10/04/21 1220          Home Main Entrance    Number of Stairs, Main Entrance  none  -CS     Row Name 10/04/21 1220          Cognition    Orientation Status (Cognition)  oriented to;person;verbal cues/prompts needed for orientation;place;disoriented to;time;situation  -CS     Row Name 10/04/21 1220          Safety Issues, Functional Mobility    Safety Issues Affecting Function (Mobility)  ability to follow commands;awareness of need for assistance;impulsivity  -CS     Impairments Affecting Function (Mobility)  cognition;endurance/activity tolerance;motor planning;strength;balance;postural/trunk control  -CS     Cognitive Impairments, Mobility Safety/Performance  attention  -CS       User Key  (r) = Recorded By, (t) = Taken By, (c) = Cosigned By    Initials Name Provider Type    CS January Edmonds OT Occupational Therapist          Mobility/ADL's     Row Name 10/04/21 1221          Bed Mobility    Bed Mobility  supine-sit  -CS     Supine-Sit [place] (Bed Mobility)  minimum assist (75% patient effort);verbal cues  -CS     Assistive Device (Bed Mobility)  bed rails;head of bed elevated  -CS     Row Name 10/04/21 1221          Transfers    Transfers  sit-stand transfer  -CS     Comment (Transfers)  pt stood impulsively at EOB  -CS     Sit-Stand  [place] (Transfers)  minimum assist (75% patient effort);verbal cues  -     Row Name 10/04/21 1221          Activities of Daily Living    BADL Assessment/Intervention  lower body dressing;grooming  -     Row Name 10/04/21 1221          Lower Body Dressing Assessment/Training    [place] Level (Lower Body Dressing)  don;socks;dependent (less than 25% patient effort)  -     Position (Lower Body Dressing)  supine  -     Row Name 10/04/21 1221          Grooming Assessment/Training    [place] Level (Grooming)  hair care, combing/brushing;minimum assist (75% patient effort)  -CS     Position (Grooming)  supported sitting  -       User Key  (r) = Recorded By, (t) = Taken By, (c) = Cosigned By    Initials Name Provider Type    January Carter OT Occupational Therapist        Obj/Interventions     Row Name 10/04/21 1221          Sensory Assessment (Somatosensory)    Sensory Assessment (Somatosensory)  unable/difficult to assess  -     Row Name 10/04/21 1221          Vision Assessment/Intervention    Visual Impairment/Limitations  unable/difficult to assess  -     Row Name 10/04/21 1221          Range of Motion Comprehensive    General Range of Motion  bilateral upper extremity ROM WFL  -     Row Name 10/04/21 1221          Strength Comprehensive (MMT)    Comment, General Manual Muscle Testing (MMT) Assessment  RUE grossly 3+/5, LUE grossly WFL  -     Row Name 10/04/21 1221          Motor Skills    Motor Skills  coordination  -     Coordination  right;upper extremity;minimal impairment;gross motor deficit;fine motor deficit  -     Row Name 10/04/21 1221          Balance    Balance Assessment  sitting static balance;sitting dynamic balance;standing static balance  -     Static Sitting Balance  WFL;sitting, edge of bed  -     Dynamic Sitting Balance  mild impairment;sitting, edge of bed  -     Static Standing Balance  mild impairment;supported  -       User Key  (r) = Recorded By,  (t) = Taken By, (c) = Cosigned By    Initials Name Provider Type    CS January Edmonds, OT Occupational Therapist        Goals/Plan     Row Name 10/04/21 1224          Transfer Goal 1 (OT)    Activity/Assistive Device (Transfer Goal 1, OT)  sit-to-stand/stand-to-sit;toilet  -CS     [place] Level/Cues Needed (Transfer Goal 1, OT)  standby assist  -CS     Time Frame (Transfer Goal 1, OT)  long term goal (LTG);10 days  -CS     Progress/Outcome (Transfer Goal 1, OT)  goal ongoing  -CS     Row Name 10/04/21 1224          Dressing Goal 1 (OT)    Activity/Device (Dressing Goal 1, OT)  lower body dressing don/doff socks w/ AAD  -CS     [place]/Cues Needed (Dressing Goal 1, OT)  minimum assist (75% or more patient effort)  -CS     Time Frame (Dressing Goal 1, OT)  long term goal (LTG);10 days  -CS     Progress/Outcome (Dressing Goal 1, OT)  goal ongoing  -CS     Row Name 10/04/21 1224          Grooming Goal 1 (OT)    Activity/Device (Grooming Goal 1, OT)  hair care;wash face, hands  -CS     [place] (Grooming Goal 1, OT)  supervision required  -CS     Time Frame (Grooming Goal 1, OT)  long term goal (LTG);10 days  -CS     Progress/Outcome (Grooming Goal 1, OT)  goal ongoing  -CS       User Key  (r) = Recorded By, (t) = Taken By, (c) = Cosigned By    Initials Name Provider Type    CS January Edmonds, AMANDA Occupational Therapist        Clinical Impression     Row Name 10/04/21 1222          Pain Assessment    Additional Documentation  Pain Scale: FACES Pre/Post-Treatment (Group)  -CS     Row Name 10/04/21 1222          Pain Scale: FACES Pre/Post-Treatment    Pain: FACES Scale, Pretreatment  0-->no hurt  -CS     Posttreatment Pain Rating  0-->no hurt  -CS     Row Name 10/04/21 1222          Plan of Care Review    Plan of Care Reviewed With  patient;spouse  -CS     Progress  improving  -CS     Outcome Summary  OT eval complete. Pt is Min A for bed mobiliy, t/fs and grooming ADLs. Pt limited d/t expressive aphasia and  difficulty initiating tasks, improved command following noted w/ visual cues. Recommend cont skilled IPOT POC. Recommend pt DC to IP rehab based on current level of performance.  -CS     Row Name 10/04/21 1222          Therapy Assessment/Plan (OT)    Patient/Family Therapy Goal Statement (OT)  Return to PLOF  -CS     Rehab Potential (OT)  good, to achieve stated therapy goals  -CS     Criteria for Skilled Therapeutic Interventions Met (OT)  yes;skilled treatment is necessary  -CS     Therapy Frequency (OT)  daily  -CS     Row Name 10/04/21 1222          Therapy Plan Review/Discharge Plan (OT)    Anticipated Discharge Disposition (OT)  inpatient rehabilitation facility  -CS     Row Name 10/04/21 1222          Vital Signs    Pre Systolic BP Rehab  130  -CS     Pre Treatment Diastolic BP  64  -CS     Post Systolic BP Rehab  -- w/ PT  -CS     Pretreatment Heart Rate (beats/min)  77  -CS     Pre SpO2 (%)  94  -CS     O2 Delivery Pre Treatment  room air  -CS     Pre Patient Position  Supine  -CS     Intra Patient Position  Standing  -CS     Post Patient Position  Sitting  -CS     Row Name 10/04/21 1222          Positioning and Restraints    Pre-Treatment Position  in bed  -CS     Post Treatment Position  bed  -CS     In Bed  notified nsg;sitting EOB;with PT;with family/caregiver;call light within reach;encouraged to call for assist  -CS       User Key  (r) = Recorded By, (t) = Taken By, (c) = Cosigned By    Initials Name Provider Type    CS January Edmonds, OT Occupational Therapist        Outcome Measures     Row Name 10/04/21 1224          How much help from another is currently needed...    Putting on and taking off regular lower body clothing?  2  -CS     Bathing (including washing, rinsing, and drying)  2  -CS     Toileting (which includes using toilet bed pan or urinal)  2  -CS     Putting on and taking off regular upper body clothing  2  -CS     Taking care of personal grooming (such as brushing teeth)  3  -CS      Eating meals  3  -CS     AM-PAC 6 Clicks Score (OT)  14  -     Row Name 10/04/21 1224          Modified [place] Scale    Pre-Stroke Modified [place] Scale  1 - No significant disability despite symptoms.  Able to carry out all usual duties and activities.  -     Modified [place] Scale  3 - Moderate disability.  Requiring some help, but able to walk without assistance.  -     Row Name 10/04/21 1224          Functional Assessment    Outcome Measure Options  AM-PAC 6 Clicks Daily Activity (OT);Modified [place]  -CS       User Key  (r) = Recorded By, (t) = Taken By, (c) = Cosigned By    Initials Name Provider Type    January Carter, OT Occupational Therapist          Occupational Therapy Education                 Title: PT OT SLP Therapies (In Progress)     Topic: Occupational Therapy (In Progress)     Point: ADL training (In Progress)     Description:   Instruct learner(s) on proper safety adaptation and remediation techniques during self care or transfers.   Instruct in proper use of assistive devices.              Learning Progress Summary           Patient Acceptance, E, NR by CS at 10/4/2021 1225                   Point: Home exercise program (Not Started)     Description:   Instruct learner(s) on appropriate technique for monitoring, assisting and/or progressing therapeutic exercises/activities.              Learner Progress:  Not documented in this visit.          Point: Precautions (In Progress)     Description:   Instruct learner(s) on prescribed precautions during self-care and functional transfers.              Learning Progress Summary           Patient Acceptance, E, NR by CS at 10/4/2021 1225                   Point: Body mechanics (In Progress)     Description:   Instruct learner(s) on proper positioning and spine alignment during self-care, functional mobility activities and/or exercises.              Learning Progress Summary           Patient Acceptance, E, NR by CS at 10/4/2021 1225                                User Key     Initials Effective Dates Name Provider Type Discipline     09/02/21 -  January Edmonds OT Occupational Therapist OT              OT Recommendation and Plan  Therapy Frequency (OT): daily  Plan of Care Review  Plan of Care Reviewed With: patient, spouse  Progress: improving  Outcome Summary: OT eval complete. Pt is Min A for bed mobiliy, t/fs and grooming ADLs. Pt limited d/t expressive aphasia and difficulty initiating tasks, improved command following noted w/ visual cues. Recommend cont skilled IPOT POC. Recommend pt DC to IP rehab based on current level of performance.     Time Calculation:   Time Calculation- OT     Row Name 10/04/21 1225             Time Calculation- OT    OT Start Time  1055  -CS      OT Received On  10/04/21  -CS      OT Goal Re-Cert Due Date  10/14/21  -CS         Untimed Charges    OT Eval/Re-eval Minutes  46  -CS         Total Minutes    Untimed Charges Total Minutes  46  -CS       Total Minutes  46  -CS        User Key  (r) = Recorded By, (t) = Taken By, (c) = Cosigned By    Initials Name Provider Type     January Edmonds OT Occupational Therapist        Therapy Charges for Today     Code Description Service Date Service Provider Modifiers Qty    [id number] HC OT EVAL LOW COMPLEXITY 4 10/4/2021 January Edmonds OT GO 1               January Edmonds OT  10/4/2021

## 2021-10-04 NOTE — CONSULTS
Order noted for diabetes education per stroke protocol. A1c 5.6%. No history of diabetes noted per chart review. Please re consult if needed. Thank you.

## 2021-10-04 NOTE — PLAN OF CARE
Goal Outcome Evaluation:  Plan of Care Reviewed With: patient, spouse           Outcome Summary: PT initial eval completed. Pt limited by decreased functional endurance, R sided weakness, balance deficit, impaired cognition, and baseline expressive aphasia. Pt amb 110ft with mod Ax1+1 for line management. Mild to moderate posterior and R lateral lean present with mobility. d/c rec for IRF based on current functional status.

## 2021-10-05 LAB
ALBUMIN SERPL-MCNC: 3.5 G/DL (ref 3.5–5.2)
ANION GAP SERPL CALCULATED.3IONS-SCNC: 8 MMOL/L (ref 5–15)
BASOPHILS # BLD AUTO: 0.02 10*3/MM3 (ref 0–0.2)
BASOPHILS NFR BLD AUTO: 0.3 % (ref 0–1.5)
BUN SERPL-MCNC: 5 MG/DL (ref 8–23)
BUN/CREAT SERPL: 10.6 (ref 7–25)
CALCIUM SPEC-SCNC: 8.4 MG/DL (ref 8.6–10.5)
CHLORIDE SERPL-SCNC: 107 MMOL/L (ref 98–107)
CO2 SERPL-SCNC: 26 MMOL/L (ref 22–29)
CREAT SERPL-MCNC: 0.47 MG/DL (ref 0.57–1)
DEPRECATED RDW RBC AUTO: 45.3 FL (ref 37–54)
EOSINOPHIL # BLD AUTO: 0.09 10*3/MM3 (ref 0–0.4)
EOSINOPHIL NFR BLD AUTO: 1.5 % (ref 0.3–6.2)
ERYTHROCYTE [DISTWIDTH] IN BLOOD BY AUTOMATED COUNT: 12.6 % (ref 12.3–15.4)
GFR SERPL CREATININE-BSD FRML MDRD: 128 ML/MIN/1.73
GLUCOSE SERPL-MCNC: 89 MG/DL (ref 65–99)
HCT VFR BLD AUTO: 38 % (ref 34–46.6)
HGB BLD-MCNC: 12.7 G/DL (ref 12–15.9)
IMM GRANULOCYTES # BLD AUTO: 0.01 10*3/MM3 (ref 0–0.05)
IMM GRANULOCYTES NFR BLD AUTO: 0.2 % (ref 0–0.5)
LYMPHOCYTES # BLD AUTO: 2.05 10*3/MM3 (ref 0.7–3.1)
LYMPHOCYTES NFR BLD AUTO: 33.1 % (ref 19.6–45.3)
MAGNESIUM SERPL-MCNC: 2.2 MG/DL (ref 1.6–2.4)
MCH RBC QN AUTO: 32.8 PG (ref 26.6–33)
MCHC RBC AUTO-ENTMCNC: 33.4 G/DL (ref 31.5–35.7)
MCV RBC AUTO: 98.2 FL (ref 79–97)
MONOCYTES # BLD AUTO: 0.82 10*3/MM3 (ref 0.1–0.9)
MONOCYTES NFR BLD AUTO: 13.2 % (ref 5–12)
NEUTROPHILS NFR BLD AUTO: 3.21 10*3/MM3 (ref 1.7–7)
NEUTROPHILS NFR BLD AUTO: 51.7 % (ref 42.7–76)
NRBC BLD AUTO-RTO: 0 /100 WBC (ref 0–0.2)
PHOSPHATE SERPL-MCNC: 2.2 MG/DL (ref 2.5–4.5)
PLATELET # BLD AUTO: 142 10*3/MM3 (ref 140–450)
PMV BLD AUTO: 10.1 FL (ref 6–12)
POTASSIUM SERPL-SCNC: 3.8 MMOL/L (ref 3.5–5.2)
RBC # BLD AUTO: 3.87 10*6/MM3 (ref 3.77–5.28)
SODIUM SERPL-SCNC: 141 MMOL/L (ref 136–145)
WBC # BLD AUTO: 6.2 10*3/MM3 (ref 3.4–10.8)

## 2021-10-05 PROCEDURE — 85025 COMPLETE CBC W/AUTO DIFF WBC: CPT | Performed by: INTERNAL MEDICINE

## 2021-10-05 PROCEDURE — 99232 SBSQ HOSP IP/OBS MODERATE 35: CPT | Performed by: INTERNAL MEDICINE

## 2021-10-05 PROCEDURE — 97530 THERAPEUTIC ACTIVITIES: CPT

## 2021-10-05 PROCEDURE — 99291 CRITICAL CARE FIRST HOUR: CPT | Performed by: STUDENT IN AN ORGANIZED HEALTH CARE EDUCATION/TRAINING PROGRAM

## 2021-10-05 PROCEDURE — 80069 RENAL FUNCTION PANEL: CPT | Performed by: INTERNAL MEDICINE

## 2021-10-05 PROCEDURE — 92507 TX SP LANG VOICE COMM INDIV: CPT

## 2021-10-05 PROCEDURE — 83735 ASSAY OF MAGNESIUM: CPT | Performed by: INTERNAL MEDICINE

## 2021-10-05 RX ORDER — LISINOPRIL 10 MG/1
10 TABLET ORAL
Status: DISCONTINUED | OUTPATIENT
Start: 2021-10-05 | End: 2021-10-06 | Stop reason: HOSPADM

## 2021-10-05 RX ORDER — METOPROLOL TARTRATE 5 MG/5ML
5 INJECTION INTRAVENOUS EVERY 6 HOURS PRN
Status: DISCONTINUED | OUTPATIENT
Start: 2021-10-05 | End: 2021-10-06 | Stop reason: HOSPADM

## 2021-10-05 RX ORDER — LISINOPRIL 10 MG/1
10 TABLET ORAL DAILY
COMMUNITY

## 2021-10-05 RX ORDER — DIVALPROEX SODIUM 250 MG/1
250 TABLET, DELAYED RELEASE ORAL EVERY 8 HOURS SCHEDULED
Status: DISCONTINUED | OUTPATIENT
Start: 2021-10-05 | End: 2021-10-06 | Stop reason: HOSPADM

## 2021-10-05 RX ADMIN — LISINOPRIL 10 MG: 10 TABLET ORAL at 11:00

## 2021-10-05 RX ADMIN — VALPROATE SODIUM 190 MG: 100 INJECTION, SOLUTION INTRAVENOUS at 02:00

## 2021-10-05 RX ADMIN — VALPROATE SODIUM 190 MG: 100 INJECTION, SOLUTION INTRAVENOUS at 09:14

## 2021-10-05 RX ADMIN — SODIUM CHLORIDE, PRESERVATIVE FREE 10 ML: 5 INJECTION INTRAVENOUS at 21:23

## 2021-10-05 RX ADMIN — METOPROLOL TARTRATE 5 MG: 5 INJECTION INTRAVENOUS at 05:56

## 2021-10-05 RX ADMIN — SODIUM CHLORIDE, PRESERVATIVE FREE 10 ML: 5 INJECTION INTRAVENOUS at 09:14

## 2021-10-05 RX ADMIN — DIVALPROEX SODIUM 250 MG: 250 TABLET, DELAYED RELEASE ORAL at 21:23

## 2021-10-05 RX ADMIN — DIVALPROEX SODIUM 250 MG: 250 TABLET, DELAYED RELEASE ORAL at 14:48

## 2021-10-05 RX ADMIN — NICARDIPINE HYDROCHLORIDE 2.5 MG/HR: 2.5 INJECTION, SOLUTION INTRAVENOUS at 00:15

## 2021-10-05 NOTE — CASE MANAGEMENT/SOCIAL WORK
Continued Stay Note  [facility]     Patient Name: Jenna Russell  MRN: [id number]  Today's Date: 10/5/2021    Admit Date: 10/3/2021    Discharge Plan     Row Name 10/05/21 1248       Plan    Plan  Rehab    Patient/Family in Agreement with Plan  yes    Plan Comments  Called April with [facility] that Cardene to be d/c'd and pt to move to floor. She will follow up on referral. CM will follow. Corin ext. [phone]        Discharge Codes    No documentation.       Expected Discharge Date and Time     Expected Discharge Date Expected Discharge Time    Oct 8, 2021             Corin Mccall RN

## 2021-10-05 NOTE — THERAPY TREATMENT NOTE
Acute Care - Speech Language Pathology Treatment Note  [facility]     Patient Name: Jenna Russell  : 1940  MRN: [id number]  Today's Date: 10/5/2021               Admit Date: 10/3/2021     Visit Dx:    ICD-10-CM ICD-9-CM   1. Subarachnoid hemorrhage without loss of consciousness (HCC)  I60.9 430   2. Aphasia  R47.01 784.3   3. History of stroke  Z86.73 V12.54   4. Elevated blood pressure reading with diagnosis of hypertension  I10 401.9   5. Chronic anticoagulation  Z79.01 V58.61     Patient Active Problem List   Diagnosis   • PAF (paroxysmal atrial fibrillation) (HCC)   • High cholesterol   • Hypertension   • Combined receptive and expressive aphasia   • Colitis, Clostridium difficile   • Collagenous colitis   • Subarachnoid hemorrhage without loss of consciousness (HCC)   • Seizure disorder (HCC)     Past Medical History:   Diagnosis Date   • Acute ischemic stroke (HCC)    • Arterial ischemic stroke (HCC) 2016   • Ataxic aphasia 2016   • BP (high blood pressure) 2016   • Cerebral infarction, left hemisphere (HCC) 2016   • Cyst of left ovary 2016    3cm   • Expressive aphasia    • H/O echocardiogram 2015    Global LV wall motion and contractility within normal limits. Normal LVSF.Normal LV diastolic filling. Left atrium midly dilated. RV mild- mod dilated. MIld aortic cusp sclerosis. No evidence of mitral valve prolapse. Mild mitral regurgitation.No pulmonary hypertension or perdicardial effusion. No dilation of aortic root. Venous system appears normal   • H/O: stroke    • HLD (hyperlipidemia) 2016   • Left temporal lobe infarction (HCC)    • LOM (loss of memory)    • Osteoarthritis 2016   • Paroxysmal atrial fibrillation (HCC) 2016   • Thyroid nodule 2016    1.8x1.2cm   • Weakness generalized    • Wound infection after surgery     Left hip arthroplasty, staph aureus, 6 weeks IV Rocephin     Past Surgical History:   Procedure Laterality Date   • ABDOMINOPLASTY      • BREAST ABSCESS INCISION AND DRAINAGE     • TOTAL HIP ARTHROPLASTY Left     Staph aureus wound infection       SLP Recommendation and Plan              Anticipated Discharge Disposition (SLP): unknown, anticipate therapy at next level of care (10/05/21 1430)        Predicted Duration Therapy Intervention (Days): until discharge (10/05/21 1430)  Daily Summary of Progress (SLP): progress toward functional goals as expected (10/05/21 1430)  Plan for Continued Treatment (SLP): Saw for S/L tx. Cont'd aphasia, does not appear to be at baseline. Decr'd comprehension & cont'd expressive deficits. Continue to address.  (10/05/21 1430)       Plan of Care Reviewed With: patient (10/05/21 1611)  Progress: improving (10/05/21 1611)         SLP EVALUATION (last 72 hours)      SLP SLC Evaluation     Row Name 10/05/21 1430                Communication Assessment/Intervention    Document Type  therapy note (daily note)  -RD       Subjective Information  no complaints  -RD       Patient Observations  alert;cooperative;agree to therapy  -RD       Patient/Family/Caregiver Comments/Observations  no family present  -RD       Care Plan Review  evaluation/treatment results reviewed;care plan/treatment goals reviewed;risks/benefits reviewed;current/potential barriers reviewed;patient/other agree to care plan  -RD       Patient Effort  good  -RD       Pain    Additional Documentation  Pain Scale: FACES Pre/Post-Treatment (Group)  -RD          Pain Scale: FACES Pre/Post-Treatment    Pain: FACES Scale, Pretreatment  0-->no hurt  -RD       Posttreatment Pain Rating  0-->no hurt  -RD          SLP Clinical Impressions    Daily Summary of Progress (SLP)  progress toward functional goals as expected  -RD       Plan for Continued Treatment (SLP)  Saw for S/L tx. Cont'd aphasia, does not appear to be at baseline. Decr'd comprehension & cont'd expressive deficits. Continue to address.   -RD          Recommendations    Therapy Frequency (SLP SLC)   5 days per week  -RD       Predicted Duration Therapy Intervention (Days)  until discharge  -RD       Anticipated Discharge Disposition (SLP)  unknown;anticipate therapy at next level of care  -RD         User Key  (r) = Recorded By, (t) = Taken By, (c) = Cosigned By    Initials Name Effective Dates    RD Leonela Acosta, MS CCC-SLP 06/16/21 -     Eleni Muniz, MS CCC-SLP 06/16/21 -              EDUCATION  The patient has been educated in the following areas:     Communication Impairment.          SLP GOALS     Row Name 10/05/21 1430 10/04/21 1030          Comprehend Questions Goal 1 (SLP)    Improve Ability to Comprehend Questions Goal 1 (SLP)  simple yes/no questions;simple wh questions;simple general questions;concrete general questions;questions about personal information;90%;with moderate cues (50-74%)  -RD  simple yes/no questions;simple wh questions;simple general questions;concrete general questions;questions about personal information;90%;with moderate cues (50-74%)  -CJ     Time Frame (Comprehend Questions Goal 1, SLP)  short term goal (STG)  -RD  short term goal (STG)  -CJ     Progress (Ability to Comprehend Questions Goal 1, SLP)  40%;with moderate cues (50-74%)  -RD  --     Progress/Outcomes (Comprehend Questions Goal 1, SLP)  continuing progress toward goal  -RD  --        Follow Directions Goal 2 (SLP)    Improve Ability to Follow Directions Goal 1 (SLP)  2 step commands;3 step commands;90%;with moderate cues (50-74%)  -RD  2 step commands;3 step commands;90%;with moderate cues (50-74%)  -CJ     Time Frame (Follow Directions Goal 1, SLP)  short term goal (STG)  -RD  short term goal (STG)  -CJ     Progress (Ability to Follow Directions Goal 1, SLP)  30%;with moderate cues (50-74%)  -RD  --     Progress/Outcomes (Follow Directions Goal 1, SLP)  continuing progress toward goal  -RD  --        Word Retrieval Skills Goal 1 (SLP)    Improve Word Retrieval Skills By Goal 1 (SLP)  completing  automatic speech task, counting;completing automatic speech task, alphabet;completing automatic speech task, days of the week;completing automatic speech task, months;confrontational naming task;high frequency;responsive naming task;simple;90%;with moderate cues (50-74%)  -RD  completing automatic speech task, counting;completing automatic speech task, alphabet;completing automatic speech task, days of the week;completing automatic speech task, months;confrontational naming task;high frequency;responsive naming task;simple;90%;with moderate cues (50-74%)  -CJ     Time Frame (Word Retrieval Goal 1, SLP)  short term goal (STG)  -RD  short term goal (STG)  -CJ     Progress (Word Retrieval Skills Goal 1, SLP)  40%;with moderate cues (50-74%)  -RD  --     Progress/Outcomes (Word Retrieval Goal 1, SLP)  continuing progress toward goal  -RD  --     Comment (Word Retrieval Goal 1, SLP)  did well w/ counting & RIOS. Perseveration during TONIE. Circumlocution w/ naming tasks.   -RD  --        Additional Goal 1 (SLP)    Additional Goal 1, SLP  LTG: Pt will improve language skills to be able to effectively communicate wants and needs to participate in her care  -RD  LTG: Pt will improve language skills to be able to effectively communicate wants and needs to participate in her care  -CJ     Time Frame (Additional Goal 1, SLP)  by discharge  -RD  by discharge  -CJ     Progress/Outcomes (Additional Goal 1, SLP)  continuing progress toward goal  -RD  --       User Key  (r) = Recorded By, (t) = Taken By, (c) = Cosigned By    Initials Name Provider Type    Leonela Dupont, MS CCC-SLP Speech and Language Pathologist    Eleni Muniz, MS CCC-SLP Speech and Language Pathologist                  Time Calculation:     Time Calculation- SLP     Row Name 10/05/21 1621             Time Calculation- SLP    SLP Start Time  1430  -RD      SLP Received On  10/05/21  -RD         Untimed Charges    57990-XV Treatment/ST Modification  Prosth Aug Alter   40  -RD         Total Minutes    Untimed Charges Total Minutes  40  -RD       Total Minutes  40  -RD        User Key  (r) = Recorded By, (t) = Taken By, (c) = Cosigned By    Initials Name Provider Type    Leonela Dupont MS CCC-SLP Speech and Language Pathologist          Therapy Charges for Today     Code Description Service Date Service Provider Modifiers Qty    [id number]  ST TREATMENT SPEECH 3 10/5/2021 Leonela Acosta MS CCC-SLP GN 1          Patient was not wearing a face mask and did not exhibit coughing during this therapy encounter.  Procedure performed was aerosolizing, involved close contact (within 6 feet for at least 15 minutes or longer), and did not involve contact with infectious secretions or specimens.  Therapist used appropriate personal protective equipment including gloves, standard procedure mask, eye protection and N95 mask.  Appropriate PPE was worn during the entire therapy session.  Hand hygiene was completed before and after therapy session.              MS ADRIÁN VazquezSLP  10/5/2021

## 2021-10-05 NOTE — PROGRESS NOTES
Stroke Progress Note       Chief Complaint: right sided weakness    Subjective    Subjective     Subjective:  No acute events overnight       Review of Systems   Constitutional: No fatigue  HENT: Negative for nosebleeds and rhinorrhea.    Eyes: Negative for redness.   Respiratory: Negative for cough.    Gastrointestinal: Negative for anal bleeding.   Endocrine: Negative for polydipsia.   Genitourinary: Negative for enuresis and urgency.   Musculoskeletal: Negative for joint swelling.   Neurological: Negative for tremors.   Psychiatric/Behavioral: Negative for hallucinations.   Objective      Temp:  [97.1 °F (36.2 °C)-98.2 °F (36.8 °C)] 97.9 °F (36.6 °C)  Heart Rate:  [55-96] 64  Resp:  [16-20] 16  BP: (106-168)/() 120/72    NEURO  MENTAL STATUS: AAOx2, memory not intact, fund of knowledge appropriate     LANG/SPEECH: Naming and repetition  Not intact,     CRANIAL NERVES:     Pupils equal and reactive, EOMI intact, no gaze deviation, no nystagmus  No facial droop, cough and gag intact, shoulder shrug intact, tongue midline      MOTOR:   RUE -4/5     SENSORY: Normal to light touch all throughout      COORDINATION: Normal finger to nose and heel to shin, no tremor, no dysmetria     STATION: Not assessed due to patient condition     GAIT: Not assessed due to patient condition  Results Review:    I reviewed the patient's new clinical results.    Lab Results (last 24 hours)     Procedure Component Value Units Date/Time    Renal Function Panel [[id number]]  (Abnormal) Collected: 10/05/21 0557    Specimen: Blood Updated: 10/05/21 0720     Glucose 89 mg/dL      BUN 5 mg/dL      Creatinine 0.47 mg/dL      Sodium 141 mmol/L      Potassium 3.8 mmol/L      Chloride 107 mmol/L      CO2 26.0 mmol/L      Calcium 8.4 mg/dL      Albumin 3.50 g/dL      Phosphorus 2.2 mg/dL      Anion Gap 8.0 mmol/L      BUN/Creatinine Ratio 10.6     eGFR Non African Amer 128 mL/min/1.73     Narrative:      GFR Normal >60  Chronic Kidney Disease  <60  Kidney Failure <15      Magnesium [[id number]]  (Normal) Collected: 10/05/21 0557    Specimen: Blood Updated: 10/05/21 0720     Magnesium 2.2 mg/dL     CBC & Differential [[id number]]  (Abnormal) Collected: 10/05/21 0557    Specimen: Blood Updated: 10/05/21 0702    Narrative:      The following orders were created for panel order CBC & Differential.  Procedure                               Abnormality         Status                     ---------                               -----------         ------                     CBC Auto Differential[[id number]]        Abnormal            Final result               Scan Slide[[id number]]                                                                    Please view results for these tests on the individual orders.    CBC Auto Differential [[id number]]  (Abnormal) Collected: 10/05/21 0557    Specimen: Blood Updated: 10/05/21 0702     WBC 6.20 10*3/mm3      RBC 3.87 10*6/mm3      Hemoglobin 12.7 g/dL      Hematocrit 38.0 %      MCV 98.2 fL      MCH 32.8 pg      MCHC 33.4 g/dL      RDW 12.6 %      RDW-SD 45.3 fl      MPV 10.1 fL      Platelets 142 10*3/mm3      Neutrophil % 51.7 %      Lymphocyte % 33.1 %      Monocyte % 13.2 %      Eosinophil % 1.5 %      Basophil % 0.3 %      Immature Grans % 0.2 %      Neutrophils, Absolute 3.21 10*3/mm3      Lymphocytes, Absolute 2.05 10*3/mm3      Monocytes, Absolute 0.82 10*3/mm3      Eosinophils, Absolute 0.09 10*3/mm3      Basophils, Absolute 0.02 10*3/mm3      Immature Grans, Absolute 0.01 10*3/mm3      nRBC 0.0 /100 WBC     Potassium [[id number]]  (Normal) Collected: 10/04/21 1952    Specimen: Blood Updated: 10/04/21 2101     Potassium 4.2 mmol/L      Comment: Slight hemolysis detected by analyzer. Results may be affected.           EEG    Result Date: 10/4/2021  This study shows evidence for left temporal dysfunction, which is consistent with stroke/hemorrhage in that area No evidence for ongoing seizures is seen This report is  transcribed using the Dragon dictation system.      CT Head Without Contrast    Result Date: 10/4/2021  Grossly unchanged appearance of subarachnoid hemorrhage hyperdense material left temporoparietal region adjacent to chronic left MCA insult without progression or midline shift.  D:  10/03/2021 E:  10/04/2021   This report was finalized on 10/4/2021 5:41 PM by Dr. Sunny Ramirez.      Results for orders placed during the hospital encounter of 01/20/18    Adult Transthoracic Echo Complete W/ Cont if Necessary Per Protocol (With Agitated Saline)    Interpretation Summary  · Left ventricular wall thickness is consistent with hypertrophy.  · Left ventricular systolic function is normal.  · Moderate aortic valve regurgitation is present.  · Mild mitral valve regurgitation is present  · Mild tricuspid valve regurgitation is present.  · Mild pulmonic valve regurgitation is present.            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        Left temporoparietal ICH  Risk factors of old stroke and being on anticoagulation. It is possible that patient might have had another vascular event in the territory of old stroke with hemorrhgic conversion.      -Hold off antiplatelets/antithrombotics at this time.  -Blood pressure goals systolic less than 140  -MRI brain GRE evidence persistent hmge, no sign of amyloid process  -stable to be transferred to the floor       Atrial fibrillation-patient is in sinus rhythm, needs reevaluation regarding initiation of anticoagulation.     Stroke neurology will follow the patient.       Manuel Berkowitz MD  10/05/21  15:57 EDT

## 2021-10-05 NOTE — PLAN OF CARE
Goal Outcome Evaluation:  Plan of Care Reviewed With: spouse, patient     Outcome Summary: VSS, AO x 1-2 (disoriented to time/situation), RA, SB to SR on monitor, NIH 4. Patient's spouse at bedside.  Patient pleasant and cooperative with care.  Patient up to bedside commode throughout shift to void.  Patient had two large bowel movements today.  IV cardene changed to home dose lisinopril this AM.  IV depacon changed to oral depakote as well.  IV fluids also discontinued.  Patient ambulated in hallway with PT this afternoon.  Patient currently resting comfortably in recliner.  Plan is transfer to telemetry when bed becomes available.  Will continue to monitor.

## 2021-10-05 NOTE — PLAN OF CARE
Goal Outcome Evaluation:  Plan of Care Reviewed With: patient  Progress: improving   SLP treatment completed. Will continue to address aphasia during treatment. Please see note for further details and recommendations.

## 2021-10-05 NOTE — PLAN OF CARE
Goal Outcome Evaluation:  Plan of Care Reviewed With: patient           Outcome Summary: pt moving all extremities, perlla, tracks, no visual deficits, no dysarthria, expressive/ receptive aphasia, on 2.5 of cardene, patient gets out of bed very quickly for bsc use, frequent urine output, mri obtained, nsr

## 2021-10-05 NOTE — PROGRESS NOTES
"Clinical Nutrition Note      Patient Name: Jenna Russell  MRN: [id number]  Admission date: 10/3/2021      Multidisciplinary Rounds    Additional information obtained during MDR:  Pt stable, question if hemorrhage from stroke, pt on cardene,phos replaced, eating , transfer to floor.      Current diet: Diet Regular; Thin; Cardiac    Oral Nutrition Supplement:  Boost plus w/ meals    Pertinent medical data reviewed:  No nutrition risk identified on nursing screen; MST score \"0\"  BMI 18.32 w/ stable weight hx  Weight Weight (kg) Weight (lbs) Weight Method   10/3/2021 53.071 kg 117 lb Stated   10/3/2021 53.524 kg 118 lb Stated   9/13/2019 53.071 kg 117 lb -     Average oral intake per documentation:  25% - 3 meals             Intervention:  ONS provided w/ meals  Plan of care and goals of care reviewed    Monitor:  RD to follow per protocol      Mely Boyce MS,RD,LD  10/05/21 17:28 EDT  Time: 15  mins       "

## 2021-10-05 NOTE — PROGRESS NOTES
"INTENSIVIST NOTE     Hospital Day: 2    Ms. Jenna Russell, 80 y.o. female is followed for:   Subarachnoid hemorrhage       SUBJECTIVE       Interval history:    Speech improved.  Afebrile.  NIH scale 4.  Remains on a Cardene drip.    The patient's relevant past medical, surgical and social history were reviewed and updated in Epic as appropriate.       OBJECTIVE     Vital Sign Min/Max for last 24 hours  Temp  Min: 97.1 °F (36.2 °C)  Max: 98.2 °F (36.8 °C)   BP  Min: 106/62  Max: 168/87   Pulse  Min: 55  Max: 96   Resp  Min: 16  Max: 20   SpO2  Min: 83 %  Max: 100 %   No data recorded   No data recorded      Intake/Output Summary (Last 24 hours) at 10/5/2021 1241  Last data filed at 10/5/2021 1047  Gross per 24 hour   Intake 1475.6 ml   Output 850 ml   Net 625.6 ml      Flowsheet Rows      First Filed Value   Admission Height  170.2 cm (67\") Documented at 10/03/2021 1201   Admission Weight  53.5 kg (118 lb) Documented at 10/03/2021 1201             10/03/21  1201 10/03/21  1507   Weight: 53.5 kg (118 lb) 53.1 kg (117 lb)            Objective:  General Appearance:  In no acute distress.    Vital signs: (most recent): Blood pressure 126/77, pulse 73, temperature 98.1 °F (36.7 °C), temperature source Oral, resp. rate 18, height 170.2 cm (67\"), weight 53.1 kg (117 lb), SpO2 95 %, not currently breastfeeding.    HEENT: Normal HEENT exam.    Lungs:  Normal effort and normal respiratory rate.  Breath sounds clear to auscultation.  She is not in respiratory distress.  No rales, wheezes or rhonchi.    Heart: Normal rate.  Regular rhythm.  S1 normal and S2 normal.  No murmur, gallop or friction rub.   Chest: Symmetric chest wall expansion.   Abdomen: Abdomen is soft and non-distended.  Bowel sounds are normal.   There is no abdominal tenderness.   There is no mass. There is no splenomegaly. There is no hepatomegaly.   Extremities: There is no deformity or dependent edema.    Neurological: Patient is alert and oriented to " person, place and time.    Pupils:  Pupils are equal, round, and reactive to light.    Skin:  Warm and dry.          Interval:  (72 hours)  1a. Level of Consciousness: 0-->Alert, keenly responsive  1b. LOC Questions: 2-->Answers neither question correctly  1c. LOC Commands: 0-->Performs both tasks correctly  2. Best Gaze: 0-->Normal  3. Visual: 0-->No visual loss  4. Facial Palsy: 0-->Normal symmetrical movements  5a. Motor Arm, Left: 0-->No drift, limb holds 90 (or 45) degrees for full 10 secs  5b. Motor Arm, Right: 0-->No drift, limb holds 90 (or 45) degrees for full 10 secs  6a. Motor Leg, Left: 0-->No drift, leg holds 30 degree position for full 5 secs  6b. Motor Leg, Right: 0-->No drift, leg holds 30 degree position for full 5 secs  7. Limb Ataxia: 0-->Absent  8. Sensory: 0-->Normal, no sensory loss  9. Best Language: 2-->Severe aphasia, all communication is through fragmentary expression, great need for inference, questioning, and guessing by the listener. Range of information that can be exchanged is limited, listener carries burden of. . . (see row details)  10. Dysarthria: 0-->Normal  11. Extinction and Inattention (formerly Neglect): 0-->No abnormality    Total (NIH Stroke Scale): 4      I reviewed the patient's new clinical results.  I reviewed the patient's new imaging results/reports including actual images and agree with reports.      Chest X-Ray: NAD 10/4    INFUSIONS       Results from last 7 days   Lab Units 10/05/21  0557 10/04/21  0821 10/03/21  1220   WBC 10*3/mm3 6.20 6.57 7.52   HEMOGLOBIN g/dL 12.7 12.6 13.1   HEMATOCRIT % 38.0 37.9 40.3   PLATELETS 10*3/mm3 142 138* 145     Results from last 7 days   Lab Units 10/05/21  0557 10/04/21  1952 10/04/21  0821 10/03/21  1221   SODIUM mmol/L 141  --  136  --    POTASSIUM mmol/L 3.8 4.2 3.2*  --    CHLORIDE mmol/L 107  --  101  --    CO2 mmol/L 26.0  --  23.0  --    BUN mg/dL 5*  --  6*  --    GLUCOSE mg/dL 89  --  100*  --    CREATININE mg/dL 0.47*   --  0.44* 0.60   MAGNESIUM mg/dL 2.2  --  1.8  --    CALCIUM mg/dL 8.4*  --  8.6  --          Results from last 7 days   Lab Units 10/03/21  1218   PH, ARTERIAL pH units 7.38         [facility] Ventilation:      I reviewed the patient's medications.    Assessment/Plan   ASSESSMENT/PLAN     Active Hospital Problems    Diagnosis    • **Subarachnoid hemorrhage without loss of consciousness (HCC)    • Seizure disorder (HCC)    • Combined receptive and expressive aphasia    • Hypertension      H/o     • High cholesterol      H/o     • PAF (paroxysmal atrial fibrillation) (HCC)        80-year-old female with a history of atrial fibrillation and prior left MCA stroke in 2015 with some residual aphasia.  She underwent an ablation at the [facility].  She has remained on chronic Eliquis therapy.  She subsequently developed seizures and was placed on Depakote with good control.  She now presents with an intraparenchymal and subarachnoid hemorrhage in the region of the old stroke.  She received Kcentra and was started on a Cardene drip on admission on 10/3.    Left temporoparietal ICH  Hold antiplatelet agents and anticoagulants  Goal SBP less than 140 mmHg  We will resume home lisinopril and discontinue Cardene drip  History of atrial fibrillation  Currently in normal sinus rhythm  Future benefits of anticoagulation unclear  Seizure disorder  Transition back to her oral Depakote    Telemetry transfer appropriate     I discussed the patient's findings and my recommendations with patient, family and nursing staff     Plan of care and goals reviewed with multidisciplinary team at daily rounds.    .    Abraham Johnson MD  Pulmonary and Critical Care Medicine  10/05/21 12:41 EDT

## 2021-10-05 NOTE — THERAPY TREATMENT NOTE
Patient Name: Jenna Russell  : 1940    MRN: [id number]                              Today's Date: 10/5/2021       Admit Date: 10/3/2021    Visit Dx:     ICD-10-CM ICD-9-CM   1. Subarachnoid hemorrhage without loss of consciousness (HCC)  I60.9 430   2. Aphasia  R47.01 784.3   3. History of stroke  Z86.73 V12.54   4. Elevated blood pressure reading with diagnosis of hypertension  I10 401.9   5. Chronic anticoagulation  Z79.01 V58.61     Patient Active Problem List   Diagnosis   • PAF (paroxysmal atrial fibrillation) ([facility])   • High cholesterol   • Hypertension   • Combined receptive and expressive aphasia   • Colitis, Clostridium difficile   • Collagenous colitis   • Subarachnoid hemorrhage without loss of consciousness ([facility])   • Seizure disorder ([facility])     Past Medical History:   Diagnosis Date   • Acute ischemic stroke ([facility])    • Arterial ischemic stroke ([facility]) 2016   • Ataxic aphasia 2016   • BP (high blood pressure) 2016   • Cerebral infarction, left hemisphere ([facility]) 2016   • Cyst of left ovary 2016    3cm   • Expressive aphasia    • H/O echocardiogram 2015    Global LV wall motion and contractility within normal limits. Normal LVSF.Normal LV diastolic filling. Left atrium midly dilated. RV mild- mod dilated. MIld aortic cusp sclerosis. No evidence of mitral valve prolapse. Mild mitral regurgitation.No pulmonary hypertension or perdicardial effusion. No dilation of aortic root. Venous system appears normal   • H/O: stroke    • HLD (hyperlipidemia) 2016   • Left temporal lobe infarction (HCC)    • LOM (loss of memory)    • Osteoarthritis 2016   • Paroxysmal atrial fibrillation (HCC) 2016   • Thyroid nodule 2016    1.8x1.2cm   • Weakness generalized    • Wound infection after surgery     Left hip arthroplasty, staph aureus, 6 weeks IV Rocephin     Past Surgical History:   Procedure Laterality Date   • ABDOMINOPLASTY     • BREAST ABSCESS INCISION AND DRAINAGE     •  TOTAL HIP ARTHROPLASTY Left     Staph aureus wound infection     General Information     Row Name 10/05/21 1506          Physical Therapy Time and Intention    Document Type  therapy note (daily note)  -AY     Mode of Treatment  physical therapy  -AY     Row Name 10/05/21 1506          General Information    Patient Profile Reviewed  yes  -AY     Existing Precautions/Restrictions  fall;other (see comments) R sided weakness, exp aphasia at baseline  -AY     Barriers to Rehab  medically complex  -AY     Row Name 10/05/21 1506          Cognition    Orientation Status (Cognition)  oriented x 3  -AY     Row Name 10/05/21 1506          Safety Issues, Functional Mobility    Impairments Affecting Function (Mobility)  endurance/activity tolerance;motor planning;strength;balance  -AY       User Key  (r) = Recorded By, (t) = Taken By, (c) = Cosigned By    Initials Name Provider Type    AY Dian West PT Physical Therapist        Mobility     Row Name 10/05/21 1506          Bed Mobility    Bed Mobility  supine-sit  -AY     Supine-Sit [place] (Bed Mobility)  supervision  -AY     Assistive Device (Bed Mobility)  bed rails;head of bed elevated  -AY     Row Name 10/05/21 1506          Transfers    Comment (Transfers)  pt impulsive with standing. VC for sequencing and safety awareness.  -AY     Row Name 10/05/21 1506          Sit-Stand Transfer    Sit-Stand [place] (Transfers)  supervision;verbal cues  -AY     Row Name 10/05/21 1506          Gait/Stairs (Locomotion)    [place] Level (Gait)  minimum assist (75% patient effort);1 person assist;verbal cues  -AY     Distance in Feet (Gait)  250  -AY     Deviations/Abnormal Patterns (Gait)  bhavik decreased;gait speed decreased;bilateral deviations;stride length decreased  -AY     Bilateral Gait Deviations  forward flexed posture;heel strike decreased  -AY     Comment (Gait/Stairs)  pt demonstrated step through gait pattern with improved balance and stability. VC for  balance/midline orientation and increased stride length. 3 LOB requring min A to correct. Gait distance limited by fatigue and worsening balance.  -AY       User Key  (r) = Recorded By, (t) = Taken By, (c) = Cosigned By    Initials Name Provider Type    Dian Horne PT Physical Therapist        Obj/Interventions     Row Name 10/05/21 1513          Balance    Balance Assessment  sitting static balance;sitting dynamic balance;standing static balance;standing dynamic balance  -AY     Static Sitting Balance  WFL;sitting, edge of bed  -AY     Dynamic Sitting Balance  WFL;sitting, edge of bed  -AY     Static Standing Balance  WFL;supported;standing  -AY     Dynamic Standing Balance  mild impairment;standing;unsupported  -AY       User Key  (r) = Recorded By, (t) = Taken By, (c) = Cosigned By    Initials Name Provider Type    Dian Horne PT Physical Therapist        Goals/Plan    No documentation.       Clinical Impression     Row Name 10/05/21 1514          Pain    Additional Documentation  Pain Scale: Numbers Pre/Post-Treatment (Group)  -AY     Row Name 10/05/21 1514          Pain Scale: Numbers Pre/Post-Treatment    Pretreatment Pain Rating  0/10 - no pain  -AY     Posttreatment Pain Rating  0/10 - no pain  -AY     Pain Intervention(s)  Ambulation/increased activity;Repositioned  -AY     Row Name 10/05/21 1515          Plan of Care Review    Plan of Care Reviewed With  patient  -AY     Progress  improving  -AY     Outcome Summary  Pt showing improvement as she increased ambulation distance to 250ft with min A, demonstrating improved midline posture/balance. 3 LOB requring min A to correct. Pt limited by decreased functional endurance and L sided weakness. Continue to progress as able. d/c rec for IRF, however, may progress to HWA and OP PT with continued skilled IP PT.  -AY     Row Name 10/05/21 9968          Vital Signs    Pre Systolic BP Rehab  121  -AY     Pre Treatment Diastolic BP  81  -AY     Post  Systolic BP Rehab  144  -AY     Post Treatment Diastolic BP  63  -AY     Pretreatment Heart Rate (beats/min)  64  -AY     Posttreatment Heart Rate (beats/min)  63  -AY     Pre SpO2 (%)  95  -AY     O2 Delivery Pre Treatment  room air  -AY     O2 Delivery Intra Treatment  room air  -AY     Post SpO2 (%)  96  -AY     O2 Delivery Post Treatment  room air  -AY     Pre Patient Position  Supine  -AY     Intra Patient Position  Standing  -AY     Post Patient Position  Sitting  -AY     Row Name 10/05/21 1514          Positioning and Restraints    Pre-Treatment Position  in bed  -AY     Post Treatment Position  chair  -AY     In Chair  notified nsg;reclined;sitting;call light within reach;encouraged to call for assist;exit alarm on;waffle cushion;legs elevated  -AY       User Key  (r) = Recorded By, (t) = Taken By, (c) = Cosigned By    Initials Name Provider Type    Dian Horne PT Physical Therapist        Outcome Measures     Row Name 10/05/21 1516          How much help from another person do you currently need...    Turning from your back to your side while in flat bed without using bedrails?  4  -AY     Moving from lying on back to sitting on the side of a flat bed without bedrails?  3  -AY     Moving to and from a bed to a chair (including a wheelchair)?  3  -AY     Standing up from a chair using your arms (e.g., wheelchair, bedside chair)?  3  -AY     Climbing 3-5 steps with a railing?  3  -AY     To walk in hospital room?  3  -AY     AM-PAC 6 Clicks Score (PT)  19  -AY     Row Name 10/05/21 1516          Functional Assessment    Outcome Measure Options  AM-PAC 6 Clicks Basic Mobility (PT)  -AY       User Key  (r) = Recorded By, (t) = Taken By, (c) = Cosigned By    Initials Name Provider Type    Dian Horne PT Physical Therapist                       Physical Therapy Education                 Title: PT OT SLP Therapies (In Progress)     Topic: Physical Therapy (In Progress)     Point: Mobility training  (Done)     Learning Progress Summary           Patient Acceptance, E,TB, VU,NR by AY at 10/5/2021 1516    Acceptance, E,TB, VU,NR by AY at 10/4/2021 1407   Significant Other Acceptance, E,TB, VU,NR by AY at 10/4/2021 1407                   Point: Home exercise program (Not Started)     Learner Progress:  Not documented in this visit.          Point: Body mechanics (Done)     Learning Progress Summary           Patient Acceptance, E,TB, VU,NR by AY at 10/5/2021 1516    Acceptance, E,TB, VU,NR by AY at 10/4/2021 1407   Significant Other Acceptance, E,TB, VU,NR by AY at 10/4/2021 1407                   Point: Precautions (Done)     Learning Progress Summary           Patient Acceptance, E,TB, VU,NR by AY at 10/5/2021 1516    Acceptance, E,TB, VU,NR by AY at 10/4/2021 1407   Significant Other Acceptance, E,TB, VU,NR by AY at 10/4/2021 1407                               User Key     Initials Effective Dates Name Provider Type Discipline     11/10/20 -  Dian West, PT Physical Therapist PT              PT Recommendation and Plan  Planned Therapy Interventions (PT): balance training, bed mobility training, gait training, home exercise program, patient/family education, strengthening, transfer training, postural re-education  Plan of Care Reviewed With: patient  Progress: improving  Outcome Summary: Pt showing improvement as she increased ambulation distance to 250ft with min A, demonstrating improved midline posture/balance. 3 LOB requring min A to correct. Pt limited by decreased functional endurance and L sided weakness. Continue to progress as able. d/c rec for IRF, however, may progress to HWA and OP PT with continued skilled IP PT.     Time Calculation:   PT Charges     Row Name 10/05/21 1517             Time Calculation    Start Time  1355  -AY      PT Received On  10/05/21  -AY         Time Calculation- PT    Total Timed Code Minutes- PT  23 minute(s)  -AY         Timed Charges    11362 - PT Therapeutic  Activity Minutes  23  -AY         Total Minutes    Timed Charges Total Minutes  23  -AY       Total Minutes  23  -AY        User Key  (r) = Recorded By, (t) = Taken By, (c) = Cosigned By    Initials Name Provider Type    Dian Horne PT Physical Therapist        Therapy Charges for Today     Code Description Service Date Service Provider Modifiers Qty    09558744221  PT EVAL LOW COMPLEXITY 4 10/4/2021 Dian West, PT GP 1    [id number]  PT THERAPEUTIC ACT EA 15 MIN 10/5/2021 Dian West, PT GP 2    [id number]  PT THER SUPP EA 15 MIN 10/5/2021 Dian West, PT GP 2          PT G-Codes  Outcome Measure Options: AM-PAC 6 Clicks Basic Mobility (PT)  AM-PAC 6 Clicks Score (PT): 19  AM-PAC 6 Clicks Score (OT): 14  Modified Taylor Scale: 3 - Moderate disability.  Requiring some help, but able to walk without assistance.    Dian West PT  10/5/2021

## 2021-10-06 ENCOUNTER — READMISSION MANAGEMENT (OUTPATIENT)
Dept: CALL CENTER | Facility: HOSPITAL | Age: 81
End: 2021-10-06

## 2021-10-06 VITALS
BODY MASS INDEX: 18.36 KG/M2 | HEIGHT: 67 IN | OXYGEN SATURATION: 97 % | DIASTOLIC BLOOD PRESSURE: 85 MMHG | WEIGHT: 117 LBS | SYSTOLIC BLOOD PRESSURE: 115 MMHG | TEMPERATURE: 97.9 F | RESPIRATION RATE: 18 BRPM | HEART RATE: 69 BPM

## 2021-10-06 PROBLEM — I60.9: Status: RESOLVED | Noted: 2021-10-03 | Resolved: 2021-10-06

## 2021-10-06 LAB
ALBUMIN SERPL-MCNC: 3.4 G/DL (ref 3.5–5.2)
ANION GAP SERPL CALCULATED.3IONS-SCNC: 11 MMOL/L (ref 5–15)
BASOPHILS # BLD AUTO: 0.03 10*3/MM3 (ref 0–0.2)
BASOPHILS NFR BLD AUTO: 0.4 % (ref 0–1.5)
BUN SERPL-MCNC: 10 MG/DL (ref 8–23)
BUN/CREAT SERPL: 16.7 (ref 7–25)
CALCIUM SPEC-SCNC: 8.7 MG/DL (ref 8.6–10.5)
CHLORIDE SERPL-SCNC: 104 MMOL/L (ref 98–107)
CO2 SERPL-SCNC: 22 MMOL/L (ref 22–29)
CREAT SERPL-MCNC: 0.6 MG/DL (ref 0.57–1)
DEPRECATED RDW RBC AUTO: 48.1 FL (ref 37–54)
EOSINOPHIL # BLD AUTO: 0.17 10*3/MM3 (ref 0–0.4)
EOSINOPHIL NFR BLD AUTO: 2.5 % (ref 0.3–6.2)
ERYTHROCYTE [DISTWIDTH] IN BLOOD BY AUTOMATED COUNT: 12.7 % (ref 12.3–15.4)
GFR SERPL CREATININE-BSD FRML MDRD: 97 ML/MIN/1.73
GLUCOSE SERPL-MCNC: 138 MG/DL (ref 65–99)
HCT VFR BLD AUTO: 39.5 % (ref 34–46.6)
HGB BLD-MCNC: 12.8 G/DL (ref 12–15.9)
IMM GRANULOCYTES # BLD AUTO: 0.02 10*3/MM3 (ref 0–0.05)
IMM GRANULOCYTES NFR BLD AUTO: 0.3 % (ref 0–0.5)
LYMPHOCYTES # BLD AUTO: 2.19 10*3/MM3 (ref 0.7–3.1)
LYMPHOCYTES NFR BLD AUTO: 31.9 % (ref 19.6–45.3)
MCH RBC QN AUTO: 33.5 PG (ref 26.6–33)
MCHC RBC AUTO-ENTMCNC: 32.4 G/DL (ref 31.5–35.7)
MCV RBC AUTO: 103.4 FL (ref 79–97)
MONOCYTES # BLD AUTO: 0.61 10*3/MM3 (ref 0.1–0.9)
MONOCYTES NFR BLD AUTO: 8.9 % (ref 5–12)
NEUTROPHILS NFR BLD AUTO: 3.85 10*3/MM3 (ref 1.7–7)
NEUTROPHILS NFR BLD AUTO: 56 % (ref 42.7–76)
NRBC BLD AUTO-RTO: 0 /100 WBC (ref 0–0.2)
PHOSPHATE SERPL-MCNC: 3.1 MG/DL (ref 2.5–4.5)
PLATELET # BLD AUTO: 146 10*3/MM3 (ref 140–450)
PMV BLD AUTO: 9.8 FL (ref 6–12)
POTASSIUM SERPL-SCNC: 4.1 MMOL/L (ref 3.5–5.2)
RBC # BLD AUTO: 3.82 10*6/MM3 (ref 3.77–5.28)
SODIUM SERPL-SCNC: 137 MMOL/L (ref 136–145)
WBC # BLD AUTO: 6.87 10*3/MM3 (ref 3.4–10.8)

## 2021-10-06 PROCEDURE — 99232 SBSQ HOSP IP/OBS MODERATE 35: CPT

## 2021-10-06 PROCEDURE — 80069 RENAL FUNCTION PANEL: CPT | Performed by: INTERNAL MEDICINE

## 2021-10-06 PROCEDURE — 85025 COMPLETE CBC W/AUTO DIFF WBC: CPT | Performed by: INTERNAL MEDICINE

## 2021-10-06 PROCEDURE — 99233 SBSQ HOSP IP/OBS HIGH 50: CPT | Performed by: INTERNAL MEDICINE

## 2021-10-06 RX ORDER — ASPIRIN 81 MG/1
81 TABLET ORAL EVERY OTHER DAY
Start: 2021-10-08

## 2021-10-06 RX ORDER — HYDROCODONE BITARTRATE AND ACETAMINOPHEN 10; 325 MG/1; MG/1
1 TABLET ORAL EVERY 6 HOURS PRN
Status: DISCONTINUED | OUTPATIENT
Start: 2021-10-06 | End: 2021-10-06 | Stop reason: HOSPADM

## 2021-10-06 RX ORDER — FOLIC ACID 1 MG/1
1 TABLET ORAL DAILY
Status: DISCONTINUED | OUTPATIENT
Start: 2021-10-06 | End: 2021-10-06 | Stop reason: HOSPADM

## 2021-10-06 RX ORDER — ATORVASTATIN CALCIUM 40 MG/1
40 TABLET, FILM COATED ORAL NIGHTLY
Status: DISCONTINUED | OUTPATIENT
Start: 2021-10-06 | End: 2021-10-06 | Stop reason: HOSPADM

## 2021-10-06 RX ORDER — ALPRAZOLAM 0.25 MG/1
0.25 TABLET ORAL 2 TIMES DAILY PRN
Status: DISCONTINUED | OUTPATIENT
Start: 2021-10-06 | End: 2021-10-06 | Stop reason: HOSPADM

## 2021-10-06 RX ORDER — CALCIUM CARBONATE 200(500)MG
750 TABLET,CHEWABLE ORAL 2 TIMES DAILY PRN
Status: DISCONTINUED | OUTPATIENT
Start: 2021-10-06 | End: 2021-10-06 | Stop reason: HOSPADM

## 2021-10-06 RX ADMIN — SODIUM CHLORIDE, PRESERVATIVE FREE 10 ML: 5 INJECTION INTRAVENOUS at 09:15

## 2021-10-06 RX ADMIN — LISINOPRIL 10 MG: 10 TABLET ORAL at 09:15

## 2021-10-06 RX ADMIN — DIVALPROEX SODIUM 250 MG: 250 TABLET, DELAYED RELEASE ORAL at 13:07

## 2021-10-06 RX ADMIN — FOLIC ACID 1 MG: 1 TABLET ORAL at 13:10

## 2021-10-06 RX ADMIN — DIVALPROEX SODIUM 250 MG: 250 TABLET, DELAYED RELEASE ORAL at 06:04

## 2021-10-06 NOTE — CASE MANAGEMENT/SOCIAL WORK
Case Management Discharge Note      Final Note: Patient has been accepted by [facility] for the CVA unit today per Amira Edmonds RN with [facility].  Tele for nursing report is [phone], fax [phone].  I discussed transportation with patient and Lianna Martinez and Lianna will be transporting her today.    She will be going to brain injury unit and not CVA.   Tele [phone], fax [phone]       Selected Continued Care - Admitted Since 10/3/2021     Destination Coordination complete.    Service Provider Selected Services Address Phone Fax Patient Preferred    [facility]  Inpatient Rehabilitation [address] [address] [phone] --       Internal Comment last updated by Carolann Michel RN 10/6/2021 1343    Insurance approval given as automatic during this covid crisis.                      Durable Medical Equipment    No services have been selected for the patient.              Dialysis/Infusion    No services have been selected for the patient.              Home Medical Care    No services have been selected for the patient.              Therapy    No services have been selected for the patient.              Community Resources    No services have been selected for the patient.              Community & DME    No services have been selected for the patient.                       Final Discharge Disposition Code: 62 - inpatient rehab facility

## 2021-10-06 NOTE — PLAN OF CARE
Goal Outcome Evaluation:  Plan of Care Reviewed With: patient        Progress: improving  Outcome Summary: Pt disoriented x4 with baseline receptive and expressive aphasia from previous CVA. She shows understanding of situation and was able to communicate effectively, but would answer the wrong questions when asked. VSS, NIH 2, Sinus rhythm - sinus ag on monitor. RA. Pt up standby to the bathroom twice. Position changed independently in bed. Spouse at bedside. No further complaints at this time.

## 2021-10-06 NOTE — DISCHARGE SUMMARY
[facility]  DISCHARGE SUMMARY    Patient Name: Jenna Russell  : 1940  MRN: [id number]    Date of Admission: 10/3/2021 12:02 PM  Date of Discharge:  10/6/2021  Primary Care Physician: Judi Rodriguez MD    Consults     Date and Time Order Name Status Description    10/3/2021  7:11 PM Inpatient Neurosurgery Consult            Hospital Course     Presenting Problem:   Subarachnoid hemorrhage without loss of consciousness (HCC) [I60.9]    Active Hospital Problems    Diagnosis  POA   • Seizure disorder (HCC) [G40.909]  Yes   • Combined receptive and expressive aphasia [R47.01]  Yes   • Hypertension [I10]  Yes   • High cholesterol [E78.00]  Yes   • PAF (paroxysmal atrial fibrillation) (HCC) [I48.0]  Yes      Resolved Hospital Problems    Diagnosis Date Resolved POA   • **Subarachnoid hemorrhage without loss of consciousness (HCC) [I60.9] 10/06/2021 Yes          Hospital Course:  Jenna Russell is a 80 y.o. female with a history of atrial fibrillation and prior left MCA stroke in  with some residual aphasia.  She underwent an ablation at the [facility].  She has remained on chronic Eliquis therapy.  She subsequently developed seizures and was placed on Depakote with good control.  She now presents with an intraparenchymal and subarachnoid hemorrhage in the region of the old stroke.  She received Kcentra and was started on a Cardene drip on admission on 10/3. Transferred to floor 10/6.      [facility]  --Neurology followed throughout stay. I d/w them prior to d/c. They recommended resumption of low dose asa every other day on Friday 10/8. We also discussed anticoagulation with her and s/o and at this time they would rather not go back to PO AC. I agree with them at this time with this as she is certainly high risk for another bleed. Her cardiologist at [facility] has also apparently told her she would be okay to be off of anticoagulation as well. She will  follow up with Dr. Yan in 2 weeks for further discussion and potential consideration of Watchman Device.  --Continue her home lisinopril.    Discharge Follow Up Recommendations for outpatient labs/diagnostics:   F/U Dr Yan in 2 weeks   F/U Stroke Neurology in 1 month.    Day of Discharge     See daily note.    Pertinent  and/or Most Recent Results     LAB RESULTS:      Lab 10/06/21  0821 10/05/21  0557 10/04/21  0821 10/03/21  1222 10/03/21  1220 10/03/21  1218 10/03/21  1217   WBC 6.87 6.20 6.57  --  7.52  --   --    HEMOGLOBIN 12.8 12.7 12.6  --  13.1  --   --    HEMOGLOBIN, POC  --   --   --   --   --  13.6  --    HEMATOCRIT 39.5 38.0 37.9  --  40.3  --   --    HEMATOCRIT POC  --   --   --   --   --  40  --    PLATELETS 146 142 138*  --  145  --   --    NEUTROS ABS 3.85 3.21  --   --  4.46  --   --    IMMATURE GRANS (ABS) 0.02 0.01  --   --  0.02  --   --    LYMPHS ABS 2.19 2.05  --   --  1.92  --   --    MONOS ABS 0.61 0.82  --   --  1.00*  --   --    EOS ABS 0.17 0.09  --   --  0.08  --   --    .4* 98.2* 98.2*  --  101.5*  --   --    PROTIME  --   --   --  15.3  --   --  15.3*   APTT  --   --   --   --  35.3*  --   --          Lab 10/06/21  0821 10/05/21  0557 10/04/21  1952 10/04/21  0821 10/03/21  1221 10/03/21  1218   SODIUM 137 141  --  136  --   --    POTASSIUM 4.1 3.8 4.2 3.2*  --   --    CHLORIDE 104 107  --  101  --   --    CO2 22.0 26.0  --  23.0  --   --    ANION GAP 11.0 8.0  --  12.0  --   --    BUN 10 5*  --  6*  --   --    CREATININE 0.60 0.47*  --  0.44* 0.60 0.60   GLUCOSE 138* 89  --  100*  --   --    CALCIUM 8.7 8.4*  --  8.6  --   --    MAGNESIUM  --  2.2  --  1.8  --   --    PHOSPHORUS 3.1 2.2*  --   --   --   --    HEMOGLOBIN A1C  --   --   --  4.80  --   --          Lab 10/06/21  0821 10/05/21  0557 10/03/21  1220   ALBUMIN 3.40* 3.50  --    ALT (SGPT)  --   --  20   AST (SGOT)  --   --  43*         Lab 10/03/21  1222 10/03/21  1220 10/03/21  1217   TROPONIN T  --  <0.010   --    PROTIME 15.3  --  15.3*   INR 1.3*  --  1.3*         Lab 10/04/21  0821   CHOLESTEROL 169   LDL CHOL 67   HDL CHOL 91*   TRIGLYCERIDES 56             Lab 10/03/21  1218   PH, ARTERIAL 7.38     Brief Urine Lab Results     None        Microbiology Results (last 10 days)     Procedure Component Value - Date/Time    COVID PRE-OP / PRE-PROCEDURE SCREENING ORDER (NO ISOLATION) - Swab, Nasopharynx [[id number]]  (Normal) Collected: 10/03/21 2030    Lab Status: Final result Specimen: Swab from Nasopharynx Updated: 10/04/21 0024    Narrative:      The following orders were created for panel order COVID PRE-OP / PRE-PROCEDURE SCREENING ORDER (NO ISOLATION) - Swab, Nasopharynx.  Procedure                               Abnormality         Status                     ---------                               -----------         ------                     COVID-19, APTIMA PANTHER...[[id number]]  Normal              Final result                 Please view results for these tests on the individual orders.    COVID-19, APTIMA PANTHER [facility] IN-HOUSE NP/OP SWAB IN UTM/VTM/SALINE TRANSPORT MEDIA 24HR TAT - Swab, Nasopharynx [[id number]]  (Normal) Collected: 10/03/21 2030    Lab Status: Final result Specimen: Swab from Nasopharynx Updated: 10/04/21 0024     COVID19 Not Detected    Narrative:      Fact sheet for providers: https://www.fda.gov/media/020461/download     Fact sheet for patients: https://www.fda.gov/media/848418/download    Test performed by RT PCR.          EEG    Result Date: 10/4/2021  Reason for referral: 80 y.o.female with seizure-like activity Technical Summary:  A 19 channel digital EEG was performed using the international 10-20 placement system, including eye leads and EKG leads. Duration: 21 minutes Video: On Findings: The patient is resting quietly in bed.  Medium amplitude 5-7 Hz theta is seen over the right hemisphere, whereas slower 3-7 Hz intermixed delta and theta activity are seen on the left with a maximal  degree of slowing seen over the left temporal head region.  At times a 9 Hz posterior rhythm is seen over the right occipital leads were slower 7 Hz posterior rhythm is seen on the left.  Drowsiness and sleep are prominent during much of the study.  Photic stimulation does not change the background.  Hyperventilation is not performed.  No epileptiform activity or electrographic seizures are seen. Technical quality: Excellent EKG: Regular, 70-80 bpm SUMMARY: Left temporal slowing, moderate No epileptiform activity or electrographic seizures are seen     This study shows evidence for left temporal dysfunction, which is consistent with stroke/hemorrhage in that area No evidence for ongoing seizures is seen This report is transcribed using the Dragon dictation system.      CT Head Without Contrast    Result Date: 10/4/2021  EXAMINATION: CT HEAD WO CONTRAST-  INDICATION: Subarachnoid hemorrhage (SAH), followup; I60.9-Nontraumatic subarachnoid hemorrhage, unspecified; R47.01-Aphasia; Z86.73-Personal history of transient ischemic attack (TIA), and cerebral infarction without residual deficits; I10-Essential (primary) hypertension; Z79.01-Long term (current) use of anticoagulants.  TECHNIQUE: CT head without intravenous contrast administration.  The radiation dose reduction device was turned on for each scan per the ALARA (As Low as Reasonably Achievable) protocol.  COMPARISON: CT head dated 10/03/2021 at 1208 hours.  FINDINGS: Grossly unchanged appearance of subarachnoid hemorrhage left temporoparietal region along the sylvian fissure similar to prior comparison with encephalomalacia from prior or chronic left MCA insult in the adjacent area without progression and no midline shift or hydrocephalus development in the interim. Paranasal sinuses and mastoid air cells are grossly clear and well pneumatized. Calvarium intact.      Grossly unchanged appearance of subarachnoid hemorrhage hyperdense material left temporoparietal  region adjacent to chronic left MCA insult without progression or midline shift.  D:  10/03/2021 E:  10/04/2021   This report was finalized on 10/4/2021 5:41 PM by Dr. Sunny Ramirez.      CT Angiogram Neck    Result Date: 10/4/2021  EXAMINATION: CT ANGIOGRAM NECK-, CT ANGIOGRAM HEAD W AI ANALYSIS OF LVO- 10/03/2021  INDICATION: Stroke, follow up stroke symptoms  TECHNIQUE: Multiple axial CT imaging was obtained of the head from skull base to skull vertex following the administration of intravenous contrast according to the CT angio protocol. Three-D reformatted images were submitted to further facilitate diagnostic accuracy and treatment planning.  Stenosis measurement was performed by the NASCET or similar method.  The radiation dose reduction device was turned on for each scan per the ALARA (As Low as Reasonably Achievable) protocol.  COMPARISON: 01/20/2018  FINDINGS: Lung apices are grossly clear. The thyroid is heterogeneous with nodules bilaterally. Great vessels are unremarkable. No bulky adenopathy in the neck. Both common carotid arteries are without significant stenosis. There is atherosclerotic disease of the carotid bifurcations bilaterally with no significant stenosis of either the internal carotid arteries. The vertebral arteries reveal slight dominance on the right. No significant stenosis identified of either of the vertebral arteries. Tortuosity identified of the vertebral arteries with distally significant atherosclerotic disease bilaterally with moderate to severe stenosis identified within the distal left vertebral artery. No significant stenosis on the right. The basilar artery is intact and unremarkable. There is fetal origin identified of the posterior cerebral arteries bilaterally. No significant stenosis of the posterior cerebral arteries. The intracranial internal carotid arteries reveal some atherosclerotic disease in the cavernous portions. Both anterior cerebral arteries are unremarkable  and patent with no significant stenosis. The M1 and M2 branches bilaterally are unremarkable.      Moderate to severe stenosis identified of the distal left vertebral artery however there is fetal origin identified of the posterior cerebral arteries bilaterally. The basilar artery is small in caliber. No significant stenosis in the right vertebral artery. There is atherosclerotic disease seen in both carotid bifurcations however no significant stenosis seen of the common carotid arteries or internal carotid arteries. The anterior circulation is intact and unremarkable.  D: 10/03/2021 E:  10/04/2021  This report was finalized on 10/4/2021 9:24 AM by Dr. Yanely Dietz MD.      XR Chest 1 View    Result Date: 10/4/2021  EXAMINATION: XR CHEST 1 VW- 10/03/2021  INDICATION: Acute Stroke Protocol (onset < 12 hrs); I60.9-Nontraumatic subarachnoid hemorrhage, unspecified; R47.01-Aphasia; Z86.73-Personal history of transient ischemic attack (TIA), and cerebral infarction without residual deficits; I10-Essential (primary) hypertension; Z79.01-Long term (current) use of anticoagulants  COMPARISON: 09/13/2019  FINDINGS: Portable chest reveals dilated loops of bowel underneath the hemidiaphragm bilaterally. Cardiac silhouettes borderline enlarged. Lung fields are grossly clear. No focal parenchymal opacification present. No pleural effusion or pneumothorax. Degenerative changes seen within the spine. Degenerative changes as well seen within the shoulders bilaterally. Pulmonary vascularity is within normal limits.        No acute cardiopulmonary disease.  D: 10/03/2021 E:  10/04/2021  This report was finalized on 10/4/2021 9:24 AM by Dr. Yanely Dietz MD.      CT Head Without Contrast Stroke Protocol    Result Date: 10/4/2021  EXAMINATION: CT HEAD WO CONTRAST, STROKE PROTOCOL-10/03/2021:  INDICATION: Neuro deficit, acute, stroke suspected, aphasia, headache.  TECHNIQUE: Multiple axial CT imaging was obtained of the head  from the skull base to the skull vertex without the administration of intravenous contrast.  The radiation dose reduction device was turned on for each scan per the ALARA (As Low as Reasonably Achievable) protocol.  COMPARISON: NONE.  FINDINGS: Small amount of subarachnoid hemorrhage identified in the left temporoparietal lobe. Just posterior to the area of hemorrhage there is encephalomalacic change from large prior insult. No other abnormal extra-axial fluid collection identified. The bony structures reveal no evidence of osseous abnormality. The visualized paranasal sinuses are clear. The mastoid air cells are patent.      Small subarachnoid hemorrhage identified in the left temporoparietal region just anterior to the encephalomalacic change within the left temporoparietal lobe from prior insult.  The examination was performed 10/03/2021 at 12:09 PM. The examination results were given to the emergency room physician 10/03/2021 at 12:17 PM.  D:  10/03/2021 E:  10/04/2021  This report was finalized on 10/4/2021 9:24 AM by Dr. Yanely Dietz MD.      CT Angiogram Head w AI Analysis of LVO    Result Date: 10/4/2021  EXAMINATION: CT ANGIOGRAM NECK-, CT ANGIOGRAM HEAD W AI ANALYSIS OF LVO- 10/03/2021  INDICATION: Stroke, follow up stroke symptoms  TECHNIQUE: Multiple axial CT imaging was obtained of the head from skull base to skull vertex following the administration of intravenous contrast according to the CT angio protocol. Three-D reformatted images were submitted to further facilitate diagnostic accuracy and treatment planning.  Stenosis measurement was performed by the NASCET or similar method.  The radiation dose reduction device was turned on for each scan per the ALARA (As Low as Reasonably Achievable) protocol.  COMPARISON: 01/20/2018  FINDINGS: Lung apices are grossly clear. The thyroid is heterogeneous with nodules bilaterally. Great vessels are unremarkable. No bulky adenopathy in the neck. Both common  carotid arteries are without significant stenosis. There is atherosclerotic disease of the carotid bifurcations bilaterally with no significant stenosis of either the internal carotid arteries. The vertebral arteries reveal slight dominance on the right. No significant stenosis identified of either of the vertebral arteries. Tortuosity identified of the vertebral arteries with distally significant atherosclerotic disease bilaterally with moderate to severe stenosis identified within the distal left vertebral artery. No significant stenosis on the right. The basilar artery is intact and unremarkable. There is fetal origin identified of the posterior cerebral arteries bilaterally. No significant stenosis of the posterior cerebral arteries. The intracranial internal carotid arteries reveal some atherosclerotic disease in the cavernous portions. Both anterior cerebral arteries are unremarkable and patent with no significant stenosis. The M1 and M2 branches bilaterally are unremarkable.      Moderate to severe stenosis identified of the distal left vertebral artery however there is fetal origin identified of the posterior cerebral arteries bilaterally. The basilar artery is small in caliber. No significant stenosis in the right vertebral artery. There is atherosclerotic disease seen in both carotid bifurcations however no significant stenosis seen of the common carotid arteries or internal carotid arteries. The anterior circulation is intact and unremarkable.  D: 10/03/2021 E:  10/04/2021  This report was finalized on 10/4/2021 9:24 AM by Dr. Yanely Dietz MD.                Results for orders placed during the hospital encounter of 01/20/18    Adult Transthoracic Echo Complete W/ Cont if Necessary Per Protocol (With Agitated Saline)    Interpretation Summary  · Left ventricular wall thickness is consistent with hypertrophy.  · Left ventricular systolic function is normal.  · Moderate aortic valve regurgitation is  present.  · Mild mitral valve regurgitation is present  · Mild tricuspid valve regurgitation is present.  · Mild pulmonic valve regurgitation is present.      Plan for Follow-up of Pending Labs/Results:     Discharge Details        Discharge Medications      Changes to Medications      Instructions Start Date   aspirin 81 MG EC tablet  What changed: when to take this   81 mg, Oral, Every Other Day   Start Date: October 8, 2021        Continue These Medications      Instructions Start Date   ALPRAZolam 0.5 MG tablet  Commonly known as: XANAX   0.25 mg, Oral, 2 Times Daily PRN      atorvastatin 40 MG tablet  Commonly known as: LIPITOR   40 mg, Oral, Nightly      calcium carbonate  MG chewable tablet  Commonly known as: TUMS EX   750 mg, Oral, 2 Times Daily      diclofenac 1.3 % patch patch  Commonly known as: FLECTOR   1 patch, Topical, 2 Times Daily      divalproex 250 MG DR tablet  Commonly known as: DEPAKOTE   250 mg, Oral, Every 8 Hours Scheduled      folic acid 1 MG tablet  Commonly known as: FOLVITE   1 mg, Oral, Daily      HYDROcodone-acetaminophen  MG per tablet  Commonly known as: NORCO   1 tablet, Oral, Every 6 Hours PRN      hyoscyamine 0.125 MG tablet  Commonly known as: ANASPAZ,LEVSIN   0.125 mg, Oral, Every 4 Hours PRN      lisinopril 10 MG tablet  Commonly known as: PRINIVIL,ZESTRIL   10 mg, Oral, Daily      Vitamin D 50 MCG (2000 UT) capsule   2,000 Units, Oral, Daily         Stop These Medications    apixaban 5 MG tablet tablet  Commonly known as: ELIQUIS            Allergies   Allergen Reactions   • Penicillins Anaphylaxis         Discharge Disposition:  Home or Self Care    Diet:  Hospital:  Diet Order   Procedures   • Diet Regular; Thin; Cardiac       Activity:      Restrictions or Other Recommendations:         CODE STATUS:    Code Status and Medical Interventions:   Ordered at: 10/03/21 1526     Code Status:    CPR     Medical Interventions (Level of Support Prior to Arrest):    Full        Future Appointments   Date Time Provider Department [place]   11/3/2021  2:00 PM Manuel Berkowitz MD [facility]       Additional Instructions for the Follow-ups that You Need to Schedule     Discharge Follow-up with Specialty: Dr. Yan; 2 Weeks   As directed      Specialty: Dr. Yan    Follow Up: 2 Weeks         Discharge Follow-up with Specialty: Stroke Neurology; 1 Month   As directed      Specialty: Stroke Neurology    Follow Up: 1 Month                     Deepa Mercado II, DO  10/06/21      Time Spent on Discharge:  I spent  34  minutes on this discharge activity which included: face-to-face encounter with the patient, reviewing the data in the system, coordination of the care with the nursing staff as well as consultants, documentation, and entering orders.

## 2021-10-06 NOTE — PROGRESS NOTES
[facility] Medicine Services  PROGRESS NOTE    Patient Name: Jenna Russell  : 1940  MRN: [id number]    Date of Admission: 10/3/2021  Primary Care Physician: Judi Rodriguez MD    Subjective   Subjective     CC: f/u [facility]    HPI: Up in bed with s/o in room. In good spirits. Still with significant aphasia but ultimately says she is doing well.    ROS:  Gen- No fevers, chills  CV- No chest pain, palpitations  Resp- No cough, dyspnea  GI- No N/V/D, abd pain     Objective   Objective     Vital Signs:   Temp:  [97.3 °F (36.3 °C)-98.6 °F (37 °C)] 97.9 °F (36.6 °C)  Heart Rate:  [57-81] 67  Resp:  [18] 18  BP: (106-139)/(60-95) 115/85     Physical Exam:  Constitutional: No acute distress, awake, alert  HENT: NCAT, mucous membranes moist  Respiratory: Clear to auscultation bilaterally, respiratory effort normal   Cardiovascular: RRR, no murmurs, rubs, or gallops  Gastrointestinal: Positive bowel sounds, soft, nontender, nondistended  Musculoskeletal: No bilateral ankle edema  Psychiatric: Appropriate affect, cooperative  Neurologic: Oriented x 3, expressive/receptive aphasia, strength intact  Skin: No rashes    Results Reviewed:  LAB RESULTS:      Lab 10/06/21  0821 10/05/21  0557 10/04/21  0821 10/03/21  1222 10/03/21  1220 10/03/21  1218 10/03/21  1217   WBC 6.87 6.20 6.57  --  7.52  --   --    HEMOGLOBIN 12.8 12.7 12.6  --  13.1  --   --    HEMOGLOBIN, POC  --   --   --   --   --  13.6  --    HEMATOCRIT 39.5 38.0 37.9  --  40.3  --   --    HEMATOCRIT POC  --   --   --   --   --  40  --    PLATELETS 146 142 138*  --  145  --   --    NEUTROS ABS 3.85 3.21  --   --  4.46  --   --    IMMATURE GRANS (ABS) 0.02 0.01  --   --  0.02  --   --    LYMPHS ABS 2.19 2.05  --   --  1.92  --   --    MONOS ABS 0.61 0.82  --   --  1.00*  --   --    EOS ABS 0.17 0.09  --   --  0.08  --   --    .4* 98.2* 98.2*  --  101.5*  --   --    PROTIME  --   --   --  15.3  --   --  15.3*   APTT  --   --   --    --  35.3*  --   --          Lab 10/06/21  0821 10/05/21  0557 10/04/21  1952 10/04/21  0821 10/03/21  1221 10/03/21  1218   SODIUM 137 141  --  136  --   --    POTASSIUM 4.1 3.8 4.2 3.2*  --   --    CHLORIDE 104 107  --  101  --   --    CO2 22.0 26.0  --  23.0  --   --    ANION GAP 11.0 8.0  --  12.0  --   --    BUN 10 5*  --  6*  --   --    CREATININE 0.60 0.47*  --  0.44* 0.60 0.60   GLUCOSE 138* 89  --  100*  --   --    CALCIUM 8.7 8.4*  --  8.6  --   --    MAGNESIUM  --  2.2  --  1.8  --   --    PHOSPHORUS 3.1 2.2*  --   --   --   --    HEMOGLOBIN A1C  --   --   --  4.80  --   --          Lab 10/06/21  0821 10/05/21  0557 10/03/21  1220   ALBUMIN 3.40* 3.50  --    ALT (SGPT)  --   --  20   AST (SGOT)  --   --  43*         Lab 10/03/21  1222 10/03/21  1220 10/03/21  1217   TROPONIN T  --  <0.010  --    PROTIME 15.3  --  15.3*   INR 1.3*  --  1.3*         Lab 10/04/21  0821   CHOLESTEROL 169   LDL CHOL 67   HDL CHOL 91*   TRIGLYCERIDES 56             Lab 10/03/21  1218   PH, ARTERIAL 7.38     Brief Urine Lab Results     None          Microbiology Results Abnormal     Procedure Component Value - Date/Time    COVID PRE-OP / PRE-PROCEDURE SCREENING ORDER (NO ISOLATION) - Swab, Nasopharynx [[id number]]  (Normal) Collected: 10/03/21 2030    Lab Status: Final result Specimen: Swab from Nasopharynx Updated: 10/04/21 0024    Narrative:      The following orders were created for panel order COVID PRE-OP / PRE-PROCEDURE SCREENING ORDER (NO ISOLATION) - Swab, Nasopharynx.  Procedure                               Abnormality         Status                     ---------                               -----------         ------                     COVID-19, APTIMA PANTHER...[[id number]]  Normal              Final result                 Please view results for these tests on the individual orders.    COVID-19, APTIMA PANTHER GINNY IN-HOUSE NP/OP SWAB IN UTM/VTM/SALINE TRANSPORT MEDIA 24HR TAT - Swab, Nasopharynx [[id number]]   (Normal) Collected: 10/03/21 2030    Lab Status: Final result Specimen: Swab from Nasopharynx Updated: 10/04/21 0024     COVID19 Not Detected    Narrative:      Fact sheet for providers: https://www.fda.gov/media/458458/download     Fact sheet for patients: https://www.fda.gov/media/412905/download    Test performed by RT PCR.          MRI brain personally reviewed showing stable appearing bleed. Interpretation pending.    Results for orders placed during the hospital encounter of 01/20/18    Adult Transthoracic Echo Complete W/ Cont if Necessary Per Protocol (With Agitated Saline)    Interpretation Summary  · Left ventricular wall thickness is consistent with hypertrophy.  · Left ventricular systolic function is normal.  · Moderate aortic valve regurgitation is present.  · Mild mitral valve regurgitation is present  · Mild tricuspid valve regurgitation is present.  · Mild pulmonic valve regurgitation is present.      I have reviewed the medications:  Scheduled Meds:divalproex, 250 mg, Oral, Q8H  lisinopril, 10 mg, Oral, Q24H  sodium chloride, 10 mL, Intravenous, Q12H      Continuous Infusions:   PRN Meds:.magnesium sulfate **OR** magnesium sulfate **OR** magnesium sulfate  •  metoprolol tartrate  •  ondansetron  •  potassium & sodium phosphates **OR** potassium & sodium phosphates  •  potassium chloride **OR** potassium chloride **OR** potassium chloride  •  sodium chloride  •  sodium chloride    Assessment/Plan   Assessment & Plan     Active Hospital Problems    Diagnosis  POA   • **Subarachnoid hemorrhage without loss of consciousness (HCC) [I60.9]  Yes   • Seizure disorder (HCC) [G40.909]  Yes   • Combined receptive and expressive aphasia [R47.01]  Yes   • Hypertension [I10]  Yes   • High cholesterol [E78.00]  Yes   • PAF (paroxysmal atrial fibrillation) (HCC) [I48.0]  Yes      Resolved Hospital Problems   No resolved problems to display.        Brief Hospital Course to date:  Jenna Russell is a 80 y.o. female  with a history of atrial fibrillation and prior left MCA stroke in 2015 with some residual aphasia.  She underwent an ablation at the [facility].  She has remained on chronic Eliquis therapy.  She subsequently developed seizures and was placed on Depakote with good control.  She now presents with an intraparenchymal and subarachnoid hemorrhage in the region of the old stroke.  She received Kcentra and was started on a Cardene drip on admission on 10/3. Transferred to floor 10/6. This is my first day assessing patient's active medical issues.    ICH  --Neurology following. Recommend continuing to hold antiplatelets at this time. Have reached out to cardiology to discuss AC vs watchman  --I have discussed this with her and family would rather not go back to PO AC and I agree with this as she is certainly high risk for another bleed. Her cardiologist at [facility] has also apparently recommended that she be off AC.  --PT/OT, recs IRF currently. D/W CM.    A fib s/p ablation  --Remains in NSR. Discussion regarding AC as above.    HTN  --Controlled on home lisinopril. CTM.    Seizure d/o  --Continue PO depakote.    Anxiety, Chronic pain  --Resume home Xanax prn and hydrocodone prn.    This patient's problems and plans were partially entered by my partner and updated as appropriate by me 10/06/21.    DVT prophylaxis:  Mechanical DVT prophylaxis orders are present.       AM-PAC 6 Clicks Score (PT): 19 (10/05/21 1516)    Disposition: I expect the patient to be discharged to IRF any time.    CODE STATUS:   Code Status and Medical Interventions:   Ordered at: 10/03/21 1526     Code Status:    CPR     Medical Interventions (Level of Support Prior to Arrest):    Full       Deepa Mercado II, DO  10/06/21

## 2021-10-06 NOTE — OUTREACH NOTE
Prep Survey      Responses   Mu-ism facility patient discharged from?  [place]   Is LACE score < 7 ?  No   Emergency Room discharge w/ pulse ox?  No   Eligibility  Not Eligible   What are the reasons patient is not eligible?  [facility]]   Does the patient have one of the following disease processes/diagnoses(primary or secondary)?  Stroke (TIA)   Prep survey completed?  Yes          Yesenia Michel RN

## 2021-10-06 NOTE — PROGRESS NOTES
Stroke Progress Note       Chief Complaint: right sided weakness    Subjective    Subjective     Subjective:  No acute events overnight.  The patient is currently resting in the bed and in good spirits.  She is agreeable to going to short-term inpatient rehab at [facility].  She still continues to have moderate expressive and receptive aphasia.      Review of Systems   Constitutional: Positive for activity change. Negative for chills, fatigue and fever.   HENT: Negative for congestion, rhinorrhea and trouble swallowing.    Eyes: Negative for photophobia and visual disturbance.   Respiratory: Negative for cough, chest tightness and shortness of breath.    Cardiovascular: Negative for chest pain, palpitations and leg swelling.   Gastrointestinal: Negative for constipation, diarrhea, nausea and vomiting.   Endocrine: Negative.    Genitourinary: Negative for difficulty urinating, dysuria and hematuria.   Musculoskeletal: Positive for arthralgias and gait problem.   Neurological: Positive for seizures and speech difficulty. Negative for dizziness, facial asymmetry, weakness, numbness and headaches.   Hematological: Negative.    Psychiatric/Behavioral: Positive for confusion (Baseline memory problems).      Objective      Temp:  [97.3 °F (36.3 °C)-98.6 °F (37 °C)] 97.9 °F (36.6 °C)  Heart Rate:  [57-81] 66  Resp:  [18] 18  BP: (106-139)/(60-95) 115/85    Neurological Exam  Mental Status  Awake and alert. Oriented only to person and place. Speech is normal. Expressive aphasia and receptive aphasia present. Attention and concentration are normal.    Cranial Nerves  CN II: Visual fields full to confrontation.  CN III, IV, VI: Extraocular movements intact bilaterally. Pupils equal round and reactive to light bilaterally.  CN V: Facial sensation is normal.  CN VII: Full and symmetric facial movement.  CN VIII: Hearing intact bilaterally.  CN IX, X: Palate elevates symmetrically  CN XI: Shoulder shrug strength is normal.  CN  XII: Tongue midline without atrophy or fasciculations.    Motor  Normal muscle bulk throughout. No fasciculations present. Normal muscle tone. Strength is 5/5 throughout all four extremities.    Sensory  Light touch is normal in upper and lower extremities.     Coordination  Unable to assess as patient has not able to understand directions.  No obvious dysmetria on my exam.    Gait  Not assessed.    Physical Exam  Vitals and nursing note reviewed.   Constitutional:       General: She is awake. She is not in acute distress.     Appearance: Normal appearance.   HENT:      Head: Normocephalic and atraumatic.      Mouth/Throat:      Mouth: Mucous membranes are dry.      Pharynx: Oropharynx is clear.   Eyes:      Extraocular Movements: Extraocular movements intact.      Pupils: Pupils are equal, round, and reactive to light.   Cardiovascular:      Rate and Rhythm: Normal rate and regular rhythm.   Pulmonary:      Effort: Pulmonary effort is normal. No respiratory distress.      Comments: On room air  Skin:     General: Skin is warm and dry.   Neurological:      Mental Status: She is alert. She is disoriented.      Cranial Nerves: No cranial nerve deficit.      Sensory: No sensory deficit.      Motor: Weakness present.      Coordination: Coordination normal.      Deep Tendon Reflexes: Strength normal.      Comments: Baseline disorientation from previous stroke per wife   Psychiatric:         Mood and Affect: Mood normal.         Speech: Speech normal.         Behavior: Behavior normal.       Results Review:    I reviewed the patient's new clinical results.    No radiology results for the last day  Results for orders placed during the hospital encounter of 01/20/18    Adult Transthoracic Echo Complete W/ Cont if Necessary Per Protocol (With Agitated Saline)    Interpretation Summary  · Left ventricular wall thickness is consistent with hypertrophy.  · Left ventricular systolic function is normal.  · Moderate aortic valve  regurgitation is present.  · Mild mitral valve regurgitation is present  · Mild tricuspid valve regurgitation is present.  · Mild pulmonic valve regurgitation is present.    CBC and RFP essentially normal  A1c 4.80  Lipid panel with total cholesterol 169, triglycerides 56, , LDL 67    MRI brain GRE evidence persistent hmge, no sign of amyloid process        Assessment/Plan     Assessment/Plan:    This is a 80-year-old  female with history of hypertension, hyperlipidemia, old left MCA stroke, paroxysmal atrial fibrillation who underwent ablation at [facility] (on chronic Eliquis PTA), who presented to the emergency department on 10/3 withconfusion and worsening speech difficulty (some baseline mixed aphasia from prior stroke) and was noted to have intraparenchymal hemorrhage.  She received Kcentra and was admitted to ICU for further monitoring.  Neurosurgery reviewed the patient's scans and deemed her not a candidate for surgical intervention.       1. Left temporoparietal ICH, while on anticoagulation  Risk factors of old stroke and being on anticoagulation. It is possible that patient might have had another vascular event in the territory of old stroke with hemorrhgic conversion.   -Serial CT scans have been stable  -Maintain SBP <140  -Antithrombotics and anticoagulation is currently on hold  -Due to the size of the patient's hemorrhage she will need to be off anticoagulation for 10 to 14 days.  -PT/OT are following patient and recommend inpatient rehabilitation at discharge.  Case management is currently following for referral needs to [facility].  -Follow-up in stroke clinic in 4 to 6 weeks    2.  Paroxysmal atrial fibrillation s/p ablation at [facility] in 2016  -Patient is currently in sinus rhythm, there have been no documented episodes of atrial fibrillation since her admission  -TTE with no evidence of left atrial enlargement or cardiac thrombus.  -We will have cardiology  weigh in on whether the patient should remain on oral anticoagulation.  She has been told in the past that she may be a candidate for watchman's device.  She is a patient of Dr. Yan and we will have him see her today.    3.  Hypertension  -Patient has been started on her home blood pressure medications  -Maintain SBP <140    4.  Hyperlipidemia   -LDL on admission is 67, goal <70  -Continue atorvastatin 40 mg nightly    Patient was discussed with Dr. Berkowitz and Dr. Mercado.  Plan of care was discussed with the patient and her wife who is currently at the bedside.  From a neurological standpoint she can be transferred to [facility] when bed is available.  Will await Dr. Yan's recommendations regarding anticoagulation.    Neurology will sign off for now, please call with any questions or concerns.     Addendum 1454: Notified Dr. Yan no longer sees patients as an inpatient.  Discussed with Dr. Berkowitz and the patient should remain off of her anticoagulation until she follows up with Dr. Yan as an outpatient.  She can resume aspirin 81 mg on 10/8.  Discussed with Dr. Mercado and primary RN.    NEREYDA Thornton  10/06/21  13:12 EDT

## 2022-03-10 ENCOUNTER — HOSPITAL ENCOUNTER (INPATIENT)
Facility: HOSPITAL | Age: 82
LOS: 5 days | Discharge: NURSING, REHAB OR LONG-TERM CARE | End: 2022-03-15
Attending: EMERGENCY MEDICINE | Admitting: INTERNAL MEDICINE

## 2022-03-10 ENCOUNTER — APPOINTMENT (OUTPATIENT)
Dept: GENERAL RADIOLOGY | Facility: HOSPITAL | Age: 82
End: 2022-03-10

## 2022-03-10 DIAGNOSIS — G40.909 SEIZURE DISORDER: ICD-10-CM

## 2022-03-10 DIAGNOSIS — Z86.73 HISTORY OF STROKE: ICD-10-CM

## 2022-03-10 DIAGNOSIS — S72.001A CLOSED RIGHT HIP FRACTURE, INITIAL ENCOUNTER: Primary | ICD-10-CM

## 2022-03-10 PROBLEM — D64.9 ANEMIA: Status: ACTIVE | Noted: 2022-03-10

## 2022-03-10 PROBLEM — Z78.9 DAILY CONSUMPTION OF ALCOHOL: Status: ACTIVE | Noted: 2022-03-10

## 2022-03-10 LAB
ALBUMIN SERPL-MCNC: 4.1 G/DL (ref 3.5–5.2)
ALBUMIN/GLOB SERPL: 1.8 G/DL
ALP SERPL-CCNC: 64 U/L (ref 39–117)
ALT SERPL W P-5'-P-CCNC: 16 U/L (ref 1–33)
ANION GAP SERPL CALCULATED.3IONS-SCNC: 10 MMOL/L (ref 5–15)
AST SERPL-CCNC: 32 U/L (ref 1–32)
BASOPHILS # BLD AUTO: 0.02 10*3/MM3 (ref 0–0.2)
BASOPHILS NFR BLD AUTO: 0.2 % (ref 0–1.5)
BILIRUB SERPL-MCNC: 0.4 MG/DL (ref 0–1.2)
BUN SERPL-MCNC: 17 MG/DL (ref 8–23)
BUN/CREAT SERPL: 28.3 (ref 7–25)
CALCIUM SPEC-SCNC: 9.3 MG/DL (ref 8.6–10.5)
CHLORIDE SERPL-SCNC: 104 MMOL/L (ref 98–107)
CO2 SERPL-SCNC: 26 MMOL/L (ref 22–29)
CREAT SERPL-MCNC: 0.6 MG/DL (ref 0.57–1)
DEPRECATED RDW RBC AUTO: 49.1 FL (ref 37–54)
EGFRCR SERPLBLD CKD-EPI 2021: 91.4 ML/MIN/1.73
EOSINOPHIL # BLD AUTO: 0.03 10*3/MM3 (ref 0–0.4)
EOSINOPHIL NFR BLD AUTO: 0.3 % (ref 0.3–6.2)
ERYTHROCYTE [DISTWIDTH] IN BLOOD BY AUTOMATED COUNT: 13.7 % (ref 12.3–15.4)
FERRITIN SERPL-MCNC: 142.2 NG/ML (ref 13–150)
FLUAV SUBTYP SPEC NAA+PROBE: NOT DETECTED
FLUBV RNA ISLT QL NAA+PROBE: NOT DETECTED
GLOBULIN UR ELPH-MCNC: 2.3 GM/DL
GLUCOSE SERPL-MCNC: 104 MG/DL (ref 65–99)
HCT VFR BLD AUTO: 35.3 % (ref 34–46.6)
HGB BLD-MCNC: 11.8 G/DL (ref 12–15.9)
IMM GRANULOCYTES # BLD AUTO: 0.03 10*3/MM3 (ref 0–0.05)
IMM GRANULOCYTES NFR BLD AUTO: 0.3 % (ref 0–0.5)
IRON 24H UR-MRATE: 59 MCG/DL (ref 37–145)
IRON SATN MFR SERPL: 18 % (ref 20–50)
LYMPHOCYTES # BLD AUTO: 1.76 10*3/MM3 (ref 0.7–3.1)
LYMPHOCYTES NFR BLD AUTO: 19.1 % (ref 19.6–45.3)
MCH RBC QN AUTO: 32.3 PG (ref 26.6–33)
MCHC RBC AUTO-ENTMCNC: 33.4 G/DL (ref 31.5–35.7)
MCV RBC AUTO: 96.7 FL (ref 79–97)
MONOCYTES # BLD AUTO: 0.94 10*3/MM3 (ref 0.1–0.9)
MONOCYTES NFR BLD AUTO: 10.2 % (ref 5–12)
NEUTROPHILS NFR BLD AUTO: 6.45 10*3/MM3 (ref 1.7–7)
NEUTROPHILS NFR BLD AUTO: 69.9 % (ref 42.7–76)
NRBC BLD AUTO-RTO: 0 /100 WBC (ref 0–0.2)
PLATELET # BLD AUTO: 145 10*3/MM3 (ref 140–450)
PMV BLD AUTO: 10 FL (ref 6–12)
POTASSIUM SERPL-SCNC: 3.9 MMOL/L (ref 3.5–5.2)
PROT SERPL-MCNC: 6.4 G/DL (ref 6–8.5)
RBC # BLD AUTO: 3.65 10*6/MM3 (ref 3.77–5.28)
SARS-COV-2 RNA PNL SPEC NAA+PROBE: NOT DETECTED
SODIUM SERPL-SCNC: 140 MMOL/L (ref 136–145)
TIBC SERPL-MCNC: 329 MCG/DL (ref 298–536)
TRANSFERRIN SERPL-MCNC: 221 MG/DL (ref 200–360)
WBC NRBC COR # BLD: 9.23 10*3/MM3 (ref 3.4–10.8)

## 2022-03-10 PROCEDURE — 25010000002 HYDROMORPHONE PER 4 MG: Performed by: STUDENT IN AN ORGANIZED HEALTH CARE EDUCATION/TRAINING PROGRAM

## 2022-03-10 PROCEDURE — 83540 ASSAY OF IRON: CPT | Performed by: PHYSICIAN ASSISTANT

## 2022-03-10 PROCEDURE — 82728 ASSAY OF FERRITIN: CPT | Performed by: PHYSICIAN ASSISTANT

## 2022-03-10 PROCEDURE — 87636 SARSCOV2 & INF A&B AMP PRB: CPT | Performed by: PHYSICIAN ASSISTANT

## 2022-03-10 PROCEDURE — 73502 X-RAY EXAM HIP UNI 2-3 VIEWS: CPT

## 2022-03-10 PROCEDURE — 93005 ELECTROCARDIOGRAM TRACING: CPT | Performed by: EMERGENCY MEDICINE

## 2022-03-10 PROCEDURE — 99284 EMERGENCY DEPT VISIT MOD MDM: CPT

## 2022-03-10 PROCEDURE — 80053 COMPREHEN METABOLIC PANEL: CPT | Performed by: EMERGENCY MEDICINE

## 2022-03-10 PROCEDURE — 99222 1ST HOSP IP/OBS MODERATE 55: CPT | Performed by: STUDENT IN AN ORGANIZED HEALTH CARE EDUCATION/TRAINING PROGRAM

## 2022-03-10 PROCEDURE — 85025 COMPLETE CBC W/AUTO DIFF WBC: CPT | Performed by: EMERGENCY MEDICINE

## 2022-03-10 PROCEDURE — 71045 X-RAY EXAM CHEST 1 VIEW: CPT

## 2022-03-10 PROCEDURE — 84466 ASSAY OF TRANSFERRIN: CPT | Performed by: PHYSICIAN ASSISTANT

## 2022-03-10 PROCEDURE — 73552 X-RAY EXAM OF FEMUR 2/>: CPT

## 2022-03-10 RX ORDER — SODIUM CHLORIDE 0.9 % (FLUSH) 0.9 %
10 SYRINGE (ML) INJECTION EVERY 12 HOURS SCHEDULED
Status: DISCONTINUED | OUTPATIENT
Start: 2022-03-10 | End: 2022-03-15 | Stop reason: HOSPADM

## 2022-03-10 RX ORDER — ATORVASTATIN CALCIUM 40 MG/1
40 TABLET, FILM COATED ORAL NIGHTLY
Status: DISCONTINUED | OUTPATIENT
Start: 2022-03-10 | End: 2022-03-15 | Stop reason: HOSPADM

## 2022-03-10 RX ORDER — LISINOPRIL 10 MG/1
10 TABLET ORAL DAILY
Status: CANCELLED | OUTPATIENT
Start: 2022-03-10

## 2022-03-10 RX ORDER — ASPIRIN 81 MG/1
81 TABLET ORAL EVERY OTHER DAY
Status: CANCELLED | OUTPATIENT
Start: 2022-03-10

## 2022-03-10 RX ORDER — ONDANSETRON 2 MG/ML
4 INJECTION INTRAMUSCULAR; INTRAVENOUS EVERY 6 HOURS PRN
Status: DISCONTINUED | OUTPATIENT
Start: 2022-03-10 | End: 2022-03-15 | Stop reason: HOSPADM

## 2022-03-10 RX ORDER — HYDROMORPHONE HYDROCHLORIDE 1 MG/ML
0.5 INJECTION, SOLUTION INTRAMUSCULAR; INTRAVENOUS; SUBCUTANEOUS
Status: DISCONTINUED | OUTPATIENT
Start: 2022-03-10 | End: 2022-03-15 | Stop reason: HOSPADM

## 2022-03-10 RX ORDER — CHOLECALCIFEROL (VITAMIN D3) 125 MCG
5 CAPSULE ORAL NIGHTLY PRN
Status: DISCONTINUED | OUTPATIENT
Start: 2022-03-10 | End: 2022-03-15 | Stop reason: HOSPADM

## 2022-03-10 RX ORDER — ATORVASTATIN CALCIUM 40 MG/1
40 TABLET, FILM COATED ORAL NIGHTLY
Status: CANCELLED | OUTPATIENT
Start: 2022-03-10

## 2022-03-10 RX ORDER — DIVALPROEX SODIUM 250 MG/1
250 TABLET, DELAYED RELEASE ORAL EVERY 8 HOURS SCHEDULED
Status: CANCELLED | OUTPATIENT
Start: 2022-03-10

## 2022-03-10 RX ORDER — LIDOCAINE HYDROCHLORIDE AND EPINEPHRINE 10; 10 MG/ML; UG/ML
10 INJECTION, SOLUTION INFILTRATION; PERINEURAL ONCE
Status: COMPLETED | OUTPATIENT
Start: 2022-03-10 | End: 2022-03-10

## 2022-03-10 RX ORDER — HYDROCODONE BITARTRATE AND ACETAMINOPHEN 5; 325 MG/1; MG/1
1 TABLET ORAL EVERY 6 HOURS PRN
Status: DISCONTINUED | OUTPATIENT
Start: 2022-03-10 | End: 2022-03-13

## 2022-03-10 RX ORDER — ACETAMINOPHEN 325 MG/1
650 TABLET ORAL EVERY 4 HOURS PRN
Status: DISCONTINUED | OUTPATIENT
Start: 2022-03-10 | End: 2022-03-15 | Stop reason: HOSPADM

## 2022-03-10 RX ORDER — SODIUM CHLORIDE 0.9 % (FLUSH) 0.9 %
10 SYRINGE (ML) INJECTION AS NEEDED
Status: DISCONTINUED | OUTPATIENT
Start: 2022-03-10 | End: 2022-03-15 | Stop reason: HOSPADM

## 2022-03-10 RX ORDER — DIVALPROEX SODIUM 250 MG/1
250 TABLET, DELAYED RELEASE ORAL EVERY 8 HOURS SCHEDULED
Status: DISCONTINUED | OUTPATIENT
Start: 2022-03-10 | End: 2022-03-15 | Stop reason: HOSPADM

## 2022-03-10 RX ORDER — CALCIUM CARBONATE 200(500)MG
750 TABLET,CHEWABLE ORAL DAILY PRN
Status: CANCELLED | OUTPATIENT
Start: 2022-03-10

## 2022-03-10 RX ORDER — HYDROCODONE BITARTRATE AND ACETAMINOPHEN 5; 325 MG/1; MG/1
1 TABLET ORAL ONCE
Status: COMPLETED | OUTPATIENT
Start: 2022-03-10 | End: 2022-03-10

## 2022-03-10 RX ORDER — BUPIVACAINE HYDROCHLORIDE 2.5 MG/ML
30 INJECTION, SOLUTION EPIDURAL; INFILTRATION; INTRACAUDAL ONCE
Status: COMPLETED | OUTPATIENT
Start: 2022-03-10 | End: 2022-03-10

## 2022-03-10 RX ORDER — LISINOPRIL 10 MG/1
10 TABLET ORAL DAILY
Status: DISCONTINUED | OUTPATIENT
Start: 2022-03-11 | End: 2022-03-15 | Stop reason: HOSPADM

## 2022-03-10 RX ADMIN — LIDOCAINE HYDROCHLORIDE,EPINEPHRINE BITARTRATE 10 ML: 10; .01 INJECTION, SOLUTION INFILTRATION; PERINEURAL at 19:17

## 2022-03-10 RX ADMIN — ATORVASTATIN CALCIUM 40 MG: 40 TABLET, FILM COATED ORAL at 23:13

## 2022-03-10 RX ADMIN — HYDROMORPHONE HYDROCHLORIDE 0.5 MG: 1 INJECTION, SOLUTION INTRAMUSCULAR; INTRAVENOUS; SUBCUTANEOUS at 23:13

## 2022-03-10 RX ADMIN — BUPIVACAINE HYDROCHLORIDE 30 ML: 2.5 INJECTION, SOLUTION EPIDURAL; INFILTRATION; INTRACAUDAL; PERINEURAL at 19:17

## 2022-03-10 RX ADMIN — HYDROCODONE BITARTRATE AND ACETAMINOPHEN 1 TABLET: 5; 325 TABLET ORAL at 19:20

## 2022-03-11 ENCOUNTER — ANESTHESIA (OUTPATIENT)
Dept: PERIOP | Facility: HOSPITAL | Age: 82
End: 2022-03-11

## 2022-03-11 ENCOUNTER — APPOINTMENT (OUTPATIENT)
Dept: GENERAL RADIOLOGY | Facility: HOSPITAL | Age: 82
End: 2022-03-11

## 2022-03-11 ENCOUNTER — ANESTHESIA EVENT CONVERTED (OUTPATIENT)
Dept: ANESTHESIOLOGY | Facility: HOSPITAL | Age: 82
End: 2022-03-11

## 2022-03-11 ENCOUNTER — ANESTHESIA EVENT (OUTPATIENT)
Dept: PERIOP | Facility: HOSPITAL | Age: 82
End: 2022-03-11

## 2022-03-11 LAB
ANION GAP SERPL CALCULATED.3IONS-SCNC: 7 MMOL/L (ref 5–15)
BACTERIA UR QL AUTO: ABNORMAL /HPF
BILIRUB UR QL STRIP: NEGATIVE
BUN SERPL-MCNC: 13 MG/DL (ref 8–23)
BUN/CREAT SERPL: 28.3 (ref 7–25)
CALCIUM SPEC-SCNC: 8.4 MG/DL (ref 8.6–10.5)
CHLORIDE SERPL-SCNC: 104 MMOL/L (ref 98–107)
CLARITY UR: CLEAR
CO2 SERPL-SCNC: 28 MMOL/L (ref 22–29)
COLOR UR: YELLOW
CREAT SERPL-MCNC: 0.46 MG/DL (ref 0.57–1)
DEPRECATED RDW RBC AUTO: 49 FL (ref 37–54)
EGFRCR SERPLBLD CKD-EPI 2021: 97.5 ML/MIN/1.73
ERYTHROCYTE [DISTWIDTH] IN BLOOD BY AUTOMATED COUNT: 13.6 % (ref 12.3–15.4)
GLUCOSE SERPL-MCNC: 107 MG/DL (ref 65–99)
GLUCOSE UR STRIP-MCNC: NEGATIVE MG/DL
HCT VFR BLD AUTO: 30.3 % (ref 34–46.6)
HGB BLD-MCNC: 9.9 G/DL (ref 12–15.9)
HGB UR QL STRIP.AUTO: NEGATIVE
HYALINE CASTS UR QL AUTO: ABNORMAL /LPF
KETONES UR QL STRIP: ABNORMAL
LEUKOCYTE ESTERASE UR QL STRIP.AUTO: ABNORMAL
MCH RBC QN AUTO: 31.8 PG (ref 26.6–33)
MCHC RBC AUTO-ENTMCNC: 32.7 G/DL (ref 31.5–35.7)
MCV RBC AUTO: 97.4 FL (ref 79–97)
NITRITE UR QL STRIP: NEGATIVE
PH UR STRIP.AUTO: 6.5 [PH] (ref 5–8)
PLATELET # BLD AUTO: 127 10*3/MM3 (ref 140–450)
PMV BLD AUTO: 10.1 FL (ref 6–12)
POTASSIUM SERPL-SCNC: 3.3 MMOL/L (ref 3.5–5.2)
PROT UR QL STRIP: NEGATIVE
QT INTERVAL: 434 MS
QTC INTERVAL: 426 MS
RBC # BLD AUTO: 3.11 10*6/MM3 (ref 3.77–5.28)
RBC # UR STRIP: ABNORMAL /HPF
REF LAB TEST METHOD: ABNORMAL
SODIUM SERPL-SCNC: 139 MMOL/L (ref 136–145)
SP GR UR STRIP: 1.02 (ref 1–1.03)
SQUAMOUS #/AREA URNS HPF: ABNORMAL /HPF
UROBILINOGEN UR QL STRIP: ABNORMAL
WBC # UR STRIP: ABNORMAL /HPF
WBC NRBC COR # BLD: 6.16 10*3/MM3 (ref 3.4–10.8)

## 2022-03-11 PROCEDURE — 25010000002 PROPOFOL 10 MG/ML EMULSION: Performed by: NURSE ANESTHETIST, CERTIFIED REGISTERED

## 2022-03-11 PROCEDURE — C1713 ANCHOR/SCREW BN/BN,TIS/BN: HCPCS | Performed by: ORTHOPAEDIC SURGERY

## 2022-03-11 PROCEDURE — 25010000002 CEFAZOLIN IN DEXTROSE 2-4 GM/100ML-% SOLUTION: Performed by: ORTHOPAEDIC SURGERY

## 2022-03-11 PROCEDURE — 81001 URINALYSIS AUTO W/SCOPE: CPT | Performed by: PHYSICIAN ASSISTANT

## 2022-03-11 PROCEDURE — 80048 BASIC METABOLIC PNL TOTAL CA: CPT | Performed by: PHYSICIAN ASSISTANT

## 2022-03-11 PROCEDURE — 25010000002 VANCOMYCIN PER 500 MG: Performed by: ORTHOPAEDIC SURGERY

## 2022-03-11 PROCEDURE — 0QS606Z REPOSITION RIGHT UPPER FEMUR WITH INTRAMEDULLARY INTERNAL FIXATION DEVICE, OPEN APPROACH: ICD-10-PCS | Performed by: ORTHOPAEDIC SURGERY

## 2022-03-11 PROCEDURE — 25010000002 FENTANYL CITRATE (PF) 50 MCG/ML SOLUTION

## 2022-03-11 PROCEDURE — 25010000002 ONDANSETRON PER 1 MG: Performed by: PHYSICIAN ASSISTANT

## 2022-03-11 PROCEDURE — 25010000002 ONDANSETRON PER 1 MG: Performed by: NURSE ANESTHETIST, CERTIFIED REGISTERED

## 2022-03-11 PROCEDURE — 99232 SBSQ HOSP IP/OBS MODERATE 35: CPT | Performed by: INTERNAL MEDICINE

## 2022-03-11 PROCEDURE — 25010000002 DEXAMETHASONE PER 1 MG: Performed by: NURSE ANESTHETIST, CERTIFIED REGISTERED

## 2022-03-11 PROCEDURE — 87086 URINE CULTURE/COLONY COUNT: CPT | Performed by: PHYSICIAN ASSISTANT

## 2022-03-11 PROCEDURE — 85027 COMPLETE CBC AUTOMATED: CPT | Performed by: PHYSICIAN ASSISTANT

## 2022-03-11 PROCEDURE — 76000 FLUOROSCOPY <1 HR PHYS/QHP: CPT

## 2022-03-11 PROCEDURE — 25010000002 FENTANYL CITRATE (PF) 50 MCG/ML SOLUTION: Performed by: NURSE ANESTHETIST, CERTIFIED REGISTERED

## 2022-03-11 DEVICE — TRIGEN INTERTAN 10S 10MM X 18CM 130DEGREE
Type: IMPLANTABLE DEVICE | Site: HIP | Status: FUNCTIONAL
Brand: TRIGEN

## 2022-03-11 DEVICE — INTERTAN LAG/COMPRESSION SCREW KIT                                    100MM / 95MM
Type: IMPLANTABLE DEVICE | Site: HIP | Status: FUNCTIONAL
Brand: TRIGEN

## 2022-03-11 DEVICE — TRIGEN LOW PROFILE SCREW 5.0MM X 27.5MM
Type: IMPLANTABLE DEVICE | Site: HIP | Status: FUNCTIONAL
Brand: TRIGEN

## 2022-03-11 RX ORDER — MEPERIDINE HYDROCHLORIDE 25 MG/ML
12.5 INJECTION INTRAMUSCULAR; INTRAVENOUS; SUBCUTANEOUS
Status: DISCONTINUED | OUTPATIENT
Start: 2022-03-11 | End: 2022-03-11 | Stop reason: HOSPADM

## 2022-03-11 RX ORDER — ROCURONIUM BROMIDE 10 MG/ML
INJECTION, SOLUTION INTRAVENOUS AS NEEDED
Status: DISCONTINUED | OUTPATIENT
Start: 2022-03-11 | End: 2022-03-11 | Stop reason: SURG

## 2022-03-11 RX ORDER — FENTANYL CITRATE 50 UG/ML
INJECTION, SOLUTION INTRAMUSCULAR; INTRAVENOUS
Status: DISPENSED
Start: 2022-03-11 | End: 2022-03-12

## 2022-03-11 RX ORDER — BUPIVACAINE HYDROCHLORIDE 5 MG/ML
INJECTION, SOLUTION EPIDURAL; INTRACAUDAL
Status: COMPLETED | OUTPATIENT
Start: 2022-03-11 | End: 2022-03-11

## 2022-03-11 RX ORDER — FENTANYL CITRATE 50 UG/ML
INJECTION, SOLUTION INTRAMUSCULAR; INTRAVENOUS
Status: COMPLETED
Start: 2022-03-11 | End: 2022-03-11

## 2022-03-11 RX ORDER — LIDOCAINE HYDROCHLORIDE 10 MG/ML
INJECTION, SOLUTION EPIDURAL; INFILTRATION; INTRACAUDAL; PERINEURAL AS NEEDED
Status: DISCONTINUED | OUTPATIENT
Start: 2022-03-11 | End: 2022-03-11 | Stop reason: SURG

## 2022-03-11 RX ORDER — SODIUM CHLORIDE 0.9 % (FLUSH) 0.9 %
10 SYRINGE (ML) INJECTION AS NEEDED
Status: DISCONTINUED | OUTPATIENT
Start: 2022-03-11 | End: 2022-03-11 | Stop reason: HOSPADM

## 2022-03-11 RX ORDER — DEXAMETHASONE SODIUM PHOSPHATE 10 MG/ML
INJECTION INTRAMUSCULAR; INTRAVENOUS AS NEEDED
Status: DISCONTINUED | OUTPATIENT
Start: 2022-03-11 | End: 2022-03-11 | Stop reason: SURG

## 2022-03-11 RX ORDER — DROPERIDOL 2.5 MG/ML
0.62 INJECTION, SOLUTION INTRAMUSCULAR; INTRAVENOUS AS NEEDED
Status: DISCONTINUED | OUTPATIENT
Start: 2022-03-11 | End: 2022-03-11 | Stop reason: HOSPADM

## 2022-03-11 RX ORDER — SODIUM CHLORIDE 0.9 % (FLUSH) 0.9 %
3 SYRINGE (ML) INJECTION EVERY 12 HOURS SCHEDULED
Status: DISCONTINUED | OUTPATIENT
Start: 2022-03-11 | End: 2022-03-11 | Stop reason: HOSPADM

## 2022-03-11 RX ORDER — FAMOTIDINE 10 MG/ML
20 INJECTION, SOLUTION INTRAVENOUS ONCE
Status: COMPLETED | OUTPATIENT
Start: 2022-03-11 | End: 2022-03-11

## 2022-03-11 RX ORDER — TRANEXAMIC ACID 10 MG/ML
1000 INJECTION, SOLUTION INTRAVENOUS ONCE
Status: COMPLETED | OUTPATIENT
Start: 2022-03-11 | End: 2022-03-11

## 2022-03-11 RX ORDER — FENTANYL CITRATE 50 UG/ML
50 INJECTION, SOLUTION INTRAMUSCULAR; INTRAVENOUS
Status: DISCONTINUED | OUTPATIENT
Start: 2022-03-11 | End: 2022-03-11 | Stop reason: HOSPADM

## 2022-03-11 RX ORDER — PROMETHAZINE HYDROCHLORIDE 25 MG/1
25 TABLET ORAL ONCE AS NEEDED
Status: DISCONTINUED | OUTPATIENT
Start: 2022-03-11 | End: 2022-03-11 | Stop reason: HOSPADM

## 2022-03-11 RX ORDER — DROPERIDOL 2.5 MG/ML
0.62 INJECTION, SOLUTION INTRAMUSCULAR; INTRAVENOUS ONCE AS NEEDED
Status: DISCONTINUED | OUTPATIENT
Start: 2022-03-11 | End: 2022-03-11 | Stop reason: HOSPADM

## 2022-03-11 RX ORDER — CEFAZOLIN SODIUM 2 G/100ML
2 INJECTION, SOLUTION INTRAVENOUS ONCE
Status: COMPLETED | OUTPATIENT
Start: 2022-03-11 | End: 2022-03-11

## 2022-03-11 RX ORDER — EPHEDRINE SULFATE 50 MG/ML
INJECTION, SOLUTION INTRAVENOUS AS NEEDED
Status: DISCONTINUED | OUTPATIENT
Start: 2022-03-11 | End: 2022-03-11 | Stop reason: SURG

## 2022-03-11 RX ORDER — HYDROMORPHONE HYDROCHLORIDE 1 MG/ML
0.5 INJECTION, SOLUTION INTRAMUSCULAR; INTRAVENOUS; SUBCUTANEOUS
Status: DISCONTINUED | OUTPATIENT
Start: 2022-03-11 | End: 2022-03-11 | Stop reason: HOSPADM

## 2022-03-11 RX ORDER — LIDOCAINE HYDROCHLORIDE 10 MG/ML
0.5 INJECTION, SOLUTION EPIDURAL; INFILTRATION; INTRACAUDAL; PERINEURAL ONCE AS NEEDED
Status: DISCONTINUED | OUTPATIENT
Start: 2022-03-11 | End: 2022-03-11 | Stop reason: HOSPADM

## 2022-03-11 RX ORDER — FENTANYL CITRATE 50 UG/ML
INJECTION, SOLUTION INTRAMUSCULAR; INTRAVENOUS AS NEEDED
Status: DISCONTINUED | OUTPATIENT
Start: 2022-03-11 | End: 2022-03-11 | Stop reason: SURG

## 2022-03-11 RX ORDER — SODIUM CHLORIDE 0.9 % (FLUSH) 0.9 %
10 SYRINGE (ML) INJECTION EVERY 12 HOURS SCHEDULED
Status: DISCONTINUED | OUTPATIENT
Start: 2022-03-11 | End: 2022-03-11 | Stop reason: HOSPADM

## 2022-03-11 RX ORDER — MIDAZOLAM HYDROCHLORIDE 1 MG/ML
0.5 INJECTION INTRAMUSCULAR; INTRAVENOUS
Status: DISCONTINUED | OUTPATIENT
Start: 2022-03-11 | End: 2022-03-11 | Stop reason: HOSPADM

## 2022-03-11 RX ORDER — DIVALPROEX SODIUM 250 MG/1
250 TABLET, DELAYED RELEASE ORAL ONCE
Status: COMPLETED | OUTPATIENT
Start: 2022-03-11 | End: 2022-03-11

## 2022-03-11 RX ORDER — NALOXONE HCL 0.4 MG/ML
0.4 VIAL (ML) INJECTION AS NEEDED
Status: DISCONTINUED | OUTPATIENT
Start: 2022-03-11 | End: 2022-03-11 | Stop reason: HOSPADM

## 2022-03-11 RX ORDER — HYDROCODONE BITARTRATE AND ACETAMINOPHEN 5; 325 MG/1; MG/1
1 TABLET ORAL ONCE AS NEEDED
Status: DISCONTINUED | OUTPATIENT
Start: 2022-03-11 | End: 2022-03-11 | Stop reason: HOSPADM

## 2022-03-11 RX ORDER — LABETALOL HYDROCHLORIDE 5 MG/ML
5 INJECTION, SOLUTION INTRAVENOUS
Status: DISCONTINUED | OUTPATIENT
Start: 2022-03-11 | End: 2022-03-11 | Stop reason: HOSPADM

## 2022-03-11 RX ORDER — FAMOTIDINE 20 MG/1
20 TABLET, FILM COATED ORAL ONCE
Status: DISCONTINUED | OUTPATIENT
Start: 2022-03-11 | End: 2022-03-11 | Stop reason: HOSPADM

## 2022-03-11 RX ORDER — PROPOFOL 10 MG/ML
VIAL (ML) INTRAVENOUS AS NEEDED
Status: DISCONTINUED | OUTPATIENT
Start: 2022-03-11 | End: 2022-03-11 | Stop reason: SURG

## 2022-03-11 RX ORDER — HYDRALAZINE HYDROCHLORIDE 20 MG/ML
5 INJECTION INTRAMUSCULAR; INTRAVENOUS
Status: DISCONTINUED | OUTPATIENT
Start: 2022-03-11 | End: 2022-03-11 | Stop reason: HOSPADM

## 2022-03-11 RX ORDER — ONDANSETRON 2 MG/ML
4 INJECTION INTRAMUSCULAR; INTRAVENOUS ONCE AS NEEDED
Status: DISCONTINUED | OUTPATIENT
Start: 2022-03-11 | End: 2022-03-11 | Stop reason: HOSPADM

## 2022-03-11 RX ORDER — IPRATROPIUM BROMIDE AND ALBUTEROL SULFATE 2.5; .5 MG/3ML; MG/3ML
3 SOLUTION RESPIRATORY (INHALATION) ONCE AS NEEDED
Status: DISCONTINUED | OUTPATIENT
Start: 2022-03-11 | End: 2022-03-11 | Stop reason: HOSPADM

## 2022-03-11 RX ORDER — SODIUM CHLORIDE 0.9 % (FLUSH) 0.9 %
3-10 SYRINGE (ML) INJECTION AS NEEDED
Status: DISCONTINUED | OUTPATIENT
Start: 2022-03-11 | End: 2022-03-11 | Stop reason: HOSPADM

## 2022-03-11 RX ORDER — SODIUM CHLORIDE, SODIUM LACTATE, POTASSIUM CHLORIDE, CALCIUM CHLORIDE 600; 310; 30; 20 MG/100ML; MG/100ML; MG/100ML; MG/100ML
9 INJECTION, SOLUTION INTRAVENOUS CONTINUOUS
Status: DISCONTINUED | OUTPATIENT
Start: 2022-03-11 | End: 2022-03-15

## 2022-03-11 RX ORDER — ONDANSETRON 2 MG/ML
INJECTION INTRAMUSCULAR; INTRAVENOUS AS NEEDED
Status: DISCONTINUED | OUTPATIENT
Start: 2022-03-11 | End: 2022-03-11 | Stop reason: SURG

## 2022-03-11 RX ORDER — PROMETHAZINE HYDROCHLORIDE 25 MG/1
25 SUPPOSITORY RECTAL ONCE AS NEEDED
Status: DISCONTINUED | OUTPATIENT
Start: 2022-03-11 | End: 2022-03-11 | Stop reason: HOSPADM

## 2022-03-11 RX ADMIN — DIVALPROEX SODIUM 250 MG: 250 TABLET, DELAYED RELEASE ORAL at 17:25

## 2022-03-11 RX ADMIN — CEFAZOLIN SODIUM 2 G: 2 INJECTION, SOLUTION INTRAVENOUS at 13:06

## 2022-03-11 RX ADMIN — VANCOMYCIN HYDROCHLORIDE 750 MG: 750 INJECTION, SOLUTION INTRAVENOUS at 13:46

## 2022-03-11 RX ADMIN — DIVALPROEX SODIUM 250 MG: 250 TABLET, DELAYED RELEASE ORAL at 09:42

## 2022-03-11 RX ADMIN — LIDOCAINE HYDROCHLORIDE 50 MG: 10 INJECTION, SOLUTION EPIDURAL; INFILTRATION; INTRACAUDAL; PERINEURAL at 13:44

## 2022-03-11 RX ADMIN — PROPOFOL 100 MG: 10 INJECTION, EMULSION INTRAVENOUS at 13:44

## 2022-03-11 RX ADMIN — ATORVASTATIN CALCIUM 40 MG: 40 TABLET, FILM COATED ORAL at 21:41

## 2022-03-11 RX ADMIN — DIVALPROEX SODIUM 250 MG: 250 TABLET, DELAYED RELEASE ORAL at 00:13

## 2022-03-11 RX ADMIN — ONDANSETRON 4 MG: 2 INJECTION INTRAMUSCULAR; INTRAVENOUS at 14:34

## 2022-03-11 RX ADMIN — TRANEXAMIC ACID 1000 MG: 10 INJECTION, SOLUTION INTRAVENOUS at 13:57

## 2022-03-11 RX ADMIN — DEXAMETHASONE SODIUM PHOSPHATE 4 MG: 10 INJECTION INTRAMUSCULAR; INTRAVENOUS at 14:02

## 2022-03-11 RX ADMIN — FAMOTIDINE 20 MG: 10 INJECTION INTRAVENOUS at 11:47

## 2022-03-11 RX ADMIN — SUGAMMADEX 200 MG: 100 INJECTION, SOLUTION INTRAVENOUS at 14:40

## 2022-03-11 RX ADMIN — BUPIVACAINE HYDROCHLORIDE 20 ML: 5 INJECTION, SOLUTION EPIDURAL; INTRACAUDAL at 12:28

## 2022-03-11 RX ADMIN — DIVALPROEX SODIUM 250 MG: 250 TABLET, DELAYED RELEASE ORAL at 21:42

## 2022-03-11 RX ADMIN — TRANEXAMIC ACID 1000 MG: 10 INJECTION, SOLUTION INTRAVENOUS at 14:36

## 2022-03-11 RX ADMIN — HYDROCODONE BITARTRATE AND ACETAMINOPHEN 1 TABLET: 5; 325 TABLET ORAL at 21:50

## 2022-03-11 RX ADMIN — ROCURONIUM BROMIDE 30 MG: 10 INJECTION, SOLUTION INTRAVENOUS at 13:44

## 2022-03-11 RX ADMIN — LISINOPRIL 10 MG: 10 TABLET ORAL at 08:33

## 2022-03-11 RX ADMIN — FENTANYL CITRATE 50 MCG: 50 INJECTION, SOLUTION INTRAMUSCULAR; INTRAVENOUS at 15:18

## 2022-03-11 RX ADMIN — SODIUM CHLORIDE, POTASSIUM CHLORIDE, SODIUM LACTATE AND CALCIUM CHLORIDE 9 ML/HR: 600; 310; 30; 20 INJECTION, SOLUTION INTRAVENOUS at 11:47

## 2022-03-11 RX ADMIN — EPHEDRINE SULFATE 5 MG: 50 INJECTION INTRAVENOUS at 14:39

## 2022-03-11 RX ADMIN — ONDANSETRON 4 MG: 2 INJECTION INTRAMUSCULAR; INTRAVENOUS at 10:27

## 2022-03-11 RX ADMIN — FENTANYL CITRATE 100 MCG: 50 INJECTION, SOLUTION INTRAMUSCULAR; INTRAVENOUS at 14:09

## 2022-03-11 RX ADMIN — Medication 10 ML: at 08:34

## 2022-03-11 RX ADMIN — Medication 10 ML: at 21:50

## 2022-03-11 RX ADMIN — FENTANYL CITRATE 50 MCG: 0.05 INJECTION, SOLUTION INTRAMUSCULAR; INTRAVENOUS at 15:18

## 2022-03-11 NOTE — OP NOTE
DATE OF OPERATION: 03/11/22    PREOPERATIVE DIAGNOSIS: Right intertrochanteric hip fracture.       POSTOPERATIVE DIAGNOSIS: Right intertrochanteric hip fracture.       PROCEDURE PERFORMED: Treatment of right intertrochanteric hip fracture with intramedullary device      SURGEON: Danny Lynne MD       ASSISTANT: Gurmeet Rivero MD, PGY 5      ANESTHESIA: General.       ESTIMATED BLOOD LOSS: 50 mL.       COMPLICATIONS: None.       DISPOSITION: To the recovery room in a stable condition.       IMPLANTS: [employer] InterTAN 10 x 130 short; 100 mm lag and 95 mm secondary screw; 27.5 mm distal locking screw      INDICATIONS FOR PROCEDURE: This is a 81-year-old who sustained a right intertrochanteric hip fracture. After discussion of risks, benefits, and alternatives, surgery was recommended as a necessity. The patient understood and wished to proceed.       DESCRIPTION OF PROCEDURE: In the holding area, the patient identified the right hip as the correct operative extremity. This was initialed by the surgeon with the patient's acknowledgment. The patient was then taken to the operating room and placed in the supine position. Upon induction of adequate anesthesia, the patient was transferred to the fracture table and positioned.  C-arm confirmed appropriate alignment after reduction maneuver was performed.  The right hip and lower extremity were then prepped and draped in usual sterile fashion.  Timeout confirmed the correct patient and operative extremity and that antibiotics were on board.  Incision was made sharply through the skin just proximal to the greater trochanter and carried sharply through the skin and subcutaneous tissue.  A guidewire was inserted onto the tip of the greater trochanter and inserted into the proximal femur under C-arm guidance.  AP and lateral images confirmed appropriate placement.  A large entry reamer was then used to open the canal.  The nail was then inserted.  The guide was used to  localize a lateral incision and the guide was inserted and a guidewire inserted into the proximal femoral head.  AP and lateral images confirmed appropriate placement.  Length was measured and the lateral cortex reamed, followed by drilling to the appropriate screw length. The central screw was then inserted with excellent purchase.   The secondary screw was then placed.  Compression was applied.  Images showed excellent alignment of the fracture and compression.  The distal locking screw was placed through the guide through a small stab incision.  AP lateral and oblique views confirmed appropriate placement of all hardware and near anatomic fixation of the fracture.  Wounds were then irrigated and closed with 0 Vicryl, 2-0 Vicryl, and skin with Monocryl and Dermabond. Sterile dressing was placed. Anesthesia was reversed. The patient was taken to the Recovery Room in stable condition. All instrument, needle, and sponge counts were correct.  The patient will be weightbearing as tolerated.

## 2022-03-11 NOTE — PROGRESS NOTES
[facility] Medicine Services  PROGRESS NOTE    Patient Name: Jenna Russell  : 1940  MRN: [id number]    Date of Admission: 3/10/2022  Primary Care Physician: Judi Rodriguez MD    Subjective   Subjective     CC:  Hip fx    HPI:  Doing fine. Wife at bedside. Anticipating surgery. No new complaints    ROS:  Gen- No fevers, chills  CV- No chest pain, palpitations  Resp- No cough, dyspnea  GI- No N/V/D, abd pain         Objective   Objective     Vital Signs:   Temp:  [97.8 °F (36.6 °C)-98.6 °F (37 °C)] 98 °F (36.7 °C)  Heart Rate:  [59-69] 67  Resp:  [16] 16  BP: (122-171)/(58-95) 134/58     Physical Exam:  GEN: NAD, resting in bed, awake  HEENT: on room air, atraumatic, normocephalic  NECK: supple, no masses  RESP: on room air, normal effort  CV: on tele, sinus rhythm  PSYCH: normal affect, appropriate  NEURO: awake, alert, no focal deficits noted  MSK: no edema noted  SKIN: no rashes noted     Results Reviewed:  LAB RESULTS:      Lab 22  0858 03/10/22  1949   WBC 6.16 9.23   HEMOGLOBIN 9.9* 11.8*   HEMATOCRIT 30.3* 35.3   PLATELETS 127* 145   NEUTROS ABS  --  6.45   IMMATURE GRANS (ABS)  --  0.03   LYMPHS ABS  --  1.76   MONOS ABS  --  0.94*   EOS ABS  --  0.03   MCV 97.4* 96.7         Lab 22  0857 03/10/22  1949   SODIUM 139 140   POTASSIUM 3.3* 3.9   CHLORIDE 104 104   CO2 28.0 26.0   ANION GAP 7.0 10.0   BUN 13 17   CREATININE 0.46* 0.60   EGFR 97.5 91.4   GLUCOSE 107* 104*   CALCIUM 8.4* 9.3         Lab 03/10/22  1949   TOTAL PROTEIN 6.4   ALBUMIN 4.10   GLOBULIN 2.3   ALT (SGPT) 16   AST (SGOT) 32   BILIRUBIN 0.4   ALK PHOS 64                 Lab 03/10/22  1949   IRON 59   IRON SATURATION 18*   TIBC 329   TRANSFERRIN 221   FERRITIN 142.20         Brief Urine Lab Results  (Last result in the past 365 days)      Color   Clarity   Blood   Leuk Est   Nitrite   Protein   CREAT   Urine HCG        22 0700 Yellow   Clear   Negative   Moderate (2+)   Negative    Negative                 Microbiology Results Abnormal     Procedure Component Value - Date/Time    COVID PRE-OP / PRE-PROCEDURE SCREENING ORDER (NO ISOLATION) - Swab, Nasopharynx [[id number]]  (Normal) Collected: 03/10/22 2231    Lab Status: Final result Specimen: Swab from Nasopharynx Updated: 03/10/22 2310    Narrative:      The following orders were created for panel order COVID PRE-OP / PRE-PROCEDURE SCREENING ORDER (NO ISOLATION) - Swab, Nasopharynx.  Procedure                               Abnormality         Status                     ---------                               -----------         ------                     COVID-19 and FLU A/B PCR...[[id number]]  Normal              Final result                 Please view results for these tests on the individual orders.    COVID-19 and FLU A/B PCR - Swab, Nasopharynx [[id number]]  (Normal) Collected: 03/10/22 2231    Lab Status: Final result Specimen: Swab from Nasopharynx Updated: 03/10/22 2310     COVID19 Not Detected     Influenza A PCR Not Detected     Influenza B PCR Not Detected    Narrative:      Fact sheet for providers: https://www.fda.gov/media/058663/download    Fact sheet for patients: https://www.fda.gov/media/999631/download    Test performed by PCR.          XR Femur 2 View Right    Result Date: 3/10/2022  DATE OF EXAM: 3/10/2022 5:26 PM  PROCEDURE: XR FEMUR 2 VW RIGHT-  INDICATIONS: fall/pain  COMPARISON: No comparisons available.  TECHNIQUE: Two radiographic views of the right femur were obtained.  FINDINGS: There is a nondisplaced intertrochanteric right femoral fracture. Right hip is maintained in alignment with mild right hip osteoarthritis. Visualized portions of the pelvis are intact. There is no distal femoral fracture. Atherosclerotic vascular calcifications noted. Patellofemoral spurring with mild osteoarthritis of the right knee partially imaged.      Impression: Nondisplaced intertrochanteric right femoral fracture.  This report was  finalized on 3/10/2022 6:37 PM by Marcelo Muniz MD.      XR Chest 1 View    Result Date: 3/10/2022   DATE OF EXAM: 3/10/2022 6:05 PM  PROCEDURE: XR CHEST 1 VW-  INDICATIONS: fall  COMPARISON: No Comparisons Available  TECHNIQUE: Portable chest radiograph.  FINDINGS:  The cardiomediastinal silhouette is within normal limits. The lungs are clear. There is no pneumothorax, focal consolidation, or large pleural effusion. Osseous structures grossly intact.      Impression: No acute process.  This report was finalized on 3/10/2022 6:35 PM by Marcelo Muniz MD.      XR Hip With or Without Pelvis 2 - 3 View Right    Result Date: 3/10/2022  DATE OF EXAM: 3/10/2022 5:27 PM  PROCEDURE: XR HIP W OR WO PELVIS 2-3 VIEW RIGHT-  INDICATIONS: FALL  COMPARISON: No comparisons available.  TECHNIQUE: AP and Lateral views of the right hip and one view of the pelvis were obtained.  FINDINGS: There is a left hip prosthesis noted. The iliopectineal and ilioischial lines are intact. There is a intertrochanteric right femoral fracture without displacement. There is convex left levocurvature of the lower lumbar spine. Remaining bony pelvis appears intact. Mild right hip osteoarthritis.      Impression: Nondisplaced right proximal femoral intertrochanteric fracture.  This report was finalized on 3/10/2022 6:35 PM by Marcelo Muniz MD.        Results for orders placed during the hospital encounter of 01/20/18    Adult Transthoracic Echo Complete W/ Cont if Necessary Per Protocol (With Agitated Saline)    Interpretation Summary  · Left ventricular wall thickness is consistent with hypertrophy.  · Left ventricular systolic function is normal.  · Moderate aortic valve regurgitation is present.  · Mild mitral valve regurgitation is present  · Mild tricuspid valve regurgitation is present.  · Mild pulmonic valve regurgitation is present.      I have reviewed the medications:  Scheduled Meds:atorvastatin, 40 mg, Oral, Nightly  divalproex, 250 mg, Oral,  Q8H  lisinopril, 10 mg, Oral, Daily  sodium chloride, 10 mL, Intravenous, Q12H      Continuous Infusions:   PRN Meds:.•  acetaminophen  •  HYDROcodone-acetaminophen  •  HYDROmorphone  •  melatonin  •  ondansetron  •  sodium chloride    Assessment/Plan   Assessment & Plan     Active Hospital Problems    Diagnosis  POA   • **Closed right hip fracture, initial encounter ([facility]) [S72.001A]  Yes   • Anemia [D64.9]  Yes   • Daily consumption of alcohol [Z78.9]  Yes   • Seizure disorder ([facility]) [G40.909]  Yes   • Hypertension [I10]  Yes   • HLD (hyperlipidemia) [E78.5]  Yes   • PAF (paroxysmal atrial fibrillation) ([facility]) [I48.0]  Yes      Resolved Hospital Problems   No resolved problems to display.        Brief Hospital Course to date:  Jenna Russlel is a 81 y.o. 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 She presents today with complaints of right hip pain s/p mechanical fall. Patient volunteers that she was out walking around the neighborhood with her partner when she tripped and fell on the sidewalk. There was no head trauma. Patient was was unable to get up on her own and was assisted by her partner and two passersby. Unable to bear weight or move the right lower extremity once upright due to severe pain. Subsequently brought to ED for further evaluation and treatment.    This patient's problems and plans were partially entered by my partner and updated as appropriate by me 03/11/22.           Closed Right Hip Fracture:  - Nondisplaced right proximal femoral intertrochanteric fracture  - secondary to mechanical fall  - PT/OT  - underwent right hip block in ED  - pain and nausea control  - check UA  - NPO after midnight, Dr Lynne planning OR today  - am labs     Anemia:  - check iron and  vitamin studies  - not anticoagulated     PAF:  - stable and rate controlled  - previously on Eliquis until SAH  - scheduled to undergo placement of Watchmen Device at [facility] in June 2022     Hypertension      HLD  - continue lisinopril  - continue statin      Seizure Disorder:  - seizure precautions  - continue depakote      Daily Alcohol Consumption:  - drinks 2-4 drinks daily.  - denies withdrawals or DTs    DVT prophylaxis:  Mechanical DVT prophylaxis orders are present.       AM-PAC 6 Clicks Score (PT): 6 (03/10/22 1241)    Disposition: I expect the patient to be discharged pending improvement/OR    CODE STATUS:   Code Status and Medical Interventions:   Ordered at: 03/10/22 2054     Level Of Support Discussed With:    Patient     Code Status (Patient has no pulse and is not breathing):    CPR (Attempt to Resuscitate)     Medical Interventions (Patient has pulse or is breathing):    Full Support       Carolann Schuler MD  03/11/22

## 2022-03-11 NOTE — ANESTHESIA PREPROCEDURE EVALUATION
Anesthesia Evaluation     Patient summary reviewed and Nursing notes reviewed   NPO Solid Status: > 8 hours  NPO Liquid Status: > 8 hours           Airway   Mallampati: II  TM distance: >3 FB  Neck ROM: full  No difficulty expected  Dental - normal exam     Pulmonary     breath sounds clear to auscultation  Cardiovascular   Exercise tolerance: poor (<4 METS)    Rhythm: regular  Rate: normal        Neuro/Psych  GI/Hepatic/Renal/Endo      Musculoskeletal     Abdominal    Substance History      OB/GYN          Other                        Anesthesia Plan    ASA 2     general with block     intravenous induction     Anesthetic plan, all risks, benefits, and alternatives have been provided, discussed and informed consent has been obtained with: patient.        CODE STATUS:    Level Of Support Discussed With: Patient  Code Status (Patient has no pulse and is not breathing): CPR (Attempt to Resuscitate)  Medical Interventions (Patient has pulse or is breathing): Full Support

## 2022-03-11 NOTE — H&P
[facility] Medicine Services  HISTORY AND PHYSICAL    Patient Name: Jenna Russell  : 1940  MRN: [id number]  Primary Care Physician: Judi Rodriguez MD  Date of admission: 3/10/2022    Subjective   Subjective     Chief Complaint:  Right hip pain    HPI:  Jenna Russell is a 81 y.o. female with a past medical history significant for hypertension, HLD, PAF without anticoagulation, prior left MCA stroke in  with some residual aphasia, intraparenchymal and subarachnoid hemorrhage in the region of the old stroke (while on Eliquis), and seizure disorder. She presents today with complaints of right hip pain s/p mechanical fall. Patient volunteers that she was out walking around the neighborhood with her partner when she tripped and fell on the sidewalk. There was no head trauma. Patient was was unable to get up on her own and was assisted by her partner and two passersby. Unable to bear weight or move the right lower extremity once upright due to severe pain. Subsequently brought to ED for further evaluation and treatment.  Of note, patient has a history of total left hip arthroplasty and reports functional and strong gait at baseline. Currently there are no complaints of fever, cough, congestion, SOB, or chest pain. No abdominal pain or N/V/D> no headache or focal weakness/parathesias. Will admit to inpatient.      COVID Details:    Symptoms:    [x] NONE [] Fever []  Cough [] Shortness of breath [] Change in taste/smell      Review of Systems   Constitutional: Negative for chills, fatigue and fever.   HENT: Negative for congestion and trouble swallowing.    Eyes: Negative for photophobia.   Respiratory: Negative for cough and shortness of breath.    Cardiovascular: Negative for chest pain and leg swelling.   Gastrointestinal: Negative for abdominal pain, diarrhea, nausea and vomiting.   Endocrine: Negative for cold intolerance and heat intolerance.   Genitourinary: Negative for  dysuria and flank pain.   Musculoskeletal: Positive for arthralgias and gait problem.   Skin: Negative for pallor and rash.   Allergic/Immunologic: Negative for immunocompromised state.   Neurological: Negative for dizziness and headaches.   Hematological: Negative for adenopathy.   Psychiatric/Behavioral: Negative for agitation and confusion.        All other systems reviewed and are negative.     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 Normal LVSF.Normal LV diastolic filling. Left atrium midly dilated. RV mild- mod dilated. MIld aortic cusp sclerosis. No evidence of mitral valve prolapse. Mild mitral regurgitation.No pulmonary hypertension or perdicardial effusion. No dilation of aortic root. Venous system appears normal   • H/O: stroke    • HLD (hyperlipidemia) 9/8/2016   • Left temporal lobe infarction (HCC)    • LOM (loss of memory)    • Osteoarthritis 9/8/2016   • Paroxysmal atrial fibrillation (HCC) 9/8/2016   • Thyroid nodule 9/8/2016    1.8x1.2cm   • Weakness generalized    • Wound infection after surgery     Left hip arthroplasty, staph aureus, 6 weeks IV Rocephin       Past Surgical History:   Procedure Laterality Date   • ABDOMINOPLASTY     • BREAST ABSCESS INCISION AND DRAINAGE     • TOTAL HIP ARTHROPLASTY Left     Staph aureus wound infection       Family History: family history includes Diabetes in her mother; Heart attack in her father and maternal grandfather; Heart disease in her paternal grandfather; Heart failure in her father; Hypertension in her mother; Mental illness in her mother; No Known Problems in her sister and  sister; Other in her mother; Stroke in her mother. Otherwise pertinent FHx was reviewed and unremarkable.     Social History:  reports that she quit smoking about 50 years ago. She quit after 2.00 years of use. She has never used smokeless tobacco. She reports current alcohol use of about 14.0 standard drinks of alcohol per week. She reports that she does not use drugs.  Social History     Social History Narrative    With her partner Lianna for 36yrs.. She drinks 1 cup of coffee per day.       Medications:  ALPRAZolam, HYDROcodone-acetaminophen, Vitamin D, aspirin, atorvastatin, calcium carbonate EX, diclofenac, divalproex, folic acid, hyoscyamine, and lisinopril    Allergies   Allergen Reactions   • Penicillins Anaphylaxis       Objective   Objective     Vital Signs:   Temp:  [98.6 °F (37 °C)] 98.6 °F (37 °C)  Heart Rate:  [60] 60  Resp:  [16] 16  BP: (123-136)/(75-95) 136/75    Physical Exam   Constitutional: Awake, alert  Eyes: PERRLA, sclerae anicteric, no conjunctival injection  HENT: NCAT, mucous membranes moist  Neck: Supple, no thyromegaly, no lymphadenopathy, trachea midline  Respiratory: Clear to auscultation bilaterally, nonlabored respirations   Cardiovascular: RRR, no murmurs, rubs, or gallops, palpable pedal pulses bilaterally  Gastrointestinal: Positive bowel sounds, soft, nontender, nondistended  Musculoskeletal: No bilateral ankle edema, no clubbing or cyanosis to extremities  Right hip tender and swollen. Right lower extremity externally rotated. Decreased ROM secondary to pain. N/v intact  Psychiatric: Appropriate affect, cooperative  Neurologic: Oriented x 3, strength symmetric in all extremities, Cranial Nerves grossly intact to confrontation, speech clear  Skin: No rashes      Results Reviewed:  I have personally reviewed most recent indicated data and agree with findings including:  [x]  Laboratory  [x]  Radiology  []  EKG/Telemetry  []  Pathology  []  Cardiac/Vascular Studies  []  Old  records  []  Other:  Most pertinent findings include: vitals stable. Hemoglobin 11.8. EKG without acute changes. Chemistry and hematology otherwise favorable. Imaging of hip shows Nondisplaced right proximal femoral intertrochanteric fracture      LAB RESULTS:      Lab 03/10/22  1949   WBC 9.23   HEMOGLOBIN 11.8*   HEMATOCRIT 35.3   PLATELETS 145   NEUTROS ABS 6.45   IMMATURE GRANS (ABS) 0.03   LYMPHS ABS 1.76   MONOS ABS 0.94*   EOS ABS 0.03   MCV 96.7         Lab 03/10/22  1949   SODIUM 140   POTASSIUM 3.9   CHLORIDE 104   CO2 26.0   ANION GAP 10.0   BUN 17   CREATININE 0.60   EGFR 91.4   GLUCOSE 104*   CALCIUM 9.3         Lab 03/10/22  1949   TOTAL PROTEIN 6.4   ALBUMIN 4.10   GLOBULIN 2.3   ALT (SGPT) 16   AST (SGOT) 32   BILIRUBIN 0.4   ALK PHOS 64                 Lab 03/10/22  1949   IRON 59   IRON SATURATION 18*   TIBC 329   TRANSFERRIN 221   FERRITIN 142.20         Brief Urine Lab Results     None        Microbiology Results (last 10 days)     ** No results found for the last 240 hours. **          XR Femur 2 View Right    Result Date: 3/10/2022  DATE OF EXAM: 3/10/2022 5:26 PM  PROCEDURE: XR FEMUR 2 VW RIGHT-  INDICATIONS: fall/pain  COMPARISON: No comparisons available.  TECHNIQUE: Two radiographic views of the right femur were obtained.  FINDINGS: There is a nondisplaced intertrochanteric right femoral fracture. Right hip is maintained in alignment with mild right hip osteoarthritis. Visualized portions of the pelvis are intact. There is no distal femoral fracture. Atherosclerotic vascular calcifications noted. Patellofemoral spurring with mild osteoarthritis of the right knee partially imaged.      Impression: Nondisplaced intertrochanteric right femoral fracture.  This report was finalized on 3/10/2022 6:37 PM by Marcelo Muniz MD.      XR Chest 1 View    Result Date: 3/10/2022   DATE OF EXAM: 3/10/2022 6:05 PM  PROCEDURE: XR CHEST 1 VW-  INDICATIONS: fall  COMPARISON: No Comparisons Available  TECHNIQUE:  Portable chest radiograph.  FINDINGS:  The cardiomediastinal silhouette is within normal limits. The lungs are clear. There is no pneumothorax, focal consolidation, or large pleural effusion. Osseous structures grossly intact.      Impression: No acute process.  This report was finalized on 3/10/2022 6:35 PM by Marcelo Muniz MD.      XR Hip With or Without Pelvis 2 - 3 View Right    Result Date: 3/10/2022  DATE OF EXAM: 3/10/2022 5:27 PM  PROCEDURE: XR HIP W OR WO PELVIS 2-3 VIEW RIGHT-  INDICATIONS: FALL  COMPARISON: No comparisons available.  TECHNIQUE: AP and Lateral views of the right hip and one view of the pelvis were obtained.  FINDINGS: There is a left hip prosthesis noted. The iliopectineal and ilioischial lines are intact. There is a intertrochanteric right femoral fracture without displacement. There is convex left levocurvature of the lower lumbar spine. Remaining bony pelvis appears intact. Mild right hip osteoarthritis.      Impression: Nondisplaced right proximal femoral intertrochanteric fracture.  This report was finalized on 3/10/2022 6:35 PM by Marcelo Muniz MD.        Results for orders placed during the hospital encounter of 01/20/18    Adult Transthoracic Echo Complete W/ Cont if Necessary Per Protocol (With Agitated Saline)    Interpretation Summary  · Left ventricular wall thickness is consistent with hypertrophy.  · Left ventricular systolic function is normal.  · Moderate aortic valve regurgitation is present.  · Mild mitral valve regurgitation is present  · Mild tricuspid valve regurgitation is present.  · Mild pulmonic valve regurgitation is present.      Assessment/Plan   Assessment & Plan       Closed right hip fracture, initial encounter ([facility])    Anemia    PAF (paroxysmal atrial fibrillation) ([facility])    Hypertension    HLD (hyperlipidemia)    Seizure disorder ([facility])    Daily consumption of alcohol      1. Closed Right Hip Fracture:  - Nondisplaced right proximal femoral intertrochanteric  fracture  - secondary to mechanical fall  - PT/OT  - underwent right hip block in ED  - pain and nausea control  - check UA  - NPO after midnight  - to see Dr. Lynne in am  - am labs    2. Anemia:  - check iron and vitamin studies  - not anticoagulated    3. PAF:  - stable and rate controlled  - previously on Eliquis until SAH  - scheduled to undergo placement of Watchmen Device at [facility] in June 2022    4. Hypertension      HLD  - continue lisinopril  - continue statin    5. Seizure Disorder:  - seizure precautions  - continue depakote    6. Daily Alcohol Consumption:  - drinks 2-4 drinks daily.  - denies withdrawals or DTs    DVT prophylaxis: mechanical    CODE STATUS: full code  Level Of Support Discussed With: Patient  Code Status (Patient has no pulse and is not breathing): CPR (Attempt to Resuscitate)  Medical Interventions (Patient has pulse or is breathing): Full Support      This note has been completed as part of a split-shared workflow.     Electronically signed by Chad Gaytan PA-C, 03/10/22, 9:55 PM EST.          Attending   Admission Attestation       I have seen and examined the patient, performing an independent face-to-face diagnostic evaluation with plan of care reviewed and developed with the advanced practice clinician (APC).      Brief Summary Statement:   Jenna Russell is a 81 y.o. female with history of hypertension, hyperlipidemia who is being admitted for a right hip fracture status post a mechanical fall.   has seen in the ED and agrees to take to surgery tomorrow.  She is currently on endorsing significant right hip pain especially with movement     Remainder of detailed HPI is as noted by APC and has been reviewed and/or edited by me for completeness.    Attending Physical Exam:  Constitutional: Awake, alert  Eyes: PERRLA, sclerae anicteric, no conjunctival injection  HENT: NCAT, mucous membranes moist  Neck: Supple, no thyromegaly, no lymphadenopathy, trachea  midline  Respiratory: Clear to auscultation bilaterally, nonlabored respirations   Cardiovascular: RRR, no murmurs, rubs, or gallops, palpable pedal pulses bilaterally  Gastrointestinal: Positive bowel sounds, soft, nontender, nondistended  Musculoskeletal: Right hip movement limited by pain and tenderness  Psychiatric: Appropriate affect, cooperative  Neurologic: Oriented x 3, expressive aphasia  Skin: No rashes      Brief Assessment/Plan :  See detailed assessment and plan developed with APC which I have reviewed and/or edited for completeness.    Plan for surgical management of right hip fracture with .  Pain controlled.  Not on any anticoagulation.  Continue other home medications.  N.p.o. at midnight    Admission Status: I believe that this patient meets inpatient criteria      Ashely Quick MD  03/10/22

## 2022-03-11 NOTE — CONSULTS
Orthopedic Consult      Patient: Jenna Russell    Date of Admission: 3/10/2022  5:49 PM    YOB: 1940    Medical Record Number: [id number]    Attending Physician: No admitting provider for patient encounter.    Consulting Physician: Danny Lynne MD      Chief Complaints: Closed right hip fracture, initial encounter ([facility]) [S72.001A]      History of Present Illness: 81 y.o. female admitted to [facility] with Closed right hip fracture, initial encounter ([facility]) [S72.001A]. I was consulted for further evaluation and treatment of right hip fracture. Onset of symptoms was abrupt starting a few hours ago.  Symptoms are associated with pain.  Symptoms are aggravated by movement.   Symptoms improve with rest.  This is a very pleasant 81-year-old female well-known to me who unfortunately tripped on a raised area of concrete while on a walk landing on her right hip.  She was unable to bear weight after the injury.  She denies numbness tingling or associated complaints.  She denies any other associated extremity complaints.  She does have a chronic history of right shoulder pain and limited function.  That has not changed since this fall.       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 Normal LVSF.Normal LV diastolic filling. Left atrium midly dilated. RV mild- mod dilated. MIld aortic cusp sclerosis. No evidence of mitral valve prolapse. Mild mitral regurgitation.No pulmonary hypertension or perdicardial effusion. No  dilation of aortic root. Venous system appears normal   • H/O: stroke    • HLD (hyperlipidemia) 2016   • Left temporal lobe infarction (HCC)    • LOM (loss of memory)    • Osteoarthritis 2016   • Paroxysmal atrial fibrillation (HCC) 2016   • Thyroid nodule 2016    1.8x1.2cm   • Weakness generalized    • Wound infection after surgery     Left hip arthroplasty, staph aureus, 6 weeks IV Rocephin        Past Surgical History:   Procedure Laterality Date   • ABDOMINOPLASTY     • BREAST ABSCESS INCISION AND DRAINAGE     • TOTAL HIP ARTHROPLASTY Left     Staph aureus wound infection        Social History     Occupational History   • Occupation: retired,    Tobacco Use   • Smoking status: Former Smoker     Years: 2.00     Quit date:      Years since quittin.2   • Smokeless tobacco: Never Used   • Tobacco comment: OV says she quit smoking within the past year   Substance and Sexual Activity   • Alcohol use: Yes     Alcohol/week: 14.0 standard drinks     Types: 14 Glasses of wine per week   • Drug use: No   • Sexual activity: Defer      Social History     Social History Narrative    With her partner Lianna for 36yrs.. She drinks 1 cup of coffee per day.        Family History   Problem Relation Age of Onset   • Stroke Mother    • Other Mother         Cardiac disorder   • Diabetes Mother    • Hypertension Mother    • Mental illness Mother    • Heart attack Father    • Heart failure Father    • No Known Problems Sister    • Heart attack Maternal Grandfather    • Heart disease Paternal Grandfather    • No Known Problems Sister          Review of Systems:   Per the H&P    Physical Exam: 81 y.o. female  General Appearance:    Alert, cooperative, in no acute distress                   Vitals:    03/10/22 1632 03/10/22 2030   BP: 135/95 123/78   BP Location: Left arm    Patient Position: Sitting    Pulse: 60    Resp: 16    Temp: 98.6 °F (37 °C)    TempSrc: Oral    SpO2: 99% 97%   Weight: 53.1 kg (117 lb)   "  Height: 172.7 cm (68\")         Head:    Normocephalic, without obvious abnormality, atraumatic      Right Upper Extremity:  No obvious deformity, painless ROM shoulder, elbow, wrist, no joint instability, normal distal strength and sensation to light touch, skin intact without cyanosis, clubbing, edema; +2 radial pulse  Left Upper Extremity:  No obvious deformity, painless ROM shoulder, elbow, wrist, no joint instability, normal distal strength and sensation to light touch, skin intact without cyanosis, clubbing, edema; +2 radial pulse  Right Lower Extremity: Leg is externally rotated, marked irritability with hip range of motion, knee range of motion deferred, painless ROM ankle, compartments soft, normal distal strength and sensation to light touch, skin intact without cyanosis, clubbing, edema; +2 dorsalis pedis pulse  Left Lower Extremity:  No obvious deformity, painless ROM hip, knee, ankle, compartments soft, normal distal strength and sensation to light touch, skin intact without cyanosis, clubbing, edema; +2 dorsalis pedis pulse         Diagnostic Tests:    I have reviewed the labs, radiology results and diagnostic studies: Yes    Results from last 7 days   Lab Units 03/10/22  1949   WBC 10*3/mm3 9.23   HEMOGLOBIN g/dL 11.8*   PLATELETS 10*3/mm3 145     Results from last 7 days   Lab Units 03/10/22  1949   SODIUM mmol/L 140   POTASSIUM mmol/L 3.9   CO2 mmol/L 26.0   CREATININE mg/dL 0.60   GLUCOSE mg/dL 104*     X-rays right hip and femur which I have personally viewed and interpreted: Nondisplaced/minimally displaced right intertrochanteric femur fracture      Assessment: Right intertrochanteric femur fracture  Patient Active Problem List   Diagnosis   • PAF (paroxysmal atrial fibrillation) ([facility])   • HLD (hyperlipidemia)   • Hypertension   • Combined receptive and expressive aphasia   • Colitis, Clostridium difficile   • Collagenous colitis   • Seizure disorder ([facility])   • Closed right hip fracture, initial " encounter ([facility])   • Anemia           Plan:  I have reviewed the findings with the patient as well her partner.  I explained the need for surgical management and that nonsurgical management is associated with poor long-term outcome, numerous medical complications, prolonged pain, and limited return to ambulatory status.  I explained that surgical management involves placement of a metal mimi and screws to fix the fracture and I discussed the risks, benefits, alternatives and expectations as well as the typical postoperative course.  All questions were answered and they wished to proceed.          Danny Lynne MD  03/10/22  21:19 EST

## 2022-03-11 NOTE — PLAN OF CARE
Goal Outcome Evaluation:               VSS. Pain controlled with PRNs. No swelling noted in lower extremities. Spouse at bedside has helped ease anxiety. NPO since midnight. All questions and concerns answered.

## 2022-03-11 NOTE — ANESTHESIA PROCEDURE NOTES
Airway  Urgency: elective    Date/Time: 3/11/2022 1:45 PM  Airway not difficult    General Information and Staff    Patient location during procedure: OR  Anesthesiologist: Farhad West MD  CRNA: John Lopez CRNA    Indications and Patient Condition  Indications for airway management: airway protection    Preoxygenated: yes  MILS not maintained throughout  Mask difficulty assessment: 1 - vent by mask    Final Airway Details  Final airway type: endotracheal airway      Successful airway: ETT  Cuffed: yes   Successful intubation technique: direct laryngoscopy  Endotracheal tube insertion site: oral  Blade: Mayela  Blade size: 3  ETT size (mm): 7.0  Cormack-Lehane Classification: grade I - full view of glottis  Placement verified by: chest auscultation and capnometry   Measured from: lips  ETT/EBT  to lips (cm): 20  Number of attempts at approach: 1  Assessment: lips, teeth, and gum same as pre-op and atraumatic intubation    Additional Comments  Negative epigastric sounds, Breath sound equal bilaterally with symmetric chest rise and fall

## 2022-03-11 NOTE — CASE MANAGEMENT/SOCIAL WORK
Discharge Planning Assessment  [facility]     Patient Name: Jenna Russell  MRN: [id number]  Today's Date: 3/11/2022    Admit Date: 3/10/2022     Discharge Needs Assessment     Row Name 03/11/22 1301       Living Environment    People in Home spouse    Current Living Arrangements home  use of elevator in high rise building    Primary Care Provided by self    Provides Primary Care For no one    Family Caregiver if Needed spouse    Quality of Family Relationships supportive;involved       Resource/Environmental Concerns    Resource/Environmental Concerns none       Transition Planning    Patient/Family Anticipates Transition to inpatient rehabilitation facility;home with family    Patient/Family Anticipated Services at Transition rehabilitation services;home health care    Transportation Anticipated --  may need ambulance or [facility] wheelchair transport       Discharge Needs Assessment    Readmission Within the Last 30 Days no previous admission in last 30 days    Equipment Currently Used at Home bath bench;grab bar;commode;cane, straight;walker, rolling    Concerns to be Addressed discharge planning    Anticipated Changes Related to Illness none    Equipment Needed After Discharge none    Discharge Facility/Level of Care Needs rehabilitation facility;nursing facility, skilled               Discharge Plan     Row Name 03/11/22 6288       Plan    Plan Possible rehab    Plan Comments Patient is off unit in OR. I spoke with Lianna in regards to discharge planning. They reside [place] a [place] rise building. Patient has had rehab in the past. I spoke with Lianna about rehab, that if not able to return home with home health or outpatient, I will go ahead and make referrals for inpatient. We discussed several facilities and she would like primary referral to [facility]. This referral is made. Carolann with [facility] will review her rehab notes on Monday. They will also need to send for insurance approval.    Final Discharge  Disposition Code 03 - skilled nursing facility (SNF)              Continued Care and Services - Admitted Since 3/10/2022    Coordination has not been started for this encounter.          Demographic Summary     Row Name 03/11/22 1259       General Information    Admission Type inpatient    Referral Source admission list    Preferred Language English    General Information Comments PCP is Judi Rodriguez               Functional Status     Row Name 03/11/22 1300       Functional Status    Usual Activity Tolerance excellent       Functional Status, IADL    Medications independent    Meal Preparation independent    Housekeeping independent    Laundry independent    Shopping independent       Employment/    Employment/ Comments Patient has [employer] Medicare insurance and denies concerns regarding coverage or disruption in coverage issues. Patient has drug coverage and denies issues obtaining or affording current medications.    She has advanced directives .               Psychosocial    No documentation.                Abuse/Neglect    No documentation.                Legal    No documentation.                Substance Abuse    No documentation.                Patient Forms    No documentation.                   Carolann Michel RN

## 2022-03-11 NOTE — PLAN OF CARE
Goal Outcome Evaluation:  Plan of Care Reviewed With: patient, spouse           Outcome Evaluation: from rr at 1620 remains drowsey but pain controlled back on PW waiting for pt to void , surgical site CDI

## 2022-03-11 NOTE — PROGRESS NOTES
Clinical nutrition     Patient Name:  Jenna Russell  YOB: 1940  MRN: [id number]  Time spent: 15 mins  Assessment Date:  3/11/2022 5:30pm    Dx includes: Right intertrochanteric hip fracture; s/pTreatment of right intertrochanteric hip fracture with intramedullary device 3/11. ETOH use daily, PAF, Sz d/o, HTN, HLP.  RD spoke with [facility] staff and requested that pt's actual wt be obtained; [facility] tech agrees to obtain.  Ht=68in, stated wt on 3/11/87=931rp. BMI=17.8. RD notes wts in EMR on 10/3/81=247un (stated) and 1/24/19= 120lb at outpt visit are c/w stated current wt. Pt not avail for interview r/t wt/po intake hx at time RD attempted today. Will await actual wt data and interview pt to screen for nutrition risk/potential malnutrition as approp when pt/data avail for such.                            Electronically signed by:  Darleen Spivey, MS,RD,LD  03/11/22 17:29 EST

## 2022-03-11 NOTE — ANESTHESIA PROCEDURE NOTES
Peripheral Block      Patient reassessed immediately prior to procedure    Patient location during procedure: pre-op  Reason for block: procedure for pain and at surgeon's request  Performed by  CRNA: Jian Araujo, CRNA  Assisted by: Dian Pritchard RN  Preanesthetic Checklist  Completed: patient identified, IV checked, site marked, risks and benefits discussed, surgical consent, monitors and equipment checked, pre-op evaluation and timeout performed  Prep:  Sterile barriers:cap, gloves and mask  Prep: ChloraPrep  Patient monitoring: blood pressure monitoring, continuous pulse oximetry and EKG  Procedure  Performed under: local infiltration  Guidance:ultrasound guided  Images:still images obtained, printed/placed on chart    Laterality:right  Block Type:fascia iliaca compartment  Injection Technique:single-shot  Needle Type:echogenic  Needle Gauge:18 G  Resistance on Injection: none  Catheter Size:20 G (20g)    Medications Used: bupivacaine (PF) (MARCAINE) 0.5 % injection, 20 mL  Med administered at 3/11/2022 12:28 PM      Medications  Preservative Free Saline:20ml    Post Assessment  Injection Assessment: negative aspiration for heme, no paresthesia on injection and incremental injection  Patient Tolerance:comfortable throughout block  Complications:no  Additional Notes  Procedure:                 Pt placed in supine position.   The insertion site was prepped in sterile fashion with Chlorapreop and clear plastic drapes.  Analgesia was provided by skin infiltration at insertion site with Lidocaine 1% 3mls.  A B-Anderson 18 g , 4 inch echogenic Touhy needle was advance In-plane under ultrasound guidance. The   Anterior superior Iliac crest was initially visualized and the probe was directed slightly medially and slightly towards the umbilicus.  The course of the needle was tracked over the sartorius muscle through the fascia Iliacus and into the anterior portion of the Iliacus muscle.  Major vessels where identified  and avoided as where structures of the peritoneal cavity.  LA injection was made incrementally in 1-5ml amounts spread was visualized superiorly below fascia iliacus.  Injection was completed with negative aspiration of blood and negative intravascular injection.  Thank You.

## 2022-03-11 NOTE — ED PROVIDER NOTES
Subjective   81-year-old female presents for evaluation of right hip pain.  Just prior to coming to the emergency department, the patient was taking a walk through her neighborhood when she slipped and fell awkwardly onto her right hip.  She was unable to bear weight after the fall and was subsequently brought to the emergency department.  She did not hit her head.  She is not anticoagulated.  No neck pain.  She is complaining of isolated pain to her right hip/thigh region.  She currently rates her pain at 8 out of 10 in severity and notes that pain is worse with attempted movement.  She denies any paresthesias.  She has no other complaints at this time.  Of note, the patient has a history of prior stroke with some mild residual aphasia.          Review of Systems   Musculoskeletal:        Right hip pain   All other systems reviewed and are negative.      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 Normal LVSF.Normal LV diastolic filling. Left atrium midly dilated. RV mild- mod dilated. MIld aortic cusp sclerosis. No evidence of mitral valve prolapse. Mild mitral regurgitation.No pulmonary hypertension or perdicardial effusion. No dilation of aortic root. Venous system appears normal   • H/O: stroke    • HLD (hyperlipidemia) 9/8/2016   • Left temporal lobe infarction (HCC)    • LOM (loss of memory)    • Osteoarthritis 9/8/2016   • Paroxysmal atrial fibrillation (HCC) 9/8/2016   • Thyroid nodule 9/8/2016    1.8x1.2cm   • Weakness generalized    • Wound infection after surgery     Left hip arthroplasty, staph aureus, 6 weeks IV Rocephin       Allergies   Allergen  Reactions   • Penicillins Anaphylaxis       Past Surgical History:   Procedure Laterality Date   • ABDOMINOPLASTY     • BREAST ABSCESS INCISION AND DRAINAGE     • TOTAL HIP ARTHROPLASTY Left     Staph aureus wound infection       Family History   Problem Relation Age of Onset   • Stroke Mother    • Other Mother         Cardiac disorder   • Diabetes Mother    • Hypertension Mother    • Mental illness Mother    • Heart attack Father    • Heart failure Father    • No Known Problems Sister    • Heart attack Maternal Grandfather    • Heart disease Paternal Grandfather    • No Known Problems Sister        Social History     Socioeconomic History   • Marital status:    • Number of children: 3   Tobacco Use   • Smoking status: Former Smoker     Years: 2.00     Quit date:      Years since quittin.2   • Smokeless tobacco: Never Used   • Tobacco comment: OV says she quit smoking within the past year   Substance and Sexual Activity   • Alcohol use: Yes     Alcohol/week: 14.0 standard drinks     Types: 14 Glasses of wine per week   • Drug use: No   • Sexual activity: Defer           Objective   Physical Exam  Vitals and nursing note reviewed.   Constitutional:       General: She is not in acute distress.     Appearance: She is well-developed. She is not diaphoretic.   HENT:      Head: Normocephalic and atraumatic.   Eyes:      Pupils: Pupils are equal, round, and reactive to light.   Neck:      Comments: No midline cervical spine tenderness present, no step-off or deformity noted  Cardiovascular:      Rate and Rhythm: Normal rate and regular rhythm.      Heart sounds: Normal heart sounds. No murmur heard.    No friction rub. No gallop.   Pulmonary:      Effort: Pulmonary effort is normal. No respiratory distress.      Breath sounds: Normal breath sounds. No wheezing or rales.   Abdominal:      General: Bowel sounds are normal. There is no distension.      Palpations: Abdomen is soft. There is no mass.       Tenderness: There is no abdominal tenderness. There is no guarding or rebound.   Musculoskeletal:      Comments: Range of motion of right hip is limited secondary to pain, no shortening or external rotation noted, no pelvic instability present   Skin:     General: Skin is warm and dry.      Findings: No erythema or rash.   Neurological:      Mental Status: She is alert.      Comments: Right lower extremity is neurovascularly intact distally with bounding distal pulses and normal sensation   Psychiatric:         Mood and Affect: Mood normal.         Thought Content: Thought content normal.         Judgment: Judgment normal.         Nerve Block    Date/Time: 3/11/2022 1:24 AM  Performed by: José Miguel Frey MD  Authorized by: José Miguel Frey MD     Consent:     Consent obtained:  Written    Consent given by:  Patient    Risks, benefits, and alternatives were discussed: yes      Risks discussed:  Allergic reaction, bleeding, infection, intravenous injection, pain, nerve damage, swelling and unsuccessful block  Universal protocol:     Procedure explained and questions answered to patient or proxy's satisfaction: yes      Relevant documents present and verified: yes      Test results available: yes      Imaging studies available: yes      Required blood products, implants, devices, and special equipment available: yes      Site/side marked: yes      Immediately prior to procedure, a time out was called: yes      Patient identity confirmed:  Verbally with patient  Indications:     Indications:  Pain relief  Location:     Body area:  Lower extremity    Lower extremity nerve:  Femoral    Laterality:  Right  Pre-procedure details:     Skin preparation:  Chlorhexidine    Preparation: Patient was prepped and draped in usual sterile fashion    Skin anesthesia:     Skin anesthesia method:  Local infiltration    Local anesthetic:  Lidocaine 1% WITH epi  Procedure details:     Guidance: ultrasound      Anesthetic  injected:  Bupivacaine 0.25% w/o epi    Steroid injected:  None    Additive injected:  None    Injection procedure:  Anatomic landmarks identified, incremental injection, introduced needle, anatomic landmarks palpated and negative aspiration for blood    Paresthesia:  None  Post-procedure details:     Dressing:  Sterile dressing    Outcome:  Pain improved    Procedure completion:  Tolerated well, no immediate complications               ED Course  ED Course as of 03/11/22 0121   Thu Mar 10, 2022   3864 81-year-old female presents for evaluation of right hip pain following a mechanical trip and fall this afternoon.  Isolated injury.  No LOC.  No neck pain.  On arrival to the ED, the patient is nontoxic-appearing.  She is not anticoagulated.  Range of motion of right hip is somewhat limited secondary to pain.  No shortening or rotation noted.  Neurovascularly intact.  We will obtain plain films, and we will reassess following initial interventions. [DD]   1830 I personally viewed the patient's x-ray images myself, and I am in agreement with the radiologist's reading for final interpretation.     [DD]   1839 XR Chest 1 View [DD]   1928 After reviewing the patient's x-rays, I spoke with Dr. Lynne of orthopedics who came to the emergency department to evaluate the patient.  He will take her to the OR for definitive surgical management tomorrow morning.  She is aware/agreeable with the plan. [DD]   1929 After obtaining informed consent, using ultrasound guidance, a right fascia iliac block was performed without complication.  The patient tolerated the procedure well.  I spoke with Dr. Quick, and the patient will be admitted under her care for further evaluation and treatment.  The patient is aware/agreeable with the plan at this time. [DD]      ED Course User Index  [DD] José Miguel Frey MD                                         Recent Results (from the past 24 hour(s))   Comprehensive Metabolic Panel    Collection  Time: 03/10/22  7:49 PM    Specimen: Blood   Result Value Ref Range    Glucose 104 (H) 65 - 99 mg/dL    BUN 17 8 - 23 mg/dL    Creatinine 0.60 0.57 - 1.00 mg/dL    Sodium 140 136 - 145 mmol/L    Potassium 3.9 3.5 - 5.2 mmol/L    Chloride 104 98 - 107 mmol/L    CO2 26.0 22.0 - 29.0 mmol/L    Calcium 9.3 8.6 - 10.5 mg/dL    Total Protein 6.4 6.0 - 8.5 g/dL    Albumin 4.10 3.50 - 5.20 g/dL    ALT (SGPT) 16 1 - 33 U/L    AST (SGOT) 32 1 - 32 U/L    Alkaline Phosphatase 64 39 - 117 U/L    Total Bilirubin 0.4 0.0 - 1.2 mg/dL    Globulin 2.3 gm/dL    A/G Ratio 1.8 g/dL    BUN/Creatinine Ratio 28.3 (H) 7.0 - 25.0    Anion Gap 10.0 5.0 - 15.0 mmol/L    eGFR 91.4 >60.0 mL/min/1.73   CBC Auto Differential    Collection Time: 03/10/22  7:49 PM    Specimen: Blood   Result Value Ref Range    WBC 9.23 3.40 - 10.80 10*3/mm3    RBC 3.65 (L) 3.77 - 5.28 10*6/mm3    Hemoglobin 11.8 (L) 12.0 - 15.9 g/dL    Hematocrit 35.3 34.0 - 46.6 %    MCV 96.7 79.0 - 97.0 fL    MCH 32.3 26.6 - 33.0 pg    MCHC 33.4 31.5 - 35.7 g/dL    RDW 13.7 12.3 - 15.4 %    RDW-SD 49.1 37.0 - 54.0 fl    MPV 10.0 6.0 - 12.0 fL    Platelets 145 140 - 450 10*3/mm3    Neutrophil % 69.9 42.7 - 76.0 %    Lymphocyte % 19.1 (L) 19.6 - 45.3 %    Monocyte % 10.2 5.0 - 12.0 %    Eosinophil % 0.3 0.3 - 6.2 %    Basophil % 0.2 0.0 - 1.5 %    Immature Grans % 0.3 0.0 - 0.5 %    Neutrophils, Absolute 6.45 1.70 - 7.00 10*3/mm3    Lymphocytes, Absolute 1.76 0.70 - 3.10 10*3/mm3    Monocytes, Absolute 0.94 (H) 0.10 - 0.90 10*3/mm3    Eosinophils, Absolute 0.03 0.00 - 0.40 10*3/mm3    Basophils, Absolute 0.02 0.00 - 0.20 10*3/mm3    Immature Grans, Absolute 0.03 0.00 - 0.05 10*3/mm3    nRBC 0.0 0.0 - 0.2 /100 WBC   Iron Profile    Collection Time: 03/10/22  7:49 PM    Specimen: Blood   Result Value Ref Range    Iron 59 37 - 145 mcg/dL    Iron Saturation 18 (L) 20 - 50 %    Transferrin 221 200 - 360 mg/dL    TIBC 329 298 - 536 mcg/dL   Ferritin    Collection Time: 03/10/22  7:49 PM     Specimen: Blood   Result Value Ref Range    Ferritin 142.20 13.00 - 150.00 ng/mL   COVID-19 and FLU A/B PCR - Swab, Nasopharynx    Collection Time: 03/10/22 10:31 PM    Specimen: Nasopharynx; Swab   Result Value Ref Range    COVID19 Not Detected Not Detected - Ref. Range    Influenza A PCR Not Detected Not Detected    Influenza B PCR Not Detected Not Detected     Note: In addition to lab results from this visit, the labs listed above may include labs taken at another facility or during a different encounter within the last 24 hours. Please correlate lab times with ED admission and discharge times for further clarification of the services performed during this visit.    XR Hip With or Without Pelvis 2 - 3 View Right   Final Result   Nondisplaced right proximal femoral intertrochanteric fracture.       This report was finalized on 3/10/2022 6:35 PM by Marcelo Muniz MD.          XR Femur 2 View Right   Final Result   Nondisplaced intertrochanteric right femoral fracture.       This report was finalized on 3/10/2022 6:37 PM by Marcelo Muniz MD.          XR Chest 1 View   Final Result   No acute process.        This report was finalized on 3/10/2022 6:35 PM by Marcelo Muniz MD.            Vitals:    03/10/22 2130 03/10/22 2201 03/10/22 2247 03/11/22 0013   BP: 136/75 122/75 171/82 147/87   BP Location:   Left arm Left arm   Patient Position:   Lying Lying   Pulse:   69 59   Resp:   16 16   Temp:   97.8 °F (36.6 °C) 97.8 °F (36.6 °C)   TempSrc:   Oral Oral   SpO2: 94% 96% 99% 97%   Weight:       Height:         Medications   atorvastatin (LIPITOR) tablet 40 mg (40 mg Oral Given 3/10/22 2313)   divalproex (DEPAKOTE) DR tablet 250 mg (250 mg Oral Given 3/11/22 0013)   lisinopril (PRINIVIL,ZESTRIL) tablet 10 mg (has no administration in time range)   sodium chloride 0.9 % flush 10 mL ( Intravenous Canceled Entry 3/10/22 2313)   sodium chloride 0.9 % flush 10 mL (has no administration in time range)   acetaminophen (TYLENOL)  tablet 650 mg (has no administration in time range)   melatonin tablet 5 mg (has no administration in time range)   ondansetron (ZOFRAN) injection 4 mg (has no administration in time range)   HYDROcodone-acetaminophen (NORCO) 5-325 MG per tablet 1 tablet (has no administration in time range)   HYDROmorphone (DILAUDID) injection 0.5 mg (0.5 mg Intravenous Given 3/10/22 2313)   HYDROcodone-acetaminophen (NORCO) 5-325 MG per tablet 1 tablet (1 tablet Oral Given 3/10/22 1920)   lidocaine 1% - EPINEPHrine 1:370361 (XYLOCAINE W/EPI) 1 %-1:169685 injection 10 mL (10 mL Injection Given by Other 3/10/22 1917)   bupivacaine (PF) (MARCAINE) 0.25 % injection 30 mL (30 mL Injection Given by Other 3/10/22 1917)     ECG/EMG Results (last 24 hours)     Procedure Component Value Units Date/Time    ECG 12 Lead [[id number]] Collected: 03/10/22 1816     Updated: 03/10/22 1818        ECG 12 Lead   Preliminary Result                     MDM    Final diagnoses:   Closed right hip fracture, initial encounter ([facility])   History of stroke       ED Disposition  ED Disposition     ED Disposition   Decision to Admit    Condition   --    Comment   Level of Care: Telemetry [5]   Diagnosis: Closed right hip fracture, initial encounter ([facility]) [153171]               No follow-up provider specified.       Medication List      No changes were made to your prescriptions during this visit.          José Miguel Frey MD  03/11/22 0125

## 2022-03-12 LAB
ALBUMIN SERPL-MCNC: 3.5 G/DL (ref 3.5–5.2)
ALBUMIN/GLOB SERPL: 1.6 G/DL
ALP SERPL-CCNC: 44 U/L (ref 39–117)
ALT SERPL W P-5'-P-CCNC: 13 U/L (ref 1–33)
ANION GAP SERPL CALCULATED.3IONS-SCNC: 9 MMOL/L (ref 5–15)
AST SERPL-CCNC: 25 U/L (ref 1–32)
BACTERIA SPEC AEROBE CULT: NORMAL
BASOPHILS # BLD AUTO: 0.01 10*3/MM3 (ref 0–0.2)
BASOPHILS NFR BLD AUTO: 0.1 % (ref 0–1.5)
BILIRUB SERPL-MCNC: 0.4 MG/DL (ref 0–1.2)
BUN SERPL-MCNC: 9 MG/DL (ref 8–23)
BUN/CREAT SERPL: 14.5 (ref 7–25)
CALCIUM SPEC-SCNC: 8.7 MG/DL (ref 8.6–10.5)
CHLORIDE SERPL-SCNC: 102 MMOL/L (ref 98–107)
CO2 SERPL-SCNC: 29 MMOL/L (ref 22–29)
CREAT SERPL-MCNC: 0.62 MG/DL (ref 0.57–1)
DEPRECATED RDW RBC AUTO: 49.1 FL (ref 37–54)
EGFRCR SERPLBLD CKD-EPI 2021: 90.7 ML/MIN/1.73
EOSINOPHIL # BLD AUTO: 0 10*3/MM3 (ref 0–0.4)
EOSINOPHIL NFR BLD AUTO: 0 % (ref 0.3–6.2)
ERYTHROCYTE [DISTWIDTH] IN BLOOD BY AUTOMATED COUNT: 13.6 % (ref 12.3–15.4)
GLOBULIN UR ELPH-MCNC: 2.2 GM/DL
GLUCOSE SERPL-MCNC: 111 MG/DL (ref 65–99)
HCT VFR BLD AUTO: 29.6 % (ref 34–46.6)
HGB BLD-MCNC: 9.5 G/DL (ref 12–15.9)
IMM GRANULOCYTES # BLD AUTO: 0.03 10*3/MM3 (ref 0–0.05)
IMM GRANULOCYTES NFR BLD AUTO: 0.4 % (ref 0–0.5)
LYMPHOCYTES # BLD AUTO: 1.17 10*3/MM3 (ref 0.7–3.1)
LYMPHOCYTES NFR BLD AUTO: 14.5 % (ref 19.6–45.3)
MCH RBC QN AUTO: 31.5 PG (ref 26.6–33)
MCHC RBC AUTO-ENTMCNC: 32.1 G/DL (ref 31.5–35.7)
MCV RBC AUTO: 98 FL (ref 79–97)
MONOCYTES # BLD AUTO: 1.1 10*3/MM3 (ref 0.1–0.9)
MONOCYTES NFR BLD AUTO: 13.7 % (ref 5–12)
NEUTROPHILS NFR BLD AUTO: 5.74 10*3/MM3 (ref 1.7–7)
NEUTROPHILS NFR BLD AUTO: 71.3 % (ref 42.7–76)
NRBC BLD AUTO-RTO: 0 /100 WBC (ref 0–0.2)
PLATELET # BLD AUTO: 133 10*3/MM3 (ref 140–450)
PMV BLD AUTO: 10.3 FL (ref 6–12)
POTASSIUM SERPL-SCNC: 3.5 MMOL/L (ref 3.5–5.2)
POTASSIUM SERPL-SCNC: 4.2 MMOL/L (ref 3.5–5.2)
PROT SERPL-MCNC: 5.7 G/DL (ref 6–8.5)
RBC # BLD AUTO: 3.02 10*6/MM3 (ref 3.77–5.28)
SODIUM SERPL-SCNC: 140 MMOL/L (ref 136–145)
WBC NRBC COR # BLD: 8.05 10*3/MM3 (ref 3.4–10.8)

## 2022-03-12 PROCEDURE — 97116 GAIT TRAINING THERAPY: CPT

## 2022-03-12 PROCEDURE — 80053 COMPREHEN METABOLIC PANEL: CPT | Performed by: ORTHOPAEDIC SURGERY

## 2022-03-12 PROCEDURE — 84132 ASSAY OF SERUM POTASSIUM: CPT | Performed by: INTERNAL MEDICINE

## 2022-03-12 PROCEDURE — 97166 OT EVAL MOD COMPLEX 45 MIN: CPT

## 2022-03-12 PROCEDURE — 25010000002 ENOXAPARIN PER 10 MG: Performed by: INTERNAL MEDICINE

## 2022-03-12 PROCEDURE — 85025 COMPLETE CBC W/AUTO DIFF WBC: CPT | Performed by: ORTHOPAEDIC SURGERY

## 2022-03-12 PROCEDURE — 97110 THERAPEUTIC EXERCISES: CPT

## 2022-03-12 PROCEDURE — 97162 PT EVAL MOD COMPLEX 30 MIN: CPT

## 2022-03-12 PROCEDURE — 99232 SBSQ HOSP IP/OBS MODERATE 35: CPT | Performed by: INTERNAL MEDICINE

## 2022-03-12 PROCEDURE — 97535 SELF CARE MNGMENT TRAINING: CPT

## 2022-03-12 RX ORDER — POTASSIUM CHLORIDE 750 MG/1
40 CAPSULE, EXTENDED RELEASE ORAL AS NEEDED
Status: DISCONTINUED | OUTPATIENT
Start: 2022-03-12 | End: 2022-03-15 | Stop reason: HOSPADM

## 2022-03-12 RX ORDER — POTASSIUM CHLORIDE 1.5 G/1.77G
40 POWDER, FOR SOLUTION ORAL AS NEEDED
Status: DISCONTINUED | OUTPATIENT
Start: 2022-03-12 | End: 2022-03-15 | Stop reason: HOSPADM

## 2022-03-12 RX ADMIN — DIVALPROEX SODIUM 250 MG: 250 TABLET, DELAYED RELEASE ORAL at 05:24

## 2022-03-12 RX ADMIN — DIVALPROEX SODIUM 250 MG: 250 TABLET, DELAYED RELEASE ORAL at 13:50

## 2022-03-12 RX ADMIN — ACETAMINOPHEN 650 MG: 325 TABLET ORAL at 20:37

## 2022-03-12 RX ADMIN — HYDROCODONE BITARTRATE AND ACETAMINOPHEN 1 TABLET: 5; 325 TABLET ORAL at 13:49

## 2022-03-12 RX ADMIN — ENOXAPARIN SODIUM 40 MG: 40 INJECTION SUBCUTANEOUS at 13:49

## 2022-03-12 RX ADMIN — Medication 10 ML: at 08:40

## 2022-03-12 RX ADMIN — HYDROCODONE BITARTRATE AND ACETAMINOPHEN 1 TABLET: 5; 325 TABLET ORAL at 10:16

## 2022-03-12 RX ADMIN — HYDROCODONE BITARTRATE AND ACETAMINOPHEN 1 TABLET: 5; 325 TABLET ORAL at 04:08

## 2022-03-12 RX ADMIN — ATORVASTATIN CALCIUM 40 MG: 40 TABLET, FILM COATED ORAL at 20:37

## 2022-03-12 RX ADMIN — POTASSIUM CHLORIDE 40 MEQ: 750 CAPSULE, EXTENDED RELEASE ORAL at 13:50

## 2022-03-12 RX ADMIN — HYDROCODONE BITARTRATE AND ACETAMINOPHEN 1 TABLET: 5; 325 TABLET ORAL at 20:37

## 2022-03-12 RX ADMIN — DIVALPROEX SODIUM 250 MG: 250 TABLET, DELAYED RELEASE ORAL at 21:46

## 2022-03-12 RX ADMIN — LISINOPRIL 10 MG: 10 TABLET ORAL at 08:39

## 2022-03-12 RX ADMIN — Medication 10 ML: at 20:37

## 2022-03-12 RX ADMIN — POTASSIUM CHLORIDE 40 MEQ: 750 CAPSULE, EXTENDED RELEASE ORAL at 17:59

## 2022-03-12 NOTE — PLAN OF CARE
Goal Outcome Evaluation:  Plan of Care Reviewed With: patient, spouse        Progress: no change  Outcome Evaluation: PT initial evaluation complete. Pt presents s/p R hip trochanteric nailing and intramedullary screw. Pt with decreased RLE strength/mobility, impaired balance, and decreased independence with functional mobility warranting further skilled acute PT services. Pt performed bed mobility with MinAx1, transfers with MinAx1 and RW, and ambulated 110ft with MinAx1 and RW limited by fatigue. KI donned prior to OOB mobility, pt requires modA for R SLR with poor quad set. PT reviewed hip precations and HEP. PT recommending inpatient rehab at [facility].

## 2022-03-12 NOTE — PROGRESS NOTES
Orthopedic Daily Progress Note      CC: Doing very well    Pain controlled - very little  General: no fevers, chills  Abdomen: no nausea, vomiting, or diarrhea    No other complaints    Physical Exam:  I have reviewed the vital signs.  Temp:  [97 °F (36.1 °C)-98.5 °F (36.9 °C)] 97.7 °F (36.5 °C)  Heart Rate:  [65-87] 69  Resp:  [14-18] 14  BP: (102-146)/(55-85) 136/55    Objective  General Appearance:    Alert, cooperative, no distress  Extremities: No clubbing, cyanosis, or edema to lower extremities  Pulses:  2+ in distal surgical extremity  Skin: Dressing Clean/dry/intact      Results Review:    I have reviewed the labs, radiology results and diagnostic studies:yes    Results from last 7 days   Lab Units 03/12/22  1006   WBC 10*3/mm3 8.05   HEMOGLOBIN g/dL 9.5*   PLATELETS 10*3/mm3 133*     Results from last 7 days   Lab Units 03/12/22  1006   SODIUM mmol/L 140   POTASSIUM mmol/L 3.5   CO2 mmol/L 29.0   CREATININE mg/dL 0.62   GLUCOSE mg/dL 111*       I have reviewed the medications.    Assessment/Problem List  POD# 1 S/p TAHIRA R IT Hip fx    Plan  PT/OT  Placement  DVT Prophylaxis - I am ok with chemical prophylaxis at discretion of hospitalist due to history of previous SAH on eliquis    Discharge Planning: I expect patient to be discharged to rehab/SNF when available.    Danny Lynne MD  03/12/22  11:29 EST

## 2022-03-12 NOTE — PLAN OF CARE
Problem: Adult Inpatient Plan of Care  Goal: Plan of Care Review  Recent Flowsheet Documentation  Taken 3/12/2022 1630 by Yvette West OT  Progress: (OT IE) no change  Plan of Care Reviewed With:  • patient  • spouse  Outcome Evaluation: OT geniaal completed. Pt alert and oriented to self, general place, disoriented to situation and time. Pt presents with decreased I in ADLs, related t/fs compared to PLOF impacted by decreased fxl endurance, impaired balance, mild muscle weakness at BUEs, pain and decreased distal reach impacting LB ADLs. Pt completed supine < > sitting with mod A, scooting toward HOB while sitting with min A. Pt dependent for d/d socks with increased pain and balance concerns with distal reach thus issued  AE with plan for trialled ADL retraining with AE if new learning is successful. Pt able to d/d gown with min A. Pt preferred to return to bed after increase in pain w/ bed mobility and simulated LBD thus progressive standing did not occur this date. Recommend IPOT POC and IRF at d/c when medically ready.

## 2022-03-12 NOTE — THERAPY EVALUATION
Patient Name: Jenna Russell  : 1940    MRN: [id number]                              Today's Date: 3/12/2022       Admit Date: 3/10/2022    Visit Dx:     ICD-10-CM ICD-9-CM   1. Closed right hip fracture, initial encounter ([facility])  S72.001A 820.8   2. 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 Normal LVSF.Normal LV diastolic filling. Left atrium midly dilated. RV mild- mod dilated. MIld aortic cusp sclerosis. No evidence of mitral valve prolapse. Mild mitral regurgitation.No pulmonary hypertension or perdicardial effusion. No dilation of aortic root. Venous system appears normal   • H/O: stroke    • HLD (hyperlipidemia) 2016   • Left temporal lobe infarction ([facility])    • LOM (loss of memory)    • Osteoarthritis 2016   • Paroxysmal atrial fibrillation ([facility]) 2016   • Thyroid nodule 2016    1.8x1.2cm   • Weakness generalized    • Wound infection after surgery     Left hip arthroplasty, staph aureus, 6 weeks IV Rocephin     Past Surgical History:   Procedure Laterality Date   • ABDOMINOPLASTY     • BREAST ABSCESS INCISION AND DRAINAGE     • TOTAL HIP ARTHROPLASTY Left     Staph aureus wound infection      General Information     Row Name 22 1117           Physical Therapy Time and Intention    Document Type evaluation  -     Mode of Treatment physical therapy  -     Row Name 03/12/22 1117          General Information    Patient Profile Reviewed yes  -     Prior Level of Function independent:;all household mobility;community mobility;gait;transfer;bed mobility  Pt/SO reports she is independent with all mobility at baseline with no use of ADs  -     Existing Precautions/Restrictions fall;brace worn when out of bed;other (see comments)  RLE WBAT, KI for OOB mobility, baseline receptive/expressive aphasia  -     Barriers to Rehab none identified  -     Row Name 03/12/22 1117          Living Environment    People in Home spouse  -     Row Name 03/12/22 1117          Home Main Entrance    Number of Stairs, Main Entrance none  [facility]  -     Row Name 03/12/22 1117          Stairs Within Home, Primary    Number of Stairs, Within Home, Primary none  -     Row Name 03/12/22 1117          Cognition    Orientation Status (Cognition) oriented x 3  -     Row Name 03/12/22 1117          Safety Issues, Functional Mobility    Safety Issues Affecting Function (Mobility) insight into deficits/self-awareness;positioning of assistive device;safety precaution awareness;safety precautions follow-through/compliance  -     Impairments Affecting Function (Mobility) balance;endurance/activity tolerance;pain;range of motion (ROM);strength  -     Comment, Safety Issues/Impairments (Mobility) Alert and following commands  -           User Key  (r) = Recorded By, (t) = Taken By, (c) = Cosigned By    Initials Name Provider Type     Willi Rodriguez, PT Physical Therapist               Mobility     Row Name 03/12/22 1120          Bed Mobility    Bed Mobility supine-sit  -     Supine-Sit [place] (Bed Mobility) minimum assist (75% patient effort);1 person assist;verbal cues;nonverbal cues (demo/gesture)  -      Assistive Device (Bed Mobility) head of bed elevated;bed rails  -     Comment, (Bed Mobility) KI donned in supine. Pt required VCs for proper bed mobility technique/sequencing. Physical assist for positioning of RLE on EOB.  -     Row Name 03/12/22 1120          Transfers    Comment, (Transfers) Pt performed STS from EOB, requiring verbal/tactile cues for proper hand placement and for advancement of RLE forward prior to transfers. Pt initially demonstrated forward flexed posture requiring cues to correct.  -     Row Name 03/12/22 1120          Sit-Stand Transfer    Sit-Stand [place] (Transfers) minimum assist (75% patient effort);1 person assist;verbal cues;nonverbal cues (demo/gesture)  -     Assistive Device (Sit-Stand Transfers) walker, 4-wheeled  -     Row Name 03/12/22 1120          Gait/Stairs (Locomotion)    [place] Level (Gait) minimum assist (75% patient effort);2 person assist;verbal cues;nonverbal cues (demo/gesture)  -     Assistive Device (Gait) walker, 4-wheeled  -     Distance in Feet (Gait) 110  -     Deviations/Abnormal Patterns (Gait) right sided deviations;gait speed decreased;stride length decreased;weight shifting decreased;bilateral deviations  -     Bilateral Gait Deviations forward flexed posture  -     Right Sided Gait Deviations heel strike decreased;weight shift ability decreased  -     [place] Level (Stairs) not tested  -     Comment, (Gait/Stairs) Pt initially demonstrated step to gait pattern, progressing to step through gait pattern. Pt required occasional VCs for proper upright posture and to increase LLE step length. Pt with decreased ability to accept RLE weight bearing. KI throughout OOB mobility. Distance limited d/t fatigue.  -     Row Name 03/12/22 1120          Mobility    Extremity Weight-bearing Status right lower extremity  -     Right Lower Extremity (Weight-bearing Status) weight-bearing as tolerated (WBAT)  -           User Key   (r) = Recorded By, (t) = Taken By, (c) = Cosigned By    Initials Name Provider Type     Willi Rodriguez PT Physical Therapist               Obj/Interventions     [facility] Name 03/12/22 1124          Range of Motion Comprehensive    General Range of Motion lower extremity range of motion deficits identified  -     Comment, General Range of Motion Decreased R hip flexion PROM to approximately 90 degrees, limited d/t pain  -[facility] Name 03/12/22 1124          Strength Comprehensive (MMT)    General Manual Muscle Testing (MMT) Assessment lower extremity strength deficits identified  -     Comment, General Manual Muscle Testing (MMT) Assessment LLE strength 4+/5, R DF 4+/5, R knee extension 2/5, R hip flexion 2-/5  -[facility] Name 03/12/22 1124          Motor Skills    Therapeutic Exercise hip;knee;ankle  -[facility] Name 03/12/22 1124          Hip (Therapeutic Exercise)    Hip (Therapeutic Exercise) isometric exercises  -     Hip Isometrics (Therapeutic Exercise) extension;gluteal sets;supine;10 repetitions  -LH     Row Name 03/12/22 1124          Knee (Therapeutic Exercise)    Knee (Therapeutic Exercise) isometric exercises  -     Knee Isometrics (Therapeutic Exercise) bilateral;quad sets;supine;10 repetitions  -[facility] Name 03/12/22 1124          Ankle (Therapeutic Exercise)    Ankle (Therapeutic Exercise) AROM (active range of motion)  -     Ankle AROM (Therapeutic Exercise) bilateral;dorsiflexion;plantarflexion;supine;15 repititions  -[facility] Name 03/12/22 1124          Balance    Balance Assessment sitting static balance;sitting dynamic balance;standing static balance;standing dynamic balance  -     Static Sitting Balance supervision  -     Dynamic Sitting Balance supervision  -     Static Standing Balance contact guard  -     Dynamic Standing Balance minimal assist  -     Position/Device Used, Standing Balance walker, front-wheeled  -     Comment, Balance Pt requires RW and  occasional [place] during OOB mobility d/t RLE weakness/pain and mild instability.  -     Row Name 03/12/22 1124          Sensory Assessment (Somatosensory)    Sensory Assessment (Somatosensory) unable/difficult to assess;other (see comments)  difficult to assess d/t aphasia  -           User Key  (r) = Recorded By, (t) = Taken By, (c) = Cosigned By    Initials Name Provider Type     Willi Rodriguez, PT Physical Therapist               Goals/Plan     Row Name 03/12/22 1133          Bed Mobility Goal 1 (PT)    Activity/Assistive Device (Bed Mobility Goal 1, PT) sit to supine/supine to sit  -     [place] Level/Cues Needed (Bed Mobility Goal 1, PT) modified independence  -LH     Time Frame (Bed Mobility Goal 1, PT) long term goal (LTG);2 weeks  -     Row Name 03/12/22 1133          Transfer Goal 1 (PT)    Activity/Assistive Device (Transfer Goal 1, PT) sit-to-stand/stand-to-sit;bed-to-chair/chair-to-bed;walker, rolling  -LH     [place] Level/Cues Needed (Transfer Goal 1, PT) supervision required  -     Time Frame (Transfer Goal 1, PT) long term goal (LTG);2 weeks  -     Row Name 03/12/22 1133          Gait Training Goal 1 (PT)    Activity/Assistive Device (Gait Training Goal 1, PT) gait (walking locomotion);assistive device use;walker, rolling  -LH     [place] Level (Gait Training Goal 1, PT) supervision required  -     Distance (Gait Training Goal 1, PT) 250  -LH     Time Frame (Gait Training Goal 1, PT) long term goal (LTG);2 weeks  -           User Key  (r) = Recorded By, (t) = Taken By, (c) = Cosigned By    Initials Name Provider Type     Willi Rodriguez, PT Physical Therapist               Clinical Impression     Row Name 03/12/22 1128          Pain    Pretreatment Pain Rating 1/10  -     Posttreatment Pain Rating 3/10  -     Pain Location - Side/Orientation Right  -LH     Pain Location generalized  -LH     Pain Location - hip  -LH     Pain Intervention(s)  Repositioned;Ambulation/increased activity;Rest  -     Row Name 03/12/22 1128          Plan of Care Review    Plan of Care Reviewed With patient;spouse  -     Progress no change  -     Outcome Evaluation PT initial evaluation complete. Pt presents s/p R hip trochanteric nailing and intramedullary screw. Pt with decreased RLE strength/mobility, impaired balance, and decreased independence with functional mobility warranting further skilled acute PT services. Pt performed bed mobility with MinAx1, transfers with MinAx1 and RW, and ambulated 110ft with MinAx1 and RW limited by fatigue. KI donned prior to OOB mobility, pt requires modA for R SLR with poor quad set. PT reviewed hip precations and HEP. PT recommending inpatient rehab at [facility].  -     Row Name 03/12/22 1128          Therapy Assessment/Plan (PT)    Patient/Family Therapy Goals Statement (PT) Return to [facility]  -     Rehab Potential (PT) good, to achieve stated therapy goals  -     Criteria for Skilled Interventions Met (PT) yes;meets criteria;skilled treatment is necessary  -     Predicted Duration of Therapy Intervention (PT) 2 weeks  -     Row Name 03/12/22 1128          Vital Signs    Pre Systolic BP Rehab 136  -     Pre Treatment Diastolic BP 55  -     Intra Systolic BP Rehab 138  -     Intra Treatment Diastolic BP 73  -     Pretreatment Heart Rate (beats/min) 74  -     O2 Delivery Pre Treatment room air  -     O2 Delivery Intra Treatment room air  -     O2 Delivery Post Treatment room air  -     Intra Patient Position Standing  -     Post Patient Position Sitting  -     Row Name 03/12/22 1128          Positioning and Restraints    Pre-Treatment Position in bed  -     Post Treatment Position chair  -     In Chair notified nsg;reclined;call light within reach;encouraged to call for assist;exit alarm on;with family/caregiver;waffle cushion;on mechanical lift sling;legs elevated;heels elevated;compression device  -            User Key  (r) = Recorded By, (t) = Taken By, (c) = Cosigned By    Initials Name Provider Type     Willi Rodriguez, PT Physical Therapist               Outcome Measures     Row Name 03/12/22 1133 03/12/22 0830       How much help from another person do you currently need...    Turning from your back to your side while in flat bed without using bedrails? 3  - 4  -OD    Moving from lying on back to sitting on the side of a flat bed without bedrails? 3  - 1  -OD    Moving to and from a bed to a chair (including a wheelchair)? 3  - 1  -OD    Standing up from a chair using your arms (e.g., wheelchair, bedside chair)? 3  - 1  -OD    Climbing 3-5 steps with a railing? 2  - 1  -OD    To walk in hospital room? 3  - 1  -OD    AM-PAC 6 Clicks Score (PT) 17  - 9  -OD    Row Name 03/12/22 1133          Functional Assessment    Outcome Measure Options AM-PAC 6 Clicks Basic Mobility (PT)  -           User Key  (r) = Recorded By, (t) = Taken By, (c) = Cosigned By    Initials Name Provider Type     Ashely Kaye, RN Registered Nurse     Willi Rodriguez, PT Physical Therapist                             Physical Therapy Education                 Title: PT OT SLP Therapies (In Progress)     Topic: Physical Therapy (Done)     Point: Mobility training (Done)     Learning Progress Summary           Significant Other Acceptance, E, VU,DU,NR by  at 3/12/2022 1134    Comment: Reviewed safety with mobility, hip precautions, HEP, DC recommendations, and PT POC                   Point: Home exercise program (Done)     Learning Progress Summary           Significant Other Acceptance, E, VU,DU,NR by  at 3/12/2022 1134    Comment: Reviewed safety with mobility, hip precautions, HEP, DC recommendations, and PT POC                   Point: Body mechanics (Done)     Learning Progress Summary           Significant Other Acceptance, E, VU,DU,NR by  at 3/12/2022 1134    Comment: Reviewed safety with mobility, hip  precautions, HEP, DC recommendations, and PT POC                   Point: Precautions (Done)     Learning Progress Summary           Significant Other Acceptance, E, VU,DU,NR by  at 3/12/2022 1134    Comment: Reviewed safety with mobility, hip precautions, HEP, DC recommendations, and PT POC                               User Key     Initials Effective Dates Name Provider Type Discipline     09/21/21 -  Willi Rodriguez, PT Physical Therapist PT              PT Recommendation and Plan  Planned Therapy Interventions (PT): balance training, bed mobility training, gait training, home exercise program, patient/family education, ROM (range of motion), strengthening, transfer training  Plan of Care Reviewed With: patient, spouse  Progress: no change  Outcome Evaluation: PT initial evaluation complete. Pt presents s/p R hip trochanteric nailing and intramedullary screw. Pt with decreased RLE strength/mobility, impaired balance, and decreased independence with functional mobility warranting further skilled acute PT services. Pt performed bed mobility with MinAx1, transfers with MinAx1 and RW, and ambulated 110ft with MinAx1 and RW limited by fatigue. KI donned prior to OOB mobility, pt requires modA for R SLR with poor quad set. PT reviewed hip precations and HEP. PT recommending inpatient rehab at [facility].     Time Calculation:    PT Charges     Row Name 03/12/22 1135             Time Calculation    Start Time 1031  -      PT Received On 03/12/22  -      PT Goal Re-Cert Due Date 03/22/22  -              Time Calculation- PT    Total Timed Code Minutes- PT 23 minute(s)  -              Timed Charges    44843 - PT Therapeutic Exercise Minutes 6  -      07054 - Gait Training Minutes  12  -      09751 - PT Therapeutic Activity Minutes 5  -              Untimed Charges    PT Eval/Re-eval Minutes 46  -              Total Minutes    Timed Charges Total Minutes 23  -      Untimed Charges Total Minutes 46  -        Total Minutes 69  -LH            User Key  (r) = Recorded By, (t) = Taken By, (c) = Cosigned By    Initials Name Provider Type     Willi Rodriguez, PT Physical Therapist              Therapy Charges for Today     Code Description Service Date Service Provider Modifiers Qty    58905029249 HC GAIT TRAINING EA 15 MIN 3/12/2022 Willi Rodriguez, PT GP 1    69345066796 HC PT THER PROC EA 15 MIN 3/12/2022 Willi Rodriguez, PT GP 1    10183592828 HC PT EVAL MOD COMPLEXITY 4 3/12/2022 Willi Rodriguez, PT GP 1    [id number]  PT THER SUPP EA 15 MIN 3/12/2022 Willi Rodriguez, PT GP 2          PT G-Codes  Outcome Measure Options: AM-PAC 6 Clicks Basic Mobility (PT)  AM-PAC 6 Clicks Score (PT): 17    Willi Rodriguez PT  3/12/2022

## 2022-03-12 NOTE — PLAN OF CARE
Goal Outcome Evaluation:  Plan of Care Reviewed With: patient  Progress: improving  Pt. Has complained of mild to moderate pain, norco given twice. VSS, on room air and NSR per tele. Pt. Worked with therapy and walked in the hallway. PT/OT recommending IPR at discharge. Will continue to monitor.

## 2022-03-12 NOTE — PLAN OF CARE
Goal Outcome Evaluation:  Plan of Care Reviewed With: patient, spouse        Progress: improving  Outcome Evaluation: spouse at bedside. pt with expressive and receptive aphasia. she has been confused during the night. complained of mild pain. received prn pain meds during the night. pt did try to get out of bed this am. pt has prev had two strokes, the apasia and confusion are normal per spouse. pt has voided during the night. right hip incision and dressings clean dry and intact. family wants to work with pt before pt gets out of bed this evening, purewick was used during the night. VSS. pt did state that she is getting feeling back in her leg. she can feel up through her hip now. will continue with the plan of care.

## 2022-03-12 NOTE — PROGRESS NOTES
[facility] Medicine Services  PROGRESS NOTE    Patient Name: Jenna Russell  : 1940  MRN: [id number]    Date of Admission: 3/10/2022  Primary Care Physician: Judi Rodriguez MD    Subjective   Subjective     CC:  Hip fx    HPI:  Apparently trying to get OOB around 4am but spouse present and stopped. Doing fine otherwise. Pain controlled. Denies urinary symptoms. Will work with PT today    ROS:  Gen- No fevers, chills  CV- No chest pain, palpitations  Resp- No cough, dyspnea  GI- No N/V/D, abd pain         Objective   Objective     Vital Signs:   Temp:  [97 °F (36.1 °C)-98.5 °F (36.9 °C)] 97.7 °F (36.5 °C)  Heart Rate:  [65-87] 69  Resp:  [14-18] 14  BP: (102-146)/(55-85) 136/55  Flow (L/min):  [2-3] 2     Physical Exam:  GEN: NAD, resting in bed, awake  HEENT: on room air, atraumatic, normocephalic  NECK: supple, no masses  RESP: on room air, normal effort  CV: on tele, sinus rhythm  PSYCH: normal affect, appropriate  NEURO: awake, alert, no focal deficits noted aside from chronic aphasia  MSK: no edema noted  SKIN: no rashes noted     Results Reviewed:  LAB RESULTS:      Lab 22  0858 03/10/22  1949   WBC 6.16 9.23   HEMOGLOBIN 9.9* 11.8*   HEMATOCRIT 30.3* 35.3   PLATELETS 127* 145   NEUTROS ABS  --  6.45   IMMATURE GRANS (ABS)  --  0.03   LYMPHS ABS  --  1.76   MONOS ABS  --  0.94*   EOS ABS  --  0.03   MCV 97.4* 96.7         Lab 22  0857 03/10/22  1949   SODIUM 139 140   POTASSIUM 3.3* 3.9   CHLORIDE 104 104   CO2 28.0 26.0   ANION GAP 7.0 10.0   BUN 13 17   CREATININE 0.46* 0.60   EGFR 97.5 91.4   GLUCOSE 107* 104*   CALCIUM 8.4* 9.3         Lab 03/10/22  1949   TOTAL PROTEIN 6.4   ALBUMIN 4.10   GLOBULIN 2.3   ALT (SGPT) 16   AST (SGOT) 32   BILIRUBIN 0.4   ALK PHOS 64                 Lab 03/10/22  1949   IRON 59   IRON SATURATION 18*   TIBC 329   TRANSFERRIN 221   FERRITIN 142.20         Brief Urine Lab Results  (Last result in the past 365 days)      Color    Clarity   Blood   Leuk Est   Nitrite   Protein   CREAT   Urine HCG        03/11/22 0700 Yellow   Clear   Negative   Moderate (2+)   Negative   Negative                 Microbiology Results Abnormal     Procedure Component Value - Date/Time    COVID PRE-OP / PRE-PROCEDURE SCREENING ORDER (NO ISOLATION) - Swab, Nasopharynx [[id number]]  (Normal) Collected: 03/10/22 2231    Lab Status: Final result Specimen: Swab from Nasopharynx Updated: 03/10/22 2310    Narrative:      The following orders were created for panel order COVID PRE-OP / PRE-PROCEDURE SCREENING ORDER (NO ISOLATION) - Swab, Nasopharynx.  Procedure                               Abnormality         Status                     ---------                               -----------         ------                     COVID-19 and FLU A/B PCR...[[id number]]  Normal              Final result                 Please view results for these tests on the individual orders.    COVID-19 and FLU A/B PCR - Swab, Nasopharynx [[id number]]  (Normal) Collected: 03/10/22 2231    Lab Status: Final result Specimen: Swab from Nasopharynx Updated: 03/10/22 2310     COVID19 Not Detected     Influenza A PCR Not Detected     Influenza B PCR Not Detected    Narrative:      Fact sheet for providers: https://www.fda.gov/media/425914/download    Fact sheet for patients: https://www.fda.gov/media/594543/download    Test performed by PCR.          XR Femur 2 View Right    Result Date: 3/10/2022  DATE OF EXAM: 3/10/2022 5:26 PM  PROCEDURE: XR FEMUR 2 VW RIGHT-  INDICATIONS: fall/pain  COMPARISON: No comparisons available.  TECHNIQUE: Two radiographic views of the right femur were obtained.  FINDINGS: There is a nondisplaced intertrochanteric right femoral fracture. Right hip is maintained in alignment with mild right hip osteoarthritis. Visualized portions of the pelvis are intact. There is no distal femoral fracture. Atherosclerotic vascular calcifications noted. Patellofemoral spurring with mild  osteoarthritis of the right knee partially imaged.      Impression: Nondisplaced intertrochanteric right femoral fracture.  This report was finalized on 3/10/2022 6:37 PM by Marcelo Muniz MD.      XR Chest 1 View    Result Date: 3/10/2022   DATE OF EXAM: 3/10/2022 6:05 PM  PROCEDURE: XR CHEST 1 VW-  INDICATIONS: fall  COMPARISON: No Comparisons Available  TECHNIQUE: Portable chest radiograph.  FINDINGS:  The cardiomediastinal silhouette is within normal limits. The lungs are clear. There is no pneumothorax, focal consolidation, or large pleural effusion. Osseous structures grossly intact.      Impression: No acute process.  This report was finalized on 3/10/2022 6:35 PM by Marcelo Muniz MD.      Peripheral Block    Result Date: 3/11/2022  Jian Araujo CRNA     3/11/2022 12:29 PM Peripheral Block Patient reassessed immediately prior to procedure Patient location during procedure: pre-op Reason for block: procedure for pain and at surgeon's request Performed by CRNA: Jian Araujo CRNA Assisted by: Dian Pritchard RN Preanesthetic Checklist Completed: patient identified, IV checked, site marked, risks and benefits discussed, surgical consent, monitors and equipment checked, pre-op evaluation and timeout performed Prep: Sterile barriers:cap, gloves and mask Prep: ChloraPrep Patient monitoring: blood pressure monitoring, continuous pulse oximetry and EKG Procedure Performed under: local infiltration Guidance:ultrasound guided Images:still images obtained, printed/placed on chart Laterality:right Block Type:fascia iliaca compartment Injection Technique:single-shot Needle Type:echogenic Needle Gauge:18 G Resistance on Injection: none Catheter Size:20 G (20g) Medications Used: bupivacaine (PF) (MARCAINE) 0.5 % injection, 20 mL Med administered at 3/11/2022 12:28 PM Medications Preservative Free Saline:20ml Post Assessment Injection Assessment: negative aspiration for heme, no paresthesia on injection and incremental  injection Patient Tolerance:comfortable throughout block Complications:no Additional Notes Procedure:         Pt placed in supine position.   The insertion site was prepped in sterile fashion with Chlorapreop and clear plastic drapes.  Analgesia was provided by skin infiltration at insertion site with Lidocaine 1% 3mls.  A B-Anderson 18 g , 4 inch echogenic Touhy needle was advance In-plane under ultrasound guidance. The   Anterior superior Iliac crest was initially visualized and the probe was directed slightly medially and slightly towards the umbilicus.  The course of the needle was tracked over the sartorius muscle through the fascia Iliacus and into the anterior portion of the Iliacus muscle.  Major vessels where identified and avoided as where structures of the peritoneal cavity.  LA injection was made incrementally in 1-5ml amounts spread was visualized superiorly below fascia iliacus.  Injection was completed with negative aspiration of blood and negative intravascular injection.  Thank You.     FL C Arm During Surgery    Result Date: 3/11/2022  DATE OF EXAM: 3/11/2022 1:47 PM  PROCEDURE: FL C ARM DURING SURGERY-  INDICATIONS: Troch Nail; S72.001A-Fracture of unspecified part of neck of right femur, initial encounter for closed fracture; Z86.73-Personal history of transient ischemic attack (TIA), and cerebral infarction without residual deficits  COMPARISON: No comparisons available.      Impression: FINDINGS/IMPRESSION: 58 seconds of fluoroscopy time provided with 4 images saved during right trochanteric nail placement  This report was finalized on 3/11/2022 3:09 PM by Levy Allen.      XR Hip With or Without Pelvis 2 - 3 View Right    Result Date: 3/10/2022  DATE OF EXAM: 3/10/2022 5:27 PM  PROCEDURE: XR HIP W OR WO PELVIS 2-3 VIEW RIGHT-  INDICATIONS: FALL  COMPARISON: No comparisons available.  TECHNIQUE: AP and Lateral views of the right hip and one view of the pelvis were obtained.  FINDINGS: There  is a left hip prosthesis noted. The iliopectineal and ilioischial lines are intact. There is a intertrochanteric right femoral fracture without displacement. There is convex left levocurvature of the lower lumbar spine. Remaining bony pelvis appears intact. Mild right hip osteoarthritis.      Impression: Nondisplaced right proximal femoral intertrochanteric fracture.  This report was finalized on 3/10/2022 6:35 PM by Marcelo Muniz MD.        Results for orders placed during the hospital encounter of 01/20/18    Adult Transthoracic Echo Complete W/ Cont if Necessary Per Protocol (With Agitated Saline)    Interpretation Summary  · Left ventricular wall thickness is consistent with hypertrophy.  · Left ventricular systolic function is normal.  · Moderate aortic valve regurgitation is present.  · Mild mitral valve regurgitation is present  · Mild tricuspid valve regurgitation is present.  · Mild pulmonic valve regurgitation is present.      I have reviewed the medications:  Scheduled Meds:atorvastatin, 40 mg, Oral, Nightly  divalproex, 250 mg, Oral, Q8H  lisinopril, 10 mg, Oral, Daily  sodium chloride, 10 mL, Intravenous, Q12H      Continuous Infusions:lactated ringers, 9 mL/hr, Last Rate: 400 mL/hr (03/11/22 1458)      PRN Meds:.•  acetaminophen  •  HYDROcodone-acetaminophen  •  HYDROmorphone  •  melatonin  •  ondansetron  •  sodium chloride    Assessment/Plan   Assessment & Plan     Active Hospital Problems    Diagnosis  POA   • **Closed right hip fracture, initial encounter ([facility]) [S72.001A]  Yes   • Anemia [D64.9]  Yes   • Daily consumption of alcohol [Z78.9]  Yes   • Seizure disorder ([facility]) [G40.909]  Yes   • Hypertension [I10]  Yes   • HLD (hyperlipidemia) [E78.5]  Yes   • PAF (paroxysmal atrial fibrillation) ([facility]) [I48.0]  Yes      Resolved Hospital Problems   No resolved problems to display.        Brief Hospital Course to date:  Jenna Russell is a 81 y.o. 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 She presents today with complaints of right hip pain s/p mechanical fall. Patient volunteers that she was out walking around the neighborhood with her partner when she tripped and fell on the sidewalk. There was no head trauma. Patient was was unable to get up on her own and was assisted by her partner and two passersby. Unable to bear weight or move the right lower extremity once upright due to severe pain. Subsequently brought to ED for further evaluation and treatment.    This patient's problems and plans were partially entered by my partner and updated as appropriate by me 03/12/22.           Closed Right Hip Fracture:  - Nondisplaced right proximal femoral intertrochanteric fracture secondary to mechanical fall while walking  - s/p repair with Maged 3/11, doing well  - PT/OT to see today  - pain control, resume DVT ppx as per ortho-- I d/w Dr Lynne as well as neuro via curbside today given her h/o SAH. Neurology felt lovenox would be safe in ppx dosing and will start this today     Hypokalemia  - replace per protocol- will order today    Asymptomatic bacteruria  - UA noted, d/w patient no symptoms  - defer on abx at this time     Anemia:  - check iron and vitamin studies  - not anticoagulated, Hb 10 today      PAF:  - stable and rate controlled  - previously on Eliquis until SAH  - scheduled to undergo placement of Watchmen Device at [facility] in June 2022     Hypertension      HLD  - continue lisinopril  - continue statin      Seizure Disorder:  - seizure precautions  - continue depakote      Daily Alcohol Consumption:  - drinks 2-4 drinks daily.  - denies withdrawals or DTs    DVT prophylaxis:  Mechanical DVT prophylaxis orders are present.       AM-PAC 6 Clicks Score (PT): 9  (03/11/22 0822)    Disposition: I expect the patient to be discharged pending PT/OT eval- improved pain control     CODE STATUS:   Code Status and Medical Interventions:   Ordered at: 03/10/22 2054     Level Of Support Discussed With:    Patient     Code Status (Patient has no pulse and is not breathing):    CPR (Attempt to Resuscitate)     Medical Interventions (Patient has pulse or is breathing):    Full Support       Carolann Schuler MD  03/12/22

## 2022-03-12 NOTE — THERAPY EVALUATION
Patient Name: Jenna Russell  : 1940    MRN: [id number]                              Today's Date: 3/12/2022       Admit Date: 3/10/2022    Visit Dx:     ICD-10-CM ICD-9-CM   1. Closed right hip fracture, initial encounter ([facility])  S72.001A 820.8   2. History of stroke  Z86.73 V12.54     Patient Active Problem List   Diagnosis   • PAF (paroxysmal atrial fibrillation) ([facility])   • HLD (hyperlipidemia)   • Hypertension   • Combined receptive and expressive aphasia   • Colitis, Clostridium difficile   • Collagenous colitis   • Seizure disorder ([facility])   • Closed right hip fracture, initial encounter ([facility])   • Anemia   • Daily consumption of alcohol     Past Medical History:   Diagnosis Date   • Acute ischemic stroke ([facility])    • 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 Normal LVSF.Normal LV diastolic filling. Left atrium midly dilated. RV mild- mod dilated. MIld aortic cusp sclerosis. No evidence of mitral valve prolapse. Mild mitral regurgitation.No pulmonary hypertension or perdicardial effusion. No dilation of aortic root. Venous system appears normal   • H/O: stroke    • HLD (hyperlipidemia) 2016   • Left temporal lobe infarction ([facility])    • LOM (loss of memory)    • Osteoarthritis 2016   • Paroxysmal atrial fibrillation ([facility]) 2016   • Thyroid nodule 2016    1.8x1.2cm   • Weakness generalized    • Wound infection after surgery     Left hip arthroplasty, staph aureus, 6 weeks IV Rocephin     Past Surgical History:   Procedure Laterality Date   • ABDOMINOPLASTY     • BREAST ABSCESS INCISION AND DRAINAGE     • TOTAL HIP ARTHROPLASTY Left     Staph aureus wound infection      General Information     Row Name 22 1618           OT Time and Intention    Document Type evaluation  -JY     Mode of Treatment occupational therapy;individual therapy  -JY     Row Name 03/12/22 1616          General Information    Patient Profile Reviewed yes  -JY     Prior Level of Function independent:;all household mobility;community mobility;gait;transfer;bed mobility;feeding;grooming;dressing;bathing;cooking;dependent:;home management;cleaning  pt I in ADLs w/o AD, I in cooking, spouse A for laundry and hired A for cleaning  -JY     Existing Precautions/Restrictions fall;brace worn when out of bed;other (see comments)  RLE WBAT, KI donned to RLE when OOB, baseline expressive/receptive aphasia  -JY     Barriers to Rehab none identified  -JY     Row Name 03/12/22 1616          Occupational Profile    Environmental Supports and Barriers (Occupational Profile) walk in shower, elevated toilet, no stairs to manage d/t elevator in high rise condo, DME: has older FWW  -JY     Row Name 03/12/22 1616          Living Environment    People in Home spouse  -JY     Row Name 03/12/22 1616          Home Main Entrance    Number of Stairs, Main Entrance none;other (see comments)  elevator to reach high rise condo  -JY     Stair Railings, Main Entrance none  -JY     Row Name 03/12/22 1616          Stairs Within Home, Primary    Stairs, Within Home, Primary 0  -JY     Number of Stairs, Within Home, Primary none  -JY     Stair Railings, Within Home, Primary none  -JY     Row Name 03/12/22 1616          Cognition    Orientation Status (Cognition) oriented to;person;place;disoriented to;situation;time;other (see comments)  able to state hospital and city, not name of hospital; difficult to assess some orientation d/t baseline aphasia  -JY     Row Name 03/12/22 1616          Safety Issues, Functional Mobility    Safety Issues Affecting Function (Mobility) awareness of need for assistance;insight into deficits/self-awareness;positioning of assistive device;problem-solving;safety  precaution awareness;safety precautions follow-through/compliance;sequencing abilities  -JY     Impairments Affecting Function (Mobility) balance;endurance/activity tolerance;pain;range of motion (ROM);strength  -JY     Comment, Safety Issues/Impairments (Mobility) alert and able to follow commands  -JY           User Key  (r) = Recorded By, (t) = Taken By, (c) = Cosigned By    Initials Name Provider Type    Yvette Huynh OT Occupational Therapist                 Mobility/ADL's     Row Name 03/12/22 1620          Bed Mobility    Bed Mobility supine-sit;scooting/bridging;sit-supine  -JY     Scooting/Bridging [place] (Bed Mobility) minimum assist (75% patient effort);verbal cues  -JY     Supine-Sit [place] (Bed Mobility) moderate assist (50% patient effort);verbal cues  -JY     Sit-Supine [place] (Bed Mobility) moderate assist (50% patient effort);verbal cues  -JY     Bed Mobility, Safety Issues decreased use of legs for bridging/pushing  -JY     Assistive Device (Bed Mobility) bed rails;head of bed elevated  -JY     Comment, (Bed Mobility) skilled cues for optimal hand placement and seq to advance LEs to EOB and reach across body to grasp bed rail to pull self into sitting, cued to push through opposite UE to upright trunk yet pt req'd gross mod A for RLE advancement and uprighting trunk; denied any dizziness or feeling LH at EOB; may recommend leg  to assist with RLE advancement if new learning occurs  -[employer]     Row Name 03/12/22 1620          Transfers    Comment, (Transfers) did not advance to STS t/f from EOB with pt indicating increased pain at R hip w/ bed mobility and preference to return to bed  -JY     Sit-Stand [place] (Transfers) not tested  -JY     Row Name 03/12/22 1620          Functional Mobility    Functional Mobility- Comment defer to PT for specifics  -[employer]     Row Name 03/12/22 1620          Activities of Daily Living    BADL Assessment/Intervention upper body  dressing;lower body dressing;grooming  -JY     Row Name 03/12/22 1620          Mobility    Extremity Weight-bearing Status right lower extremity  -JY     Right Lower Extremity (Weight-bearing Status) weight-bearing as tolerated (WBAT)  -     Row Name 03/12/22 1620          Upper Body Dressing Assessment/Training    [place] Level (Upper Body Dressing) doff;don;pajama/robe;minimum assist (75% patient effort);verbal cues  -     Position (Upper Body Dressing) sitting up in bed  -JY     Comment, (Upper Body Dressing) min A for proximal and posterior mgmt of gown, tying, untying  -JY     Row Name 03/12/22 1620          Lower Body Dressing Assessment/Training    [place] Level (Lower Body Dressing) doff;don;socks;dependent (less than 25% patient effort)  -J     Assistive Devices (Lower Body Dressing) other (see comments);leg ;long-handled shoe horn;reacher;sock-aid  issued LH AE stated plan to progress training in future OT sessions  -J     Position (Lower Body Dressing) supported sitting;edge of bed sitting  -J     Comment, (Lower Body Dressing) pt able to reach distally while seated EOB however concern for balance and increased pain at R hip hindering pt from d/d sock thus dependent this date; issued AE with plan to progress ADL training with AE if new learning is successful in future sessions  -     Row Name 03/12/22 1620          Grooming Assessment/Training    [place] Level (Grooming) wash face, hands;standby assist;verbal cues;set up  -J     Position (Grooming) sitting up in bed  -J     Comment, (Grooming) provision of cues for initiation and follow through, no physical A afater set up  -J           User Key  (r) = Recorded By, (t) = Taken By, (c) = Cosigned By    Initials Name Provider Type    Yvette Huynh OT Occupational Therapist               Obj/Interventions     Row Name 03/12/22 1628          Sensory Assessment (Somatosensory)    Sensory Assessment (Somatosensory)  bilateral UE;sensation intact  -JY     Bilateral UE Sensory Assessment general sensation;light touch awareness;light touch localization;intact  -JY     Sensory Assessment denies any numbness or tingling and able to recognize stimuli at BUEs as intact and symmetrical  -JY     Row Name 03/12/22 1628          Vision Assessment/Intervention    Visual Impairment/Limitations corrective lenses for reading  -JY     Vision Assessment Comment reports chronic vision deficits however using eyewear for reading, no acute changes including blurred or incomplete vision  -JY     Row Name 03/12/22 1628          Range of Motion Comprehensive    General Range of Motion bilateral upper extremity ROM WFL  -JY     Comment, General Range of Motion BUE AROM WFL  -JY     Row Name 03/12/22 1628          Strength Comprehensive (MMT)    General Manual Muscle Testing (MMT) Assessment upper extremity strength deficits identified  -JY     Comment, General Manual Muscle Testing (MMT) Assessment gross BUE MMS 4+/5 per MMT  -JY     Row Name 03/12/22 1628          Motor Skills    Motor Skills functional endurance  -JY     Functional Endurance decreased activity tolerance toward more dynamic tasks  [facility] 03/12/22 1628          Balance    Balance Assessment sitting static balance;sitting dynamic balance  -JY     Static Sitting Balance supervision;verbal cues  -JY     Dynamic Sitting Balance contact guard;verbal cues;other (see comments)  LBD  -JY     Position, Sitting Balance supported;sitting edge of bed  -JY     Balance Interventions sitting;static;dynamic;occupation based/functional task  -JY     Comment, Balance pt with balance concern during distal reach toward feet for assessing d/d socks  -JY           User Key  (r) = Recorded By, (t) = Taken By, (c) = Cosigned By    Initials Name Provider Type    Yvette Huynh OT Occupational Therapist               Goals/Plan     Row Name 03/12/22 1637          Bed Mobility Goal 1 (OT)     Activity/Assistive Device (Bed Mobility Goal 1, OT) bed mobility activities, all  -JY     [place] Level/Cues Needed (Bed Mobility Goal 1, OT) minimum assist (75% or more patient effort);verbal cues required  -JY     Time Frame (Bed Mobility Goal 1, OT) long term goal (LTG);by discharge  -JY     Progress/Outcomes (Bed Mobility Goal 1, OT) goal ongoing  -[employer]     Row Name 03/12/22 1637          Transfer Goal 1 (OT)    Activity/Assistive Device (Transfer Goal 1, OT) sit-to-stand/stand-to-sit;bed-to-chair/chair-to-bed;commode, bedside without drop arms;walker, rolling;other (see comments)  progress to toilet if able  -JY     [place] Level/Cues Needed (Transfer Goal 1, OT) minimum assist (75% or more patient effort);verbal cues required  -JY     Time Frame (Transfer Goal 1, OT) long term goal (LTG);by discharge  -JY     Progress/Outcome (Transfer Goal 1, OT) goal ongoing  -[employer]     Row Name 03/12/22 1637          Dressing Goal 1 (OT)    Activity/Device (Dressing Goal 1, OT) lower body dressing;other (see comments)  d/d LB garments with AE  -JY     [place]/Cues Needed (Dressing Goal 1, OT) minimum assist (75% or more patient effort);verbal cues required  -JY     Time Frame (Dressing Goal 1, OT) long term goal (LTG);by discharge  -JY     Progress/Outcome (Dressing Goal 1, OT) goal ongoing  -[employer]     Row Name 03/12/22 1637          Strength Goal 1 (OT)    Strength Goal 1 (OT) Pt to complete seated HEP focused on BUE strength and endurance training w/ progressive reps/sets/resistance in order to improve integration in ADLs, related t/fs  -JY     Time Frame (Strength Goal 1, OT) long term goal (LTG);by discharge  -JY     Progress/Outcome (Strength Goal 1, OT) goal ongoing  -[employer]     Row Name 03/12/22 1637          Therapy Assessment/Plan (OT)    Planned Therapy Interventions (OT) activity tolerance training;adaptive equipment training;BADL retraining;functional balance retraining;occupation/activity based  interventions;patient/caregiver education/training;strengthening exercise;transfer/mobility retraining  -J           User Key  (r) = Recorded By, (t) = Taken By, (c) = Cosigned By    Initials Name Provider Type    Yvette Huynh, AMANDA Occupational Therapist               Clinical Impression     Row Name 03/12/22 1630          Pain Assessment    Pain Intervention(s) Repositioned;Ambulation/increased activity  -JY     Additional Documentation Pain Scale: FACES Pre/Post-Treatment (Group)  -LEI     Row Name 03/12/22 1630          Pain Scale: FACES Pre/Post-Treatment    Pain: FACES Scale, Pretreatment 2-->hurts little bit  -JY     Posttreatment Pain Rating 2-->hurts little bit  -JY     Pain Location - Side/Orientation Right  -JY     Pain Location generalized  -JY     Pain Location - hip  -JY     Pre/Posttreatment Pain Comment general mild pain at pre/post OT, increased mildly during bed mobility and attempted distal reach during LBD  -LEI     Row Name 03/12/22 1630          Plan of Care Review    Plan of Care Reviewed With patient;spouse  -LEI     Progress no change  OT IE  -JY     Outcome Evaluation OT eval completed. Pt alert and oriented to self, general place, disoriented to situation and time. Pt presents with decreased I in ADLs, related t/fs compared to PLOF impacted by decreased fxl endurance, impaired balance, mild muscle weakness at BUEs, pain and decreased distal reach impacting LB ADLs. Pt completed supine < > sitting with mod A, scooting toward HOB while sitting with min A. Pt dependent for d/d socks with increased pain and balance concerns with distal reach thus issued  AE with plan for trialled ADL retraining with AE if new learning is successful. Pt able to d/d gown with min A. Pt preferred to return to bed after increase in pain w/ bed mobility and simulated LBD thus progressive standing did not occur this date. Recommend IPOT POC and IRF at d/c when medically ready.  -LEI     Row Name 03/12/22 0884           Therapy Assessment/Plan (OT)    Patient/Family Therapy Goal Statement (OT) to increase I in ADLs, related t/fs, return to PLOF  -JY     Rehab Potential (OT) good, to achieve stated therapy goals  -JY     Criteria for Skilled Therapeutic Interventions Met (OT) yes;skilled treatment is necessary  -JY     Therapy Frequency (OT) daily  -JY     Row Name 03/12/22 1630          Therapy Plan Review/Discharge Plan (OT)    Equipment Needs Upon Discharge (OT) dressing equipment;bathing equipment;commode chair  -JY     Anticipated Discharge Disposition (OT) inpatient rehabilitation facility  -JY     Row Name 03/12/22 1630          Vital Signs    Pre Systolic BP Rehab 127  -JY     Pre Treatment Diastolic BP 66  -JY     Pretreatment Heart Rate (beats/min) 75  -JY     Posttreatment Heart Rate (beats/min) 79  -JY     O2 Delivery Pre Treatment room air  -JY     O2 Delivery Intra Treatment room air  -JY     O2 Delivery Post Treatment room air  -JY     Pre Patient Position Supine  -JY     Intra Patient Position Sitting  -JY     Post Patient Position Supine  -JY     Row Name 03/12/22 1630          Positioning and Restraints    Pre-Treatment Position in bed  -JY     Post Treatment Position bed  -JY     In Bed notified nsg;fowlers;call light within reach;encouraged to call for assist;exit alarm on;with family/caregiver;side rails up x3  -JY           User Key  (r) = Recorded By, (t) = Taken By, (c) = Cosigned By    Initials Name Provider Type    Yvette Huynh, AMANDA Occupational Therapist               Outcome Measures     Row Name 03/12/22 1639          How much help from another is currently needed...    Putting on and taking off regular lower body clothing? 1  -JY     Bathing (including washing, rinsing, and drying) 2  -JY     Toileting (which includes using toilet bed pan or urinal) 2  -JY     Putting on and taking off regular upper body clothing 3  -JY     Taking care of personal grooming (such as brushing teeth) 3  -JY      Eating meals 3  -JY     AM-PAC 6 Clicks Score (OT) 14  -JY     Row Name 03/12/22 1133 03/12/22 0830       How much help from another person do you currently need...    Turning from your back to your side while in flat bed without using bedrails? 3  -LH 4  -OD    Moving from lying on back to sitting on the side of a flat bed without bedrails? 3  -LH 1  -OD    Moving to and from a bed to a chair (including a wheelchair)? 3  -LH 1  -OD    Standing up from a chair using your arms (e.g., wheelchair, bedside chair)? 3  -LH 1  -OD    Climbing 3-5 steps with a railing? 2  -LH 1  -OD    To walk in hospital room? 3  -LH 1  -OD    AM-PAC 6 Clicks Score (PT) 17  -LH 9  -OD    Row Name 03/12/22 1639 03/12/22 1133       Functional Assessment    Outcome Measure Options AM-PAC 6 Clicks Daily Activity (OT)  - AM-PAC 6 Clicks Basic Mobility (PT)  -          User Key  (r) = Recorded By, (t) = Taken By, (c) = Cosigned By    Initials Name Provider Type    OD Ashely Kaye, RN Registered Nurse    Yvette Huynh OT Occupational Therapist     Willi Rodriguez, PT Physical Therapist                Occupational Therapy Education                 Title: PT OT SLP Therapies (In Progress)     Topic: Occupational Therapy (In Progress)     Point: ADL training (In Progress)     Description:   Instruct learner(s) on proper safety adaptation and remediation techniques during self care or transfers.   Instruct in proper use of assistive devices.              Learning Progress Summary           Patient Acceptance, E,D, NR by LEI at 3/12/2022 1533                   Point: Home exercise program (Not Started)     Description:   Instruct learner(s) on appropriate technique for monitoring, assisting and/or progressing therapeutic exercises/activities.              Learner Progress:  Not documented in this visit.          Point: Precautions (In Progress)     Description:   Instruct learner(s) on prescribed precautions during self-care and  functional transfers.              Learning Progress Summary           Patient Acceptance, E,D, NR by LEI at 3/12/2022 1533                   Point: Body mechanics (In Progress)     Description:   Instruct learner(s) on proper positioning and spine alignment during self-care, functional mobility activities and/or exercises.              Learning Progress Summary           Patient Acceptance, E,D, NR by LEI at 3/12/2022 1533                               User Key     Initials Effective Dates Name Provider Type Discipline    LEI 06/16/21 -  Yvette West, AMANDA Occupational Therapist OT              OT Recommendation and Plan  Planned Therapy Interventions (OT): activity tolerance training, adaptive equipment training, BADL retraining, functional balance retraining, occupation/activity based interventions, patient/caregiver education/training, strengthening exercise, transfer/mobility retraining  Therapy Frequency (OT): daily  Plan of Care Review  Plan of Care Reviewed With: patient, spouse  Progress: no change (OT IE)  Outcome Evaluation: OT eval completed. Pt alert and oriented to self, general place, disoriented to situation and time. Pt presents with decreased I in ADLs, related t/fs compared to PLOF impacted by decreased fxl endurance, impaired balance, mild muscle weakness at BUEs, pain and decreased distal reach impacting LB ADLs. Pt completed supine < > sitting with mod A, scooting toward HOB while sitting with min A. Pt dependent for d/d socks with increased pain and balance concerns with distal reach thus issued LH AE with plan for trialled ADL retraining with AE if new learning is successful. Pt able to d/d gown with min A. Pt preferred to return to bed after increase in pain w/ bed mobility and simulated LBD thus progressive standing did not occur this date. Recommend IPOT POC and IRF at d/c when medically ready.     Time Calculation:    Time Calculation- OT     Row Name 03/12/22 1642 03/12/22 1139           Time Calculation- OT    OT Start Time 1533  -JY --     OT Received On 03/12/22  -JY --     OT Goal Re-Cert Due Date 03/22/22  -JY --            Timed Charges    79262 - Gait Training Minutes  -- 12  -     66642 - OT Self Care/Mgmt Minutes 10  -JY --            Untimed Charges    OT Eval/Re-eval Minutes 47  -JY --            Total Minutes    Timed Charges Total Minutes 10  -JY 12  -     Untimed Charges Total Minutes 47  -JY --      Total Minutes 57  -JY 12  -           User Key  (r) = Recorded By, (t) = Taken By, (c) = Cosigned By    Initials Name Provider Type    Yvette Huynh OT Occupational Therapist     Willi Rodriguez, PT Physical Therapist              Therapy Charges for Today     Code Description Service Date Service Provider Modifiers Qty    50561164599 HC OT SELF CARE/MGMT/TRAIN EA 15 MIN 3/12/2022 Yvette West OT GO 1    99435301910 HC OT EVAL MOD COMPLEXITY 4 3/12/2022 Yvette West OT GO 1               Yvette West OT  3/12/2022

## 2022-03-13 LAB
ALBUMIN SERPL-MCNC: 3.3 G/DL (ref 3.5–5.2)
ALBUMIN/GLOB SERPL: 1.7 G/DL
ALP SERPL-CCNC: 49 U/L (ref 39–117)
ALT SERPL W P-5'-P-CCNC: 13 U/L (ref 1–33)
ANION GAP SERPL CALCULATED.3IONS-SCNC: 5 MMOL/L (ref 5–15)
AST SERPL-CCNC: 26 U/L (ref 1–32)
BASOPHILS # BLD AUTO: 0.02 10*3/MM3 (ref 0–0.2)
BASOPHILS NFR BLD AUTO: 0.3 % (ref 0–1.5)
BILIRUB SERPL-MCNC: 0.4 MG/DL (ref 0–1.2)
BUN SERPL-MCNC: 7 MG/DL (ref 8–23)
BUN/CREAT SERPL: 14.9 (ref 7–25)
CALCIUM SPEC-SCNC: 8.7 MG/DL (ref 8.6–10.5)
CHLORIDE SERPL-SCNC: 104 MMOL/L (ref 98–107)
CO2 SERPL-SCNC: 31 MMOL/L (ref 22–29)
CREAT SERPL-MCNC: 0.47 MG/DL (ref 0.57–1)
DEPRECATED RDW RBC AUTO: 50.6 FL (ref 37–54)
EGFRCR SERPLBLD CKD-EPI 2021: 97 ML/MIN/1.73
EOSINOPHIL # BLD AUTO: 0.09 10*3/MM3 (ref 0–0.4)
EOSINOPHIL NFR BLD AUTO: 1.2 % (ref 0.3–6.2)
ERYTHROCYTE [DISTWIDTH] IN BLOOD BY AUTOMATED COUNT: 13.8 % (ref 12.3–15.4)
GLOBULIN UR ELPH-MCNC: 2 GM/DL
GLUCOSE SERPL-MCNC: 96 MG/DL (ref 65–99)
HCT VFR BLD AUTO: 28.3 % (ref 34–46.6)
HGB BLD-MCNC: 9.2 G/DL (ref 12–15.9)
IMM GRANULOCYTES # BLD AUTO: 0.01 10*3/MM3 (ref 0–0.05)
IMM GRANULOCYTES NFR BLD AUTO: 0.1 % (ref 0–0.5)
LYMPHOCYTES # BLD AUTO: 2.3 10*3/MM3 (ref 0.7–3.1)
LYMPHOCYTES NFR BLD AUTO: 29.6 % (ref 19.6–45.3)
MCH RBC QN AUTO: 32.5 PG (ref 26.6–33)
MCHC RBC AUTO-ENTMCNC: 32.5 G/DL (ref 31.5–35.7)
MCV RBC AUTO: 100 FL (ref 79–97)
MONOCYTES # BLD AUTO: 1.15 10*3/MM3 (ref 0.1–0.9)
MONOCYTES NFR BLD AUTO: 14.8 % (ref 5–12)
NEUTROPHILS NFR BLD AUTO: 4.2 10*3/MM3 (ref 1.7–7)
NEUTROPHILS NFR BLD AUTO: 54 % (ref 42.7–76)
NRBC BLD AUTO-RTO: 0 /100 WBC (ref 0–0.2)
PLATELET # BLD AUTO: 124 10*3/MM3 (ref 140–450)
PMV BLD AUTO: 10.5 FL (ref 6–12)
POTASSIUM SERPL-SCNC: 4.4 MMOL/L (ref 3.5–5.2)
PROT SERPL-MCNC: 5.3 G/DL (ref 6–8.5)
RBC # BLD AUTO: 2.83 10*6/MM3 (ref 3.77–5.28)
SODIUM SERPL-SCNC: 140 MMOL/L (ref 136–145)
WBC NRBC COR # BLD: 7.77 10*3/MM3 (ref 3.4–10.8)

## 2022-03-13 PROCEDURE — 97530 THERAPEUTIC ACTIVITIES: CPT

## 2022-03-13 PROCEDURE — 99232 SBSQ HOSP IP/OBS MODERATE 35: CPT | Performed by: NURSE PRACTITIONER

## 2022-03-13 PROCEDURE — 97116 GAIT TRAINING THERAPY: CPT

## 2022-03-13 PROCEDURE — 85025 COMPLETE CBC W/AUTO DIFF WBC: CPT | Performed by: INTERNAL MEDICINE

## 2022-03-13 PROCEDURE — 25010000002 ENOXAPARIN PER 10 MG: Performed by: INTERNAL MEDICINE

## 2022-03-13 PROCEDURE — 80053 COMPREHEN METABOLIC PANEL: CPT | Performed by: INTERNAL MEDICINE

## 2022-03-13 PROCEDURE — 97110 THERAPEUTIC EXERCISES: CPT

## 2022-03-13 RX ORDER — POLYETHYLENE GLYCOL 3350 17 G/17G
17 POWDER, FOR SOLUTION ORAL DAILY
Status: DISCONTINUED | OUTPATIENT
Start: 2022-03-13 | End: 2022-03-15 | Stop reason: HOSPADM

## 2022-03-13 RX ORDER — DOCUSATE SODIUM 100 MG/1
100 CAPSULE, LIQUID FILLED ORAL 2 TIMES DAILY
Status: DISCONTINUED | OUTPATIENT
Start: 2022-03-13 | End: 2022-03-15 | Stop reason: HOSPADM

## 2022-03-13 RX ORDER — BACLOFEN 10 MG/1
10 TABLET ORAL 3 TIMES DAILY PRN
Status: DISCONTINUED | OUTPATIENT
Start: 2022-03-13 | End: 2022-03-14

## 2022-03-13 RX ORDER — HYDROCODONE BITARTRATE AND ACETAMINOPHEN 5; 325 MG/1; MG/1
1 TABLET ORAL EVERY 4 HOURS PRN
Status: DISCONTINUED | OUTPATIENT
Start: 2022-03-13 | End: 2022-03-15

## 2022-03-13 RX ADMIN — Medication 10 ML: at 20:59

## 2022-03-13 RX ADMIN — HYDROCODONE BITARTRATE AND ACETAMINOPHEN 1 TABLET: 5; 325 TABLET ORAL at 15:25

## 2022-03-13 RX ADMIN — DOCUSATE SODIUM 100 MG: 100 CAPSULE, LIQUID FILLED ORAL at 20:57

## 2022-03-13 RX ADMIN — POLYETHYLENE GLYCOL 3350 17 G: 17 POWDER, FOR SOLUTION ORAL at 10:24

## 2022-03-13 RX ADMIN — DIVALPROEX SODIUM 250 MG: 250 TABLET, DELAYED RELEASE ORAL at 15:25

## 2022-03-13 RX ADMIN — DOCUSATE SODIUM 100 MG: 100 CAPSULE, LIQUID FILLED ORAL at 10:24

## 2022-03-13 RX ADMIN — BACLOFEN 10 MG: 10 TABLET ORAL at 10:24

## 2022-03-13 RX ADMIN — DIVALPROEX SODIUM 250 MG: 250 TABLET, DELAYED RELEASE ORAL at 21:01

## 2022-03-13 RX ADMIN — LISINOPRIL 10 MG: 10 TABLET ORAL at 08:51

## 2022-03-13 RX ADMIN — HYDROCODONE BITARTRATE AND ACETAMINOPHEN 1 TABLET: 5; 325 TABLET ORAL at 08:51

## 2022-03-13 RX ADMIN — DIVALPROEX SODIUM 250 MG: 250 TABLET, DELAYED RELEASE ORAL at 05:55

## 2022-03-13 RX ADMIN — HYDROCODONE BITARTRATE AND ACETAMINOPHEN 1 TABLET: 5; 325 TABLET ORAL at 04:33

## 2022-03-13 RX ADMIN — Medication 10 ML: at 09:00

## 2022-03-13 RX ADMIN — ENOXAPARIN SODIUM 40 MG: 40 INJECTION SUBCUTANEOUS at 12:26

## 2022-03-13 RX ADMIN — ATORVASTATIN CALCIUM 40 MG: 40 TABLET, FILM COATED ORAL at 20:57

## 2022-03-13 NOTE — THERAPY TREATMENT NOTE
Patient Name: Jenna Russell  : 1940    MRN: [id number]                              Today's Date: 3/13/2022       Admit Date: 3/10/2022    Visit Dx:     ICD-10-CM ICD-9-CM   1. Closed right hip fracture, initial encounter ([facility])  S72.001A 820.8   2. 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 Normal LVSF.Normal LV diastolic filling. Left atrium midly dilated. RV mild- mod dilated. MIld aortic cusp sclerosis. No evidence of mitral valve prolapse. Mild mitral regurgitation.No pulmonary hypertension or perdicardial effusion. No dilation of aortic root. Venous system appears normal   • H/O: stroke    • HLD (hyperlipidemia) 2016   • Left temporal lobe infarction ([facility])    • LOM (loss of memory)    • Osteoarthritis 2016   • Paroxysmal atrial fibrillation ([facility]) 2016   • Thyroid nodule 2016    1.8x1.2cm   • Weakness generalized    • Wound infection after surgery     Left hip arthroplasty, staph aureus, 6 weeks IV Rocephin     Past Surgical History:   Procedure Laterality Date   • ABDOMINOPLASTY     • BREAST ABSCESS INCISION AND DRAINAGE     • TOTAL HIP ARTHROPLASTY Left     Staph aureus wound infection      General Information     Row Name 22 1015           Physical Therapy Time and Intention    Document Type therapy note (daily note)  -SC     Mode of Treatment physical therapy  -[facility] Name 03/13/22 1015          General Information    Patient Profile Reviewed yes  -SC     Existing Precautions/Restrictions fall;brace worn when out of bed;other (see comments)  hx of aphasia  -[facility] Name 03/13/22 1015          Cognition    Orientation Status (Cognition) oriented to;person;place  -[facility] Name 03/13/22 1015          Safety Issues, Functional Mobility    Impairments Affecting Function (Mobility) balance;endurance/activity tolerance;pain;range of motion (ROM);strength  -SC     Comment, Safety Issues/Impairments (Mobility) alert follows 4/5 commandes  -SC           User Key  (r) = Recorded By, (t) = Taken By, (c) = Cosigned By    Initials Name Provider Type    SC Lizbeth Gardner PT Physical Therapist               Mobility     [facility] Name 03/13/22 1016          Bed Mobility    Bed Mobility sit-supine;scooting/bridging  -SC     Scooting/Bridging Ne[place] (Bed Mobility) minimum assist (75% patient effort);verbal cues  -SC     Supine-Sit [place] (Bed Mobility) verbal cues;maximum assist (25% patient effort)  -SC     Assistive Device (Bed Mobility) bed rails;head of bed elevated  -SC     Comment, (Bed Mobility) cues for bridging over to edge of bed. Patient demonstrated difficulty sequencing this transfer dispite verbal and tactile cues.  -[facility] Name 03/13/22 1016          Transfers    Comment, (Transfers) STS from EOB with cues for hand placement. Demonstrated good technique  -SC     Row Name 03/13/22 1016          Sit-Stand Transfer    Sit-Stand Ne[place] (Transfers) 1 person assist;1 person to manage equipment;contact guard  -SC     Assistive Device (Sit-Stand Transfers) walker, 4-wheeled  -SC     Row Name 03/13/22 1016          Gait/Stairs (Locomotion)    Neely Level (Gait) 1 person assist;1 person to manage equipment;contact guard   -SC     Assistive Device (Gait) walker, 4-wheeled  -SC     Distance in Feet (Gait) 100  -SC     Deviations/Abnormal Patterns (Gait) right sided deviations;gait speed decreased;stride length decreased;weight shifting decreased;bilateral deviations  -SC     Bilateral Gait Deviations forward flexed posture  -SC     Right Sided Gait Deviations heel strike decreased;weight shift ability decreased  -SC     Comment, (Gait/Stairs) Gt training focused on achieving step to gait pattern . Patient preformed good sequencing with cues for uprght posure and equal wt shifting  -SC     Row Name 03/13/22 1016          Mobility    Extremity Weight-bearing Status right lower extremity  -SC     Right Lower Extremity (Weight-bearing Status) weight-bearing as tolerated (WBAT)  -SC           User Key  (r) = Recorded By, (t) = Taken By, (c) = Cosigned By    Initials Name Provider Type    SC Lizbeth Gardner, PT Physical Therapist               Obj/Interventions     [facility] Name 03/13/22 1020          Motor Skills    Therapeutic Exercise hip;knee;ankle;shoulder  -[facility] Name 03/13/22 1020          Shoulder (Therapeutic Exercise)    Shoulder (Therapeutic Exercise) AROM (active range of motion)  -SC     Shoulder AROM (Therapeutic Exercise) bilateral;flexion;extension;supine;10 repetitions  -[facility] Name 03/13/22 1020          Hip (Therapeutic Exercise)    Hip Isometrics (Therapeutic Exercise) bilateral;extension;aBduction;aDduction;flexion;10 repetitions;supine  -SC     Row Name 03/13/22 1020          Knee (Therapeutic Exercise)    Knee (Therapeutic Exercise) isometric exercises;AROM (active range of motion)  -SC     Knee AROM (Therapeutic Exercise) bilateral;flexion;extension;10 repetitions;SAQ (short arc quad);supine  -SC     Knee Isometrics (Therapeutic Exercise) 10 repetitions;bilateral;gluteal sets;quad sets  -[facility] Name 03/13/22 1020          Ankle (Therapeutic Exercise)    Ankle (Therapeutic Exercise) AROM (active range of  motion)  -SC     Ankle AROM (Therapeutic Exercise) bilateral;dorsiflexion;plantarflexion  -[facility] Name 03/13/22 1020          Balance    Dynamic Standing Balance 1-person assist;minimal assist  -SC     Position/Device Used, Standing Balance supported  -SC     Comment, Balance needs walkeer and contact support for stability  -SC           User Key  (r) = Recorded By, (t) = Taken By, (c) = Cosigned By    Initials Name Provider Type    SC Lizbeth Gardner, PT Physical Therapist               Goals/Plan    No documentation.                Clinical Impression     [facility] Name 03/13/22 1023          Pain    Pretreatment Pain Rating 5/10  -SC     Posttreatment Pain Rating 5/10  -SC     Pain Location - Side/Orientation Right  -SC     Pain Location - hip  -[facility] Name 03/13/22 1023          Plan of Care Review    Plan of Care Reviewed With patient  -SC     Outcome Evaluation Patient continues to need moderate to max assistance to get out of bed with difficulty sequencing, In standing she ambuated 100 feet wtih walker and demonstrated improving control of waker. Continue to recommend IRF at discharge  -[facility] Name 03/13/22 1023          Therapy Assessment/Plan (PT)    Rehab Potential (PT) good, to achieve stated therapy goals  -SC     Criteria for Skilled Interventions Met (PT) yes;meets criteria;skilled treatment is necessary  -[facility] Name 03/13/22 1023          Positioning and Restraints    Pre-Treatment Position in bed  -SC     Post Treatment Position chair  -SC     In Chair notified nsg;reclined;sitting;call light within reach;encouraged to call for assist;exit alarm on;with family/caregiver  -SC           User Key  (r) = Recorded By, (t) = Taken By, (c) = Cosigned By    Initials Name Provider Type    SC Lizbeth Gardner, PT Physical Therapist               Outcome Measures     [facility] Name 03/13/22 1025          How much help from another person do you currently need...    Turning from your back to your side while  in flat bed without using bedrails? 3  -SC     Moving from lying on back to sitting on the side of a flat bed without bedrails? 2  -SC     Moving to and from a bed to a chair (including a wheelchair)? 3  -SC     Standing up from a chair using your arms (e.g., wheelchair, bedside chair)? 3  -SC     Climbing 3-5 steps with a railing? 2  -SC     To walk in hospital room? 3  -SC     AM-PAC 6 Clicks Score (PT) 16  -SC     Row Name 03/13/22 1025          Functional Assessment    Outcome Measure Options AM-PAC 6 Clicks Basic Mobility (PT)  -SC           User Key  (r) = Recorded By, (t) = Taken By, (c) = Cosigned By    Initials Name Provider Type    Lizbeth Paul PT Physical Therapist                             Physical Therapy Education                 Title: PT OT SLP Therapies (In Progress)     Topic: Physical Therapy (Done)     Point: Mobility training (Done)     Learning Progress Summary           Patient Eager, E, VU by SC at 3/13/2022 1025    Comment: reviewed benefits of activity   Significant Other Acceptance, E, VU,DU,NR by  at 3/12/2022 1134    Comment: Reviewed safety with mobility, hip precautions, HEP, DC recommendations, and PT POC                   Point: Home exercise program (Done)     Learning Progress Summary           Patient Eager, E, VU by SC at 3/13/2022 1025    Comment: reviewed benefits of activity   Significant Other Acceptance, E, VU,DU,NR by  at 3/12/2022 1134    Comment: Reviewed safety with mobility, hip precautions, HEP, DC recommendations, and PT POC                   Point: Body mechanics (Done)     Learning Progress Summary           Patient Eager, E, VU by SC at 3/13/2022 1025    Comment: reviewed benefits of activity   Significant Other Acceptance, E, VU,DU,NR by  at 3/12/2022 1134    Comment: Reviewed safety with mobility, hip precautions, HEP, DC recommendations, and PT POC                   Point: Precautions (Done)     Learning Progress Summary           Patient Eaglilia  REUBEN, VU by SC at 3/13/2022 1025    Comment: reviewed benefits of activity   Significant Other Acceptance, E, VU,DU,NR by  at 3/12/2022 1134    Comment: Reviewed safety with mobility, hip precautions, HEP, DC recommendations, and PT POC                               User Key     Initials Effective Dates Name Provider Type Discipline    SC 06/16/21 -  Lizbeth Gardner PT Physical Therapist PT    LH 09/21/21 -  Willi Rodriguez PT Physical Therapist PT              PT Recommendation and Plan     Plan of Care Reviewed With: patient  Outcome Evaluation: Patient continues to need moderate to max assistance to get out of bed with difficulty sequencing, In standing she ambuated 100 feet wtih walker and demonstrated improving control of waker. Continue to recommend IRF at discharge     Time Calculation:    PT Charges     Row Name 03/13/22 0919             Time Calculation    Start Time 0919  -SC      PT Received On 03/13/22  -SC      PT Goal Re-Cert Due Date 03/22/22  -SC              Time Calculation- PT    Total Timed Code Minutes- PT 38 minute(s)  -SC              Timed Charges    05738 - PT Therapeutic Exercise Minutes 10  -SC      70636 - Gait Training Minutes  15  -SC      59105 - PT Therapeutic Activity Minutes 13  -SC              Total Minutes    Timed Charges Total Minutes 38  -SC       Total Minutes 38  -SC            User Key  (r) = Recorded By, (t) = Taken By, (c) = Cosigned By    Initials Name Provider Type    SC Lizbeth Gardner, PT Physical Therapist              Therapy Charges for Today     Code Description Service Date Service Provider Modifiers Qty    51349878819 HC PT THER PROC EA 15 MIN 3/13/2022 Lizbeth Gardner, PT GP 1    45224117002 HC GAIT TRAINING EA 15 MIN 3/13/2022 Lizbeth Gardner, PT GP 1    20431256361 HC PT THERAPEUTIC ACT EA 15 MIN 3/13/2022 Lizbeth Gardner, PT GP 1    95326843314 HC PT THER SUPP EA 15 MIN 3/13/2022 Lizbeth Gardner, PT GP 2          PT G-Codes  Outcome Measure Options: AM-PAC  6 Clicks Basic Mobility (PT)  AM-PAC 6 Clicks Score (PT): 16  AM-PAC 6 Clicks Score (OT): 14    Lizbeth Gardner, PT  3/13/2022

## 2022-03-13 NOTE — PROGRESS NOTES
"/61   Pulse 69   Temp 98 °F (36.7 °C) (Oral)   Resp 16   Ht 172.7 cm (68\")   Wt 53.1 kg (117 lb)   SpO2 97%   BMI 17.79 kg/m²     Lab Results (last 24 hours)     Procedure Component Value Units Date/Time    CBC & Differential [[id number]]  (Abnormal) Collected: 03/13/22 0629    Specimen: Blood Updated: 03/13/22 0726    Narrative:      The following orders were created for panel order CBC & Differential.  Procedure                               Abnormality         Status                     ---------                               -----------         ------                     CBC Auto Differential[[id number]]        Abnormal            Final result                 Please view results for these tests on the individual orders.    CBC Auto Differential [[id number]]  (Abnormal) Collected: 03/13/22 0629    Specimen: Blood Updated: 03/13/22 0726     WBC 7.77 10*3/mm3      RBC 2.83 10*6/mm3      Hemoglobin 9.2 g/dL      Hematocrit 28.3 %      .0 fL      MCH 32.5 pg      MCHC 32.5 g/dL      RDW 13.8 %      RDW-SD 50.6 fl      MPV 10.5 fL      Platelets 124 10*3/mm3      Neutrophil % 54.0 %      Lymphocyte % 29.6 %      Monocyte % 14.8 %      Eosinophil % 1.2 %      Basophil % 0.3 %      Immature Grans % 0.1 %      Neutrophils, Absolute 4.20 10*3/mm3      Lymphocytes, Absolute 2.30 10*3/mm3      Monocytes, Absolute 1.15 10*3/mm3      Eosinophils, Absolute 0.09 10*3/mm3      Basophils, Absolute 0.02 10*3/mm3      Immature Grans, Absolute 0.01 10*3/mm3      nRBC 0.0 /100 WBC     Comprehensive Metabolic Panel [[id number]]  (Abnormal) Collected: 03/13/22 0629    Specimen: Blood Updated: 03/13/22 0723     Glucose 96 mg/dL      BUN 7 mg/dL      Creatinine 0.47 mg/dL      Sodium 140 mmol/L      Potassium 4.4 mmol/L      Chloride 104 mmol/L      CO2 31.0 mmol/L      Calcium 8.7 mg/dL      Total Protein 5.3 g/dL      Albumin 3.30 g/dL      ALT (SGPT) 13 U/L      AST (SGOT) 26 U/L      Alkaline Phosphatase 49 U/L      " Total Bilirubin 0.4 mg/dL      Globulin 2.0 gm/dL      Comment: Calculated Result        A/G Ratio 1.7 g/dL      BUN/Creatinine Ratio 14.9     Anion Gap 5.0 mmol/L      eGFR 97.0 mL/min/1.73      Comment: National Kidney Foundation and American Society of Nephrology (ASN) Task Force recommended calculation based on the Chronic Kidney Disease Epidemiology Collaboration (CKD-EPI) equation refit without adjustment for race.       Narrative:      GFR Normal >60  Chronic Kidney Disease <60  Kidney Failure <15      Potassium [[id number]]  (Normal) Collected: 03/12/22 2206    Specimen: Blood Updated: 03/12/22 2241     Potassium 4.2 mmol/L     Urine Culture - Urine, Urine, Clean Catch [[id number]] Collected: 03/11/22 0700    Specimen: Urine, Clean Catch Updated: 03/12/22 1219     Urine Culture 25,000 CFU/mL Mixed Cheyenne Isolated    Narrative:      Specimen contains mixed organisms of questionable pathogenicity which indicates contamination with commensal cheyenne.  Further identification is unlikely to provide clinically useful information.  Suggest recollection.    Comprehensive Metabolic Panel [[id number]]  (Abnormal) Collected: 03/12/22 1006    Specimen: Blood Updated: 03/12/22 1114     Glucose 111 mg/dL      BUN 9 mg/dL      Creatinine 0.62 mg/dL      Sodium 140 mmol/L      Potassium 3.5 mmol/L      Chloride 102 mmol/L      CO2 29.0 mmol/L      Calcium 8.7 mg/dL      Total Protein 5.7 g/dL      Albumin 3.50 g/dL      ALT (SGPT) 13 U/L      AST (SGOT) 25 U/L      Alkaline Phosphatase 44 U/L      Total Bilirubin 0.4 mg/dL      Globulin 2.2 gm/dL      Comment: Calculated Result        A/G Ratio 1.6 g/dL      BUN/Creatinine Ratio 14.5     Anion Gap 9.0 mmol/L      eGFR 90.7 mL/min/1.73      Comment: National Kidney Foundation and American Society of Nephrology (ASN) Task Force recommended calculation based on the Chronic Kidney Disease Epidemiology Collaboration (CKD-EPI) equation refit without adjustment for race.        Narrative:      GFR Normal >60  Chronic Kidney Disease <60  Kidney Failure <15      CBC & Differential [[id number]]  (Abnormal) Collected: 03/12/22 1006    Specimen: Blood Updated: 03/12/22 1058    Narrative:      The following orders were created for panel order CBC & Differential.  Procedure                               Abnormality         Status                     ---------                               -----------         ------                     CBC Auto Differential[[id number]]        Abnormal            Final result               Scan Slide[[id number]]                                                                    Please view results for these tests on the individual orders.    CBC Auto Differential [[id number]]  (Abnormal) Collected: 03/12/22 1006    Specimen: Blood Updated: 03/12/22 1058     WBC 8.05 10*3/mm3      RBC 3.02 10*6/mm3      Hemoglobin 9.5 g/dL      Hematocrit 29.6 %      MCV 98.0 fL      MCH 31.5 pg      MCHC 32.1 g/dL      RDW 13.6 %      RDW-SD 49.1 fl      MPV 10.3 fL      Platelets 133 10*3/mm3      Neutrophil % 71.3 %      Lymphocyte % 14.5 %      Monocyte % 13.7 %      Eosinophil % 0.0 %      Basophil % 0.1 %      Immature Grans % 0.4 %      Neutrophils, Absolute 5.74 10*3/mm3      Lymphocytes, Absolute 1.17 10*3/mm3      Monocytes, Absolute 1.10 10*3/mm3      Eosinophils, Absolute 0.00 10*3/mm3      Basophils, Absolute 0.01 10*3/mm3      Immature Grans, Absolute 0.03 10*3/mm3      nRBC 0.0 /100 WBC           Imaging Results (Last 24 Hours)     ** No results found for the last 24 hours. **          Patient Care Team:  Judi Rodriguez MD as PCP - General (Internal Medicine)  Max Toledo MD as Cardiologist (Cardiology)  Levy Yan MD as Consulting Physician (Cardiology)    SUBJECTIVE: She reports she is doing well.  Pain is well controlled.  She walked 100 feet with therapy this morning.    PHYSICAL EXAM  Right hip dressings are clean, dry and intact  Thigh and  calf soft and nontender  Neurovascular intact distally       Closed right hip fracture, initial encounter ([facility])    PAF (paroxysmal atrial fibrillation) ([facility])    HLD (hyperlipidemia)    Hypertension    Seizure disorder ([facility])    Anemia    Daily consumption of alcohol      PLAN / DISPOSITION: 81-year-old female postop day #2 status post right hip TFN by Dr. Lynne  -Continue to mobilize with therapy as tolerated  -Lovenox for DVT prophylaxis  - for rehab placement  -Follow-up with Dr. Lynne as scheduled    Sanket Stevens MD  03/13/22  10:43 EDT

## 2022-03-13 NOTE — PLAN OF CARE
Goal Outcome Evaluation:  Plan of Care Reviewed With: patient  Progress: improving  Pt. Has complained of moderate to severe pain, norco given twice. VSS, on room air NSR per tele. Pt. Worked with therapy and sat up in the chair for several hours. Plan is for rehab. Will continue to monitor.

## 2022-03-13 NOTE — PLAN OF CARE
Goal Outcome Evaluation:      Pt alert and oriented to person and time only. Pt's spouse repeatedly asking for pain medication for pt, despite pt stating that she is not in pain. Spouse and pt educated on pain medication regimen and protocol for PRN narcotics. Some expressive aphasia noted to be pt's baseline. No BM since 3/10. VSS on RA.

## 2022-03-13 NOTE — PLAN OF CARE
Goal Outcome Evaluation:  Plan of Care Reviewed With: patient           Outcome Evaluation: Patient continues to need moderate to max assistance to get out of bed with difficulty sequencing, In standing she ambuated 100 feet wtih walker and demonstrated improving control of waker. Continue to recommend IRF at discharge

## 2022-03-13 NOTE — PROGRESS NOTES
[facility] Medicine Services  PROGRESS NOTE    Patient Name: Jenna Russell  : 1940  MRN: [id number]    Date of Admission: 3/10/2022  Primary Care Physician: Judi Rodriguez MD    Subjective   Subjective     CC:  Hip fx    HPI:  + constipation  Continue right hip pain, uncontrolled and now having low back pain    ROS:  Gen- No fevers, chills  CV- No chest pain, palpitations  Resp- No cough, dyspnea  GI- No N/V/D, abd pain  + back/ right lower ext pain       Objective   Objective     Vital Signs:   Temp:  [98 °F (36.7 °C)-98.7 °F (37.1 °C)] 98 °F (36.7 °C)  Heart Rate:  [64-78] 69  Resp:  [16-18] 16  BP: (116-137)/(61-67) 134/61     Physical Exam:  Constitutional: No acute distress, awake, alert- sitting up in bed  HENT: NCAT, mucous membranes moist  Respiratory: Clear to auscultation bilaterally, respiratory effort normal   Cardiovascular: RRR, no murmurs, rubs, or gallops  Gastrointestinal: Positive bowel sounds, soft, nontender, nondistended  Musculoskeletal: No bilateral ankle edema  Psychiatric: Appropriate affect, cooperative  Neurologic: Oriented x 3, decreased ROM RLE, speech clear  Skin: No rashes- bandage right hip CDI      Results Reviewed:  LAB RESULTS:      Lab 22  0629 22  1006 22  0858 03/10/22  1949   WBC 7.77 8.05 6.16 9.23   HEMOGLOBIN 9.2* 9.5* 9.9* 11.8*   HEMATOCRIT 28.3* 29.6* 30.3* 35.3   PLATELETS 124* 133* 127* 145   NEUTROS ABS 4.20 5.74  --  6.45   IMMATURE GRANS (ABS) 0.01 0.03  --  0.03   LYMPHS ABS 2.30 1.17  --  1.76   MONOS ABS 1.15* 1.10*  --  0.94*   EOS ABS 0.09 0.00  --  0.03   .0* 98.0* 97.4* 96.7         Lab 22  0629 226 22  1006 22  0857 03/10/22  1949   SODIUM 140  --  140 139 140   POTASSIUM 4.4 4.2 3.5 3.3* 3.9   CHLORIDE 104  --  102 104 104   CO2 31.0*  --  29.0 28.0 26.0   ANION GAP 5.0  --  9.0 7.0 10.0   BUN 7*  --  9 13 17   CREATININE 0.47*  --  0.62 0.46* 0.60   EGFR 97.0  --   90.7 97.5 91.4   GLUCOSE 96  --  111* 107* 104*   CALCIUM 8.7  --  8.7 8.4* 9.3         Lab 03/13/22  0629 03/12/22  1006 03/10/22  1949   TOTAL PROTEIN 5.3* 5.7* 6.4   ALBUMIN 3.30* 3.50 4.10   GLOBULIN 2.0 2.2 2.3   ALT (SGPT) 13 13 16   AST (SGOT) 26 25 32   BILIRUBIN 0.4 0.4 0.4   ALK PHOS 49 44 64                 Lab 03/10/22  1949   IRON 59   IRON SATURATION 18*   TIBC 329   TRANSFERRIN 221   FERRITIN 142.20         Brief Urine Lab Results  (Last result in the past 365 days)      Color   Clarity   Blood   Leuk Est   Nitrite   Protein   CREAT   Urine HCG        03/11/22 0700 Yellow   Clear   Negative   Moderate (2+)   Negative   Negative                 Microbiology Results Abnormal     Procedure Component Value - Date/Time    Urine Culture - Urine, Urine, Clean Catch [[id number]] Collected: 03/11/22 0700    Lab Status: Final result Specimen: Urine, Clean Catch Updated: 03/12/22 1219     Urine Culture 25,000 CFU/mL Mixed Cheyenne Isolated    Narrative:      Specimen contains mixed organisms of questionable pathogenicity which indicates contamination with commensal cheyenne.  Further identification is unlikely to provide clinically useful information.  Suggest recollection.    COVID PRE-OP / PRE-PROCEDURE SCREENING ORDER (NO ISOLATION) - Swab, Nasopharynx [[id number]]  (Normal) Collected: 03/10/22 2231    Lab Status: Final result Specimen: Swab from Nasopharynx Updated: 03/10/22 2310    Narrative:      The following orders were created for panel order COVID PRE-OP / PRE-PROCEDURE SCREENING ORDER (NO ISOLATION) - Swab, Nasopharynx.  Procedure                               Abnormality         Status                     ---------                               -----------         ------                     COVID-19 and FLU A/B PCR...[[id number]]  Normal              Final result                 Please view results for these tests on the individual orders.    COVID-19 and FLU A/B PCR - Swab, Nasopharynx [[id number]]  (Normal)  Collected: 03/10/22 2231    Lab Status: Final result Specimen: Swab from Nasopharynx Updated: 03/10/22 2310     COVID19 Not Detected     Influenza A PCR Not Detected     Influenza B PCR Not Detected    Narrative:      Fact sheet for providers: https://www.fda.gov/media/053164/download    Fact sheet for patients: https://www.fda.gov/media/022293/download    Test performed by PCR.          Peripheral Block    Result Date: 3/11/2022  Jian Araujo CRNA     3/11/2022 12:29 PM Peripheral Block Patient reassessed immediately prior to procedure Patient location during procedure: pre-op Reason for block: procedure for pain and at surgeon's request Performed by CRNA: Jian Araujo, DEEDEE Assisted by: Dian Pritchard RN Preanesthetic Checklist Completed: patient identified, IV checked, site marked, risks and benefits discussed, surgical consent, monitors and equipment checked, pre-op evaluation and timeout performed Prep: Sterile barriers:cap, gloves and mask Prep: ChloraPrep Patient monitoring: blood pressure monitoring, continuous pulse oximetry and EKG Procedure Performed under: local infiltration Guidance:ultrasound guided Images:still images obtained, printed/placed on chart Laterality:right Block Type:fascia iliaca compartment Injection Technique:single-shot Needle Type:echogenic Needle Gauge:18 G Resistance on Injection: none Catheter Size:20 G (20g) Medications Used: bupivacaine (PF) (MARCAINE) 0.5 % injection, 20 mL Med administered at 3/11/2022 12:28 PM Medications Preservative Free Saline:20ml Post Assessment Injection Assessment: negative aspiration for heme, no paresthesia on injection and incremental injection Patient Tolerance:comfortable throughout block Complications:no Additional Notes Procedure:         Pt placed in supine position.   The insertion site was prepped in sterile fashion with Chlorapreop and clear plastic drapes.  Analgesia was provided by skin infiltration at insertion site with Lidocaine 1%  3mls.  A B-Anderson 18 g , 4 inch echogenic Touhy needle was advance In-plane under ultrasound guidance. The   Anterior superior Iliac crest was initially visualized and the probe was directed slightly medially and slightly towards the umbilicus.  The course of the needle was tracked over the sartorius muscle through the fascia Iliacus and into the anterior portion of the Iliacus muscle.  Major vessels where identified and avoided as where structures of the peritoneal cavity.  LA injection was made incrementally in 1-5ml amounts spread was visualized superiorly below fascia iliacus.  Injection was completed with negative aspiration of blood and negative intravascular injection.  Thank You.     FL C Arm During Surgery    Result Date: 3/11/2022  DATE OF EXAM: 3/11/2022 1:47 PM  PROCEDURE: FL C ARM DURING SURGERY-  INDICATIONS: Troch Nail; S72.001A-Fracture of unspecified part of neck of right femur, initial encounter for closed fracture; Z86.73-Personal history of transient ischemic attack (TIA), and cerebral infarction without residual deficits  COMPARISON: No comparisons available.      Impression: FINDINGS/IMPRESSION: 58 seconds of fluoroscopy time provided with 4 images saved during right trochanteric nail placement  This report was finalized on 3/11/2022 3:09 PM by Levy Allen.        Results for orders placed during the hospital encounter of 01/20/18    Adult Transthoracic Echo Complete W/ Cont if Necessary Per Protocol (With Agitated Saline)    Interpretation Summary  · Left ventricular wall thickness is consistent with hypertrophy.  · Left ventricular systolic function is normal.  · Moderate aortic valve regurgitation is present.  · Mild mitral valve regurgitation is present  · Mild tricuspid valve regurgitation is present.  · Mild pulmonic valve regurgitation is present.      I have reviewed the medications:  Scheduled Meds:atorvastatin, 40 mg, Oral, Nightly  divalproex, 250 mg, Oral, Q8H  docusate sodium,  100 mg, Oral, BID  enoxaparin, 40 mg, Subcutaneous, Q24H  lisinopril, 10 mg, Oral, Daily  polyethylene glycol, 17 g, Oral, Daily  sodium chloride, 10 mL, Intravenous, Q12H      Continuous Infusions:lactated ringers, 9 mL/hr, Last Rate: 400 mL/hr (03/11/22 1458)      PRN Meds:.•  acetaminophen  •  baclofen  •  HYDROcodone-acetaminophen  •  HYDROmorphone  •  melatonin  •  ondansetron  •  potassium chloride  •  potassium chloride  •  sodium chloride    Assessment/Plan   Assessment & Plan     Active Hospital Problems    Diagnosis  POA   • **Closed right hip fracture, initial encounter ([facility]) [S72.001A]  Yes   • Anemia [D64.9]  Yes   • Daily consumption of alcohol [Z78.9]  Yes   • Seizure disorder ([facility]) [G40.909]  Yes   • Hypertension [I10]  Yes   • HLD (hyperlipidemia) [E78.5]  Yes   • PAF (paroxysmal atrial fibrillation) ([facility]) [I48.0]  Yes      Resolved Hospital Problems   No resolved problems to display.        Brief Hospital Course to date:  Jenna Russell is a 81 y.o. 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 She presents today with complaints of right hip pain s/p mechanical fall. Patient volunteers that she was out walking around the neighborhood with her partner when she tripped and fell on the sidewalk. There was no head trauma. Patient was was unable to get up on her own and was assisted by her partner and two passersby. Unable to bear weight or move the right lower extremity once upright due to severe pain. Subsequently brought to ED for further evaluation and treatment.    This patient's problems and plans were partially entered by my partner and updated as appropriate by me 03/13/22.       Closed Right Hip Fracture:  - Nondisplaced right proximal femoral intertrochanteric  fracture secondary to mechanical fall while walking  - s/p repair with Maged 3/11, doing well  - PT/OT to see today  - pain control, resume DVT ppx as per ortho--My partner d/w Dr Lynne as well as neuro via curbside 2/2 her h/o SAH. Neurology felt lovenox would be safe in ppx dosing and started 3/12  -add low dose baclofen prn    Constipation  -add colace/ miralax    Hypokalemia  - replace per protocol    Asymptomatic bacteruria  - UA noted, d/w patient no symptoms. Urine cx no growth  - defer on abx at this time     Anemia:  - check iron and vitamin studies  - not anticoagulated, Hb stable post op     PAF:  - stable and rate controlled  - previously on Eliquis until SAH  - scheduled to undergo placement of Watchmen Device at [facility] in June 2022     Hypertension      HLD  - continue lisinopril  - continue statin      Seizure Disorder:  - seizure precautions  - continue depakote      Daily Alcohol Consumption:  - drinks 2-4 drinks daily.  - denies withdrawals or DTs    DVT prophylaxis:  Medical and mechanical DVT prophylaxis orders are present.       AM-PAC 6 Clicks Score (PT): 17 (03/12/22 1133)    Disposition: I expect the patient to be discharged to rehab Pending bed   CODE STATUS:   Code Status and Medical Interventions:   Ordered at: 03/10/22 2054     Level Of Support Discussed With:    Patient     Code Status (Patient has no pulse and is not breathing):    CPR (Attempt to Resuscitate)     Medical Interventions (Patient has pulse or is breathing):    Full Support       Dee Duke, APRN  03/13/22

## 2022-03-14 PROCEDURE — 97116 GAIT TRAINING THERAPY: CPT

## 2022-03-14 PROCEDURE — 25010000002 ENOXAPARIN PER 10 MG: Performed by: INTERNAL MEDICINE

## 2022-03-14 PROCEDURE — 97530 THERAPEUTIC ACTIVITIES: CPT

## 2022-03-14 PROCEDURE — 99232 SBSQ HOSP IP/OBS MODERATE 35: CPT | Performed by: NURSE PRACTITIONER

## 2022-03-14 PROCEDURE — 97535 SELF CARE MNGMENT TRAINING: CPT

## 2022-03-14 RX ORDER — LIDOCAINE 50 MG/G
1 PATCH TOPICAL
Status: DISCONTINUED | OUTPATIENT
Start: 2022-03-14 | End: 2022-03-15 | Stop reason: HOSPADM

## 2022-03-14 RX ORDER — BACLOFEN 10 MG/1
5 TABLET ORAL 3 TIMES DAILY PRN
Status: DISCONTINUED | OUTPATIENT
Start: 2022-03-14 | End: 2022-03-15

## 2022-03-14 RX ADMIN — LISINOPRIL 10 MG: 10 TABLET ORAL at 09:38

## 2022-03-14 RX ADMIN — ENOXAPARIN SODIUM 40 MG: 40 INJECTION SUBCUTANEOUS at 12:35

## 2022-03-14 RX ADMIN — ACETAMINOPHEN 650 MG: 325 TABLET ORAL at 22:25

## 2022-03-14 RX ADMIN — Medication 10 ML: at 21:05

## 2022-03-14 RX ADMIN — ATORVASTATIN CALCIUM 40 MG: 40 TABLET, FILM COATED ORAL at 21:05

## 2022-03-14 RX ADMIN — DIVALPROEX SODIUM 250 MG: 250 TABLET, DELAYED RELEASE ORAL at 21:05

## 2022-03-14 RX ADMIN — HYDROCODONE BITARTRATE AND ACETAMINOPHEN 1 TABLET: 5; 325 TABLET ORAL at 00:14

## 2022-03-14 RX ADMIN — DIVALPROEX SODIUM 250 MG: 250 TABLET, DELAYED RELEASE ORAL at 06:30

## 2022-03-14 RX ADMIN — DICLOFENAC 2 G: 10 GEL TOPICAL at 13:57

## 2022-03-14 RX ADMIN — ACETAMINOPHEN 650 MG: 325 TABLET ORAL at 18:23

## 2022-03-14 RX ADMIN — POLYETHYLENE GLYCOL 3350 17 G: 17 POWDER, FOR SOLUTION ORAL at 09:37

## 2022-03-14 RX ADMIN — DICLOFENAC 2 G: 10 GEL TOPICAL at 21:05

## 2022-03-14 RX ADMIN — DOCUSATE SODIUM 100 MG: 100 CAPSULE, LIQUID FILLED ORAL at 09:37

## 2022-03-14 RX ADMIN — LIDOCAINE 1 PATCH: 50 PATCH CUTANEOUS at 21:04

## 2022-03-14 RX ADMIN — DIVALPROEX SODIUM 250 MG: 250 TABLET, DELAYED RELEASE ORAL at 13:57

## 2022-03-14 NOTE — PLAN OF CARE
Problem: Adult Inpatient Plan of Care  Goal: Plan of Care Review  Recent Flowsheet Documentation  Taken 3/14/2022 1423 by Yvette West OT  Progress: no change  Plan of Care Reviewed With:   patient   spouse  Outcome Evaluation: Pt alert during session, requesting need to void upon OT arrival. Pt req'd gross min A x 2 for STS at recliner and at BSC with need for mod A x 2 and FWW to complete SPT between surfaces with skilled cues for seq and hand placement for safe pivot and optimal alignment of RLE. Skilled cues too for some forward flexion at trunk to reach neutral alignment to decrease trunk extension hindering safe pre transfer skills. Pt grossly dependent for all toileting tasks, spv to SBA for hand washing post toileting after set up of washcloth. Will progress pt as able while hospitalized.

## 2022-03-14 NOTE — PLAN OF CARE
Goal Outcome Evaluation:  Plan of Care Reviewed With: (P) patient        Progress: (P) declining  Outcome Evaluation: (P) Good effort with therEx in bed. Required modA for bed mobility to get to EOB. Required ModAx2 for transfers and gait using RW. Ambulated 14 feet before being limited by fatigue. Continue to recommend IRF at discharge.

## 2022-03-14 NOTE — NURSING NOTE
Patient alert, forgetfulness at times. Prn tylenol given or pain, has complaints of back pain, volatern applied to back. 2 Large bms today, up with assist x 2 with knee immobilizer. Patients spouse requests that muscle relaxer not be given due to making her drowsy.

## 2022-03-14 NOTE — CASE MANAGEMENT/SOCIAL WORK
Continued Stay Note  [facility]     Patient Name: Jenna Russell  MRN: [id number]  Today's Date: 3/14/2022    Admit Date: 3/10/2022     Discharge Plan     Row Name 03/14/22 1605       Plan    Plan SNF    Patient/Family in Agreement with Plan yes    Plan Comments Case mgt f/u. Per Carolann at the [facility], they have a skilled bed and can accept  Ms Russell PENDING insurance authorization. Ms Russell and spouse are in agreement with plan. She will need a negative Covid prior to transfer. Case mgt will f/u in am    Final Discharge Disposition Code 03 - skilled nursing facility (SNF)               Discharge Codes    No documentation.               Expected Discharge Date and Time     Expected Discharge Date Expected Discharge Time    Mar 16, 2022             Sonja C Kellerman, RN

## 2022-03-14 NOTE — PLAN OF CARE
Goal Outcome Evaluation:  Plan of Care Reviewed With: patient        Progress: no change     Pt is alert with with confusion. VSS. RA . Wife at bedside. Ice pack and Prn [place] for pain.

## 2022-03-14 NOTE — THERAPY TREATMENT NOTE
Patient Name: Jenna Russell  : 1940    MRN: [id number]                              Today's Date: 3/14/2022       Admit Date: 3/10/2022    Visit Dx:     ICD-10-CM ICD-9-CM   1. Closed right hip fracture, initial encounter ([facility])  S72.001A 820.8   2. 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 Normal LVSF.Normal LV diastolic filling. Left atrium midly dilated. RV mild- mod dilated. MIld aortic cusp sclerosis. No evidence of mitral valve prolapse. Mild mitral regurgitation.No pulmonary hypertension or perdicardial effusion. No dilation of aortic root. Venous system appears normal   • H/O: stroke    • HLD (hyperlipidemia) 2016   • Left temporal lobe infarction ([facility])    • LOM (loss of memory)    • Osteoarthritis 2016   • Paroxysmal atrial fibrillation ([facility]) 2016   • Thyroid nodule 2016    1.8x1.2cm   • Weakness generalized    • Wound infection after surgery     Left hip arthroplasty, staph aureus, 6 weeks IV Rocephin     Past Surgical History:   Procedure Laterality Date   • ABDOMINOPLASTY     • BREAST ABSCESS INCISION AND DRAINAGE     • HIP TROCHANTERIC NAILING WITH INTRAMEDULLARY HIP SCREW Right 3/11/2022    Procedure: HIP TROCHANTERIC NAILING WITH  INTRAMEDULLARY HIP SCREW RIGHT;  Surgeon: Danny Lynne MD;  Location: [facility];  Service: Orthopedics;  Laterality: Right;   • TOTAL HIP ARTHROPLASTY Left     Staph aureus wound infection      General Information     Row Name 03/14/22 1157          OT Time and Intention    Document Type therapy note (daily note)  -JY     Mode of Treatment occupational therapy;individual therapy  -JY     Row Name 03/14/22 1157          General Information    Patient Profile Reviewed yes  -JY     Existing Precautions/Restrictions fall;brace worn when out of bed;other (see comments)  RLE WBAT, hx of receptive/expressive aphasia, confusion, KI donned to RLE when OOB  -JY     Row Name 03/14/22 1157          Cognition    Orientation Status (Cognition) oriented to;person;place  -JY     Row Name 03/14/22 1157          Safety Issues, Functional Mobility    Safety Issues Affecting Function (Mobility) awareness of need for assistance;insight into deficits/self-awareness;positioning of assistive device;problem-solving;safety precaution awareness;safety precautions follow-through/compliance;sequencing abilities  -JY     Impairments Affecting Function (Mobility) balance;endurance/activity tolerance;pain;range of motion (ROM);strength  -JY     Comment, Safety Issues/Impairments (Mobility) alert and able to follow commands with repeated cues for seq, hand placement  -JY           User Key  (r) = Recorded By, (t) = Taken By, (c) = Cosigned By    Initials Name Provider Type    Yvette Huynh OT Occupational Therapist                 Mobility/ADL's     Row Name 03/14/22 1205          Bed Mobility    Bed Mobility other (see comments)  received UIC  -JY     Scooting/Bridging [place] (Bed Mobility) not tested  -JY     Supine-Sit [place] (Bed Mobility) not tested  -JY     Sit-Supine [place] (Bed Mobility) not tested  -JY     Bed Mobility, Safety Issues decreased use of legs for bridging/pushing  -JY     Comment, (Bed Mobility)  pt received UIC and preferred to remain OOB thus bed mobility not assessed  -JY     Row Name 03/14/22 1203          Transfers    Transfers sit-stand transfer;stand-sit transfer;toilet transfer;other (see comments)  BSC vs toilet  -JY     Comment, (Transfers) skilled cues for optimal hand placement for controlled ascend, descend specifically to reach back prior to sitting, push up from seated position vs at wx and to flex forward in sitting prior to stand to decrease trunk extension to gain leverage, further to move RLE out forward prior to sitting with KI donned; further cues for seq to pivot with segmental stepping vs turning at R hip  -JY     Sit-Stand [place] (Transfers) minimum assist (75% patient effort);2 person assist;verbal cues  -JY     Stand-Sit [place] (Transfers) minimum assist (75% patient effort);2 person assist;verbal cues  -JY     [place] Level (Toilet Transfer) moderate assist (50% patient effort);2 person assist;verbal cues  -JY     Assistive Device (Toilet Transfer) commode, bedside without drop arms;walker, front-wheeled  -JY     Row Name 03/14/22 1203          Sit-Stand Transfer    Assistive Device (Sit-Stand Transfers) walker, front-wheeled  -[employer]     Row Name 03/14/22 1203          Toilet Transfer    Type (Toilet Transfer) stand pivot/stand step  -JY     Comment, (Toilet Transfer) once seated pt required support for RLE and repeated cues for trunk at neutral position vs trunk extension; set room up to consistently t/f with LLE leading  -JY     Row Name 03/14/22 1203          Functional Mobility    Functional Mobility- Comment defer to PT for specifics  -JY     Row Name 03/14/22 1203          Activities of Daily Living    BADL Assessment/Intervention grooming;toileting  -[employer]     Row Name 03/14/22 1203          Mobility    Extremity Weight-bearing Status right lower extremity  -JY     Right Lower Extremity (Weight-bearing Status) weight-bearing as tolerated (WBAT)  -[employer]     Row Name  03/14/22 1203          Grooming Assessment/Training    [place] Level (Grooming) wash face, hands;standby assist;set up  -JY     Position (Grooming) supported sitting  -JY     Comment, (Grooming) provision of cues for initiation and follow through, no physical A after set up  -JY     Row Name 03/14/22 1203          Stand-Sit Transfer    Assistive Device (Stand-Sit Transfers) walker, front-wheeled  -JY     Row Name 03/14/22 1203          Toileting Assessment/Training    [place] Level (Toileting) adjust/manage clothing;perform perineal hygiene;change pad/brief;dependent (less than 25% patient effort);verbal cues  -JY     Assistive Devices (Toileting) commode, bedside without drop arms  -JY     Position (Toileting) supported sitting;supported standing  -JY           User Key  (r) = Recorded By, (t) = Taken By, (c) = Cosigned By    Initials Name Provider Type    Yvette Huynh OT Occupational Therapist               Obj/Interventions     Row Name 03/14/22 1421          Balance    Balance Assessment sitting static balance;sitting dynamic balance;standing static balance;standing dynamic balance  -JY     Static Sitting Balance supervision;verbal cues  -JY     Dynamic Sitting Balance contact guard;verbal cues;other (see comments)  sitting on BSC for toileting  -JY     Position, Sitting Balance supported;sitting in chair;other (see comments)  sitting on BSC  -JY     Static Standing Balance minimal assist;2-person assist;verbal cues  -JY     Dynamic Standing Balance moderate assist;2-person assist;verbal cues  -JY     Position/Device Used, Standing Balance supported;walker, front-wheeled  -JY     Balance Interventions sitting;standing;static;dynamic;sit to stand;supported;occupation based/functional task  -JY     Comment, Balance pt utilized FWW in standing tasks with 2 person A; skilled cues for postural alignment for stability  -JY           User Key  (r) = Recorded By, (t) = Taken By, (c) = Cosigned By     Initials Name Provider Type    Yvette Huynh, AMANDA Occupational Therapist               Goals/Plan    No documentation.                Clinical Impression     Row Name 03/14/22 1423          Pain Assessment    Pretreatment Pain Rating 3/10  -JY     Posttreatment Pain Rating 3/10  -JY     Pain Location - Side/Orientation Right  -JY     Pain Location generalized  -JY     Pain Location - hip  -JY     Pre/Posttreatment Pain Comment pt reported pain at R hip largely during mobility and initial sitting on BSC, improved with repositioning  -JY     Pain Intervention(s) Repositioned;Ambulation/increased activity  -JY     Row Name 03/14/22 1423          Plan of Care Review    Plan of Care Reviewed With patient;spouse  -JY     Progress no change  -JY     Outcome Evaluation Pt alert during session, requesting need to void upon OT arrival. Pt req'd gross min A x 2 for STS at recliner and at BSC with need for mod A x 2 and FWW to complete SPT between surfaces with skilled cues for seq and hand placement for safe pivot and optimal alignment of RLE. Skilled cues too for some forward flexion at trunk to reach neutral alignment to decrease trunk extension hindering safe pre transfer skills. Pt grossly dependent for all toileting tasks, spv to SBA for hand washing post toileting after set up of washcloth. Will progress pt as able while hospitalized.  -LEI     Row Name 03/14/22 1423          Therapy Assessment/Plan (OT)    Rehab Potential (OT) good, to achieve stated therapy goals  -J     Criteria for Skilled Therapeutic Interventions Met (OT) yes;skilled treatment is necessary  -JY     Therapy Frequency (OT) daily  -JY     Row Name 03/14/22 1423          Therapy Plan Review/Discharge Plan (OT)    Anticipated Discharge Disposition (OT) inpatient rehabilitation facility  -JFLORES     Row Name 03/14/22 1423          Vital Signs    Pre Systolic BP Rehab 127  -JY     Pre Treatment Diastolic BP 57  -JY     Pretreatment Heart Rate (beats/min)  70  -JY     Pre SpO2 (%) 94  -JY     O2 Delivery Pre Treatment room air  -JY     O2 Delivery Intra Treatment room air  -JY     O2 Delivery Post Treatment room air  -JY     Pre Patient Position Sitting  -JY     Intra Patient Position Standing  -JY     Post Patient Position Sitting  -JY     Row Name 03/14/22 1423          Positioning and Restraints    Pre-Treatment Position sitting in chair/recliner  -JY     Post Treatment Position chair  -JY     In Chair notified nsg;reclined;call light within reach;encouraged to call for assist;exit alarm on;with family/caregiver;waffle cushion;on mechanical lift sling;legs elevated  KI donned per nursing request  -JY           User Key  (r) = Recorded By, (t) = Taken By, (c) = Cosigned By    Initials Name Provider Type    Yvette Huynh, AMANDA Occupational Therapist               Outcome Measures     Row Name 03/14/22 1429          How much help from another is currently needed...    Putting on and taking off regular lower body clothing? 1  -JY     Bathing (including washing, rinsing, and drying) 2  -JY     Toileting (which includes using toilet bed pan or urinal) 2  -JY     Putting on and taking off regular upper body clothing 3  -JY     Taking care of personal grooming (such as brushing teeth) 3  -JY     Eating meals 3  -JY     AM-PAC 6 Clicks Score (OT) 14  -JY     Row Name 03/14/22 1026          How much help from another person do you currently need...    Turning from your back to your side while in flat bed without using bedrails? 2 (P)   -LK     Moving from lying on back to sitting on the side of a flat bed without bedrails? 2 (P)   -LK     Moving to and from a bed to a chair (including a wheelchair)? 3 (P)   -LK     Standing up from a chair using your arms (e.g., wheelchair, bedside chair)? 3 (P)   -LK     Climbing 3-5 steps with a railing? 1 (P)   -LK     To walk in hospital room? 3 (P)   -LK     AM-PAC 6 Clicks Score (PT) 14 (P)   -LK     Row Name 03/14/22 1429 03/14/22  1026       Functional Assessment    Outcome Measure Options AM-PAC 6 Clicks Daily Activity (OT)  -LEI AM-PAC 6 Clicks Basic Mobility (PT) (P)   -LK          User Key  (r) = Recorded By, (t) = Taken By, (c) = Cosigned By    Initials Name Provider Type    Yvette Huynh, OT Occupational Therapist    Jael Dalal, PT Student PT Student                Occupational Therapy Education                 Title: PT OT SLP Therapies (In Progress)     Topic: Occupational Therapy (In Progress)     Point: ADL training (In Progress)     Description:   Instruct learner(s) on proper safety adaptation and remediation techniques during self care or transfers.   Instruct in proper use of assistive devices.              Learning Progress Summary           Patient Acceptance, E,D, NR by LEI at 3/14/2022 1113    Acceptance, E,D, NR by LEI at 3/12/2022 1533   Family Acceptance, E,D, NR by LEI at 3/14/2022 1113                   Point: Home exercise program (Not Started)     Description:   Instruct learner(s) on appropriate technique for monitoring, assisting and/or progressing therapeutic exercises/activities.              Learner Progress:  Not documented in this visit.          Point: Precautions (In Progress)     Description:   Instruct learner(s) on prescribed precautions during self-care and functional transfers.              Learning Progress Summary           Patient Acceptance, E,D, NR by LEI at 3/14/2022 1113    Acceptance, E,D, NR by LEI at 3/12/2022 1533   Family Acceptance, E,D, NR by LEI at 3/14/2022 1113                   Point: Body mechanics (In Progress)     Description:   Instruct learner(s) on proper positioning and spine alignment during self-care, functional mobility activities and/or exercises.              Learning Progress Summary           Patient Acceptance, E,D, NR by LEI at 3/14/2022 1113    Acceptance, E,D, NR by LEI at 3/12/2022 1533   Family Acceptance, E,D, NR by LEI at 3/14/2022 1113                                User Key     Initials Effective Dates Name Provider Type Discipline    LEI 06/16/21 -  Yvette West OT Occupational Therapist OT              OT Recommendation and Plan  Planned Therapy Interventions (OT): activity tolerance training, adaptive equipment training, BADL retraining, functional balance retraining, occupation/activity based interventions, patient/caregiver education/training, strengthening exercise, transfer/mobility retraining  Therapy Frequency (OT): daily  Plan of Care Review  Plan of Care Reviewed With: patient, spouse  Progress: no change  Outcome Evaluation: Pt alert during session, requesting need to void upon OT arrival. Pt req'd gross min A x 2 for STS at recliner and at BSC with need for mod A x 2 and FWW to complete SPT between surfaces with skilled cues for seq and hand placement for safe pivot and optimal alignment of RLE. Skilled cues too for some forward flexion at trunk to reach neutral alignment to decrease trunk extension hindering safe pre transfer skills. Pt grossly dependent for all toileting tasks, spv to SBA for hand washing post toileting after set up of washcloth. Will progress pt as able while hospitalized.     Time Calculation:    Time Calculation- OT     Row Name 03/14/22 1432 03/14/22 1150          Time Calculation- OT    OT Start Time 1113  -JY --     OT Received On 03/14/22  -JY --     OT Goal Re-Cert Due Date 03/22/22  -JY --            Timed Charges    61907 - Gait Training Minutes  -- 12 (P)   -LK     06466 - OT Therapeutic Activity Minutes 19  -JY --     49772 - OT Self Care/Mgmt Minutes 20  -JY --            Total Minutes    Timed Charges Total Minutes 39  -JY 12 (P)   -LK      Total Minutes 39  -JY 12 (P)   -LK           User Key  (r) = Recorded By, (t) = Taken By, (c) = Cosigned By    Initials Name Provider Type    LEI Yvette West OT Occupational Therapist    Jael Dalal, PT Student PT Student              Therapy Charges for Today     Code  Description Service Date Service Provider Modifiers Qty    47386876289 HC OT THERAPEUTIC ACT EA 15 MIN 3/14/2022 Yvette West OT GO 1    [id number] HC OT SELF CARE/MGMT/TRAIN EA 15 MIN 3/14/2022 Yvette West OT GO 2               Yvette West OT  3/14/2022

## 2022-03-14 NOTE — PROGRESS NOTES
Orthopedic Daily Progress Note      CC: Tired, a little more confused    Pain controlled  General: no fevers, chills  Abdomen: no nausea, vomiting, or diarrhea    No other complaints    Physical Exam:  I have reviewed the vital signs.  Temp:  [98 °F (36.7 °C)-98.3 °F (36.8 °C)] 98.2 °F (36.8 °C)  Heart Rate:  [65-71] 70  Resp:  [16-18] 16  BP: (115-133)/(57-98) 127/57    Objective  General Appearance:    Alert, cooperative, no distress  Extremities: No clubbing, cyanosis, or edema to lower extremities  Pulses:  2+ in distal surgical extremity  Skin: Dressing Clean/dry/intact      Results Review:    I have reviewed the labs, radiology results and diagnostic studies:yes    Results from last 7 days   Lab Units 03/13/22  0629   WBC 10*3/mm3 7.77   HEMOGLOBIN g/dL 9.2*   PLATELETS 10*3/mm3 124*     Results from last 7 days   Lab Units 03/13/22  0629   SODIUM mmol/L 140   POTASSIUM mmol/L 4.4   CO2 mmol/L 31.0*   CREATININE mg/dL 0.47*   GLUCOSE mg/dL 96       I have reviewed the medications.    Assessment/Problem List  POD# 3 S/p TAHIRA R IT hip fx    Plan  PT/OT - may have overdone it yesterday  Placement  Lovenox for DVT prophylaxis    Danny Lynne MD  03/14/22  12:14 EDT

## 2022-03-14 NOTE — PROGRESS NOTES
[facility] Medicine Services  PROGRESS NOTE    Patient Name: Jenna Russell  : 1940  MRN: [id number]    Date of Admission: 3/10/2022  Primary Care Physician: Judi Rodriguez MD    Subjective   Subjective     CC:  Hip fx    HPI:  Continued constipation and hip/ back pain  Walked with PT 100ft yesterday    ROS:  Gen- No fevers, chills  CV- No chest pain, palpitations  Resp- No cough, dyspnea  GI- No N/V/D, abd pain  + back/ right lower ext pain       Objective   Objective     Vital Signs:   Temp:  [98 °F (36.7 °C)-98.3 °F (36.8 °C)] 98.2 °F (36.8 °C)  Heart Rate:  [65-71] 70  Resp:  [16-18] 16  BP: (115-133)/(57-98) 127/57     Physical Exam:  Constitutional: No acute distress, awake, alert- sitting up in bed  HENT: NCAT, mucous membranes moist  Respiratory: Clear to auscultation bilaterally, respiratory effort normal   Cardiovascular: RRR, no murmurs, rubs, or gallops  Gastrointestinal: Positive bowel sounds, soft, nontender, nondistended  Musculoskeletal: No bilateral ankle edema  Psychiatric: Appropriate affect, cooperative  Neurologic: Oriented x 3, decreased ROM RLE, speech clear  Skin: No rashes- bandage right hip CDI  No change in exam from 3/13/22      Results Reviewed:  LAB RESULTS:      Lab 22  0629 22  1006 22  0858 03/10/22  1949   WBC 7.77 8.05 6.16 9.23   HEMOGLOBIN 9.2* 9.5* 9.9* 11.8*   HEMATOCRIT 28.3* 29.6* 30.3* 35.3   PLATELETS 124* 133* 127* 145   NEUTROS ABS 4.20 5.74  --  6.45   IMMATURE GRANS (ABS) 0.01 0.03  --  0.03   LYMPHS ABS 2.30 1.17  --  1.76   MONOS ABS 1.15* 1.10*  --  0.94*   EOS ABS 0.09 0.00  --  0.03   .0* 98.0* 97.4* 96.7         Lab 22  0629 22  2206 22  1006 22  0857 03/10/22  1949   SODIUM 140  --  140 139 140   POTASSIUM 4.4 4.2 3.5 3.3* 3.9   CHLORIDE 104  --  102 104 104   CO2 31.0*  --  29.0 28.0 26.0   ANION GAP 5.0  --  9.0 7.0 10.0   BUN 7*  --  9 13 17   CREATININE 0.47*  --  0.62  0.46* 0.60   EGFR 97.0  --  90.7 97.5 91.4   GLUCOSE 96  --  111* 107* 104*   CALCIUM 8.7  --  8.7 8.4* 9.3         Lab 03/13/22  0629 03/12/22  1006 03/10/22  1949   TOTAL PROTEIN 5.3* 5.7* 6.4   ALBUMIN 3.30* 3.50 4.10   GLOBULIN 2.0 2.2 2.3   ALT (SGPT) 13 13 16   AST (SGOT) 26 25 32   BILIRUBIN 0.4 0.4 0.4   ALK PHOS 49 44 64                 Lab 03/10/22  1949   IRON 59   IRON SATURATION 18*   TIBC 329   TRANSFERRIN 221   FERRITIN 142.20         Brief Urine Lab Results  (Last result in the past 365 days)      Color   Clarity   Blood   Leuk Est   Nitrite   Protein   CREAT   Urine HCG        03/11/22 0700 Yellow   Clear   Negative   Moderate (2+)   Negative   Negative                 Microbiology Results Abnormal     Procedure Component Value - Date/Time    Urine Culture - Urine, Urine, Clean Catch [[id number]] Collected: 03/11/22 0700    Lab Status: Final result Specimen: Urine, Clean Catch Updated: 03/12/22 1219     Urine Culture 25,000 CFU/mL Mixed [place] Isolated    Narrative:      Specimen contains mixed organisms of questionable pathogenicity which indicates contamination with commensal cheyenne.  Further identification is unlikely to provide clinically useful information.  Suggest recollection.    COVID PRE-OP / PRE-PROCEDURE SCREENING ORDER (NO ISOLATION) - Swab, Nasopharynx [[id number]]  (Normal) Collected: 03/10/22 2231    Lab Status: Final result Specimen: Swab from Nasopharynx Updated: 03/10/22 2310    Narrative:      The following orders were created for panel order COVID PRE-OP / PRE-PROCEDURE SCREENING ORDER (NO ISOLATION) - Swab, Nasopharynx.  Procedure                               Abnormality         Status                     ---------                               -----------         ------                     COVID-19 and FLU A/B PCR...[[id number]]  Normal              Final result                 Please view results for these tests on the individual orders.    COVID-19 and FLU A/B PCR - Swab,  Nasopharynx [[id number]]  (Normal) Collected: 03/10/22 2231    Lab Status: Final result Specimen: Swab from Nasopharynx Updated: 03/10/22 2310     COVID19 Not Detected     Influenza A PCR Not Detected     Influenza B PCR Not Detected    Narrative:      Fact sheet for providers: https://www.fda.gov/media/818291/download    Fact sheet for patients: https://www.fda.gov/media/649663/download    Test performed by PCR.          No radiology results from the last 24 hrs    Results for orders placed during the hospital encounter of 01/20/18    Adult Transthoracic Echo Complete W/ Cont if Necessary Per Protocol (With Agitated Saline)    Interpretation Summary  · Left ventricular wall thickness is consistent with hypertrophy.  · Left ventricular systolic function is normal.  · Moderate aortic valve regurgitation is present.  · Mild mitral valve regurgitation is present  · Mild tricuspid valve regurgitation is present.  · Mild pulmonic valve regurgitation is present.      I have reviewed the medications:  Scheduled Meds:atorvastatin, 40 mg, Oral, Nightly  Diclofenac Sodium, 2 g, Topical, 4x Daily  divalproex, 250 mg, Oral, Q8H  docusate sodium, 100 mg, Oral, BID  enoxaparin, 40 mg, Subcutaneous, Q24H  lidocaine, 1 patch, Transdermal, Q24H  lisinopril, 10 mg, Oral, Daily  polyethylene glycol, 17 g, Oral, Daily  sodium chloride, 10 mL, Intravenous, Q12H      Continuous Infusions:lactated ringers, 9 mL/hr, Last Rate: 400 mL/hr (03/11/22 1458)      PRN Meds:.•  acetaminophen  •  baclofen  •  HYDROcodone-acetaminophen  •  HYDROmorphone  •  melatonin  •  ondansetron  •  potassium chloride  •  potassium chloride  •  sodium chloride    Assessment/Plan   Assessment & Plan     Active Hospital Problems    Diagnosis  POA   • **Closed right hip fracture, initial encounter ([facility]) [S72.001A]  Yes   • Anemia [D64.9]  Yes   • Daily consumption of alcohol [Z78.9]  Yes   • Seizure disorder ([facility]) [G40.909]  Yes   • Hypertension [I10]  Yes   • HLD  (hyperlipidemia) [E78.5]  Yes   • PAF (paroxysmal atrial fibrillation) (HCC) [I48.0]  Yes      Resolved Hospital Problems   No resolved problems to display.        Brief Hospital Course to date:  Jenna Russell is a 81 y.o. 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 She presents today with complaints of right hip pain s/p mechanical fall. Patient volunteers that she was out walking around the neighborhood with her partner when she tripped and fell on the sidewalk. There was no head trauma. Patient was was unable to get up on her own and was assisted by her partner and two passersby. Unable to bear weight or move the right lower extremity once upright due to severe pain. Subsequently brought to ED for further evaluation and treatment.    This patient's problems and plans were partially entered by my partner and updated as appropriate by me 03/14/22.       Closed Right Hip Fracture:  - Nondisplaced right proximal femoral intertrochanteric fracture secondary to mechanical fall while walking  - s/p repair with Maged 3/11, progressing  - PT/OT   - pain control, resume DVT ppx as per ortho--My partner d/w Dr Lynne as well as neuro via jose 2/2 her h/o SAH. Neurology felt lovenox would be safe in ppx dosing and started 3/12  -add low dose baclofen prn- decrease to 5mg tid prn  -voltaren gel/ lidoderm for back pain    Constipation  -continue colace/ miralax    Hypokalemia  - replace per protocol    Asymptomatic bacteruria  - UA noted, d/w patient no symptoms. Urine cx no growth  - defer on abx at this time     Anemia:  - check iron and vitamin studies  - not anticoagulated, Hb stable post op     PAF:  - stable and rate controlled  - previously on Eliquis until SAH  - scheduled to undergo  placement of Watchmen Device at [facility] in June 2022     Hypertension      HLD  - continue lisinopril  - continue statin      Seizure Disorder:  - seizure precautions  - continue depakote      Daily Alcohol Consumption:  - drinks 2-4 drinks daily.  - denies withdrawals or DTs    DVT prophylaxis:  Medical and mechanical DVT prophylaxis orders are present.       AM-PAC 6 Clicks Score (PT): 16 (03/13/22 1025)    Disposition: I expect the patient to be discharged to rehab Pending bed   CODE STATUS:   Code Status and Medical Interventions:   Ordered at: 03/10/22 2054     Level Of Support Discussed With:    Patient     Code Status (Patient has no pulse and is not breathing):    CPR (Attempt to Resuscitate)     Medical Interventions (Patient has pulse or is breathing):    Full Support       Dee Duke, APRN  03/14/22

## 2022-03-14 NOTE — THERAPY TREATMENT NOTE
Patient Name: Jenna Russell  : 1940    MRN: [id number]                              Today's Date: 3/14/2022       Admit Date: 3/10/2022    Visit Dx:     ICD-10-CM ICD-9-CM   1. Closed right hip fracture, initial encounter ([facility])  S72.001A 820.8   2. History of stroke  Z86.73 V12.54     Patient Active Problem List   Diagnosis   • PAF (paroxysmal atrial fibrillation) ([facility])   • HLD (hyperlipidemia)   • Hypertension   • Combined receptive and expressive aphasia   • Colitis, Clostridium difficile   • Collagenous colitis   • Seizure disorder ([facility])   • Closed right hip fracture, initial encounter ([facility])   • Anemia   • Daily consumption of alcohol     Past Medical History:   Diagnosis Date   • Acute ischemic stroke ([facility])    • Arterial ischemic stroke ([facility]) 2016   • Ataxic aphasia 2016   • BP (high blood pressure) 2016   • Cerebral infarction, left hemisphere ([facility]) 2016   • Cyst of left ovary 2016    3cm   • Expressive aphasia    • H/O echocardiogram 2015    Global LV wall motion and contractility within normal limits. Normal LVSF.Normal LV diastolic filling. Left atrium midly dilated. RV mild- mod dilated. MIld aortic cusp sclerosis. No evidence of mitral valve prolapse. Mild mitral regurgitation.No pulmonary hypertension or perdicardial effusion. No dilation of aortic root. Venous system appears normal   • H/O: stroke    • HLD (hyperlipidemia) 2016   • Left temporal lobe infarction ([facility])    • LOM (loss of memory)    • Osteoarthritis 2016   • Paroxysmal atrial fibrillation ([facility]) 2016   • Thyroid nodule 2016    1.8x1.2cm   • Weakness generalized    • Wound infection after surgery     Left hip arthroplasty, staph aureus, 6 weeks IV Rocephin     Past Surgical History:   Procedure Laterality Date   • ABDOMINOPLASTY     • BREAST ABSCESS INCISION AND DRAINAGE     • TOTAL HIP ARTHROPLASTY Left     Staph aureus wound infection      General Information     Row Name 22 1026           Physical Therapy Time and Intention    Document Type therapy note (daily note) (P)   -LK     Mode of Treatment physical therapy (P)   -LK     Row Name 03/14/22 1026          General Information    Patient Profile Reviewed yes (P)   -LK     Existing Precautions/Restrictions fall;brace worn when out of bed;other (see comments) (P)   hx of aphasia  -LK     Row Name 03/14/22 1026          Cognition    Orientation Status (Cognition) oriented to;person;place;disoriented to;time (P)   -LK     Row Name 03/14/22 1026          Safety Issues, Functional Mobility    Safety Issues Affecting Function (Mobility) awareness of need for assistance;insight into deficits/self-awareness;safety precaution awareness;problem-solving;safety precautions follow-through/compliance;sequencing abilities (P)   -LK     Impairments Affecting Function (Mobility) balance;endurance/activity tolerance;pain;range of motion (ROM);strength (P)   -LK     Comment, Safety Issues/Impairments (Mobility) Alert, confused at times and needed reminders of the task at hand. KI out of bed due to lack of quad control. (P)   -LK           User Key  (r) = Recorded By, (t) = Taken By, (c) = Cosigned By    Initials Name Provider Type    Jael Dalal, PT Student PT Student               Mobility     Row Name 03/14/22 1026          Bed Mobility    Bed Mobility sit-supine;scooting/bridging (P)   -LK     Scooting/Bridging [place] (Bed Mobility) minimum assist (75% patient effort);verbal cues (P)   -LK     Supine-Sit [place] (Bed Mobility) moderate assist (50% patient effort) (P)   -LK     Assistive Device (Bed Mobility) bed rails;head of bed elevated (P)   -LK     Comment, (Bed Mobility) Verbal and tactile cues for hand/foot placement. ModA for trunk control to get to sitting EOB and LE assistance. KI doned in bed. (P)   -LK     Row Name 03/14/22 1026          Sit-Stand Transfer    Sit-Stand [place] (Transfers) moderate assist (50% patient  effort);2 person assist (P)   -LK     Assistive Device (Sit-Stand Transfers) walker, front-wheeled (P)   -LK     Comment, (Sit-Stand Transfer) STS from EOB with verbal cues for hand/foot placement. ModA for upright posture and LE positioning. (P)   -LK     Row Name 03/14/22 1026          Gait/Stairs (Locomotion)    [place] Level (Gait) 2 person assist;moderate assist (50% patient effort) (P)   -LK     Assistive Device (Gait) walker, front-wheeled (P)   -LK     Distance in Feet (Gait) 14 (P)   -LK     Deviations/Abnormal Patterns (Gait) gait speed decreased;stride length decreased;weight shifting decreased;bilateral deviations (P)   -LK     Bilateral Gait Deviations forward flexed posture (P)   -LK     Right Sided Gait Deviations heel strike decreased;weight shift ability decreased (P)   -LK     [place] Level (Stairs) not tested (P)   -LK     Comment, (Gait/Stairs) Ambulated with step to gait pattern. Was slightly confused and required max verbal cues to advance LE and weight shift. Required assistance to advance RW. (P)   -LK     Row Name 03/14/22 1026          Mobility    Extremity Weight-bearing Status right lower extremity (P)   -LK     Right Lower Extremity (Weight-bearing Status) weight-bearing as tolerated (WBAT) (P)   -LK           User Key  (r) = Recorded By, (t) = Taken By, (c) = Cosigned By    Initials Name Provider Type    Jael Dalal, PT Student PT Student               Obj/Interventions     Row Name 03/14/22 1026          Motor Skills    Therapeutic Exercise hip;knee;ankle (P)   -     Row Name 03/14/22 1026          Hip (Therapeutic Exercise)    Hip (Therapeutic Exercise) isometric exercises;strengthening exercise (P)   -     Hip Isometrics (Therapeutic Exercise) bilateral;gluteal sets;10 repetitions (P)   -     Hip Strengthening (Therapeutic Exercise) right;heel slides (P)   -     Row Name 03/14/22 1026          Knee (Therapeutic Exercise)    Knee (Therapeutic Exercise)  isometric exercises;strengthening exercise (P)   -LK     Knee Isometrics (Therapeutic Exercise) bilateral;quad sets;10 repetitions (P)   -LK     Knee Strengthening (Therapeutic Exercise) right;SLR (straight leg raise);SAQ (short arc quad);LAQ (long arc quad) (P)   -LK     Row Name 03/14/22 1026          Ankle (Therapeutic Exercise)    Ankle (Therapeutic Exercise) AROM (active range of motion) (P)   -LK     Ankle AROM (Therapeutic Exercise) bilateral;dorsiflexion;plantarflexion (P)   -LK     Row Name 03/14/22 1026          Balance    Balance Assessment sitting static balance;sitting dynamic balance;standing static balance;standing dynamic balance;sit to stand dynamic balance (P)   -LK     Static Sitting Balance minimal assist (P)   -LK     Dynamic Sitting Balance minimal assist (P)   -LK     Position, Sitting Balance supported;sitting edge of bed (P)   -LK     Static Standing Balance moderate assist (P)   -LK     Dynamic Standing Balance moderate assist (P)   -LK     Position/Device Used, Standing Balance supported;walker, front-wheeled (P)   -LK     Balance Interventions sitting;standing;sit to stand;supported;static;dynamic (P)   -LK     Comment, Balance Required walker and modA in standing. (P)   -LK           User Key  (r) = Recorded By, (t) = Taken By, (c) = Cosigned By    Initials Name Provider Type    Jael Dalal, PT Student PT Student               Goals/Plan    No documentation.                Clinical Impression     Row Name 03/14/22 1026          Pain Scale: FACES Pre/Post-Treatment    Pain: FACES Scale, Pretreatment 4-->hurts little more (P)   -LK     Posttreatment Pain Rating 4-->hurts little more (P)   -LK     Pain Location - Side/Orientation Right (P)   -LK     Pain Location generalized (P)   -LK     Pain Location - hip (P)   -LK     Row Name 03/14/22 1026          Plan of Care Review    Plan of Care Reviewed With patient (P)   -LK     Progress declining (P)   -LK     Outcome Evaluation Good  effort with therEx in bed. Required modA for bed mobility to get to EOB. Required ModAx2 for transfers and gait using RW. Ambulated 14 feet before being limited by fatigue. Continue to recommend IRF at discharge. (P)   -LK     Row Name 03/14/22 1026          Therapy Assessment/Plan (PT)    Rehab Potential (PT) good, to achieve stated therapy goals (P)   -LK     Criteria for Skilled Interventions Met (PT) yes;meets criteria;skilled treatment is necessary (P)   -LK     Row Name 03/14/22 1026          Vital Signs    O2 Delivery Pre Treatment room air (P)   -LK     O2 Delivery Intra Treatment room air (P)   -LK     O2 Delivery Post Treatment room air (P)   -LK     Pre Patient Position Supine (P)   -LK     Intra Patient Position Standing (P)   -LK     Post Patient Position Sitting (P)   -LK     Row Name 03/14/22 1026          Positioning and Restraints    Pre-Treatment Position in bed (P)   -LK     Post Treatment Position chair (P)   -LK     In Chair reclined;call light within reach;encouraged to call for assist;exit alarm on;with nsg (P)   -LK           User Key  (r) = Recorded By, (t) = Taken By, (c) = Cosigned By    Initials Name Provider Type    Jael Dalal, PT Student PT Student               Outcome Measures     Row Name 03/14/22 1026          How much help from another person do you currently need...    Turning from your back to your side while in flat bed without using bedrails? 2 (P)   -LK     Moving from lying on back to sitting on the side of a flat bed without bedrails? 2 (P)   -LK     Moving to and from a bed to a chair (including a wheelchair)? 3 (P)   -LK     Standing up from a chair using your arms (e.g., wheelchair, bedside chair)? 3 (P)   -LK     Climbing 3-5 steps with a railing? 1 (P)   -LK     To walk in hospital room? 3 (P)   -LK     AM-PAC 6 Clicks Score (PT) 14 (P)   -LK     Row Name 03/14/22 1026          Functional Assessment    Outcome Measure Options AM-PAC 6 Clicks Basic Mobility  (PT) (P)   -LK           User Key  (r) = Recorded By, (t) = Taken By, (c) = Cosigned By    Initials Name Provider Type    Jael Dalal, PT Student PT Student                             Physical Therapy Education                 Title: PT OT SLP Therapies (In Progress)     Topic: Physical Therapy (Done)     Point: Mobility training (Done)     Learning Progress Summary           Patient Acceptance, E, DU,NR by  at 3/14/2022 1026    Comment: Educated on safe sequencing for bed mobility, transfers, and gait.    REUBEN Blackman VU by SC at 3/13/2022 1025    Comment: reviewed benefits of activity   Family Acceptance, E, DU,NR by  at 3/14/2022 1026    Comment: Educated on safe sequencing for bed mobility, transfers, and gait.   Significant Other Acceptance, E, VU,DU,NR by  at 3/12/2022 1134    Comment: Reviewed safety with mobility, hip precautions, HEP, DC recommendations, and PT POC                   Point: Home exercise program (Done)     Learning Progress Summary           Patient Acceptance, E, DU,NR by  at 3/14/2022 1026    Comment: Educated on safe sequencing for bed mobility, transfers, and gait.    REUBEN Blackman VU by SC at 3/13/2022 1025    Comment: reviewed benefits of activity   Family Acceptance, E, DU,NR by  at 3/14/2022 1026    Comment: Educated on safe sequencing for bed mobility, transfers, and gait.   Significant Other Acceptance, E, VU,DU,NR by  at 3/12/2022 1134    Comment: Reviewed safety with mobility, hip precautions, HEP, DC recommendations, and PT POC                   Point: Body mechanics (Done)     Learning Progress Summary           Patient Acceptance, E, DU,NR by  at 3/14/2022 1026    Comment: Educated on safe sequencing for bed mobility, transfers, and gait.    REUBEN Blackman VU by SC at 3/13/2022 1025    Comment: reviewed benefits of activity   Family Acceptance, E, DU,NR by  at 3/14/2022 1026    Comment: Educated on safe sequencing for bed mobility, transfers, and gait.    Significant Other Acceptance, E, VU,DU,NR by  at 3/12/2022 1134    Comment: Reviewed safety with mobility, hip precautions, HEP, DC recommendations, and PT POC                   Point: Precautions (Done)     Learning Progress Summary           Patient Acceptance, E, DU,NR by  at 3/14/2022 1026    Comment: Educated on safe sequencing for bed mobility, transfers, and gait.    Eager, REUBEN, VU by SC at 3/13/2022 1025    Comment: reviewed benefits of activity   Family Acceptance, E, DU,NR by  at 3/14/2022 1026    Comment: Educated on safe sequencing for bed mobility, transfers, and gait.   Significant Other Acceptance, E, VU,DU,NR by  at 3/12/2022 1134    Comment: Reviewed safety with mobility, hip precautions, HEP, DC recommendations, and PT POC                               User Key     Initials Effective Dates Name Provider Type Discipline    SC 06/16/21 -  Lizbeth Gardner, PT Physical Therapist PT     09/21/21 -  Willi Rodriguez, PT Physical Therapist PT     01/11/22 -  Jael Craig, PT Student PT Student PT              PT Recommendation and Plan     Plan of Care Reviewed With: (P) patient  Progress: (P) declining  Outcome Evaluation: (P) Good effort with therEx in bed. Required modA for bed mobility to get to EOB. Required ModAx2 for transfers and gait using RW. Ambulated 14 feet before being limited by fatigue. Continue to recommend IRF at discharge.     Time Calculation:    PT Charges     Row Name 03/14/22 1150             Time Calculation    Start Time 1026 (P)   -      PT Received On 03/14/22 (P)   -      PT Goal Re-Cert Due Date 03/22/22 (P)   -              Time Calculation- PT    Total Timed Code Minutes- PT 34 minute(s) (P)   -              Timed Charges    58724 - PT Therapeutic Exercise Minutes 10 (P)   -      44980 - Gait Training Minutes  12 (P)   -      60156 - PT Therapeutic Activity Minutes 12 (P)   -              Total Minutes    Timed Charges Total Minutes 34 (P)    -BA       Total Minutes 34 (P)   -LK            User Key  (r) = Recorded By, (t) = Taken By, (c) = Cosigned By    Initials Name Provider Type    Jael Dalal, PT Student PT Student              Therapy Charges for Today     Code Description Service Date Service Provider Modifiers Qty    [id number]  GAIT TRAINING EA 15 MIN 3/14/2022 Jael Craig, PT Student GP 1    [id number]  PT THERAPEUTIC ACT EA 15 MIN 3/14/2022 Jael Craig, PT Student GP 1          PT G-Codes  Outcome Measure Options: (P) AM-PAC 6 Clicks Basic Mobility (PT)  AM-PAC 6 Clicks Score (PT): (P) 14  AM-PAC 6 Clicks Score (OT): 14    JAEL CRAIG PT Student  3/14/2022

## 2022-03-15 VITALS
SYSTOLIC BLOOD PRESSURE: 132 MMHG | TEMPERATURE: 97.9 F | WEIGHT: 117 LBS | HEART RATE: 76 BPM | HEIGHT: 68 IN | BODY MASS INDEX: 17.73 KG/M2 | RESPIRATION RATE: 18 BRPM | OXYGEN SATURATION: 98 % | DIASTOLIC BLOOD PRESSURE: 65 MMHG

## 2022-03-15 PROCEDURE — 97110 THERAPEUTIC EXERCISES: CPT

## 2022-03-15 PROCEDURE — 97116 GAIT TRAINING THERAPY: CPT

## 2022-03-15 PROCEDURE — 97535 SELF CARE MNGMENT TRAINING: CPT

## 2022-03-15 PROCEDURE — 25010000002 ENOXAPARIN PER 10 MG: Performed by: INTERNAL MEDICINE

## 2022-03-15 PROCEDURE — 99239 HOSP IP/OBS DSCHRG MGMT >30: CPT | Performed by: NURSE PRACTITIONER

## 2022-03-15 RX ORDER — ACETAMINOPHEN 325 MG/1
650 TABLET ORAL EVERY 4 HOURS PRN
Start: 2022-03-15

## 2022-03-15 RX ORDER — HYDROCODONE BITARTRATE AND ACETAMINOPHEN 5; 325 MG/1; MG/1
0.5 TABLET ORAL EVERY 6 HOURS PRN
Qty: 6 TABLET | Refills: 0 | Status: SHIPPED | OUTPATIENT
Start: 2022-03-15 | End: 2022-03-18

## 2022-03-15 RX ORDER — CHOLECALCIFEROL (VITAMIN D3) 125 MCG
5 CAPSULE ORAL NIGHTLY PRN
Start: 2022-03-15

## 2022-03-15 RX ORDER — HYDROCODONE BITARTRATE AND ACETAMINOPHEN 5; 325 MG/1; MG/1
0.5 TABLET ORAL EVERY 6 HOURS PRN
Status: DISCONTINUED | OUTPATIENT
Start: 2022-03-15 | End: 2022-03-15 | Stop reason: HOSPADM

## 2022-03-15 RX ORDER — LIDOCAINE 50 MG/G
1 PATCH TOPICAL
Start: 2022-03-16

## 2022-03-15 RX ORDER — POLYETHYLENE GLYCOL 3350 17 G/17G
17 POWDER, FOR SOLUTION ORAL DAILY
Start: 2022-03-16

## 2022-03-15 RX ORDER — PSEUDOEPHEDRINE HCL 30 MG
100 TABLET ORAL 2 TIMES DAILY
Start: 2022-03-15

## 2022-03-15 RX ORDER — ALPRAZOLAM 0.5 MG/1
0.25 TABLET ORAL 2 TIMES DAILY PRN
Qty: 3 TABLET | Refills: 0 | Status: SHIPPED | OUTPATIENT
Start: 2022-03-15

## 2022-03-15 RX ORDER — CALCIUM CARBONATE 750 MG/1
750 TABLET, CHEWABLE ORAL 2 TIMES DAILY PRN
Start: 2022-03-15

## 2022-03-15 RX ADMIN — DICLOFENAC 2 G: 10 GEL TOPICAL at 18:30

## 2022-03-15 RX ADMIN — DIVALPROEX SODIUM 250 MG: 250 TABLET, DELAYED RELEASE ORAL at 13:31

## 2022-03-15 RX ADMIN — ACETAMINOPHEN 650 MG: 325 TABLET ORAL at 04:00

## 2022-03-15 RX ADMIN — ACETAMINOPHEN 650 MG: 325 TABLET ORAL at 09:23

## 2022-03-15 RX ADMIN — DICLOFENAC 2 G: 10 GEL TOPICAL at 12:15

## 2022-03-15 RX ADMIN — ENOXAPARIN SODIUM 40 MG: 40 INJECTION SUBCUTANEOUS at 12:15

## 2022-03-15 RX ADMIN — DIVALPROEX SODIUM 250 MG: 250 TABLET, DELAYED RELEASE ORAL at 06:43

## 2022-03-15 RX ADMIN — LISINOPRIL 10 MG: 10 TABLET ORAL at 09:23

## 2022-03-15 RX ADMIN — Medication 10 ML: at 09:22

## 2022-03-15 RX ADMIN — DICLOFENAC 2 G: 10 GEL TOPICAL at 09:22

## 2022-03-15 NOTE — PLAN OF CARE
Goal Outcome Evaluation:  Plan of Care Reviewed With: patient, spouse        Progress: improving     Pt is alert with with confusion. Slept throughout the night. VSS. RA . Tylenol for pain. Voiding well. Wife at bedside.

## 2022-03-15 NOTE — THERAPY TREATMENT NOTE
Patient Name: Jenna Russell  : 1940    MRN: [id number]                              Today's Date: 3/15/2022       Admit Date: 3/10/2022    Visit Dx:     ICD-10-CM ICD-9-CM   1. Closed right hip fracture, initial encounter ([facility])  S72.001A 820.8   2. 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 Normal LVSF.Normal LV diastolic filling. Left atrium midly dilated. RV mild- mod dilated. MIld aortic cusp sclerosis. No evidence of mitral valve prolapse. Mild mitral regurgitation.No pulmonary hypertension or perdicardial effusion. No dilation of aortic root. Venous system appears normal   • H/O: stroke    • HLD (hyperlipidemia) 2016   • Left temporal lobe infarction ([facility])    • LOM (loss of memory)    • Osteoarthritis 2016   • Paroxysmal atrial fibrillation ([facility]) 2016   • Thyroid nodule 2016    1.8x1.2cm   • Weakness generalized    • Wound infection after surgery     Left hip arthroplasty, staph aureus, 6 weeks IV Rocephin     Past Surgical History:   Procedure Laterality Date   • ABDOMINOPLASTY     • BREAST ABSCESS INCISION AND DRAINAGE     • HIP TROCHANTERIC NAILING WITH INTRAMEDULLARY HIP SCREW Right 3/11/2022    Procedure: HIP TROCHANTERIC NAILING WITH  INTRAMEDULLARY HIP SCREW RIGHT;  Surgeon: Danny Lynne MD;  Location: [facility];  Service: Orthopedics;  Laterality: Right;   • TOTAL HIP ARTHROPLASTY Left     Staph aureus wound infection      General Information     Row Name 03/15/22 0910          Physical Therapy Time and Intention    Document Type therapy note (daily note)  -     Mode of Treatment physical therapy  -     Row Name 03/15/22 0910          General Information    Patient Profile Reviewed yes  -     Existing Precautions/Restrictions fall;brace worn when out of bed;other (see comments)  RLE WBAT, hx of receptive/expressive aphasia, confusion, KI donned to RLE when OOB  -     Row Name 03/15/22 0910          Cognition    Orientation Status (Cognition) oriented x 3  -     Row Name 03/15/22 0910          Safety Issues, Functional Mobility    Safety Issues Affecting Function (Mobility) safety precaution awareness;safety precautions follow-through/compliance;sequencing abilities;insight into deficits/self-awareness;positioning of assistive device  -     Impairments Affecting Function (Mobility) balance;endurance/activity tolerance;pain;range of motion (ROM);strength  -           User Key  (r) = Recorded By, (t) = Taken By, (c) = Cosigned By    Initials Name Provider Type     Rodrick Laguerre, PT Physical Therapist               Mobility     Row Name 03/15/22 0911          Bed Mobility    Bed Mobility supine-sit;scooting/bridging  -     Scooting/Bridging [place] (Bed Mobility) minimum assist (75% patient effort);2 person assist;verbal cues;nonverbal cues (demo/gesture)  -     Supine-Sit [place] (Bed Mobility) minimum assist (75% patient effort);2 person assist;verbal cues;nonverbal cues (demo/gesture)  -     Assistive Device (Bed Mobility) bed rails;head of bed elevated  -     Comment, (Bed Mobility) Pt showing improved ability to sequence with cues with less assist. More alert and energetic this morning. KI donned in  supine prior to mobility due to inability to perform SLR  -     Row Name 03/15/22 0911          Transfers    Comment, (Transfers) VCs for safe hand placement with use of RW and positioning of RLE with KI for safety  -[facility] Name 03/15/22 0911          Sit-Stand Transfer    Sit-Stand [place] (Transfers) minimum assist (75% patient effort);2 person assist;verbal cues  -     Assistive Device (Sit-Stand Transfers) walker, front-wheeled  -     Row Name 03/15/22 0911          Gait/Stairs (Locomotion)    [place] Level (Gait) minimum assist (75% patient effort);verbal cues;nonverbal cues (demo/gesture);1 person assist;1 person to manage equipment  -     Assistive Device (Gait) walker, front-wheeled  -     Distance in Feet (Gait) 60  -     Deviations/Abnormal Patterns (Gait) gait speed decreased;stride length decreased;weight shifting decreased;bilateral deviations  -     Bilateral Gait Deviations forward flexed posture  -     Right Sided Gait Deviations heel strike decreased;weight shift ability decreased  -     Comment, (Gait/Stairs) KI on for gait and RW for support with min A x 1 and chair follow. Gait training focused on safe management of RW with verbal cues for upright posture, proper positioning of walker, increasing step length, and weight bearing cues. Pt ambulating wth step to pattern initially and able to progress to step through with short steps until fatigued.  -     Row Name 03/15/22 0911          Mobility    Extremity Weight-bearing Status right lower extremity  -     Right Lower Extremity (Weight-bearing Status) weight-bearing as tolerated (WBAT)  -           User Key  (r) = Recorded By, (t) = Taken By, (c) = Cosigned By    Initials Name Provider Type     Rodrick Laguerre, PT Physical Therapist               Obj/Interventions     [facility] Name 03/15/22 0915          Motor Skills    Therapeutic Exercise other (see comments)  ankle pumps x 20, quad sets x 20, saq x 20, glute  sets x 15  -     Row Name 03/15/22 0915          Balance    Balance Assessment sitting static balance;sitting dynamic balance;sit to stand dynamic balance;standing static balance;standing dynamic balance  -     Comment, Balance sitting balance WFL, standing balance mild impairments  -           User Key  (r) = Recorded By, (t) = Taken By, (c) = Cosigned By    Initials Name Provider Type     Rodrick Laguerre, PT Physical Therapist               Goals/Plan    No documentation.                Clinical Impression     [facility] Name 03/15/22 0915          Pain    Pretreatment Pain Rating 0/10 - no pain  -     Posttreatment Pain Rating 0/10 - no pain  -[facility] Name 03/15/22 0915          Plan of Care Review    Plan of Care Reviewed With patient;family  -     Progress improving  -     Outcome Evaluation Continued used of KI due to quad weakness. Pt much improved this date and able to ambulate 60 feet with min A and chair follow. Starting to get better quad activation with quad sets and minimal SAQ. Cont d/c recs for IRF.  -[facility] Name 03/15/22 0915          Therapy Assessment/Plan (PT)    Rehab Potential (PT) good, to achieve stated therapy goals  -     Criteria for Skilled Interventions Met (PT) yes;meets criteria;skilled treatment is necessary  -[facility] Name 03/15/22 0915          Vital Signs    O2 Delivery Pre Treatment room air  -     O2 Delivery Intra Treatment room air  -     O2 Delivery Post Treatment room air  -     Pre Patient Position Supine  -     Intra Patient Position Standing  -     Post Patient Position Sitting  -[facility] Name 03/15/22 0915          Positioning and Restraints    Pre-Treatment Position in bed  -     Post Treatment Position chair  -     In Chair notified nsg;reclined;call light within reach;encouraged to call for assist;exit alarm on;legs elevated;with family/caregiver  -           User Key  (r) = Recorded By, (t) = Taken By, (c) = Cosigned By    Initials  Name Provider Type    Rodrick Mayorga, PT Physical Therapist               Outcome Measures     Row Name 03/15/22 0917          How much help from another person do you currently need...    Turning from your back to your side while in flat bed without using bedrails? 3  -JH     Moving from lying on back to sitting on the side of a flat bed without bedrails? 3  -JH     Moving to and from a bed to a chair (including a wheelchair)? 3  -JH     Standing up from a chair using your arms (e.g., wheelchair, bedside chair)? 3  -JH     Climbing 3-5 steps with a railing? 1  -JH     To walk in hospital room? 3  -JH     AM-PAC 6 Clicks Score (PT) 16  -     Row Name 03/15/22 0917          Functional Assessment    Outcome Measure Options AM-PAC 6 Clicks Basic Mobility (PT)  -           User Key  (r) = Recorded By, (t) = Taken By, (c) = Cosigned By    Initials Name Provider Type    Rodrick Mayorga, PT Physical Therapist                             Physical Therapy Education                 Title: PT OT SLP Therapies (In Progress)     Topic: Physical Therapy (Done)     Point: Mobility training (Done)     Learning Progress Summary           Patient Acceptance, E,TB, VU by  at 3/15/2022 0917    Acceptance, E, DU,NR by BA at 3/14/2022 1026    Comment: Educated on safe sequencing for bed mobility, transfers, and gait.    Eager, E, VU by SC at 3/13/2022 1025    Comment: reviewed benefits of activity   Family Acceptance, E,TB, VU by  at 3/15/2022 0917    Acceptance, E, DU,NR by BA at 3/14/2022 1026    Comment: Educated on safe sequencing for bed mobility, transfers, and gait.   Significant Other Acceptance, E, VU,DU,NR by  at 3/12/2022 1134    Comment: Reviewed safety with mobility, hip precautions, HEP, DC recommendations, and PT POC                   Point: Home exercise program (Done)     Learning Progress Summary           Patient Acceptance, E,TB, VU by  at 3/15/2022 0917    Acceptance, E, DU,NR by BA at 3/14/2022 1026     Comment: Educated on safe sequencing for bed mobility, transfers, and gait.    Eager, E, VU by SC at 3/13/2022 1025    Comment: reviewed benefits of activity   Family Acceptance, E,TB, VU by  at 3/15/2022 0917    Acceptance, E, DU,NR by  at 3/14/2022 1026    Comment: Educated on safe sequencing for bed mobility, transfers, and gait.   Significant Other Acceptance, E, VU,DU,NR by  at 3/12/2022 1134    Comment: Reviewed safety with mobility, hip precautions, HEP, DC recommendations, and PT POC                   Point: Body mechanics (Done)     Learning Progress Summary           Patient Acceptance, E,TB, VU by  at 3/15/2022 0917    Acceptance, E, DU,NR by  at 3/14/2022 1026    Comment: Educated on safe sequencing for bed mobility, transfers, and gait.    Franer, E, VU by SC at 3/13/2022 1025    Comment: reviewed benefits of activity   Family Acceptance, E,TB, VU by  at 3/15/2022 0917    Acceptance, E, DU,NR by  at 3/14/2022 1026    Comment: Educated on safe sequencing for bed mobility, transfers, and gait.   Significant Other Acceptance, E, VU,DU,NR by  at 3/12/2022 1134    Comment: Reviewed safety with mobility, hip precautions, HEP, DC recommendations, and PT POC                   Point: Precautions (Done)     Learning Progress Summary           Patient Acceptance, E,TB, VU by  at 3/15/2022 0917    Acceptance, E, DU,NR by BA at 3/14/2022 1026    Comment: Educated on safe sequencing for bed mobility, transfers, and gait.    Franer, E, VU by SC at 3/13/2022 1025    Comment: reviewed benefits of activity   Family Acceptance, E,TB, VU by  at 3/15/2022 0917    Acceptance, E, DU,NR by  at 3/14/2022 1026    Comment: Educated on safe sequencing for bed mobility, transfers, and gait.   Significant Other Acceptance, E, VU,DU,NR by  at 3/12/2022 1134    Comment: Reviewed safety with mobility, hip precautions, HEP, DC recommendations, and PT POC                               User Key     Initials  Effective Dates Name Provider Type Discipline    SC 06/16/21 -  Lizbeth Gardner, PT Physical Therapist PT     09/22/20 -  Rodrick Laguerre, PT Physical Therapist PT     09/21/21 -  Willi Rodriguez, PT Physical Therapist PT     01/11/22 -  Jael Craig, PT Student PT Student PT              PT Recommendation and Plan     Plan of Care Reviewed With: patient, family  Progress: improving  Outcome Evaluation: Continued used of KI due to quad weakness. Pt much improved this date and able to ambulate 60 feet with min A and chair follow. Starting to get better quad activation with quad sets and minimal SAQ. Cont d/c recs for IRF.     Time Calculation:    PT Charges     Row Name 03/15/22 0918             Time Calculation    Start Time 0840  -      PT Received On 03/15/22  -      PT Goal Re-Cert Due Date 03/22/22  -              Time Calculation- PT    Total Timed Code Minutes- PT 29 minute(s)  -              Timed Charges    09982 - PT Therapeutic Exercise Minutes 12  -      95901 - Gait Training Minutes  12  -      08872 - PT Therapeutic Activity Minutes 5  -              Total Minutes    Timed Charges Total Minutes 29  -       Total Minutes 29  -            User Key  (r) = Recorded By, (t) = Taken By, (c) = Cosigned By    Initials Name Provider Type     Rodrick Laguerre, PT Physical Therapist              Therapy Charges for Today     Code Description Service Date Service Provider Modifiers Qty    44493170409 HC PT THER PROC EA 15 MIN 3/15/2022 Rodrick Laguerre, PT GP 1    25966708901 HC GAIT TRAINING EA 15 MIN 3/15/2022 Rodrick Laguerre, PT GP 1    16109632722 HC PT THER SUPP EA 15 MIN 3/15/2022 Rodrick Laguerre, PT GP 2          PT G-Codes  Outcome Measure Options: AM-PAC 6 Clicks Basic Mobility (PT)  AM-PAC 6 Clicks Score (PT): 16  AM-PAC 6 Clicks Score (OT): 14    Rodrick Laguerre PT  3/15/2022

## 2022-03-15 NOTE — THERAPY TREATMENT NOTE
Patient Name: Jenna Russell  : 1940    MRN: [id number]                              Today's Date: 3/15/2022       Admit Date: 3/10/2022    Visit Dx:     ICD-10-CM ICD-9-CM   1. Closed right hip fracture, initial encounter ([facility])  S72.001A 820.8   2. History of stroke  Z86.73 V12.54   3. Seizure disorder ([facility])  G40.909 345.90     Patient Active Problem List   Diagnosis   • PAF (paroxysmal atrial fibrillation) ([facility])   • HLD (hyperlipidemia)   • Hypertension   • Combined receptive and expressive aphasia   • Colitis, Clostridium difficile   • Collagenous colitis   • Seizure disorder ([facility])   • Closed right hip fracture, initial encounter ([facility])   • Anemia   • Daily consumption of alcohol     Past Medical History:   Diagnosis Date   • Acute ischemic stroke ([facility])    • 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 Normal LVSF.Normal LV diastolic filling. Left atrium midly dilated. RV mild- mod dilated. MIld aortic cusp sclerosis. No evidence of mitral valve prolapse. Mild mitral regurgitation.No pulmonary hypertension or perdicardial effusion. No dilation of aortic root. Venous system appears normal   • H/O: stroke    • HLD (hyperlipidemia) 2016   • Left temporal lobe infarction ([facility])    • LOM (loss of memory)    • Osteoarthritis 2016   • Paroxysmal atrial fibrillation ([facility]) 2016   • Thyroid nodule 2016    1.8x1.2cm   • Weakness generalized    • Wound infection after surgery     Left hip arthroplasty, staph aureus, 6 weeks IV Rocephin     Past Surgical History:   Procedure Laterality Date   • ABDOMINOPLASTY     • BREAST ABSCESS INCISION AND DRAINAGE     • HIP TROCHANTERIC NAILING WITH INTRAMEDULLARY HIP SCREW Right 3/11/2022     Procedure: HIP TROCHANTERIC NAILING WITH INTRAMEDULLARY HIP SCREW RIGHT;  Surgeon: Danny Lynne MD;  Location: [facility];  Service: Orthopedics;  Laterality: Right;   • TOTAL HIP ARTHROPLASTY Left     Staph aureus wound infection      General Information     Row Name 03/15/22 1518          OT Time and Intention    Document Type therapy note (daily note)  -MR     Mode of Treatment occupational therapy  -MR     Row Name 03/15/22 1518          General Information    Patient Profile Reviewed yes  -MR     Existing Precautions/Restrictions fall;brace worn when out of bed;other (see comments)  RLE WBAT, hx of receptive/expressive aphasia, confusion, KI donned to RLE when OOB.  -MR     Barriers to Rehab none identified  -MR     Row Name 03/15/22 1518          Stairs Within Home, Primary    Number of Stairs, Within Home, Primary none  -MR     Stair Railings, Within Home, Primary none  -MR     Row Name 03/15/22 1518          Cognition    Orientation Status (Cognition) oriented x 3  -MR     Row Name 03/15/22 1518          Safety Issues, Functional Mobility    Safety Issues Affecting Function (Mobility) safety precaution awareness;safety precautions follow-through/compliance;sequencing abilities;insight into deficits/self-awareness  -MR     Impairments Affecting Function (Mobility) balance;endurance/activity tolerance;pain;range of motion (ROM);strength  -MR           User Key  (r) = Recorded By, (t) = Taken By, (c) = Cosigned By    Initials Name Provider Type     Krissy Case, OT Occupational Therapist                 Mobility/ADL's     Row Name 03/15/22 1521          Bed Mobility    Comment, (Bed Mobility) Pt received sitting up in bed, pt deferred getting OOB at this time.  -MR     Row Name 03/15/22 1521          Transfers    Comment, (Transfers) deferred STS or EOB/OOB d/t d/c later this day  -MR     Row Name 03/15/22 1521          Activities of Daily Living    BADL Assessment/Intervention grooming;upper  body dressing;bathing  -MR     Row Name 03/15/22 1521          Mobility    Extremity Weight-bearing Status right lower extremity  -MR     Right Lower Extremity (Weight-bearing Status) weight-bearing as tolerated (WBAT)  -MR     Row Name 03/15/22 1521          Upper Body Dressing Assessment/Training    [place] Level (Upper Body Dressing) doff;other (see comments);moderate assist (50% patient effort)  hospital gown  -MR     Position (Upper Body Dressing) sitting up in bed  -MR     Row Name 03/15/22 1521          Grooming Assessment/Training    [place] Level (Grooming) hair care, combing/brushing;wash face, hands;set up  -MR     Position (Grooming) sitting up in bed  -MR     Row Name 03/15/22 1521          Bathing Assessment/Intervention    [place] Level (Bathing) bathing skills;set up;lower body;upper body  -MR     Position (Bathing) sitting up in bed  -MR     Comment, (Bathing) Pt requesting to complete sponge bathing prior to d/c. SUA for completing bathing tasks w/ front of the body. Pt requesting to complete task Independently w/ family in the room.  -MR           User Key  (r) = Recorded By, (t) = Taken By, (c) = Cosigned By    Initials Name Provider Type    Krissy Prakash OT Occupational Therapist               Obj/Interventions     Row Name 03/15/22 1523          Balance    Balance Assessment sitting static balance;sitting dynamic balance  -MR     Static Sitting Balance supervision;verbal cues  -MR     Dynamic Sitting Balance supervision;verbal cues  -MR     Position, Sitting Balance supported;other (see comments)  sitting up in bed  -MR     Balance Interventions sitting;occupation based/functional task;supported  -MR           User Key  (r) = Recorded By, (t) = Taken By, (c) = Cosigned By    Initials Name Provider Type    Krissy Prakash, AMANDA Occupational Therapist               Goals/Plan    No documentation.                Clinical Impression     Row Name 03/15/22 1524          Pain  Assessment    Pretreatment Pain Rating 0/10 - no pain  -MR     Posttreatment Pain Rating 0/10 - no pain  -MR     Pre/Posttreatment Pain Comment Denied pain pre and post session  -MR     Pain Intervention(s) Repositioned  -     Row Name 03/15/22 1524          Plan of Care Review    Plan of Care Reviewed With patient;family  -MR     Progress improving  -MR     Outcome Evaluation Pt deferred OOB/EOB this treatment session d/t d/c later this date. SUA for grooming, sponge bathing, ModA for doffing hospital gown. Recommendation for d/c to IRF.  -     Row Name 03/15/22 1524          Therapy Plan Review/Discharge Plan (OT)    Anticipated Discharge Disposition (OT) inpatient rehabilitation facility  -MR     Row Name 03/15/22 1524          Vital Signs    Pre Systolic BP Rehab --  VSS. Cleared by RN for treatment  -MR     Pre SpO2 (%) 98  -MR     O2 Delivery Pre Treatment room air  -MR     O2 Delivery Intra Treatment room air  -MR     Post SpO2 (%) 98  -MR     O2 Delivery Post Treatment room air  -MR     Pre Patient Position Supine  -MR     Intra Patient Position Supine  -MR     Post Patient Position Supine  -MR     Row Name 03/15/22 1524          Positioning and Restraints    Pre-Treatment Position in bed  -MR     Post Treatment Position bed  -MR     In Bed notified nsg;fowlers;encouraged to call for assist;call light within reach;exit alarm on;with family/caregiver;side rails up x2  -MR           User Key  (r) = Recorded By, (t) = Taken By, (c) = Cosigned By    Initials Name Provider Type    Krissy Prakash, OT Occupational Therapist               Outcome Measures     Row Name 03/15/22 1526          How much help from another is currently needed...    Putting on and taking off regular lower body clothing? 1  -MR     Bathing (including washing, rinsing, and drying) 2  -MR     Toileting (which includes using toilet bed pan or urinal) 2  -MR     Putting on and taking off regular upper body clothing 3  -MR     Taking care  of personal grooming (such as brushing teeth) 3  -MR     Eating meals 3  -     AM-PAC 6 Clicks Score (OT) 14  -     Row Name 03/15/22 0922 03/15/22 0917       How much help from another person do you currently need...    Turning from your back to your side while in flat bed without using bedrails? 3  -SC 3  -JH    Moving from lying on back to sitting on the side of a flat bed without bedrails? 3  -SC 3  -JH    Moving to and from a bed to a chair (including a wheelchair)? 3  -SC 3  -JH    Standing up from a chair using your arms (e.g., wheelchair, bedside chair)? 3  -SC 3  -JH    Climbing 3-5 steps with a railing? 1  -SC 1  -JH    To walk in hospital room? 3  -SC 3  -JH    AM-PAC 6 Clicks Score (PT) 16  -SC 16  -    Row Name 03/15/22 1526 03/15/22 0917       Functional Assessment    Outcome Measure Options AM-PAC 6 Clicks Daily Activity (OT)  - AM-PAC 6 Clicks Basic Mobility (PT)  -          User Key  (r) = Recorded By, (t) = Taken By, (c) = Cosigned By    Initials Name Provider Type    SC Tomeka Arce, RN Registered Nurse    Rodrick Mayorga, PT Physical Therapist    Krissy Prakash, OT Occupational Therapist                Occupational Therapy Education                 Title: PT OT SLP Therapies (In Progress)     Topic: Occupational Therapy (In Progress)     Point: ADL training (Done)     Description:   Instruct learner(s) on proper safety adaptation and remediation techniques during self care or transfers.   Instruct in proper use of assistive devices.              Learning Progress Summary           Patient Acceptance, E, VU,NR by MR at 3/15/2022 1527    Acceptance, E,D, NR by LEI at 3/14/2022 1113    Acceptance, E,D, NR by LEI at 3/12/2022 1533   Family Acceptance, E, VU,NR by MR at 3/15/2022 1527    Acceptance, E,D, NR by LEI at 3/14/2022 1113                   Point: Home exercise program (Not Started)     Description:   Instruct learner(s) on appropriate technique for monitoring, assisting  and/or progressing therapeutic exercises/activities.              Learner Progress:  Not documented in this visit.          Point: Precautions (Done)     Description:   Instruct learner(s) on prescribed precautions during self-care and functional transfers.              Learning Progress Summary           Patient Acceptance, E, VU,NR by MR at 3/15/2022 1527    Acceptance, E,D, NR by JY at 3/14/2022 1113    Acceptance, E,D, NR by JY at 3/12/2022 1533   Family Acceptance, E, VU,NR by MR at 3/15/2022 1527    Acceptance, E,D, NR by JY at 3/14/2022 1113                   Point: Body mechanics (Done)     Description:   Instruct learner(s) on proper positioning and spine alignment during self-care, functional mobility activities and/or exercises.              Learning Progress Summary           Patient Acceptance, E, VU,NR by MR at 3/15/2022 1527    Acceptance, E,D, NR by JY at 3/14/2022 1113    Acceptance, E,D, NR by JY at 3/12/2022 1533   Family Acceptance, E, VU,NR by MR at 3/15/2022 1527    Acceptance, E,D, NR by JY at 3/14/2022 1113                               User Key     Initials Effective Dates Name Provider Type Discipline     06/16/21 -  Yvette West, OT Occupational Therapist OT     10/06/21 -  Krissy Case OT Occupational Therapist OT              OT Recommendation and Plan     Plan of Care Review  Plan of Care Reviewed With: patient, family  Progress: improving  Outcome Evaluation: Pt deferred OOB/EOB this treatment session d/t d/c later this date. SUA for grooming, sponge bathing, ModA for doffing hospital gown. Recommendation for d/c to IRF.     Time Calculation:    Time Calculation- OT     Row Name 03/15/22 1527 03/15/22 0918          Time Calculation- OT    OT Start Time 1458  -MR --     OT Received On 03/15/22  - --     OT Goal Re-Cert Due Date 03/22/22 - --            Timed Charges    59642 - Gait Training Minutes  -- 12  [facility]     96294 - OT Self Care/Mgmt Minutes 23 -MR --             Total Minutes    Timed Charges Total Minutes 23  - 12  -      Total Minutes 23  - 12  -           User Key  (r) = Recorded By, (t) = Taken By, (c) = Cosigned By    Initials Name Provider Type     Rodrick Laguerre, PT Physical Therapist    Anne Prakash OT Occupational Therapist              Therapy Charges for Today     Code Description Service Date Service Provider Modifiers Qty    [id number] HC OT SELF CARE/MGMT/TRAIN EA 15 MIN 3/15/2022 Anne Case OT GO 2               ANNE CASE OT  3/15/2022

## 2022-03-15 NOTE — DISCHARGE SUMMARY
[facility] Medicine Services  DISCHARGE SUMMARY    Patient Name: Jenna Russell  : 1940  MRN: [id number]    Date of Admission: 3/10/2022  5:49 PM  Date of Discharge:  3/15/2022  Primary Care Physician: Judi Rodriguez MD    Consults     Date and Time Order Name Status Description    3/10/2022 10:43 PM Inpatient Orthopedic Surgery Consult            Hospital Course     Presenting Problem:   Closed right hip fracture, initial encounter ([facility]) [S72.001A]    Active Hospital Problems    Diagnosis  POA   • **Closed right hip fracture, initial encounter ([facility]) [S72.001A]  Yes   • Anemia [D64.9]  Yes   • Daily consumption of alcohol [Z78.9]  Yes   • Seizure disorder ([facility]) [G40.909]  Yes   • Hypertension [I10]  Yes   • HLD (hyperlipidemia) [E78.5]  Yes   • PAF (paroxysmal atrial fibrillation) ([facility]) [I48.0]  Yes      Resolved Hospital Problems   No resolved problems to display.          Hospital Course:  Jenna Russell is a 81 y.o. 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 She presents today with complaints of right hip pain s/p mechanical fall. Patient volunteers that she was out walking around the neighborhood with her partner when she tripped and fell on the sidewalk. There was no head trauma. Patient was was unable to get up on her own and was assisted by her partner and two passersby. Unable to bear weight or move the right lower extremity once upright due to severe pain. Subsequently brought to ED for further evaluation and treatment.       Closed Right Hip Fracture:  - Nondisplaced right proximal femoral intertrochanteric fracture secondary to mechanical fall while walking  - s/p repair with Sajadi 3/11, progressing  - PT/OT recs IPR   - pain control,  resume DVT ppx as per ortho--My partner d/w Dr Lynne as well as neuro via curbside 2/2 her h/o SAH. Neurology felt lovenox would be safe in ppx dosing and started 3/12  - dc baclofen due to drowsiness. Reduce norco to 2.5mg q 6hr prn  -voltaren gel/ lidoderm for back pain seems to help     Constipation- resolved  -continue colace/ miralax     Hypokalemia  - replace per protocol     Asymptomatic bacteruria  - UA noted, d/w patient no symptoms. Urine cx no growth  - defer on abx at this time     Anemia:  - check iron and vitamin studies  - not anticoagulated, Hb stable post op     PAF:  - stable and regular  - previously on Eliquis until SAH  - scheduled to undergo placement of Watchmen Device at [facility] in June 2022     Hypertension      HLD  - continue lisinopril  - continue statin      Seizure Disorder:  - seizure precautions  - continue depakote      Daily Alcohol Consumption:  - drinks 2-4 drinks daily.  - no noted withdrawals or DTs      Discharge Follow Up Recommendations for outpatient labs/diagnostics:  PCP follow up 1 week post DC rehab  Follow up with Dr. Lynne 3-4 weeks. Continue lovenox dvt ppx x 30 days  WBAT RLE, patient may shower    Day of Discharge     HPI:   Patient very drowsy yesterday. Only able to walk 14 ft  Less drowsy this morning. Pain seems better  + BM    Review of Systems  Gen- No fevers, chills  CV- No chest pain, palpitations  Resp- No cough, dyspnea  GI- No N/V/D, abd pain  + right hip pain      Vital Signs:   Temp:  [97.9 °F (36.6 °C)-98.3 °F (36.8 °C)] 97.9 °F (36.6 °C)  Heart Rate:  [66-76] 76  Resp:  [14-18] 18  BP: (110-132)/(60-77) 132/65      Physical Exam:    Constitutional: No acute distress, awake, alert- sitting up in bed  HENT: NCAT, mucous membranes moist  Respiratory: Clear to auscultation bilaterally, respiratory effort normal   Cardiovascular: RRR, no murmurs, rubs, or gallops  Gastrointestinal: Positive bowel sounds, soft, nontender,  nondistended  Musculoskeletal: No bilateral ankle edema  Psychiatric: Appropriate affect, cooperative  Neurologic: Oriented x 3, decreased ROM RLE, speech clear  Skin: No rashes- bandage right hip CDI      Pertinent  and/or Most Recent Results     LAB RESULTS:      Lab 03/13/22  0629 03/12/22  1006 03/11/22  0858 03/10/22  1949   WBC 7.77 8.05 6.16 9.23   HEMOGLOBIN 9.2* 9.5* 9.9* 11.8*   HEMATOCRIT 28.3* 29.6* 30.3* 35.3   PLATELETS 124* 133* 127* 145   NEUTROS ABS 4.20 5.74  --  6.45   IMMATURE GRANS (ABS) 0.01 0.03  --  0.03   LYMPHS ABS 2.30 1.17  --  1.76   MONOS ABS 1.15* 1.10*  --  0.94*   EOS ABS 0.09 0.00  --  0.03   .0* 98.0* 97.4* 96.7         Lab 03/13/22  0629 03/12/22 2206 03/12/22  1006 03/11/22  0857 03/10/22  1949   SODIUM 140  --  140 139 140   POTASSIUM 4.4 4.2 3.5 3.3* 3.9   CHLORIDE 104  --  102 104 104   CO2 31.0*  --  29.0 28.0 26.0   ANION GAP 5.0  --  9.0 7.0 10.0   BUN 7*  --  9 13 17   CREATININE 0.47*  --  0.62 0.46* 0.60   EGFR 97.0  --  90.7 97.5 91.4   GLUCOSE 96  --  111* 107* 104*   CALCIUM 8.7  --  8.7 8.4* 9.3         Lab 03/13/22  0629 03/12/22  1006 03/10/22  1949   TOTAL PROTEIN 5.3* 5.7* 6.4   ALBUMIN 3.30* 3.50 4.10   GLOBULIN 2.0 2.2 2.3   ALT (SGPT) 13 13 16   AST (SGOT) 26 25 32   BILIRUBIN 0.4 0.4 0.4   ALK PHOS 49 44 64                 Lab 03/10/22  1949   IRON 59   IRON SATURATION 18*   TIBC 329   TRANSFERRIN 221   FERRITIN 142.20         Brief Urine Lab Results  (Last result in the past 365 days)      Color   Clarity   Blood   Leuk Est   Nitrite   Protein   CREAT   Urine HCG        03/11/22 0700 Yellow   Clear   Negative   Moderate (2+)   Negative   Negative               Microbiology Results (last 10 days)     Procedure Component Value - Date/Time    Urine Culture - Urine, Urine, Clean Catch [[id number]] Collected: 03/11/22 0700    Lab Status: Final result Specimen: Urine, Clean Catch Updated: 03/12/22 1219     Urine Culture 25,000 CFU/mL Mixed Lilliam Isolated     Narrative:      Specimen contains mixed organisms of questionable pathogenicity which indicates contamination with commensal cheyenne.  Further identification is unlikely to provide clinically useful information.  Suggest recollection.    COVID PRE-OP / PRE-PROCEDURE SCREENING ORDER (NO ISOLATION) - Swab, Nasopharynx [[id number]]  (Normal) Collected: 03/10/22 2231    Lab Status: Final result Specimen: Swab from Nasopharynx Updated: 03/10/22 2310    Narrative:      The following orders were created for panel order COVID PRE-OP / PRE-PROCEDURE SCREENING ORDER (NO ISOLATION) - Swab, Nasopharynx.  Procedure                               Abnormality         Status                     ---------                               -----------         ------                     COVID-19 and FLU A/B PCR...[[id number]]  Normal              Final result                 Please view results for these tests on the individual orders.    COVID-19 and FLU A/B PCR - Swab, Nasopharynx [[id number]]  (Normal) Collected: 03/10/22 2231    Lab Status: Final result Specimen: Swab from Nasopharynx Updated: 03/10/22 2310     COVID19 Not Detected     Influenza A PCR Not Detected     Influenza B PCR Not Detected    Narrative:      Fact sheet for providers: https://www.fda.gov/media/058046/download    Fact sheet for patients: https://www.fda.gov/media/904370/download    Test performed by PCR.          XR Femur 2 View Right    Result Date: 3/10/2022  DATE OF EXAM: 3/10/2022 5:26 PM  PROCEDURE: XR FEMUR 2 VW RIGHT-  INDICATIONS: fall/pain  COMPARISON: No comparisons available.  TECHNIQUE: Two radiographic views of the right femur were obtained.  FINDINGS: There is a nondisplaced intertrochanteric right femoral fracture. Right hip is maintained in alignment with mild right hip osteoarthritis. Visualized portions of the pelvis are intact. There is no distal femoral fracture. Atherosclerotic vascular calcifications noted. Patellofemoral spurring with mild  osteoarthritis of the right knee partially imaged.      Nondisplaced intertrochanteric right femoral fracture.  This report was finalized on 3/10/2022 6:37 PM by Marcelo Muniz MD.      XR Chest 1 View    Result Date: 3/10/2022   DATE OF EXAM: 3/10/2022 6:05 PM  PROCEDURE: XR CHEST 1 VW-  INDICATIONS: fall  COMPARISON: No Comparisons Available  TECHNIQUE: Portable chest radiograph.  FINDINGS:  The cardiomediastinal silhouette is within normal limits. The lungs are clear. There is no pneumothorax, focal consolidation, or large pleural effusion. Osseous structures grossly intact.      No acute process.  This report was finalized on 3/10/2022 6:35 PM by Marcelo Muniz MD.      Peripheral Block    Result Date: 3/11/2022  Jian Araujo CRNA     3/11/2022 12:29 PM Peripheral Block Patient reassessed immediately prior to procedure Patient location during procedure: pre-op Reason for block: procedure for pain and at surgeon's request Performed by CRNA: Jian Araujo, DEEDEE Assisted by: Dian Pritchard RN Preanesthetic Checklist Completed: patient identified, IV checked, site marked, risks and benefits discussed, surgical consent, monitors and equipment checked, pre-op evaluation and timeout performed Prep: Sterile barriers:cap, gloves and mask Prep: ChloraPrep Patient monitoring: blood pressure monitoring, continuous pulse oximetry and EKG Procedure Performed under: local infiltration Guidance:ultrasound guided Images:still images obtained, printed/placed on chart Laterality:right Block Type:fascia iliaca compartment Injection Technique:single-shot Needle Type:echogenic Needle Gauge:18 G Resistance on Injection: none Catheter Size:20 G (20g) Medications Used: bupivacaine (PF) (MARCAINE) 0.5 % injection, 20 mL Med administered at 3/11/2022 12:28 PM Medications Preservative Free Saline:20ml Post Assessment Injection Assessment: negative aspiration for heme, no paresthesia on injection and incremental injection Patient  Tolerance:comfortable throughout block Complications:no Additional Notes Procedure:         Pt placed in supine position.   The insertion site was prepped in sterile fashion with Chlorapreop and clear plastic drapes.  Analgesia was provided by skin infiltration at insertion site with Lidocaine 1% 3mls.  A B-Anderson 18 g , 4 inch echogenic Touhy needle was advance In-plane under ultrasound guidance. The   Anterior superior Iliac crest was initially visualized and the probe was directed slightly medially and slightly towards the umbilicus.  The course of the needle was tracked over the sartorius muscle through the fascia Iliacus and into the anterior portion of the Iliacus muscle.  Major vessels where identified and avoided as where structures of the peritoneal cavity.  LA injection was made incrementally in 1-5ml amounts spread was visualized superiorly below fascia iliacus.  Injection was completed with negative aspiration of blood and negative intravascular injection.  Thank You.     FL C Arm During Surgery    Result Date: 3/11/2022  DATE OF EXAM: 3/11/2022 1:47 PM  PROCEDURE: FL C ARM DURING SURGERY-  INDICATIONS: Troch Nail; S72.001A-Fracture of unspecified part of neck of right femur, initial encounter for closed fracture; Z86.73-Personal history of transient ischemic attack (TIA), and cerebral infarction without residual deficits  COMPARISON: No comparisons available.      FINDINGS/IMPRESSION: 58 seconds of fluoroscopy time provided with 4 images saved during right trochanteric nail placement  This report was finalized on 3/11/2022 3:09 PM by Levy Allen.      XR Hip With or Without Pelvis 2 - 3 View Right    Result Date: 3/10/2022  DATE OF EXAM: 3/10/2022 5:27 PM  PROCEDURE: XR HIP W OR WO PELVIS 2-3 VIEW RIGHT-  INDICATIONS: FALL  COMPARISON: No comparisons available.  TECHNIQUE: AP and Lateral views of the right hip and one view of the pelvis were obtained.  FINDINGS: There is a left hip prosthesis noted.  The iliopectineal and ilioischial lines are intact. There is a intertrochanteric right femoral fracture without displacement. There is convex left levocurvature of the lower lumbar spine. Remaining bony pelvis appears intact. Mild right hip osteoarthritis.      Nondisplaced right proximal femoral intertrochanteric fracture.  This report was finalized on 3/10/2022 6:35 PM by Marcelo Muniz MD.                Results for orders placed during the hospital encounter of 01/20/18    Adult Transthoracic Echo Complete W/ Cont if Necessary Per Protocol (With Agitated Saline)    Interpretation Summary  · Left ventricular wall thickness is consistent with hypertrophy.  · Left ventricular systolic function is normal.  · Moderate aortic valve regurgitation is present.  · Mild mitral valve regurgitation is present  · Mild tricuspid valve regurgitation is present.  · Mild pulmonic valve regurgitation is present.      Plan for Follow-up of Pending Labs/Results:     Discharge Details        Discharge Medications      New Medications      Instructions Start Date   acetaminophen 325 MG tablet  Commonly known as: TYLENOL   650 mg, Oral, Every 4 Hours PRN      Diclofenac Sodium 1 % gel gel  Commonly known as: VOLTAREN   2 g, Topical, 4 Times Daily      docusate sodium 100 MG capsule   100 mg, Oral, 2 Times Daily      enoxaparin 40 MG/0.4ML solution syringe  Commonly known as: LOVENOX   40 mg, Subcutaneous, Every 24 Hours   Start Date: March 16, 2022     HYDROcodone-acetaminophen 5-325 MG per tablet  Commonly known as: NORCO  Replaces: HYDROcodone-acetaminophen  MG per tablet   0.5 tablets, Oral, Every 6 Hours PRN      lidocaine 5 %  Commonly known as: LIDODERM   1 patch, Transdermal, Every 24 Hours Scheduled, Remove & Discard patch within 12 hours or as directed by MD   Start Date: March 16, 2022     melatonin 5 MG tablet tablet   5 mg, Oral, Nightly PRN      polyethylene glycol 17 g packet  Commonly known as: MIRALAX   17 g, Oral,  Daily   Start Date: March 16, 2022        Changes to Medications      Instructions Start Date   ALPRAZolam 0.5 MG tablet  Commonly known as: XANAX  What changed: reasons to take this   0.25 mg, Oral, 2 Times Daily PRN      calcium carbonate  MG chewable tablet  Commonly known as: TUMS EX  What changed:   · when to take this  · reasons to take this   750 mg, Oral, 2 Times Daily PRN         Continue These Medications      Instructions Start Date   aspirin 81 MG EC tablet   81 mg, Oral, Every Other Day      atorvastatin 40 MG tablet  Commonly known as: LIPITOR   40 mg, Oral, Nightly      divalproex 250 MG DR tablet  Commonly known as: DEPAKOTE   250 mg, Oral, Every 8 Hours Scheduled      folic acid 1 MG tablet  Commonly known as: FOLVITE   1 mg, Oral, Daily      hyoscyamine 0.125 MG tablet  Commonly known as: ANASPAZ,LEVSIN   0.125 mg, Oral, Every 4 Hours PRN      lisinopril 10 MG tablet  Commonly known as: PRINIVIL,ZESTRIL   10 mg, Oral, Daily      Vitamin D 50 MCG (2000 UT) capsule   2,000 Units, Oral, Daily         Stop These Medications    diclofenac 1.3 % patch patch  Commonly known as: FLECTOR     HYDROcodone-acetaminophen  MG per tablet  Commonly known as: NORCO  Replaced by: HYDROcodone-acetaminophen 5-325 MG per tablet            Allergies   Allergen Reactions   • Penicillins Anaphylaxis         Discharge Disposition:  Skilled Nursing Facility (DC - External)    Diet:  Hospital:  Diet Order   Procedures   • Diet Regular       Activity:  Activity Instructions     Activity as Tolerated            Restrictions or Other Recommendations:         CODE STATUS:    Code Status and Medical Interventions:   Ordered at: 03/10/22 2054     Level Of Support Discussed With:    Patient     Code Status (Patient has no pulse and is not breathing):    CPR (Attempt to Resuscitate)     Medical Interventions (Patient has pulse or is breathing):    Full Support       Future Appointments   Date Time Provider Department  Center   3/16/2022 11:15 AM [facility]       Additional Instructions for the Follow-ups that You Need to Schedule     Discharge Follow-up with PCP   As directed       Currently Documented PCP:    Judi Rodriguez MD    PCP Phone Number:    [phone]     Follow Up Details: 1 week post dc rehab         Discharge Follow-up with Specified Provider: Dr. Lynne 3-4 weeks   As directed      To: Dr. Lynne 3-4 weeks                     NEREYDA Madera  03/15/22      Time Spent on Discharge:  I spent  60 minutes on this discharge activity which included: face-to-face encounter with the patient, reviewing the data in the system, coordination of the care with the nursing staff as well as consultants, documentation, and entering orders.        Electronically signed by NEREYDA Madera, 03/15/22, 12:42 PM EDT.

## 2022-03-15 NOTE — CASE MANAGEMENT/SOCIAL WORK
Case Management Discharge Note      Final Note: Pt has been approved for transport to [facility] for today 3/15. [facility] will transport her at 1730. Report can be called to [phone]. Pt in agreement with plan         Selected Continued Care - Admitted Since 3/10/2022     Destination Coordination complete.    Service Provider Selected Services Address Phone Fax Patient Preferred    [facility] [address], [place] [id number] [phone] --          Durable Medical Equipment    No services have been selected for the patient.              Dialysis/Infusion    No services have been selected for the patient.              Home Medical Care    No services have been selected for the patient.              Therapy    No services have been selected for the patient.              Community Resources    No services have been selected for the patient.              Community & DME    No services have been selected for the patient.                  Transportation Services  W/C Van: [facility]    Final Discharge Disposition Code: 03 - skilled nursing facility (SNF)

## 2022-03-15 NOTE — PROGRESS NOTES
Orthopedic Daily Progress Note      CC: pain    Pain well controlled this am  General: no fevers, chills  Abdomen: denies nausea, vomiting, or diarrhea    No other complaints    Physical Exam:  I have reviewed the vital signs.  Temp:  [97.9 °F (36.6 °C)-98.3 °F (36.8 °C)] 98.3 °F (36.8 °C)  Heart Rate:  [66-73] 69  Resp:  [14-18] 16  BP: (110-127)/(57-77) 127/60    Objective  General Appearance:    Alert, cooperative, no distress  Extremities: No clubbing, cyanosis, or edema to lower extremities  Pulses:  2+ in distal surgical extremity  Skin: incisions Clean/dry/intact      Results Review:    I have reviewed the labs, radiology results and diagnostic studies:yes    Results from last 7 days   Lab Units 03/13/22  0629   WBC 10*3/mm3 7.77   HEMOGLOBIN g/dL 9.2*   PLATELETS 10*3/mm3 124*     Results from last 7 days   Lab Units 03/13/22  0629   SODIUM mmol/L 140   POTASSIUM mmol/L 4.4   CO2 mmol/L 31.0*   CREATININE mg/dL 0.47*   GLUCOSE mg/dL 96       I have reviewed the medications.    Assessment/Problem List  POD#  4 S/p TAHIRA R IT hip fx    Plan  PT/OT   WBAT RLE  Ok for showers  Placement, optimistic for discharge to SNF today vs tomorrow  Lovenox for DVT prophylaxis    Follow up with Dr. Lynne in 3-4 weeks       Discharge Planning: I expect patient to be discharged to SNF with authorization obtained.     Gurmeet Rivero MD  03/15/22  07:31 EDT

## 2022-03-15 NOTE — PROGRESS NOTES
[facility] Medicine Services  PROGRESS NOTE    Patient Name: Jenna Russell  : 1940  MRN: [id number]    Date of Admission: 3/10/2022  Primary Care Physician: Judi Rodriguez MD    Subjective   Subjective     CC:  Hip fx    HPI:  Patient very drowsy yesterday. Only able to walk 14 ft  Less drowsy this morning. Pain seems better  + BM    ROS:  Gen- No fevers, chills  CV- No chest pain, palpitations  Resp- No cough, dyspnea  GI- No N/V/D, abd pain  + back/ right lower ext pain improved        Objective   Objective     Vital Signs:   Temp:  [97.9 °F (36.6 °C)-98.3 °F (36.8 °C)] 97.9 °F (36.6 °C)  Heart Rate:  [66-76] 76  Resp:  [14-18] 18  BP: (110-132)/(60-77) 132/65     Physical Exam:  Constitutional: No acute distress, awake, alert- sitting up in bed  HENT: NCAT, mucous membranes moist  Respiratory: Clear to auscultation bilaterally, respiratory effort normal   Cardiovascular: RRR, no murmurs, rubs, or gallops  Gastrointestinal: Positive bowel sounds, soft, nontender, nondistended  Musculoskeletal: No bilateral ankle edema  Psychiatric: Appropriate affect, cooperative  Neurologic: Oriented x 3, decreased ROM RLE, speech clear  Skin: No rashes- bandage right hip CDI  No change in exam from 3/14/22      Results Reviewed:  LAB RESULTS:      Lab 22  0629 22  1006 22  0858 03/10/22  1949   WBC 7.77 8.05 6.16 9.23   HEMOGLOBIN 9.2* 9.5* 9.9* 11.8*   HEMATOCRIT 28.3* 29.6* 30.3* 35.3   PLATELETS 124* 133* 127* 145   NEUTROS ABS 4.20 5.74  --  6.45   IMMATURE GRANS (ABS) 0.01 0.03  --  0.03   LYMPHS ABS 2.30 1.17  --  1.76   MONOS ABS 1.15* 1.10*  --  0.94*   EOS ABS 0.09 0.00  --  0.03   .0* 98.0* 97.4* 96.7         Lab 03/13/22  0629 22  2206 22  1006 22  0857 03/10/22  1949   SODIUM 140  --  140 139 140   POTASSIUM 4.4 4.2 3.5 3.3* 3.9   CHLORIDE 104  --  102 104 104   CO2 31.0*  --  29.0 28.0 26.0   ANION GAP 5.0  --  9.0 7.0 10.0   BUN 7*   --  9 13 17   CREATININE 0.47*  --  0.62 0.46* 0.60   EGFR 97.0  --  90.7 97.5 91.4   GLUCOSE 96  --  111* 107* 104*   CALCIUM 8.7  --  8.7 8.4* 9.3         Lab 03/13/22  0629 03/12/22  1006 03/10/22  1949   TOTAL PROTEIN 5.3* 5.7* 6.4   ALBUMIN 3.30* 3.50 4.10   GLOBULIN 2.0 2.2 2.3   ALT (SGPT) 13 13 16   AST (SGOT) 26 25 32   BILIRUBIN 0.4 0.4 0.4   ALK PHOS 49 44 64                 Lab 03/10/22  1949   IRON 59   IRON SATURATION 18*   TIBC 329   TRANSFERRIN 221   FERRITIN 142.20         Brief Urine Lab Results  (Last result in the past 365 days)      Color   Clarity   Blood   Leuk Est   Nitrite   Protein   CREAT   Urine HCG        03/11/22 0700 Yellow   Clear   Negative   Moderate (2+)   Negative   Negative                 Microbiology Results Abnormal     Procedure Component Value - Date/Time    Urine Culture - Urine, Urine, Clean Catch [[id number]] Collected: 03/11/22 0700    Lab Status: Final result Specimen: Urine, Clean Catch Updated: 03/12/22 1219     Urine Culture 25,000 CFU/mL Mixed Cheyenne Isolated    Narrative:      Specimen contains mixed organisms of questionable pathogenicity which indicates contamination with commensal cheyenne.  Further identification is unlikely to provide clinically useful information.  Suggest recollection.    COVID PRE-OP / PRE-PROCEDURE SCREENING ORDER (NO ISOLATION) - Swab, Nasopharynx [[id number]]  (Normal) Collected: 03/10/22 2231    Lab Status: Final result Specimen: Swab from Nasopharynx Updated: 03/10/22 2310    Narrative:      The following orders were created for panel order COVID PRE-OP / PRE-PROCEDURE SCREENING ORDER (NO ISOLATION) - Swab, Nasopharynx.  Procedure                               Abnormality         Status                     ---------                               -----------         ------                     COVID-19 and FLU A/B PCR...[[id number]]  Normal              Final result                 Please view results for these tests on the individual orders.     COVID-19 and FLU A/B PCR - Swab, Nasopharynx [[id number]]  (Normal) Collected: 03/10/22 2231    Lab Status: Final result Specimen: Swab from Nasopharynx Updated: 03/10/22 2310     COVID19 Not Detected     Influenza A PCR Not Detected     Influenza B PCR Not Detected    Narrative:      Fact sheet for providers: https://www.fda.gov/media/039231/download    Fact sheet for patients: https://www.fda.gov/media/401045/download    Test performed by PCR.          No radiology results from the last 24 hrs    Results for orders placed during the hospital encounter of 01/20/18    Adult Transthoracic Echo Complete W/ Cont if Necessary Per Protocol (With Agitated Saline)    Interpretation Summary  · Left ventricular wall thickness is consistent with hypertrophy.  · Left ventricular systolic function is normal.  · Moderate aortic valve regurgitation is present.  · Mild mitral valve regurgitation is present  · Mild tricuspid valve regurgitation is present.  · Mild pulmonic valve regurgitation is present.      I have reviewed the medications:  Scheduled Meds:atorvastatin, 40 mg, Oral, Nightly  Diclofenac Sodium, 2 g, Topical, 4x Daily  divalproex, 250 mg, Oral, Q8H  docusate sodium, 100 mg, Oral, BID  enoxaparin, 40 mg, Subcutaneous, Q24H  lidocaine, 1 patch, Transdermal, Q24H  lisinopril, 10 mg, Oral, Daily  polyethylene glycol, 17 g, Oral, Daily  sodium chloride, 10 mL, Intravenous, Q12H      Continuous Infusions:   PRN Meds:.•  acetaminophen  •  HYDROcodone-acetaminophen  •  HYDROmorphone  •  melatonin  •  ondansetron  •  potassium chloride  •  potassium chloride  •  sodium chloride    Assessment/Plan   Assessment & Plan     Active Hospital Problems    Diagnosis  POA   • **Closed right hip fracture, initial encounter ([facility]) [S72.001A]  Yes   • Anemia [D64.9]  Yes   • Daily consumption of alcohol [Z78.9]  Yes   • Seizure disorder ([facility]) [G40.909]  Yes   • Hypertension [I10]  Yes   • HLD (hyperlipidemia) [E78.5]  Yes   • PAF  (paroxysmal atrial fibrillation) (HCC) [I48.0]  Yes      Resolved Hospital Problems   No resolved problems to display.        Brief Hospital Course to date:  Jenna Russell is a 81 y.o. 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 She presents today with complaints of right hip pain s/p mechanical fall. Patient volunteers that she was out walking around the neighborhood with her partner when she tripped and fell on the sidewalk. There was no head trauma. Patient was was unable to get up on her own and was assisted by her partner and two passersby. Unable to bear weight or move the right lower extremity once upright due to severe pain. Subsequently brought to ED for further evaluation and treatment.    This patient's problems and plans were partially entered by my partner and updated as appropriate by me 03/15/22.       Closed Right Hip Fracture:  - Nondisplaced right proximal femoral intertrochanteric fracture secondary to mechanical fall while walking  - s/p repair with Maged 3/11, progressing  - PT/OT   - pain control, resume DVT ppx as per ortho--My partner d/w Dr Lynne as well as neuro via janetbsshreya 2/2 her h/o SAH. Neurology felt lovenox would be safe in ppx dosing and started 3/12  - dc baclofen due to drowsiness. Reduce norco to 2.5mg q 6hr prn  -voltaren gel/ lidoderm for back pain seems to help    Constipation- resolved  -continue colace/ miralax    Hypokalemia  - replace per protocol    Asymptomatic bacteruria  - UA noted, d/w patient no symptoms. Urine cx no growth  - defer on abx at this time     Anemia:  - check iron and vitamin studies  - not anticoagulated, Hb stable post op     PAF:  - stable and rate controlled  - previously on Eliquis until SAH  - scheduled to undergo  placement of Watchmen Device at [facility] in June 2022     Hypertension      HLD  - continue lisinopril  - continue statin      Seizure Disorder:  - seizure precautions  - continue depakote      Daily Alcohol Consumption:  - drinks 2-4 drinks daily.  - denies withdrawals or DTs    DVT prophylaxis:  Medical and mechanical DVT prophylaxis orders are present.       AM-PAC 6 Clicks Score (PT): 16 (03/15/22 0917)    Disposition: I expect the patient to be discharged pending Peer to Peer reviewed completed this morning at 0900   CODE STATUS:   Code Status and Medical Interventions:   Ordered at: 03/10/22 2054     Level Of Support Discussed With:    Patient     Code Status (Patient has no pulse and is not breathing):    CPR (Attempt to Resuscitate)     Medical Interventions (Patient has pulse or is breathing):    Full Support       Dee Dkue, APRN  03/15/22

## 2022-03-15 NOTE — PLAN OF CARE
Goal Outcome Evaluation:  Plan of Care Reviewed With: patient, family        Progress: improving  Outcome Evaluation: Pt deferred OOB/EOB this treatment session d/t d/c later this date. SUA for grooming, sponge bathing, ModA for doffing hospital gown. Recommendation for d/c to IRF.

## 2022-03-15 NOTE — PROGRESS NOTES
Clinical nutrition    Patient Name:  Jenna Russell  YOB: 1940  MRN: [id number]    Assessment Date:  3/15/2022    RD Notes there is still no actual wt data avail/documented this adm. RD requested again that nsg obtain pt's current wt to support accuracy of nutrition assessment.          Electronically signed by:  Darleen Spivey MS,HELENE,LD  03/15/22 10:56 EDT

## 2022-03-15 NOTE — PLAN OF CARE
Goal Outcome Evaluation:  Plan of Care Reviewed With: patient, family        Progress: improving  Outcome Evaluation: Continued used of KI due to quad weakness. Pt much improved this date and able to ambulate 60 feet with min A and chair follow. Starting to get better quad activation with quad sets and minimal SAQ. Cont d/c recs for IRF.

## 2022-05-25 NOTE — THERAPY EVALUATION
Acute Care - Speech Language Pathology   Swallow Initial Evaluation [facility]   Clinical Swallow Evaluation       Patient Name: Jenna Russell  : 1940  MRN: [id number]  Today's Date: 2018               Admit Date: 2018    Visit Dx:     ICD-10-CM ICD-9-CM   1. Combined receptive and expressive aphasia R47.01 784.3   2. History of stroke Z86.73 V12.54   3. History of atrial fibrillation Z86.79 V12.59   4. High cholesterol E78.00 272.0   5. Essential hypertension I10 401.9     Patient Active Problem List   Diagnosis   • Pneumothorax, right   • Fracture of multiple ribs of right side with routine healing   • PAF (paroxysmal atrial fibrillation)   • High cholesterol   • Hypertension   • Atrial fibrillation   • Bradycardia   • History of stroke   • Aphasia   • Combined receptive and expressive aphasia     Past Medical History:   Diagnosis Date   • Acute ischemic stroke    • Arterial ischemic stroke 2016   • Ataxic aphasia 2016   • BP (high blood pressure) 2016   • Cerebral infarction, left hemisphere 2016   • Cyst of left ovary 2016    3cm   • Expressive aphasia    • H/O echocardiogram 2015    Global LV wall motion and contractility within normal limits. Normal LVSF.Normal LV diastolic filling. Left atrium midly dilated. RV mild- mod dilated. MIld aortic cusp sclerosis. No evidence of mitral valve prolapse. Mild mitral regurgitation.No pulmonary hypertension or perdicardial effusion. No dilation of aortic root. Venous system appears normal   • H/O: stroke    • HLD (hyperlipidemia) 2016   • Left temporal lobe infarction    • LOM (loss of memory)    • Osteoarthritis 2016   • Paroxysmal atrial fibrillation 2016   • Thyroid nodule 2016    1.8x1.2cm   • Weakness generalized    • Wound infection after surgery     Left hip arthroplasty, staph aureus, 6 weeks IV Rocephin     Past Surgical History:   Procedure Laterality Date   • ABDOMINOPLASTY     • BREAST ABSCESS  INCISION AND DRAINAGE     • TOTAL HIP ARTHROPLASTY Left     Staph aureus wound infection          SWALLOW EVALUATION (last 72 hours)      Swallow Evaluation       01/21/18 0840                Rehab Evaluation    Document Type evaluation  -SG        Subjective Information no complaints;agree to therapy  -SG        Patient Effort, Rehab Treatment good  -SG        Symptoms Noted During/After Treatment none  -SG        General Information    Patient Profile Review yes  -SG        Onset of Illness/Injury 01/20/18  -SG        Subjective Patient Observations pt alert and cooperative  -SG        Pertinent History Of Current Problem adm w/ aphasia, seizure yesterday afternoon, no stroke per neuro  -SG        Current Diet Limitations NPO  -SG        Precautions/Limitations, Vision WFL  -SG        Precautions/Limitations, Hearing WFL  -SG        Prior Level of Function- Communication functional for ADL's without assistance   h/o L temporal CVA, much worse w/ admit  -SG        Prior Level of Function- Swallowing no diet consistency restrictions  -SG        Plans/Goals Discussed With patient;spouse/S.O.;agreed upon  -SG        Barriers to Rehab none identified  -SG        Clinical Impression    Patient's Goals For Discharge return to PO diet  -SG        Family Goals For Discharge patient able to return to PO diet  -SG        SLP Swallowing Diagnosis other (see comments)   no s/s of pharyngeal dys  -SG        Rehab Potential/Prognosis, Swallowing good, to achieve stated therapy goals  -SG        Criteria for Skilled Therapeutic Interventions Met no problems identified which require skilled intervention  -SG        FCM, Swallowing 7-->Level 7  -SG        Therapy Frequency evaluation only  -SG        SLP Diet Recommendation regular textures;thin liquids  -SG        SLP Rec. for Method of Medication Administration meds whole with thin liquid  -SG        Monitor For Signs Of Aspiration cough;gurgly voice;throat clearing  -SG         Anticipated Discharge Disposition home with outpatient services  -SG        Pain Assessment    Pain Assessment No/denies pain  -SG        Oral Motor Structure and Function    Oral Motor Anatomy and Physiology patient demonstrates anatomy and physiology that is WNL  -SG        Dentition Assessment present and adequate  -SG        Secretion Management WNL/WFL  -SG        Mucosal Quality moist, healthy  -SG        Velar Elevation WFL (within functional limits)  -SG        Gag Response WFL (within functional limits)  -SG        Volitional Swallow no difficulties initiating volitional swallow  -SG        Volitional Cough no difficulties initiating volitional cough  -SG        Oral Musculature General Assessment WFL (within functional limits)   inc'd processing time w/ OME commands  -SG        General Feeding/Swallowing Observations    Current Feeding Method oral feeding methods  -SG        Observations of Self Feeding Skills appropriate self feeding skills observed  -SG        Observations of Posture During Feeding upright at 90 for evaluation  -SG        Clinical Swallow Exam    Mode of Presentation fed by clinician;cup;spoon;straw  -SG        Oral Phase Results intact oral phase without signs of dysfunction  -SG        Pharyngeal Phase Results no signs/symptoms of pharyngeal impairment  -SG        Summary of Clinical Exam Clinical Dys Eval completed this date. Pt alert and cooperative, neuro testing clear for CVA, recent seizure, presenting w/ significant expressive aphasia. OMEs WFL. No s/s w/ any PO even when pushed. Timing and elevation adequate per palpation w/ all. No oral issue/pocketing/etc. Rec: Reg and thins, S/L eval later today (in-room procedure pending when SLP completed dysphagia eval).   -SG        Swallow Recommendations    Eating Assistance no assistance needed with self eating  -SG        Oral Care oral care with toothbrush and dentifrice BID and PRN  -SG        Modified Eating Strategies upright  positioning 90 degrees  -SG        Other Recommendations regular;thin  -SG        Recommended Diet regular textures;thin liquids  -          User Key  (r) = Recorded By, (t) = Taken By, (c) = Cosigned By    Initials Name Effective Dates    WILBERTO AlonzoOli, MS ACOSTA-SLP 06/22/15 -         EDUCATION  The patient has been educated in the following areas:   Dysphagia (Swallowing Impairment) Oral Care/Hydration.    SLP Recommendation and Plan  SLP Swallowing Diagnosis: other (see comments) (no s/s of pharyngeal dys)  SLP Diet Recommendation: regular textures, thin liquids     SLP Rec. for Method of Medication Administration: meds whole with thin liquid  Monitor For Signs Of Aspiration: cough, gurgly voice, throat clearing     Criteria for Skilled Therapeutic Interventions Met: no problems identified which require skilled intervention  Anticipated Discharge Disposition: home with outpatient services  Rehab Potential/Prognosis, Swallowing: good, to achieve stated therapy goals  Therapy Frequency: evaluation only             Plan of Care Review  Plan Of Care Reviewed With: patient  Progress:  (Evaluation)  Outcome Summary/Follow up Plan: Clinical Dys Eval completed this date. Pt alert and cooperative, neuro testing clear for CVA, recent seizure, presenting w/ significant expressive aphasia. OMEs WFL. No s/s w/ any PO even when pushed. Timing and elevation adequate per palpation w/ all. No oral issue/pocketing/etc. Rec: Reg and thins, S/L eval later today (in-room procedure pending when SLP completed dysphagia eval).              Time Calculation:         Time Calculation- SLP       01/21/18 0926          Time Calculation- SLP    SLP Start Time 0840  -      SLP Received On 01/21/18  -        User Key  (r) = Recorded By, (t) = Taken By, (c) = Cosigned By    Initials Name Provider Type    WILBERTO RandallmicheleimeldaSantyOli, MS ACOSTA-SLP Speech and Language Pathologist          Therapy Charges for Today     Code  Description Service Date Service Provider Modifiers Qty    89189431170 HC ST EVAL ORAL PHARYNG SWALLOW 4 1/21/2018 Carolann Hernandes WinifredOsirisOli, MS CCC-SLP GN 1    60825656744 HC ST SWALLOWING CURRENT STATUS 1/21/2018 Carolann RandallgrantOsirisOli, MS CCC-SLP GN, CH 1    29014862495 HC ST SWALLOWING PROJECTED 1/21/2018 Carolann Hernandes Zachery, MS CCC-SLP GN, CH 1    [id number] HC ST SWALLOWING DISCHARGE 1/21/2018 Carolann Hernandes Zachery, MS ACOSTA-SLP GN, CH 1          SLP G-Codes  Functional Limitations: Swallowing  Swallow Current Status (): 0 percent impaired, limited or restricted  Swallow Goal Status (): 0 percent impaired, limited or restricted  Swallow Discharge Status (): 0 percent impaired, limited or restricted    Carolann Hernandes Zachery, MS ACOSTA-SLP  1/21/2018   Detail Level: Detailed Quality 137: Melanoma: Continuity Of Care - Recall System: Recall system not utilized, reason not otherwise specified Quality 47: Advance Care Plan: Advance care planning not documented, reason not otherwise specified. Quality 431: Preventive Care And Screening: Unhealthy Alcohol Use - Screening: Patient not identified as an unhealthy alcohol user when screened for unhealthy alcohol use using a systematic screening method Quality 337: Tuberculosis Prevention For Psoriasis And Psoriatic Arthritis Patients On A Biological Immune Response Modifier: No documentation of negative or managed positive TB screen Quality 130: Documentation Of Current Medications In The Medical Record: Current Medications Documented Quality 138: Melanoma: Coordination Of Care: Treatment plan not communicated, reason not otherwise specified. Quality 265: Biopsy Follow-Up: Biopsy Results not Reviewed, not Communicated to the Patient and the Patient's Primary Care/Referring Physician, Communication not Tracked in a Log, and/or Tracking Process not Documented in the Patient's Medical Record. Quality 111:Pneumonia Vaccination Status For Older Adults: Documentation of medical reason(s) for not administering pneumococcal vaccine (e.g., adverse reaction to vaccine) Quality 410: Psoriasis Clinical Response To Oral Systemic Or Biologic Mediations: Psoriasis Assessment Tool NOT Documented Quality 226: Preventive Care And Screening: Tobacco Use: Screening And Cessation Intervention: Patient screened for tobacco use and is an ex/non-smoker no

## 2022-10-01 NOTE — ANESTHESIA POSTPROCEDURE EVALUATION
Patient: Jenna Russell    Procedure Summary     Date: 03/11/22 Room / Location:  [facility] /  [facility]    Anesthesia Start: 1338 Anesthesia Stop:     Procedure: HIP TROCHANTERIC NAILING WITH INTRAMEDULLARY HIP SCREW (Right Hip) Diagnosis:       Displaced intertrochanteric fracture of right femur      (Displaced intertrochanteric fracture of right femur [S72.141])    Surgeons: Danny Lynne MD Provider: Niki Jensen MD    Anesthesia Type: general with block ASA Status: 2          Anesthesia Type: general with block    Vitals  No vitals data found for the desired time range.          Post Anesthesia Care and Evaluation    Patient location during evaluation: PACU  Patient participation: complete - patient participated  Level of consciousness: awake and alert  Pain score: 0  Pain management: adequate  Airway patency: patent  Anesthetic complications: No anesthetic complications  PONV Status: none  Cardiovascular status: hemodynamically stable and acceptable  Respiratory status: nonlabored ventilation, acceptable, nasal cannula and spontaneous ventilation  Hydration status: acceptable    Comments: VSS  No anesthesia care post op    
No

## 2022-10-31 ENCOUNTER — TRANSCRIBE ORDERS (OUTPATIENT)
Dept: ADMINISTRATIVE | Facility: HOSPITAL | Age: 82
End: 2022-10-31

## 2022-10-31 ENCOUNTER — HOSPITAL ENCOUNTER (OUTPATIENT)
Dept: CT IMAGING | Facility: HOSPITAL | Age: 82
Discharge: HOME | End: 2022-10-31

## 2022-10-31 ENCOUNTER — HOSPITAL ENCOUNTER (OUTPATIENT)
Dept: GENERAL RADIOLOGY | Facility: HOSPITAL | Age: 82
Discharge: HOME | End: 2022-10-31

## 2022-10-31 DIAGNOSIS — M25.551 RIGHT HIP PAIN: Primary | ICD-10-CM

## 2022-10-31 DIAGNOSIS — M25.511 RIGHT SHOULDER PAIN, UNSPECIFIED CHRONICITY: ICD-10-CM

## 2022-10-31 DIAGNOSIS — S09.90XA HEAD TRAUMA, INITIAL ENCOUNTER: ICD-10-CM
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
## 2022-10-31 DIAGNOSIS — S09.90XA HEAD TRAUMA, INITIAL ENCOUNTER: Primary | ICD-10-CM

## 2022-10-31 DIAGNOSIS — S09.93XA FACIAL TRAUMA, INITIAL ENCOUNTER: Primary | ICD-10-CM

## 2022-10-31 PROCEDURE — 70450 CT HEAD/BRAIN W/O DYE: CPT

## 2022-10-31 PROCEDURE — 73502 X-RAY EXAM HIP UNI 2-3 VIEWS: CPT

## 2022-10-31 PROCEDURE — 73030 X-RAY EXAM OF SHOULDER: CPT

## 2022-10-31 PROCEDURE — 70150 X-RAY EXAM OF FACIAL BONES: CPT

## 2022-11-08 ENCOUNTER — TRANSCRIBE ORDERS (OUTPATIENT)
Dept: ADMINISTRATIVE | Facility: HOSPITAL | Age: 82
End: 2022-11-08

## 2022-11-08 ENCOUNTER — HOSPITAL ENCOUNTER (OUTPATIENT)
Dept: GENERAL RADIOLOGY | Facility: HOSPITAL | Age: 82
Discharge: HOME | End: 2022-11-08
Admitting: INTERNAL MEDICINE

## 2022-11-08 DIAGNOSIS — G89.11 PAIN, ACUTE DUE TO TRAUMA: Primary | ICD-10-CM

## 2022-11-08 PROCEDURE — 73630 X-RAY EXAM OF FOOT: CPT

## 2023-01-17 NOTE — PLAN OF CARE
Problem: Pain, Acute (Adult)  Goal: Identify Related Risk Factors and Signs and Symptoms  Outcome: Ongoing (interventions implemented as appropriate)         No

## 2023-01-25 ENCOUNTER — NURSE TRIAGE (OUTPATIENT)
Dept: CALL CENTER | Facility: HOSPITAL | Age: 83
End: 2023-01-25
Payer: MEDICARE

## 2023-01-25 NOTE — TELEPHONE ENCOUNTER
"She had a watchman completed at the [facility] at . She will call [facility].      Reason for Disposition  • [1] Caller requesting NON-URGENT health information AND [2] PCP's office is the best resource    Additional Information  • Negative: [1] Caller is not with the adult (patient) AND [2] reporting urgent symptoms  • Negative: Lab result questions  • Negative: Medication questions  • Negative: Caller can't be reached by phone  • Negative: Caller has already spoken to PCP or another triager  • Negative: RN needs further essential information from caller in order to complete triage  • Negative: Requesting regular office appointment    Answer Assessment - Initial Assessment Questions  1. REASON FOR CALL or QUESTION: \"What is your reason for calling today?\" or \"How can I best help you?\" or \"What question do you have that I can help answer?\"      She will call the [facility].    Protocols used: INFORMATION ONLY CALL - NO TRIAGE-ADULT-      "

## 2023-05-01 NOTE — PLAN OF CARE
Goal Outcome Evaluation:  Plan of Care Reviewed With: patient        Progress: improving  Outcome Summary: Pt showing improvement as she increased ambulation distance to 250ft with min A, demonstrating improved midline posture/balance. 3 LOB requring min A to correct. Pt limited by decreased functional endurance and L sided weakness. Continue to progress as able. d/c rec for IRF, however, may progress to HWA and OP PT with continued skilled IP PT.   Vaccine status unknown

## 2023-10-05 ENCOUNTER — TRANSCRIBE ORDERS (OUTPATIENT)
Dept: ADMINISTRATIVE | Facility: HOSPITAL | Age: 83
End: 2023-10-05
Payer: MEDICARE

## 2023-10-05 ENCOUNTER — HOSPITAL ENCOUNTER (OUTPATIENT)
Dept: GENERAL RADIOLOGY | Facility: HOSPITAL | Age: 83
Discharge: HOME | End: 2023-10-05
Admitting: INTERNAL MEDICINE
Payer: MEDICARE

## 2023-10-05 DIAGNOSIS — M25.551 HIP PAIN, ACUTE, RIGHT: Primary | ICD-10-CM
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
## 2023-10-05 PROCEDURE — 73502 X-RAY EXAM HIP UNI 2-3 VIEWS: CPT

## 2023-10-16 ENCOUNTER — HOSPITAL ENCOUNTER (OUTPATIENT)
Dept: GENERAL RADIOLOGY | Facility: HOSPITAL | Age: 83
Discharge: HOME | End: 2023-10-16
Admitting: INTERNAL MEDICINE
Payer: MEDICARE

## 2023-10-16 ENCOUNTER — TRANSCRIBE ORDERS (OUTPATIENT)
Dept: GENERAL RADIOLOGY | Facility: HOSPITAL | Age: 83
End: 2023-10-16
Payer: MEDICARE

## 2023-10-16 DIAGNOSIS — M89.8X5 PAIN IN FEMUR: ICD-10-CM

## 2023-10-16 DIAGNOSIS — M25.551 HIP PAIN, RIGHT: Primary | ICD-10-CM

## 2023-10-16 PROCEDURE — 73551 X-RAY EXAM OF FEMUR 1: CPT

## 2023-10-16 PROCEDURE — 73502 X-RAY EXAM HIP UNI 2-3 VIEWS: CPT

## 2024-03-12 ENCOUNTER — TRANSCRIBE ORDERS (OUTPATIENT)
Dept: ADMINISTRATIVE | Facility: HOSPITAL | Age: 84
End: 2024-03-12
Payer: MEDICARE

## 2024-03-12 DIAGNOSIS — R22.2 ABDOMINAL WALL MASS: Primary | ICD-10-CM

## 2024-03-28 ENCOUNTER — OUTSIDE FACILITY SERVICE (OUTPATIENT)
Dept: GASTROENTEROLOGY | Facility: CLINIC | Age: 84
End: 2024-03-28
Payer: MEDICARE

## 2024-03-28 PROCEDURE — 43239 EGD BIOPSY SINGLE/MULTIPLE: CPT | Performed by: INTERNAL MEDICINE

## 2024-04-05 ENCOUNTER — HOSPITAL ENCOUNTER (OUTPATIENT)
Dept: ULTRASOUND IMAGING | Facility: HOSPITAL | Age: 84
Discharge: HOME | End: 2024-04-05
Payer: MEDICARE

## 2024-04-05 DIAGNOSIS — R22.2 ABDOMINAL WALL MASS: ICD-10-CM

## 2024-04-05 PROCEDURE — 76700 US EXAM ABDOM COMPLETE: CPT

## 2024-08-19 ENCOUNTER — TRANSCRIBE ORDERS (OUTPATIENT)
Dept: ADMINISTRATIVE | Facility: HOSPITAL | Age: 84
End: 2024-08-19
Payer: MEDICARE

## 2024-08-19 ENCOUNTER — HOSPITAL ENCOUNTER (OUTPATIENT)
Dept: GENERAL RADIOLOGY | Facility: HOSPITAL | Age: 84
Discharge: HOME | End: 2024-08-19
Admitting: INTERNAL MEDICINE
Payer: MEDICARE

## 2024-08-19 DIAGNOSIS — R07.81 RIB PAIN: ICD-10-CM

## 2024-08-19 DIAGNOSIS — R07.81 RIB PAIN: Primary | ICD-10-CM

## 2024-08-19 PROCEDURE — 71110 X-RAY EXAM RIBS BIL 3 VIEWS: CPT

## 2024-09-04 ENCOUNTER — HOSPITAL ENCOUNTER (EMERGENCY)
Facility: HOSPITAL | Age: 84
Discharge: HOME | End: 2024-09-04
Attending: EMERGENCY MEDICINE
Payer: MEDICARE

## 2024-09-04 ENCOUNTER — APPOINTMENT (OUTPATIENT)
Dept: GENERAL RADIOLOGY | Facility: HOSPITAL | Age: 84
End: 2024-09-04
Payer: MEDICARE

## 2024-09-04 ENCOUNTER — APPOINTMENT (OUTPATIENT)
Dept: CT IMAGING | Facility: HOSPITAL | Age: 84
End: 2024-09-04
Payer: MEDICARE

## 2024-09-04 VITALS
HEART RATE: 56 BPM | WEIGHT: 117.06 LBS | RESPIRATION RATE: 18 BRPM | BODY MASS INDEX: 17.74 KG/M2 | OXYGEN SATURATION: 97 % | DIASTOLIC BLOOD PRESSURE: 49 MMHG | TEMPERATURE: 97.6 F | SYSTOLIC BLOOD PRESSURE: 127 MMHG | HEIGHT: 68 IN

## 2024-09-04 DIAGNOSIS — S51.011A SKIN TEAR OF RIGHT ELBOW WITHOUT COMPLICATION, INITIAL ENCOUNTER: ICD-10-CM

## 2024-09-04 DIAGNOSIS — M25.551 RIGHT HIP PAIN: Primary | ICD-10-CM

## 2024-09-04 PROCEDURE — 73070 X-RAY EXAM OF ELBOW: CPT

## 2024-09-04 PROCEDURE — 72192 CT PELVIS W/O DYE: CPT

## 2024-09-04 PROCEDURE — 70450 CT HEAD/BRAIN W/O DYE: CPT

## 2024-09-04 PROCEDURE — 72170 X-RAY EXAM OF PELVIS: CPT

## 2024-09-04 PROCEDURE — 72125 CT NECK SPINE W/O DYE: CPT

## 2024-09-04 PROCEDURE — 73552 X-RAY EXAM OF FEMUR 2/>: CPT

## 2024-09-04 PROCEDURE — 99284 EMERGENCY DEPT VISIT MOD MDM: CPT

## 2024-09-04 RX ORDER — PANTOPRAZOLE SODIUM 40 MG/1
40 TABLET, DELAYED RELEASE ORAL DAILY
Qty: 90 TABLET | Refills: 3 | Status: SHIPPED | OUTPATIENT
Start: 2024-09-04

## 2024-09-04 RX ORDER — HYDROCODONE BITARTRATE AND ACETAMINOPHEN 5; 325 MG/1; MG/1
1 TABLET ORAL ONCE
Status: COMPLETED | OUTPATIENT
Start: 2024-09-04 | End: 2024-09-04

## 2024-09-04 RX ADMIN — HYDROCODONE BITARTRATE AND ACETAMINOPHEN 1 TABLET: 5; 325 TABLET ORAL at 14:04

## 2024-09-05 NOTE — ED PROVIDER NOTES
Subjective   History of Present Illness  83-year-old female presents for evaluation of right hip pain.  Just prior to coming to the emergency department today, the patient had a mechanical trip and fall and landed awkwardly on her right hip.  She has a history of a prior right hip replacement by Dr. Lynne of orthopedic surgery.  She did strike her head.  She was complaining of pain to her right hip following the fall and there was concern about an underlying fracture or hardware issues so she was subsequently brought to the ED to be evaluated.  She denies any paresthesias.  She notes that the pain is worse with attempted movement.  She currently rates her pain at 8 out of 10 in severity.  She suffered a skin tear to her right elbow from the fall as well.  She denies any pain to her right arm.  Tetanus is up-to-date.      Review of Systems   Musculoskeletal:         Right hip pain   Skin:  Positive for wound.   Neurological:  Positive for headaches.   All other systems reviewed and are negative.      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 Normal LVSF.Normal LV diastolic filling. Left atrium midly dilated. RV mild- mod dilated. MIld aortic cusp sclerosis. No evidence of mitral valve prolapse. Mild mitral regurgitation.No pulmonary hypertension or perdicardial effusion. No dilation of aortic root. Venous system appears normal    H/O: stroke     HLD (hyperlipidemia) 9/8/2016    Left temporal lobe infarction     LOM (loss of memory)     Osteoarthritis 9/8/2016    Paroxysmal atrial fibrillation 9/8/2016    Thyroid nodule 9/8/2016    1.8x1.2cm    Weakness generalized     Wound infection after  surgery     Left hip arthroplasty, staph aureus, 6 weeks IV Rocephin       Allergies   Allergen Reactions    Penicillins Anaphylaxis       Past Surgical History:   Procedure Laterality Date    ABDOMINOPLASTY      BREAST ABSCESS INCISION AND DRAINAGE      HIP TROCHANTERIC NAILING WITH INTRAMEDULLARY HIP SCREW Right 3/11/2022    Procedure: HIP TROCHANTERIC NAILING WITH INTRAMEDULLARY HIP SCREW RIGHT;  Surgeon: Danny Lynne MD;  Location: [facility];  Service: Orthopedics;  Laterality: Right;    TOTAL HIP ARTHROPLASTY Left     Staph aureus wound infection       Family History   Problem Relation Age of Onset    Stroke Mother     Other Mother         Cardiac disorder    Diabetes Mother     Hypertension Mother     Mental illness Mother     Heart attack Father     Heart failure Father     No Known Problems Sister     Heart attack Maternal Grandfather     Heart disease Paternal Grandfather     No Known Problems Sister        Social History     Socioeconomic History    Marital status:     Number of children: 3   Tobacco Use    Smoking status: Former     Current packs/day: 0.00     Types: Cigarettes     Start date:      Quit date:      Years since quittin.7    Smokeless tobacco: Never    Tobacco comments:     OV says she quit smoking within the past year   Substance and Sexual Activity    Alcohol use: Yes     Alcohol/week: 14.0 standard drinks of alcohol     Types: 14 Glasses of wine per week    Drug use: No    Sexual activity: Defer           Objective   Physical Exam  Vitals and nursing note reviewed.   Constitutional:       General: She is not in acute distress.     Appearance: Normal appearance. She is well-developed. She is not diaphoretic.      Comments: Nontoxic-appearing female   HENT:      Head: Normocephalic and atraumatic.   Eyes:      Pupils: Pupils are equal, round, and reactive to light.   Neck:      Comments: C-collar in place  Cardiovascular:      Rate and Rhythm: Normal rate and  regular rhythm.      Heart sounds: Normal heart sounds. No murmur heard.     No friction rub. No gallop.   Pulmonary:      Effort: Pulmonary effort is normal. No respiratory distress.      Breath sounds: Normal breath sounds. No wheezing or rales.   Abdominal:      General: Bowel sounds are normal. There is no distension.      Palpations: Abdomen is soft. There is no mass.      Tenderness: There is no abdominal tenderness. There is no guarding or rebound.   Musculoskeletal:      Comments: Range of motion of right elbow is within normal limits and painless    Range of motion of right hip is somewhat limited secondary to pain, no shortening or rotation of right lower extremity when compared to the left on visual inspection, no pelvic instability present   Skin:     General: Skin is warm and dry.      Findings: No erythema or rash.      Comments: Superficial skin tear noted to extensor surface of proximal right forearm just distal to elbow, no active bleeding, no wound contamination present   Neurological:      Mental Status: She is alert and oriented to person, place, and time.      Comments: Neurovascularly intact distally in all 4 extremities with bounding distal pulses and normal sensation noted   Psychiatric:         Mood and Affect: Mood normal.         Thought Content: Thought content normal.         Judgment: Judgment normal.         Procedures           ED Course  ED Course as of 09/04/24 2043   Wed Sep 04, 2024   1929 83-year-old female presents for evaluation of right hip pain.  Just prior to coming to the emergency department today, the patient had a mechanical trip and fall and landed awkwardly on her right hip.  She has a history of a prior right hip replacement by Dr. Lynne of orthopedic surgery.  She did strike her head.  She was complaining of pain to her right hip and there was concerned about an underlying fracture or hardware issue so she was brought to the ED to be evaluated.  On arrival, the  patient is nontoxic-appearing.  Range of motion of right hip is somewhat limited secondary to pain.  No shortening or rotation noted.  Right lower extremity is neurovascularly intact distally with bounding distal pulses and normal sensation noted.  No pelvic instability present.  Range of motion of right elbow is within normal limits and painless.  The patient has a small skin tear noted to her proximal right forearm's extensor surface. [DD]   1932 Pain control provided.  Tetanus is up-to-date. [DD]   1932 I personally and independently viewed the patient's x-ray images myself, and I am in agreement with the radiologist's reading for final interpretation, particularly there is no underlying fracture noted of the right hip or elbow. [DD]   1933 I personally and independently reviewed the patient's CT images and findings, and I am in agreement with the radiologist regarding CT interpretation--particularly there is no emergent intracranial process noted.  No cervical spine fracture noted.  C-collar cleared. [DD]   1933 CT of pelvis obtained to rule out occult fracture. [DD]   1933 I personally and independently reviewed the patient's CT images and findings, and I am in agreement with the radiologist regarding CT interpretation--particularly there is no occult fracture present of the right hip or pelvis. [DD]   1933 Upon reevaluation, the patient looks and feels much improved.  Her pain is adequately controlled.  I feel that she can safely be discharged and managed on an outpatient basis.  She will follow-up with her primary care physician within the next week.  Agreeable with plan and given appropriate strict return precautions. [DD]      ED Course User Index  [DD] José Miguel Frey MD                                   No results found for this or any previous visit (from the past 24 hour(s)).  Note: In addition to lab results from this visit, the labs listed above may include labs taken at another facility or  during a different encounter within the last 24 hours. Please correlate lab times with ED admission and discharge times for further clarification of the services performed during this visit.    CT Head Without Contrast   Final Result   Impression:   Right temporal scalp hematoma. No acute intracranial findings. Chronic and senescent changes of the brain detailed above.      No acute fracture or traumatic malalignment in the cervical spine. Multilevel degenerative changes detailed above.            Electronically Signed: Orestes Ferrara MD     9/4/2024 3:22 PM EDT     Workstation ID: [id number]      CT Cervical Spine Without Contrast   Final Result   Impression:   Right temporal scalp hematoma. No acute intracranial findings. Chronic and senescent changes of the brain detailed above.      No acute fracture or traumatic malalignment in the cervical spine. Multilevel degenerative changes detailed above.            Electronically Signed: Orestes Ferrara MD     9/4/2024 3:22 PM EDT     Workstation ID: [id number]      CT Pelvis Without Contrast   Final Result   Impression:   Chronic findings. No acute process.            Electronically Signed: Stacie Robbins MD     9/4/2024 3:19 PM EDT     Workstation ID: [id number]      XR Pelvis 1 or 2 View   Final Result   Impression:   Chronic findings. No acute process.         Electronically Signed: Stacie Robbins MD     9/4/2024 2:41 PM EDT     Workstation ID: [id number]      XR Femur 2 View Right   Final Result   Impression:   Chronic findings. No acute bony process.         Electronically Signed: Stacie Robbins MD     9/4/2024 2:40 PM EDT     Workstation ID: [id number]      XR Elbow 2 View Right   Final Result   Impression:   Chronic findings. No acute process.         Electronically Signed: Stacie Robbins MD     9/4/2024 2:37 PM EDT     Workstation ID: [id number]        Vitals:    09/04/24 1404 09/04/24 1439 09/04/24 1444 09/04/24 1451   BP:       Pulse: 56 54 56    Resp:       Temp:     97.6 °F (36.4 °C)   TempSrc:    Oral   SpO2: 97% 95% 97%    Weight:       Height:         Medications   HYDROcodone-acetaminophen (NORCO) 5-325 MG per tablet 1 tablet (1 tablet Oral Given 9/4/24 1404)     ECG/EMG Results (last 24 hours)       ** No results found for the last 24 hours. **          No orders to display                 Medical Decision Making  Problems Addressed:  Right hip pain: complicated acute illness or injury  Skin tear of right elbow without complication, initial encounter: complicated acute illness or injury    Amount and/or Complexity of Data Reviewed  Radiology: ordered.    Risk  Prescription drug management.        Final diagnoses:   Right hip pain   Skin tear of right elbow without complication, initial encounter       ED Disposition  ED Disposition       ED Disposition   Discharge    Condition   Stable    Comment   --               Judi Rodriguez MD  [address] [address]  [phone]    In 1 week           Medication List      No changes were made to your prescriptions during this visit.            José Miguel Frey MD  09/04/24 9499

## 2024-11-12 ENCOUNTER — TRANSCRIBE ORDERS (OUTPATIENT)
Dept: ADMINISTRATIVE | Facility: HOSPITAL | Age: 84
End: 2024-11-12
Payer: MEDICARE

## 2024-11-12 DIAGNOSIS — G47.10 HYPERSOMNIA, UNSPECIFIED: Primary | ICD-10-CM

## 2024-11-17 ENCOUNTER — HOSPITAL ENCOUNTER (OUTPATIENT)
Facility: HOSPITAL | Age: 84
Discharge: HOME | End: 2024-11-17
Admitting: INTERNAL MEDICINE
Payer: MEDICARE

## 2024-11-17 DIAGNOSIS — G47.10 HYPERSOMNIA, UNSPECIFIED: ICD-10-CM

## 2024-11-17 PROCEDURE — 25510000001 IOPAMIDOL 61 % SOLUTION: Performed by: INTERNAL MEDICINE

## 2024-11-17 PROCEDURE — 70470 CT HEAD/BRAIN W/O & W/DYE: CPT

## 2024-11-17 RX ORDER — IOPAMIDOL 612 MG/ML
100 INJECTION, SOLUTION INTRAVASCULAR
Status: COMPLETED | OUTPATIENT
Start: 2024-11-17 | End: 2024-11-17

## 2024-11-17 RX ADMIN — IOPAMIDOL 90 ML: 612 INJECTION, SOLUTION INTRAVENOUS at 15:09

## 2024-11-23 ENCOUNTER — APPOINTMENT (OUTPATIENT)
Dept: CT IMAGING | Facility: HOSPITAL | Age: 84
End: 2024-11-23
Payer: MEDICARE

## 2024-11-23 ENCOUNTER — APPOINTMENT (OUTPATIENT)
Dept: GENERAL RADIOLOGY | Facility: HOSPITAL | Age: 84
End: 2024-11-23
Payer: MEDICARE

## 2024-11-23 ENCOUNTER — HOSPITAL ENCOUNTER (OUTPATIENT)
Facility: HOSPITAL | Age: 84
Setting detail: OBSERVATION
Discharge: NURSING, REHAB OR LONG-TERM CARE | End: 2024-11-27
Attending: EMERGENCY MEDICINE | Admitting: INTERNAL MEDICINE
Payer: MEDICARE

## 2024-11-23 ENCOUNTER — APPOINTMENT (OUTPATIENT)
Dept: MRI IMAGING | Facility: HOSPITAL | Age: 84
End: 2024-11-23
Payer: MEDICARE

## 2024-11-23 DIAGNOSIS — G40.209 COMPLEX PARTIAL SEIZURE: ICD-10-CM

## 2024-11-23 DIAGNOSIS — R47.01 APHASIA: ICD-10-CM

## 2024-11-23 DIAGNOSIS — R41.0 CONFUSION: ICD-10-CM

## 2024-11-23 DIAGNOSIS — R53.1 ACUTE RIGHT-SIDED WEAKNESS: ICD-10-CM

## 2024-11-23 DIAGNOSIS — R47.01 EXPRESSIVE APHASIA: Primary | ICD-10-CM

## 2024-11-23 DIAGNOSIS — R41.4 RIGHT-SIDED VISUAL NEGLECT: ICD-10-CM

## 2024-11-23 PROBLEM — Z86.73 HISTORY OF CVA (CEREBROVASCULAR ACCIDENT): Status: ACTIVE | Noted: 2018-01-20

## 2024-11-23 PROBLEM — I63.9 CVA (CEREBRAL VASCULAR ACCIDENT): Status: ACTIVE | Noted: 2024-11-23

## 2024-11-23 PROBLEM — I63.9 CVA (CEREBRAL VASCULAR ACCIDENT): Status: RESOLVED | Noted: 2024-11-23 | Resolved: 2024-11-23

## 2024-11-23 LAB
ALT SERPL W P-5'-P-CCNC: 20 U/L (ref 1–33)
APTT PPP: 28.7 SECONDS (ref 22–39)
AST SERPL-CCNC: 46 U/L (ref 1–32)
BASOPHILS # BLD AUTO: 0.02 10*3/MM3 (ref 0–0.2)
BASOPHILS NFR BLD AUTO: 0.3 % (ref 0–1.5)
DEPRECATED RDW RBC AUTO: 48 FL (ref 37–54)
EOSINOPHIL # BLD AUTO: 0.08 10*3/MM3 (ref 0–0.4)
EOSINOPHIL NFR BLD AUTO: 1.1 % (ref 0.3–6.2)
ERYTHROCYTE [DISTWIDTH] IN BLOOD BY AUTOMATED COUNT: 13.6 % (ref 12.3–15.4)
GLUCOSE BLDC GLUCOMTR-MCNC: 78 MG/DL (ref 70–130)
HCT VFR BLD AUTO: 37.7 % (ref 34–46.6)
HGB BLD-MCNC: 12.2 G/DL (ref 12–15.9)
HOLD SPECIMEN: NORMAL
IMM GRANULOCYTES # BLD AUTO: 0.03 10*3/MM3 (ref 0–0.05)
IMM GRANULOCYTES NFR BLD AUTO: 0.4 % (ref 0–0.5)
INR PPP: 0.98 (ref 0.89–1.12)
LYMPHOCYTES # BLD AUTO: 2.15 10*3/MM3 (ref 0.7–3.1)
LYMPHOCYTES NFR BLD AUTO: 29.9 % (ref 19.6–45.3)
MCH RBC QN AUTO: 31.6 PG (ref 26.6–33)
MCHC RBC AUTO-ENTMCNC: 32.4 G/DL (ref 31.5–35.7)
MCV RBC AUTO: 97.7 FL (ref 79–97)
MONOCYTES # BLD AUTO: 0.81 10*3/MM3 (ref 0.1–0.9)
MONOCYTES NFR BLD AUTO: 11.3 % (ref 5–12)
NEUTROPHILS NFR BLD AUTO: 4.1 10*3/MM3 (ref 1.7–7)
NEUTROPHILS NFR BLD AUTO: 57 % (ref 42.7–76)
NRBC BLD AUTO-RTO: 0 /100 WBC (ref 0–0.2)
PLATELET # BLD AUTO: 152 10*3/MM3 (ref 140–450)
PMV BLD AUTO: 9 FL (ref 6–12)
PROTHROMBIN TIME: 13.1 SECONDS (ref 12.2–14.5)
RBC # BLD AUTO: 3.86 10*6/MM3 (ref 3.77–5.28)
TROPONIN T SERPL HS-MCNC: 14 NG/L
VALPROATE SERPL-MCNC: 68.8 MCG/ML (ref 50–125)
WBC NRBC COR # BLD AUTO: 7.19 10*3/MM3 (ref 3.4–10.8)
WHOLE BLOOD HOLD COAG: NORMAL
WHOLE BLOOD HOLD SPECIMEN: NORMAL

## 2024-11-23 PROCEDURE — 70498 CT ANGIOGRAPHY NECK: CPT

## 2024-11-23 PROCEDURE — 70496 CT ANGIOGRAPHY HEAD: CPT

## 2024-11-23 PROCEDURE — 96365 THER/PROPH/DIAG IV INF INIT: CPT

## 2024-11-23 PROCEDURE — 93005 ELECTROCARDIOGRAM TRACING: CPT | Performed by: EMERGENCY MEDICINE

## 2024-11-23 PROCEDURE — 84450 TRANSFERASE (AST) (SGOT): CPT | Performed by: EMERGENCY MEDICINE

## 2024-11-23 PROCEDURE — 96375 TX/PRO/DX INJ NEW DRUG ADDON: CPT

## 2024-11-23 PROCEDURE — 99214 OFFICE O/P EST MOD 30 MIN: CPT

## 2024-11-23 PROCEDURE — 25010000002 LORAZEPAM PER 2 MG: Performed by: STUDENT IN AN ORGANIZED HEALTH CARE EDUCATION/TRAINING PROGRAM

## 2024-11-23 PROCEDURE — 85730 THROMBOPLASTIN TIME PARTIAL: CPT | Performed by: EMERGENCY MEDICINE

## 2024-11-23 PROCEDURE — 0042T HC CT CEREBRAL PERFUSION W/WO CONTRAST: CPT

## 2024-11-23 PROCEDURE — 85610 PROTHROMBIN TIME: CPT | Performed by: EMERGENCY MEDICINE

## 2024-11-23 PROCEDURE — 99291 CRITICAL CARE FIRST HOUR: CPT

## 2024-11-23 PROCEDURE — 36415 COLL VENOUS BLD VENIPUNCTURE: CPT

## 2024-11-23 PROCEDURE — 85025 COMPLETE CBC W/AUTO DIFF WBC: CPT | Performed by: EMERGENCY MEDICINE

## 2024-11-23 PROCEDURE — 70450 CT HEAD/BRAIN W/O DYE: CPT

## 2024-11-23 PROCEDURE — 99222 1ST HOSP IP/OBS MODERATE 55: CPT | Performed by: INTERNAL MEDICINE

## 2024-11-23 PROCEDURE — G0378 HOSPITAL OBSERVATION PER HR: HCPCS

## 2024-11-23 PROCEDURE — 82948 REAGENT STRIP/BLOOD GLUCOSE: CPT

## 2024-11-23 PROCEDURE — 80164 ASSAY DIPROPYLACETIC ACD TOT: CPT

## 2024-11-23 PROCEDURE — 84484 ASSAY OF TROPONIN QUANT: CPT | Performed by: EMERGENCY MEDICINE

## 2024-11-23 PROCEDURE — 84460 ALANINE AMINO (ALT) (SGPT): CPT | Performed by: EMERGENCY MEDICINE

## 2024-11-23 PROCEDURE — 71045 X-RAY EXAM CHEST 1 VIEW: CPT

## 2024-11-23 PROCEDURE — 25510000001 IOPAMIDOL PER 1 ML: Performed by: EMERGENCY MEDICINE

## 2024-11-23 RX ORDER — SODIUM CHLORIDE 0.9 % (FLUSH) 0.9 %
10 SYRINGE (ML) INJECTION AS NEEDED
Status: DISCONTINUED | OUTPATIENT
Start: 2024-11-23 | End: 2024-11-27 | Stop reason: HOSPADM

## 2024-11-23 RX ORDER — DIVALPROEX SODIUM 250 MG/1
500 TABLET, DELAYED RELEASE ORAL EVERY 8 HOURS SCHEDULED
Status: DISCONTINUED | OUTPATIENT
Start: 2024-11-23 | End: 2024-11-27 | Stop reason: HOSPADM

## 2024-11-23 RX ORDER — LACOSAMIDE 100 MG/1
100 TABLET ORAL EVERY 12 HOURS SCHEDULED
Status: DISCONTINUED | OUTPATIENT
Start: 2024-11-24 | End: 2024-11-23

## 2024-11-23 RX ORDER — ASPIRIN 81 MG/1
81 TABLET, CHEWABLE ORAL DAILY
Status: DISCONTINUED | OUTPATIENT
Start: 2024-11-23 | End: 2024-11-27 | Stop reason: HOSPADM

## 2024-11-23 RX ORDER — SODIUM CHLORIDE 0.9 % (FLUSH) 0.9 %
10 SYRINGE (ML) INJECTION EVERY 12 HOURS SCHEDULED
Status: DISCONTINUED | OUTPATIENT
Start: 2024-11-23 | End: 2024-11-27 | Stop reason: HOSPADM

## 2024-11-23 RX ORDER — METOPROLOL SUCCINATE 25 MG/1
25 TABLET, EXTENDED RELEASE ORAL DAILY
COMMUNITY

## 2024-11-23 RX ORDER — IOPAMIDOL 755 MG/ML
150 INJECTION, SOLUTION INTRAVASCULAR
Status: COMPLETED | OUTPATIENT
Start: 2024-11-23 | End: 2024-11-23

## 2024-11-23 RX ORDER — ALPRAZOLAM 0.25 MG/1
0.25 TABLET ORAL 2 TIMES DAILY PRN
Status: DISCONTINUED | OUTPATIENT
Start: 2024-11-23 | End: 2024-11-27 | Stop reason: HOSPADM

## 2024-11-23 RX ORDER — AMOXICILLIN 250 MG
2 CAPSULE ORAL 2 TIMES DAILY PRN
Status: DISCONTINUED | OUTPATIENT
Start: 2024-11-23 | End: 2024-11-27 | Stop reason: HOSPADM

## 2024-11-23 RX ORDER — ASPIRIN 300 MG/1
300 SUPPOSITORY RECTAL DAILY
Status: DISCONTINUED | OUTPATIENT
Start: 2024-11-23 | End: 2024-11-27 | Stop reason: HOSPADM

## 2024-11-23 RX ORDER — LISINOPRIL 10 MG/1
10 TABLET ORAL DAILY
Status: DISCONTINUED | OUTPATIENT
Start: 2024-11-23 | End: 2024-11-27 | Stop reason: HOSPADM

## 2024-11-23 RX ORDER — ACETAMINOPHEN 650 MG/1
650 SUPPOSITORY RECTAL EVERY 4 HOURS PRN
Status: DISCONTINUED | OUTPATIENT
Start: 2024-11-23 | End: 2024-11-27 | Stop reason: HOSPADM

## 2024-11-23 RX ORDER — ACETAMINOPHEN 160 MG/5ML
650 SOLUTION ORAL EVERY 4 HOURS PRN
Status: DISCONTINUED | OUTPATIENT
Start: 2024-11-23 | End: 2024-11-27 | Stop reason: HOSPADM

## 2024-11-23 RX ORDER — BISACODYL 10 MG
10 SUPPOSITORY, RECTAL RECTAL DAILY PRN
Status: DISCONTINUED | OUTPATIENT
Start: 2024-11-23 | End: 2024-11-27 | Stop reason: HOSPADM

## 2024-11-23 RX ORDER — LORAZEPAM 2 MG/ML
4 INJECTION INTRAMUSCULAR ONCE
Status: COMPLETED | OUTPATIENT
Start: 2024-11-23 | End: 2024-11-23

## 2024-11-23 RX ORDER — BISACODYL 5 MG/1
5 TABLET, DELAYED RELEASE ORAL DAILY PRN
Status: DISCONTINUED | OUTPATIENT
Start: 2024-11-23 | End: 2024-11-27 | Stop reason: HOSPADM

## 2024-11-23 RX ORDER — POLYETHYLENE GLYCOL 3350 17 G/17G
17 POWDER, FOR SOLUTION ORAL DAILY PRN
Status: DISCONTINUED | OUTPATIENT
Start: 2024-11-23 | End: 2024-11-27 | Stop reason: HOSPADM

## 2024-11-23 RX ORDER — LACOSAMIDE 10 MG/ML
200 INJECTION, SOLUTION INTRAVENOUS ONCE
Status: DISCONTINUED | OUTPATIENT
Start: 2024-11-23 | End: 2024-11-23

## 2024-11-23 RX ORDER — ACETAMINOPHEN 325 MG/1
650 TABLET ORAL EVERY 4 HOURS PRN
Status: DISCONTINUED | OUTPATIENT
Start: 2024-11-23 | End: 2024-11-27 | Stop reason: HOSPADM

## 2024-11-23 RX ORDER — ONDANSETRON 4 MG/1
4 TABLET, ORALLY DISINTEGRATING ORAL EVERY 6 HOURS PRN
Status: DISCONTINUED | OUTPATIENT
Start: 2024-11-23 | End: 2024-11-27 | Stop reason: HOSPADM

## 2024-11-23 RX ORDER — ATORVASTATIN CALCIUM 40 MG/1
80 TABLET, FILM COATED ORAL NIGHTLY
Status: DISCONTINUED | OUTPATIENT
Start: 2024-11-23 | End: 2024-11-27 | Stop reason: HOSPADM

## 2024-11-23 RX ORDER — SODIUM CHLORIDE 9 MG/ML
40 INJECTION, SOLUTION INTRAVENOUS AS NEEDED
Status: DISCONTINUED | OUTPATIENT
Start: 2024-11-23 | End: 2024-11-27 | Stop reason: HOSPADM

## 2024-11-23 RX ORDER — ONDANSETRON 2 MG/ML
4 INJECTION INTRAMUSCULAR; INTRAVENOUS EVERY 6 HOURS PRN
Status: DISCONTINUED | OUTPATIENT
Start: 2024-11-23 | End: 2024-11-27 | Stop reason: HOSPADM

## 2024-11-23 RX ADMIN — IOPAMIDOL 115 ML: 755 INJECTION, SOLUTION INTRAVENOUS at 12:39

## 2024-11-23 RX ADMIN — LORAZEPAM 4 MG: 2 INJECTION INTRAMUSCULAR; INTRAVENOUS at 13:23

## 2024-11-23 RX ADMIN — VALPROATE SODIUM 1000 MG: 100 INJECTION, SOLUTION INTRAVENOUS at 14:23

## 2024-11-23 RX ADMIN — ASPIRIN 300 MG: 300 SUPPOSITORY RECTAL at 18:06

## 2024-11-23 NOTE — Clinical Note
Level of Care: Telemetry [5]   Diagnosis: CVA (cerebral vascular accident) [[id number]]   Admitting Physician: RAISA BOJORQUEZ II [[id number]]

## 2024-11-23 NOTE — NURSING NOTE
Unable to document NIH r/t pt LOC upon arrival to unit. Sleep interrupted but pt sleeping between care. Safety and seizure precautions in place.

## 2024-11-23 NOTE — ED NOTES
Jenna Russell    Nursing Report ED to Floor:  Mental status: A & O x 2  Ambulatory status: walker/2-assist  Oxygen Therapy:  2L via NC  Cardiac Rhythm: sinus  Admitted from: home  Safety Concerns:  N/A  Social Issues: N/A  ED Room #:  29    ED Nurse Phone Extension - [phone] or may call [phone].      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 Normal LVSF.Normal LV diastolic filling. Left atrium midly dilated. RV mild- mod dilated. MIld aortic cusp sclerosis. No evidence of mitral valve prolapse. Mild mitral regurgitation.No pulmonary hypertension or perdicardial effusion. No dilation of aortic root. Venous system appears normal    H/O: stroke     HLD (hyperlipidemia) 9/8/2016    Left temporal lobe infarction     LOM (loss of memory)     Osteoarthritis 9/8/2016    Paroxysmal atrial fibrillation 9/8/2016    Thyroid nodule 9/8/2016    1.8x1.2cm    Weakness generalized     Wound infection after surgery     Left hip arthroplasty, staph aureus, 6 weeks IV Rocephin        Past Surgical History:  Past Surgical History:   Procedure Laterality Date    ABDOMINOPLASTY      BREAST ABSCESS INCISION AND DRAINAGE      HIP TROCHANTERIC NAILING WITH INTRAMEDULLARY HIP SCREW Right 3/11/2022    Procedure: HIP TROCHANTERIC NAILING WITH INTRAMEDULLARY HIP SCREW RIGHT;  Surgeon: Danny Lynne MD;  Location: [facility];  Service: Orthopedics;  Laterality: Right;    TOTAL HIP ARTHROPLASTY Left     Staph aureus wound infection        Admitting Doctor:   Deepa Mercado II, DO    Consulting Provider(s):  Consults       Date and  Time Order Name Status Description    11/23/2024 12:22 PM Inpatient Neurology Consult Stroke               Admitting Diagnosis:     Most Recent Vitals:   Vitals:    11/23/24 1300 11/23/24 1315 11/23/24 1330 11/23/24 1333   BP: (!) 189/80 (!) 184/73 (!) 189/79    BP Location:   Left arm    Patient Position:   Lying    Pulse: 69 69 66    Resp:       Temp:       TempSrc:       SpO2: 94% 97% 100%    Weight:    53.5 kg (118 lb)   Height:           Active LDAs/IV Access:   Lines, Drains & Airways       Active LDAs       Name Placement date Placement time Site Days    Peripheral IV 11/23/24 1218 Left Antecubital 11/23/24 1218  Antecubital  less than 1    External Urinary Catheter 03/11/22  1619  --  987                    Labs (abnormal labs have a star):   Labs Reviewed   SINGLE HS TROPONIN T - Abnormal; Notable for the following components:       Result Value    HS Troponin T 14 (*)     All other components within normal limits    Narrative:     High Sensitive Troponin T Reference Range:  <14.0 ng/L- Negative Female for AMI  <22.0 ng/L- Negative Male for AMI  >=14 - Abnormal Female indicating possible myocardial injury.  >=22 - Abnormal Male indicating possible myocardial injury.   Clinicians would have to utilize clinical acumen, EKG, Troponin, and serial changes to determine if it is an Acute Myocardial Infarction or myocardial injury due to an underlying chronic condition.        AST - Abnormal; Notable for the following components:    AST (SGOT) 46 (*)     All other components within normal limits   CBC WITH AUTO DIFFERENTIAL - Abnormal; Notable for the following components:    MCV 97.7 (*)     All other components within normal limits   PROTIME-INR - Normal   APTT - Normal    Narrative:     PTT = The equivalent PTT values for the therapeutic range of heparin levels at 0.3 to 0.5 U/ml are 60 to 70 seconds.   ALT - Normal   VALPROIC ACID LEVEL, TOTAL - Normal    Narrative:     Therapeutic Ranges for Valproic  Acid    Epilepsy:       mcg/ml  Bipolar/Ramya  up to 125 mcg/ml     RAINBOW DRAW    Narrative:     The following orders were created for panel order [place] Draw.  Procedure                               Abnormality         Status                     ---------                               -----------         ------                     Green Top (Gel)[[id number]]                                  Final result               Lavender Top[[id number]]                                     Final result               Gold Top - SST[[id number]]                                   Final result               Gray Top[[id number]]                                         Final result               Light Blue Top[[id number]]                                   Final result                 Please view results for these tests on the individual orders.   POCT CHEM 8   CBC AND DIFFERENTIAL    Narrative:     The following orders were created for panel order CBC & Differential.  Procedure                               Abnormality         Status                     ---------                               -----------         ------                     CBC Auto Differential[[id number]]        Abnormal            Final result                 Please view results for these tests on the individual orders.   GREEN TOP   LAVENDER TOP   GOLD TOP - SST   GRAY TOP   LIGHT BLUE TOP       Meds Given in ED:   Medications   sodium chloride 0.9 % flush 10 mL (has no administration in time range)   valproate (DEPACON) 1,000 mg in sodium chloride 0.9 % 100 mL IVPB (has no administration in time range)   divalproex (DEPAKOTE) DR tablet 500 mg (has no administration in time range)   iopamidol (ISOVUE-370) 76 % injection 150 mL (115 mL Intravenous Given 11/23/24 1239)   LORazepam (ATIVAN) injection 4 mg (4 mg Intravenous Given 11/23/24 1323)           Last NIH score:  Interval: baseline  1a. Level of Consciousness: 0-->Alert, keenly responsive  1b. LOC Questions:  2-->Answers neither question correctly  1c. LOC Commands: 2-->Performs neither task correctly  2. Best Gaze: 0-->Normal  3. Visual: 0-->No visual loss  4. Facial Palsy: 0-->Normal symmetrical movements  5a. Motor Arm, Left: 0-->No drift, limb holds 90 (or 45) degrees for full 10 secs  5b. Motor Arm, Right: 1-->Drift, limb holds 90 (or 45) degrees, but drifts down before full 10 secs, does not hit bed or other support  6a. Motor Leg, Left: 0-->No drift, leg holds 30 degree position for full 5 secs  6b. Motor Leg, Right: 1-->Drift, leg falls by the end of the 5-sec period but does not hit bed  7. Limb Ataxia: 0-->Absent  8. Sensory: 0-->Normal, no sensory loss  9. Best Language: 2-->Severe aphasia, all communication is through fragmentary expression, great need for inference, questioning, and guessing by the listener. Range of information that can be exchanged is limited, listener carries burden of. . . (see row details)  10. Dysarthria: 1-->Mild-to-moderate dysarthria, patient slurs at least some words and, at worst, can be understood with some difficulty  11. Extinction and Inattention (formerly Neglect): 0-->No abnormality    Total (NIH Stroke Scale): 9     Dysphagia screening results:  Patient Factors Component (Dysphagia:Stroke or Rule-out)  Best Eye Response: 4-->(E4) spontaneous (11/23/24 1237)  Best Motor Response: 6-->(M6) obeys commands (11/23/24 1237)  Best Verbal Response: 4-->(V4) confused (11/23/24 1237)  [place] Coma Scale Score: 14 (11/23/24 1237)     [place] Coma Scale:  No data recorded     CIWA:        Restraint Type:            Isolation Status:  No active isolations

## 2024-11-23 NOTE — ED PROVIDER NOTES
Subjective   History of Present Illness  Patient is a pleasant 83-year-old female who presents to the emergency department with right-sided deficits, expressive aphasia, confusion.  She ported that yesterday evening around midnight is when she was last known normal.  History as per EMS.  They state that a friend was with the patient this morning and supplying the history but the friend was emotionally upset and there are some details which are unclear.  That midnight last night was the last known normal.  She had right-sided deficits, speech disturbance, and confusion today.  Patient does have a distant history of a stroke in 2015 but is not believed to have had significant residual deficits.  No reported recent illness.  No other acute complaints.        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 Normal LVSF.Normal LV diastolic filling. Left atrium midly dilated. RV mild- mod dilated. MIld aortic cusp sclerosis. No evidence of mitral valve prolapse. Mild mitral regurgitation.No pulmonary hypertension or perdicardial effusion. No dilation of aortic root. Venous system appears normal    H/O: stroke     HLD (hyperlipidemia) 9/8/2016    Left temporal lobe infarction     LOM (loss of memory)     Osteoarthritis 9/8/2016    Paroxysmal atrial fibrillation 9/8/2016    Thyroid nodule 9/8/2016    1.8x1.2cm    Weakness generalized     Wound infection after surgery     Left hip arthroplasty, staph aureus, 6 weeks IV Rocephin       Allergies   Allergen Reactions    Penicillins Anaphylaxis       Past Surgical History:   Procedure  Laterality Date    ABDOMINOPLASTY      BREAST ABSCESS INCISION AND DRAINAGE      HIP TROCHANTERIC NAILING WITH INTRAMEDULLARY HIP SCREW Right 3/11/2022    Procedure: HIP TROCHANTERIC NAILING WITH INTRAMEDULLARY HIP SCREW RIGHT;  Surgeon: Danny Lynne MD;  Location: [facility];  Service: Orthopedics;  Laterality: Right;    TOTAL HIP ARTHROPLASTY Left     Staph aureus wound infection       Family History   Problem Relation Age of Onset    Stroke Mother     Other Mother         Cardiac disorder    Diabetes Mother     Hypertension Mother     Mental illness Mother     Heart attack Father     Heart failure Father     No Known Problems Sister     Heart attack Maternal Grandfather     Heart disease Paternal Grandfather     No Known Problems Sister        Social History     Socioeconomic History    Marital status:     Number of children: 3   Tobacco Use    Smoking status: Former     Current packs/day: 0.00     Types: Cigarettes     Start date:      Quit date:      Years since quittin.9    Smokeless tobacco: Never    Tobacco comments:     OV says she quit smoking within the past year   Vaping Use    Vaping status: Never Used   Substance and Sexual Activity    Alcohol use: Yes     Alcohol/week: 14.0 standard drinks of alcohol     Types: 14 Glasses of wine per week    Drug use: No    Sexual activity: Defer           Objective   Physical Exam  Vitals and nursing note reviewed.   Constitutional:       Appearance: Normal appearance.   HENT:      Head: Normocephalic.      Mouth/Throat:      Mouth: Mucous membranes are moist.   Eyes:      Extraocular Movements: Extraocular movements intact.      Pupils: Pupils are equal, round, and reactive to light.   Cardiovascular:      Rate and Rhythm: Normal rate and regular rhythm.      Pulses: Normal pulses.      Heart sounds: Normal heart sounds. No murmur heard.  Pulmonary:      Effort: Pulmonary effort is normal.      Breath sounds: Normal breath sounds.    Abdominal:      General: Bowel sounds are normal.      Palpations: Abdomen is soft.   Musculoskeletal:         General: No swelling, deformity or signs of injury. Normal range of motion.      Cervical back: Normal range of motion.   Skin:     General: Skin is warm and dry.      Capillary Refill: Capillary refill takes less than 2 seconds.   Neurological:      Mental Status: She is alert. She is disoriented.      Motor: Weakness (Significant weakness of right upper extremity, and slightly increased drift of right lower extremity.  No definitive facial droop appreciated.) present.   Psychiatric:         Behavior: Behavior normal.         Thought Content: Thought content normal.         Critical Care    Performed by: Epi Seay DO  Authorized by: Epi Seay DO    Critical care provider statement:     Critical care time (minutes):  35    Critical care time was exclusive of:  Separately billable procedures and treating other patients    Critical care was necessary to treat or prevent imminent or life-threatening deterioration of the following conditions:  CNS failure or compromise    Critical care was time spent personally by me on the following activities:  Ordering and performing treatments and interventions, ordering and review of laboratory studies, ordering and review of radiographic studies, pulse oximetry, re-evaluation of patient's condition, review of old charts, obtaining history from patient or surrogate, examination of patient, evaluation of patient's response to treatment, discussions with consultants and development of treatment plan with patient or surrogate    I assumed direction of critical care for this patient from another provider in my specialty: no      Care discussed with: admitting provider               ED Course                                                       Medical Decision Making  Patient presented with right-sided deficits.  She does have some deficits from previous CVA but  deficits are much more pronounced today.  In addition to the right-sided deficits she does have an expressive aphasia along with confusion today.  Last known normal was approximately when she went to bed around midnight last night.  Given the onset time her last known normal patient is not a candidate for TNK.  She has no large vessel occlusion.  The patient was evaluated by the neurology stroke team upon her arrival.  Neurology attendings also indirectly involved in her care.  Neurology nurse practitioner witnessed in the room brief, mild seizure-like activity and Ativan and Vimpat were administered.    On reexamination prior to admission patient's weakness persists.  She appears to potentially have more right side neglect than on initial examination.    Case discussed with internal medicine attending and patient mated for further evaluation and management.      Recent Results (from the past 24 hours)  -Single High Sensitivity Troponin T:   Collection Time: 11/23/24 12:33 PM  Specimen: Blood       Result                      Value             Ref Range           HS Troponin T               14 (H)            <14 ng/L       -Protime-INR:   Collection Time: 11/23/24 12:33 PM  Specimen: Blood       Result                      Value             Ref Range           Protime                     13.1              12.2 - 14.5 *       INR                         0.98              0.89 - 1.12    -aPTT:   Collection Time: 11/23/24 12:33 PM  Specimen: Blood       Result                      Value             Ref Range           PTT                         28.7              22.0 - 39.0 *  -AST:   Collection Time: 11/23/24 12:33 PM  Specimen: Blood       Result                      Value             Ref Range           AST (SGOT)                  46 (H)            1 - 32 U/L     -ALT:   Collection Time: 11/23/24 12:33 PM  Specimen: Blood       Result                      Value             Ref Range           ALT (SGPT)                   20                1 - 33 U/L     -Green Top (Gel):   Collection Time: 11/23/24 12:33 PM       Result                      Value             Ref Range           Extra Tube                                                    Hold for add-ons.  -Lavender Top:   Collection Time: 11/23/24 12:33 PM       Result                      Value             Ref Range           Extra Tube                                                    hold for add-on  -Gold Top - SST:   Collection Time: 11/23/24 12:33 PM       Result                      Value             Ref Range           Extra Tube                                                    Hold for add-ons.  -Gray Top:   Collection Time: 11/23/24 12:33 PM       Result                      Value             Ref Range           Extra Tube                                                    Hold for add-ons.  -Light Blue Top:   Collection Time: 11/23/24 12:33 PM       Result                      Value             Ref Range           Extra Tube                                                    Hold for add-ons.  -CBC Auto Differential:   Collection Time: 11/23/24 12:33 PM  Specimen: Blood       Result                      Value             Ref Range           WBC                         7.19              3.40 - 10.80*       RBC                         3.86              3.77 - 5.28 *       Hemoglobin                  12.2              12.0 - 15.9 *       Hematocrit                  37.7              34.0 - 46.6 %       MCV                         97.7 (H)          79.0 - 97.0 *       MCH                         31.6              26.6 - 33.0 *       MCHC                        32.4              31.5 - 35.7 *       RDW                         13.6              12.3 - 15.4 %       RDW-SD                      48.0              37.0 - 54.0 *       MPV                         9.0               6.0 - 12.0 fL       Platelets                   152               140 - 450 10*        Neutrophil %                57.0              42.7 - 76.0 %       Lymphocyte %                29.9              19.6 - 45.3 %       Monocyte %                  11.3              5.0 - 12.0 %        Eosinophil %                1.1               0.3 - 6.2 %         Basophil %                  0.3               0.0 - 1.5 %         Immature Grans %            0.4               0.0 - 0.5 %         Neutrophils, Absolute       4.10              1.70 - 7.00 *       Lymphocytes, Absolute       2.15              0.70 - 3.10 *       Monocytes, Absolute         0.81              0.10 - 0.90 *       Eosinophils, Absolute       0.08              0.00 - 0.40 *       Basophils, Absolute         0.02              0.00 - 0.20 *       Immature Grans, Absolu*     0.03              0.00 - 0.05 *       nRBC                        0.0               0.0 - 0.2 /1*  -Valproic Acid Level, Total:   Collection Time: 11/23/24 12:33 PM  Specimen: Blood       Result                      Value             Ref Range           Valproic Acid               68.8              50.0 - 125.0*  -ECG 12 Lead ED Triage Standing Order; Acute Stroke (Onset <24 hrs):   Collection Time: 11/23/24  1:01 PM       Result                      Value             Ref Range           QT Interval                 416               ms                  QTC Interval                449               ms             -POC Glucose Once:   Collection Time: 11/23/24  5:36 PM  Specimen: Blood       Result                      Value             Ref Range           Glucose                     78                70 - 130 mg/*  -POC Glucose Once:   Collection Time: 11/24/24 12:27 AM  Specimen: Blood       Result                      Value             Ref Range           Glucose                     74                70 - 130 mg/*  -POC Glucose Once:   Collection Time: 11/24/24  5:45 AM  Specimen: Blood       Result                      Value             Ref Range           Glucose                      80                70 - 130 mg/*  Note: In addition to lab results from this visit, the labs listed above may include labs taken at another facility or during a different encounter within the last 24 hours. Please correlate lab times with ED admission and discharge times for further clarification of the services performed during this visit.    XR Chest 1 View   Final Result    Impression:    No acute cardiopulmonary findings.            Electronically Signed: Orestes Ferrara MD      11/23/2024 1:08 PM EST      Workstation ID: [id number]     CT Angiogram Head w AI Analysis of LVO   Final Result    Impression:    No core infarct or ischemic tissue at risk. There are couple small perfusion defects associated with the area of chronic infarct in the left MCA territory.        No abrupt cut off/large vessel occlusion, flow-limiting stenosis, dissection, or aneurysm in the head or neck. There is scattered atherosclerosis of the cervical and intracranial arteries, detailed above.                Electronically Signed: Orestes Ferrara MD      11/23/2024 1:05 PM EST      Workstation ID: [id number]     CT Angiogram Neck   Final Result    Impression:    No core infarct or ischemic tissue at risk. There are couple small perfusion defects associated with the area of chronic infarct in the left MCA territory.        No abrupt cut off/large vessel occlusion, flow-limiting stenosis, dissection, or aneurysm in the head or neck. There is scattered atherosclerosis of the cervical and intracranial arteries, detailed above.                Electronically Signed: Orestes Ferrara MD      11/23/2024 1:05 PM EST      Workstation ID: [id number]     CT CEREBRAL PERFUSION WITH & WITHOUT CONTRAST   Final Result    Impression:    No core infarct or ischemic tissue at risk. There are couple small perfusion defects associated with the area of chronic infarct in the left MCA territory.        No abrupt cut off/large vessel occlusion, flow-limiting  stenosis, dissection, or aneurysm in the head or neck. There is scattered atherosclerosis of the cervical and intracranial arteries, detailed above.                Electronically Signed: Orestes Ferrara MD      11/23/2024 1:05 PM EST      Workstation ID: [id number]     CT Head Without Contrast Stroke Protocol   Final Result    Impression:    No acute intracranial findings.        Chronic and senescent changes including sequelae of prior left MCA infarct.                Electronically Signed: Orestes Ferrara MD      11/23/2024 12:34 PM EST      Workstation ID: [id number]     MRI Brain Without Contrast    (Results Pending)  ------------------------------------------------------------            11/24/24 11/24/24 11/24/24 11/24/24               0100      0300         0425          0500     ------------------------------------------------------------   BP:                              150/71                   BP Location:                        Right arm                 Patient Position:                          Lying                   Pulse:      52        54           54            55       Resp:                              14                     Temp:                       97.2 °F (36.2 °C)             TempSrc:                        Axillary                  SpO2:      94%       94%           96%          91%       Weight:                                                   Height:                                                  ------------------------------------------------------------  Medications  sodium chloride 0.9 % flush 10 mL (has no administration in time range)  sodium chloride 0.9 % flush 10 mL (10 mL Intravenous Given 11/24/24 5680)  sodium chloride 0.9 % flush 10 mL (has no administration in time range)  atorvastatin (LIPITOR) tablet 80 mg (80 mg Oral Not Given 11/23/24 2222)  aspirin chewable tablet 81 mg ( Oral Not  Given:  See Alt 11/23/24 1806)    Or  aspirin suppository 300 mg (300 mg Rectal Given 11/23/24 1806)  divalproex (DEPAKOTE) DR tablet 500 mg (500 mg Oral Not Given 11/23/24 2222)   lisinopril (PRINIVIL,ZESTRIL) tablet 10 mg ( Oral Dose Auto Held 12/9/24 0900)  ALPRAZolam (XANAX) tablet 0.25 mg ( Oral Held by provider 11/23/24 1547)  melatonin tablet 5 mg ( Oral Held by provider 11/23/24 1547)  sodium chloride 0.9 % flush 10 mL ( Intravenous Canceled Entry 11/23/24 2222)  sodium chloride 0.9 % flush 10 mL (has no administration in time range)  sodium chloride 0.9 % infusion 40 mL (has no administration in time range)  Potassium Replacement - Follow Nurse / BPA Driven Protocol (has no administration in time range)  Magnesium Standard Dose Replacement - Follow Nurse / BPA Driven Protocol (has no administration in time range)  Phosphorus Replacement - Follow Nurse / BPA Driven Protocol (has no administration in time range)  Calcium Replacement - Follow Nurse / BPA Driven Protocol (has no administration in time range)  acetaminophen (TYLENOL) tablet 650 mg (has no administration in time range)    Or  acetaminophen (TYLENOL) 160 MG/5ML oral solution 650 mg (has no administration in time range)    Or  acetaminophen (TYLENOL) suppository 650 mg (has no administration in time range)  sennosides-docusate (PERICOLACE) 8.6-50 MG per tablet 2 tablet (has no administration in time range)    And  polyethylene glycol (MIRALAX) packet 17 g (has no administration in time range)    And  bisacodyl (DULCOLAX) EC tablet 5 mg (has no administration in time range)    And  bisacodyl (DULCOLAX) suppository 10 mg (has no administration in time range)   ondansetron ODT (ZOFRAN-ODT) disintegrating tablet 4 mg (has no administration in time range)    Or  ondansetron (ZOFRAN) injection 4 mg (has no administration in time range)  iopamidol (ISOVUE-370) 76 % injection 150 mL (115 mL Intravenous Given 11/23/24 1239)  LORazepam (ATIVAN) injection 4  mg (4 mg Intravenous Given 11/23/24 1323)  valproate (DEPACON) 1,000 mg in sodium chloride 0.9 % 100 mL IVPB (0 mg Intravenous Stopped 11/23/24 1705)  ECG/EMG Results (last 24 hours)     Procedure Component Value Units Date/Time    ECG 12 Lead ED Triage Standing Order; Acute Stroke (Onset <24 hrs)   [[id number]] Collected: 11/23/24 1301     Updated: 11/23/24 1301     QT Interval 416 ms      QTC Interval 449 ms     Narrative:      Test Reason : ED Triage Standing Order~  Blood Pressure :   */*   mmHG  Vent. Rate :  70 BPM     Atrial Rate :  70 BPM     P-R Int : 166 ms          QRS Dur :  82 ms      QT Int : 416 ms       P-R-T Axes :  74  -1  -8 degrees    QTcB Int : 449 ms    Normal sinus rhythm  Nonspecific ST and T wave abnormality  Abnormal ECG  When compared with ECG of 10-Mar-2022 18:16,  No significant change was found    Referred By: ed md           Confirmed By:       ECG 12 Lead ED Triage Standing Order; Acute Stroke (Onset <24 hrs)   Preliminary Result    Test Reason : ED Triage Standing Order~    Blood Pressure :   */*   mmHG    Vent. Rate :  70 BPM     Atrial Rate :  70 BPM       P-R Int : 166 ms          QRS Dur :  82 ms        QT Int : 416 ms       P-R-T Axes :  74  -1  -8 degrees      QTcB Int : 449 ms        Normal sinus rhythm    Nonspecific ST and T wave abnormality    Abnormal ECG    When compared with ECG of 10-Mar-2022 18:16,    No significant change was found        Referred By: ed md           Confirmed By: Dawood             Amount and/or Complexity of Data Reviewed  Independent Historian: EMS  External Data Reviewed: notes.  Labs: ordered. Decision-making details documented in ED Course.  Radiology: ordered and independent interpretation performed. Decision-making details documented in ED Course.  ECG/medicine tests: ordered and independent interpretation performed. Decision-making details documented in ED Course.    Risk  Prescription drug management.  Decision regarding  hospitalization.        Final diagnoses:   Acute right-sided weakness   Confusion   Expressive aphasia   Right-sided visual neglect       ED Disposition  ED Disposition       ED Disposition   Decision to Admit    Condition   --    Comment   Level of Care: Telemetry [5]   Diagnosis: CVA (cerebral vascular accident) [233577]   Admitting Physician: RAISA BOJORQUEZ II [[id number]                    Epi Seay DO  11/24/24 0630

## 2024-11-23 NOTE — H&P
[facility] Medicine Services  HISTORY AND PHYSICAL    Patient Name: Jenna Russell  : 1940  MRN: [id number]  Primary Care Physician: Judi Rodriguez MD  Date of admission: 2024      Subjective   Subjective     Chief Complaint: AMS    HPI:  Jenna Russell is a 83 y.o. female accompanied by her family after she developed right sided weakness this morning. After undergoing CT scans in ED she had rhythmic right arm twitching c/w complex partial seizure. She received 4 mg IV ativan and is now sedated.      Personal History     Past Medical History:   Diagnosis Date    Acute ischemic stroke     Arterial ischemic stroke 2016    Ataxic aphasia 2016    BP (high blood pressure) 2016    Cerebral infarction, left hemisphere 2016    Cyst of left ovary 2016    3cm    Expressive aphasia     H/O echocardiogram 2015    Global LV wall motion and contractility within normal limits. Normal LVSF.Normal LV diastolic filling. Left atrium midly dilated. RV mild- mod dilated. MIld aortic cusp sclerosis. No evidence of mitral valve prolapse. Mild mitral regurgitation.No pulmonary hypertension or perdicardial effusion. No dilation of aortic root. Venous system appears normal    H/O: stroke     HLD (hyperlipidemia) 2016    Left temporal lobe infarction     LOM (loss of memory)     Osteoarthritis 2016    Paroxysmal atrial fibrillation 2016    Thyroid nodule 2016    1.8x1.2cm    Weakness generalized     Wound infection after surgery     Left hip arthroplasty, staph aureus, 6 weeks IV Rocephin           Past Surgical History:   Procedure Laterality Date    ABDOMINOPLASTY      BREAST ABSCESS INCISION AND DRAINAGE      HIP TROCHANTERIC NAILING WITH INTRAMEDULLARY HIP SCREW Right 3/11/2022    Procedure: HIP TROCHANTERIC NAILING WITH INTRAMEDULLARY HIP SCREW RIGHT;  Surgeon: Danny Lynne MD;  Location: [facility];  Service: Orthopedics;  Laterality: Right;     TOTAL HIP ARTHROPLASTY Left     Staph aureus wound infection       Family History: family history includes Diabetes in her mother; Heart attack in her father and maternal grandfather; Heart disease in her paternal grandfather; Heart failure in her father; Hypertension in her mother; Mental illness in her mother; No Known Problems in her sister and sister; Other in her mother; Stroke in her mother.     Social History:  reports that she quit smoking about 52 years ago. She started smoking about 54 years ago. She has never used smokeless tobacco. She reports current alcohol use of about 14.0 standard drinks of alcohol per week. She reports that she does not use drugs.  Social History     Social History Narrative    With her partner Lianna for 36yrs.. She drinks 1 cup of coffee per day.       Medications:  Available home medication information reviewed.  ALPRAZolam, Diclofenac Sodium, Vitamin D, acetaminophen, aspirin, atorvastatin, calcium carbonate EX, divalproex, docusate sodium, folic acid, hyoscyamine, lidocaine, lisinopril, melatonin, pantoprazole, and polyethylene glycol    Allergies   Allergen Reactions    Penicillins Anaphylaxis       Objective   Objective     Vital Signs:   Temp:  [97.7 °F (36.5 °C)] 97.7 °F (36.5 °C)  Heart Rate:  [66-73] 66  Resp:  [20] 20  BP: (183-201)/(73-95) 189/79  Flow (L/min) (Oxygen Therapy):  [2] 2  Total (NIH Stroke Scale): 9    Physical Exam   Constitutional: Somnolent, unable to awaken  Eyes: PERRLA, sclerae anicteric, no conjunctival injection  HENT: NCAT, mucous membranes moist  Neck: Supple, no thyromegaly, no lymphadenopathy, trachea midline  Respiratory: Clear to auscultation bilaterally, nonlabored respirations   Cardiovascular: RRR, no murmurs, rubs, or gallops, palpable pedal pulses bilaterally  Gastrointestinal: Positive bowel sounds, soft, nontender, nondistended  Musculoskeletal: No bilateral ankle edema, no clubbing or cyanosis to extremities  Psychiatric:  MAULIK  Neurologic: MAULIK  Skin: No rashes     Result Review:  I have personally reviewed the results from the time of this admission to 11/23/2024 14:10 EST and agree with these findings:  []  Laboratory list / accordion  []  Microbiology  []  Radiology  []  EKG/Telemetry   []  Cardiology/Vascular   []  Pathology  []  Old records  []  Other:  Most notable findings include:       LAB RESULTS:      Lab 11/23/24  1233   WBC 7.19   HEMOGLOBIN 12.2   HEMATOCRIT 37.7   PLATELETS 152   NEUTROS ABS 4.10   IMMATURE GRANS (ABS) 0.03   LYMPHS ABS 2.15   MONOS ABS 0.81   EOS ABS 0.08   MCV 97.7*   PROTIME 13.1   INR 0.98   APTT 28.7             Lab 11/23/24  1233   ALT (SGPT) 20   AST (SGOT) 46*         Lab 11/23/24  1233   HSTROP T 14*                     Microbiology Results (last 10 days)       ** No results found for the last 240 hours. **            XR Chest 1 View    Result Date: 11/23/2024  XR CHEST 1 VW Date of Exam: 11/23/2024 12:34 PM EST Indication: Acute Stroke Protocol (onset < 12 hrs) Comparison: Chest x-ray 8/19/2024 Findings: Redemonstration of left atrial appendage occlusion device. Stable cardiomediastinal silhouette with top normal size of the heart. There is mild diffuse interstitial prominence likely chronic/senescent interstitial change, without evidence of focal consolidation. No pleural effusion. No pneumothorax. The bones are demineralized without acute osseous findings.     Impression: Impression: No acute cardiopulmonary findings. Electronically Signed: Orestes Ferrara MD  11/23/2024 1:08 PM EST  Workstation ID: [id number]    CT Angiogram Head w AI Analysis of LVO    Result Date: 11/23/2024  CT CEREBRAL PERFUSION W WO CONTRAST, CT ANGIOGRAM HEAD W AI ANALYSIS OF LVO, CT ANGIOGRAM NECK Date of Exam: 11/23/2024 12:26 PM EST Indication: Neuro Deficit, acute, Stroke suspected Neuro deficit, acute stroke suspected.  Comparison: Concurrent noncontrast head CT, CTA head and neck 10/3/2021 Technique: Axial CT  images of the brain were obtained prior to and after the administration of 115 mL Isovue-370. Core blood volume, core blood flow, mean transit time, and Tmax images were obtained utilizing the Rapid software protocol. A limited CT angiogram of the head was also performed to measure the blood vessel density. CTA of the head and neck was performed after the uneventful intravenous administration of above contrast. Reconstructed coronal and sagittal images were also obtained. In addition, a 3-D volume rendered image was created for interpretation. Automated exposure control and iterative reconstruction methods were used. The radiation dose reduction device was turned on for each scan per the ALARA (As Low as Reasonably Achievable) protocol. Findings: CT cerebral perfusion: There is small scattered perfusion defects in the area of chronic infarct/encephalomalacia in the left MCA territory. Accounting for this, no evidence of core infarct or ischemic tissue at risk. CTA HEAD: No abrupt cut off/large vessel occlusion, flow-limiting stenosis, dissection, or aneurysm. There is some luminal irregularity and mild stenosis at the carotid siphons from calcified atherosclerosis. There is fetal origin of the bilateral PCAs, normal variant. The cerebral veins and dural venous sinuses appear grossly patent. There is atherosclerosis of the bilateral V4 segments with mild luminal irregularity without flow-limiting stenosis. CTA NECK: No abrupt cut off/large vessel occlusion, flow-limiting stenosis, dissection, or aneurysm. There is atherosclerosis at the carotid bifurcations without flow-limiting stenosis or appreciable luminal narrowing. Extravascular findings: Upper lungs are grossly clear. There is a hypodense 1.5 cm nodule in the right thyroid lobe. The pharyngeal and laryngeal structures are unremarkable. No acute or suspicious bony findings.     Impression: Impression: No core infarct or ischemic tissue at risk. There are couple  small perfusion defects associated with the area of chronic infarct in the left MCA territory. No abrupt cut off/large vessel occlusion, flow-limiting stenosis, dissection, or aneurysm in the head or neck. There is scattered atherosclerosis of the cervical and intracranial arteries, detailed above. Electronically Signed: Orestes Ferrara MD  11/23/2024 1:05 PM EST  Workstation ID: [id number]    CT Angiogram Neck    Result Date: 11/23/2024  CT CEREBRAL PERFUSION W WO CONTRAST, CT ANGIOGRAM HEAD W AI ANALYSIS OF LVO, CT ANGIOGRAM NECK Date of Exam: 11/23/2024 12:26 PM EST Indication: Neuro Deficit, acute, Stroke suspected Neuro deficit, acute stroke suspected.  Comparison: Concurrent noncontrast head CT, CTA head and neck 10/3/2021 Technique: Axial CT images of the brain were obtained prior to and after the administration of 115 mL Isovue-370. Core blood volume, core blood flow, mean transit time, and Tmax images were obtained utilizing the Rapid software protocol. A limited CT angiogram of the head was also performed to measure the blood vessel density. CTA of the head and neck was performed after the uneventful intravenous administration of above contrast. Reconstructed coronal and sagittal images were also obtained. In addition, a 3-D volume rendered image was created for interpretation. Automated exposure control and iterative reconstruction methods were used. The radiation dose reduction device was turned on for each scan per the ALARA (As Low as Reasonably Achievable) protocol. Findings: CT cerebral perfusion: There is small scattered perfusion defects in the area of chronic infarct/encephalomalacia in the left MCA territory. Accounting for this, no evidence of core infarct or ischemic tissue at risk. CTA HEAD: No abrupt cut off/large vessel occlusion, flow-limiting stenosis, dissection, or aneurysm. There is some luminal irregularity and mild stenosis at the carotid siphons from calcified atherosclerosis. There is  fetal origin of the bilateral PCAs, normal variant. The cerebral veins and dural venous sinuses appear grossly patent. There is atherosclerosis of the bilateral V4 segments with mild luminal irregularity without flow-limiting stenosis. CTA NECK: No abrupt cut off/large vessel occlusion, flow-limiting stenosis, dissection, or aneurysm. There is atherosclerosis at the carotid bifurcations without flow-limiting stenosis or appreciable luminal narrowing. Extravascular findings: Upper lungs are grossly clear. There is a hypodense 1.5 cm nodule in the right thyroid lobe. The pharyngeal and laryngeal structures are unremarkable. No acute or suspicious bony findings.     Impression: Impression: No core infarct or ischemic tissue at risk. There are couple small perfusion defects associated with the area of chronic infarct in the left MCA territory. No abrupt cut off/large vessel occlusion, flow-limiting stenosis, dissection, or aneurysm in the head or neck. There is scattered atherosclerosis of the cervical and intracranial arteries, detailed above. Electronically Signed: Orestes Ferrara MD  11/23/2024 1:05 PM EST  Workstation ID: [id number]    CT CEREBRAL PERFUSION WITH & WITHOUT CONTRAST    Result Date: 11/23/2024  CT CEREBRAL PERFUSION W WO CONTRAST, CT ANGIOGRAM HEAD W AI ANALYSIS OF LVO, CT ANGIOGRAM NECK Date of Exam: 11/23/2024 12:26 PM EST Indication: Neuro Deficit, acute, Stroke suspected Neuro deficit, acute stroke suspected.  Comparison: Concurrent noncontrast head CT, CTA head and neck 10/3/2021 Technique: Axial CT images of the brain were obtained prior to and after the administration of 115 mL Isovue-370. Core blood volume, core blood flow, mean transit time, and Tmax images were obtained utilizing the Rapid software protocol. A limited CT angiogram of the head was also performed to measure the blood vessel density. CTA of the head and neck was performed after the uneventful intravenous administration of above  contrast. Reconstructed coronal and sagittal images were also obtained. In addition, a 3-D volume rendered image was created for interpretation. Automated exposure control and iterative reconstruction methods were used. The radiation dose reduction device was turned on for each scan per the ALARA (As Low as Reasonably Achievable) protocol. Findings: CT cerebral perfusion: There is small scattered perfusion defects in the area of chronic infarct/encephalomalacia in the left MCA territory. Accounting for this, no evidence of core infarct or ischemic tissue at risk. CTA HEAD: No abrupt cut off/large vessel occlusion, flow-limiting stenosis, dissection, or aneurysm. There is some luminal irregularity and mild stenosis at the carotid siphons from calcified atherosclerosis. There is fetal origin of the bilateral PCAs, normal variant. The cerebral veins and dural venous sinuses appear grossly patent. There is atherosclerosis of the bilateral V4 segments with mild luminal irregularity without flow-limiting stenosis. CTA NECK: No abrupt cut off/large vessel occlusion, flow-limiting stenosis, dissection, or aneurysm. There is atherosclerosis at the carotid bifurcations without flow-limiting stenosis or appreciable luminal narrowing. Extravascular findings: Upper lungs are grossly clear. There is a hypodense 1.5 cm nodule in the right thyroid lobe. The pharyngeal and laryngeal structures are unremarkable. No acute or suspicious bony findings.     Impression: Impression: No core infarct or ischemic tissue at risk. There are couple small perfusion defects associated with the area of chronic infarct in the left MCA territory. No abrupt cut off/large vessel occlusion, flow-limiting stenosis, dissection, or aneurysm in the head or neck. There is scattered atherosclerosis of the cervical and intracranial arteries, detailed above. Electronically Signed: Orestes Ferrara MD  11/23/2024 1:05 PM EST  Workstation ID: [id number]    CT Head  Without Contrast Stroke Protocol    Result Date: 11/23/2024  CT HEAD WO CONTRAST STROKE PROTOCOL Date of Exam: 11/23/2024 12:23 PM EST Indication: Neuro deficit, acute, stroke suspected Neuro Deficit, acute, Stroke suspected. Comparison: Head CT 11/17/2024 Technique: Axial CT images were obtained of the head without contrast administration.  Reconstructed coronal images were also obtained. Automated exposure control and iterative construction methods were used. Scan Time: 12:24 p.m. 11/23/2024 Results discussed with stroke navigator by Dr. Orestes Ferrara via telephone at 12:30 p.m. 11/23/2024. Findings: Similar confluent hypodensity/encephalomalacia in the left MCA territory compatible with prior infarct. No evidence of new/acute large territory infarct. No acute intracranial hemorrhage. There are scattered white matter hypodensities which are nonspecific and can be seen in the setting of chronic small vessel ischemic change. No extra-axial collections. No midline shift or herniation. Normal size and configuration of the ventricles. Normal appearance of the orbits. The paranasal sinuses and mastoid air cells are clear. No acute osseous findings.     Impression: Impression: No acute intracranial findings. Chronic and senescent changes including sequelae of prior left MCA infarct. Electronically Signed: Orestes Ferrara MD  11/23/2024 12:34 PM EST  Workstation ID: [id number]     Results for orders placed during the hospital encounter of 01/20/18    Adult Transthoracic Echo Complete W/ Cont if Necessary Per Protocol (With Agitated Saline)    Interpretation Summary  · Left ventricular wall thickness is consistent with hypertrophy.  · Left ventricular systolic function is normal.  · Moderate aortic valve regurgitation is present.  · Mild mitral valve regurgitation is present  · Mild tricuspid valve regurgitation is present.  · Mild pulmonic valve regurgitation is present.      Assessment & Plan   Assessment & Plan       PAF  (paroxysmal atrial fibrillation)    HLD (hyperlipidemia)    Hypertension    History of CVA (cerebrovascular accident)    Seizure disorder    Complex partial seizure    Right sided weakness    Jenna Russell is a 81 y.o. female with a past medical history significant for hypertension, HLD, PAF without anticoagulation, prior left MCA stroke in 2015 with some residual aphasia, intraparenchymal and subarachnoid hemorrhage in the region of the old stroke (while on Eliquis), and seizure disorder presenting with acute right sided weakness followed by seizure activity.    Right sided weakness r/o CVA  Hx CVA + ICH  HTN  HLD  - stroke team follows, have d/w them. Rec'd DAPT x 21 days + statin. MRI brain, echo pending.  - autoregulation bp  - pt/ot/slp    Complex partial seizure  Hx Seizure Disorder  - has received 4mg versed in ED and neurology rec'd increasing depakote to 500 mg from home 250 mg.    PAF w/p watchman    NPO w/ no meds/food until wakes and passes dysphagia screen.     VTE Prophylaxis:  Mechanical VTE prophylaxis orders are signed & held.            CODE STATUS:    Code Status and Medical Interventions: CPR (Attempt to Resuscitate); Full Support   Ordered at: 11/23/24 1409     Code Status (Patient has no pulse and is not breathing):    CPR (Attempt to Resuscitate)     Medical Interventions (Patient has pulse or is breathing):    Full Support       Expected Discharge   Expected discharge date/ time has not been documented.     Deepa Mercado II, DO  11/23/24

## 2024-11-24 ENCOUNTER — APPOINTMENT (OUTPATIENT)
Dept: CARDIOLOGY | Facility: HOSPITAL | Age: 84
End: 2024-11-24
Payer: MEDICARE

## 2024-11-24 ENCOUNTER — APPOINTMENT (OUTPATIENT)
Dept: MRI IMAGING | Facility: HOSPITAL | Age: 84
End: 2024-11-24
Payer: MEDICARE

## 2024-11-24 LAB
ASCENDING AORTA: 3.8 CM
BH CV ECHO MEAS - AI P1/2T: 658.1 MSEC
BH CV ECHO MEAS - AO MAX PG: 5.1 MMHG
BH CV ECHO MEAS - AO MEAN PG: 3 MMHG
BH CV ECHO MEAS - AO ROOT DIAM: 3.2 CM
BH CV ECHO MEAS - AO V2 MAX: 113 CM/SEC
BH CV ECHO MEAS - AO V2 VTI: 27.9 CM
BH CV ECHO MEAS - AVA(I,D): 2.6 CM2
BH CV ECHO MEAS - EDV(CUBED): 140.6 ML
BH CV ECHO MEAS - EDV(MOD-SP2): 73.9 ML
BH CV ECHO MEAS - EDV(MOD-SP4): 102 ML
BH CV ECHO MEAS - EF(MOD-BP): 51.4 %
BH CV ECHO MEAS - EF(MOD-SP2): 50.1 %
BH CV ECHO MEAS - EF(MOD-SP4): 55.3 %
BH CV ECHO MEAS - ESV(CUBED): 24.4 ML
BH CV ECHO MEAS - ESV(MOD-SP2): 36.9 ML
BH CV ECHO MEAS - ESV(MOD-SP4): 45.6 ML
BH CV ECHO MEAS - FS: 44.2 %
BH CV ECHO MEAS - IVS/LVPW: 1 CM
BH CV ECHO MEAS - IVSD: 0.8 CM
BH CV ECHO MEAS - LA DIMENSION: 3 CM
BH CV ECHO MEAS - LAT PEAK E' VEL: 7.6 CM/SEC
BH CV ECHO MEAS - LV MASS(C)D: 145.2 GRAMS
BH CV ECHO MEAS - LV MAX PG: 3.1 MMHG
BH CV ECHO MEAS - LV MEAN PG: 2 MMHG
BH CV ECHO MEAS - LV V1 MAX: 88 CM/SEC
BH CV ECHO MEAS - LV V1 VTI: 22.7 CM
BH CV ECHO MEAS - LVIDD: 5.2 CM
BH CV ECHO MEAS - LVIDS: 2.9 CM
BH CV ECHO MEAS - LVOT AREA: 3.1 CM2
BH CV ECHO MEAS - LVOT DIAM: 2 CM
BH CV ECHO MEAS - LVPWD: 0.8 CM
BH CV ECHO MEAS - MED PEAK E' VEL: 5.2 CM/SEC
BH CV ECHO MEAS - MV A MAX VEL: 41.4 CM/SEC
BH CV ECHO MEAS - MV DEC TIME: 0.15 SEC
BH CV ECHO MEAS - MV E MAX VEL: 56.1 CM/SEC
BH CV ECHO MEAS - MV E/A: 1.36
BH CV ECHO MEAS - PA ACC TIME: 0.1 SEC
BH CV ECHO MEAS - RAP SYSTOLE: 3 MMHG
BH CV ECHO MEAS - RVSP: 36 MMHG
BH CV ECHO MEAS - SV(LVOT): 71.3 ML
BH CV ECHO MEAS - SV(MOD-SP2): 37 ML
BH CV ECHO MEAS - SV(MOD-SP4): 56.4 ML
BH CV ECHO MEAS - TAPSE (>1.6): 2.3 CM
BH CV ECHO MEAS - TR MAX PG: 32.9 MMHG
BH CV ECHO MEAS - TR MAX VEL: 287 CM/SEC
BH CV ECHO MEASUREMENTS AVERAGE E/E' RATIO: 8.77
BH CV XLRA - RV BASE: 3.3 CM
BH CV XLRA - RV LENGTH: 7.3 CM
BH CV XLRA - RV MID: 2.1 CM
BH CV XLRA - TDI S': 17.1 CM/SEC
CHOLEST SERPL-MCNC: 157 MG/DL (ref 0–200)
GLUCOSE BLDC GLUCOMTR-MCNC: 128 MG/DL (ref 70–130)
GLUCOSE BLDC GLUCOMTR-MCNC: 74 MG/DL (ref 70–130)
GLUCOSE BLDC GLUCOMTR-MCNC: 80 MG/DL (ref 70–130)
HBA1C MFR BLD: 4.9 % (ref 4.8–5.6)
HDLC SERPL-MCNC: 80 MG/DL (ref 40–60)
LDLC SERPL CALC-MCNC: 64 MG/DL (ref 0–100)
LDLC/HDLC SERPL: 0.8 {RATIO}
LEFT ATRIUM VOLUME INDEX: 37.9 ML/M2
TRIGL SERPL-MCNC: 67 MG/DL (ref 0–150)
VLDLC SERPL-MCNC: 13 MG/DL (ref 5–40)

## 2024-11-24 PROCEDURE — G0378 HOSPITAL OBSERVATION PER HR: HCPCS

## 2024-11-24 PROCEDURE — 82948 REAGENT STRIP/BLOOD GLUCOSE: CPT

## 2024-11-24 PROCEDURE — 99214 OFFICE O/P EST MOD 30 MIN: CPT | Performed by: STUDENT IN AN ORGANIZED HEALTH CARE EDUCATION/TRAINING PROGRAM

## 2024-11-24 PROCEDURE — 93306 TTE W/DOPPLER COMPLETE: CPT | Performed by: INTERNAL MEDICINE

## 2024-11-24 PROCEDURE — 92523 SPEECH SOUND LANG COMPREHEN: CPT

## 2024-11-24 PROCEDURE — 97166 OT EVAL MOD COMPLEX 45 MIN: CPT

## 2024-11-24 PROCEDURE — 99232 SBSQ HOSP IP/OBS MODERATE 35: CPT | Performed by: INTERNAL MEDICINE

## 2024-11-24 PROCEDURE — 83036 HEMOGLOBIN GLYCOSYLATED A1C: CPT

## 2024-11-24 PROCEDURE — 80061 LIPID PANEL: CPT

## 2024-11-24 PROCEDURE — 92610 EVALUATE SWALLOWING FUNCTION: CPT

## 2024-11-24 PROCEDURE — 93306 TTE W/DOPPLER COMPLETE: CPT

## 2024-11-24 PROCEDURE — 70551 MRI BRAIN STEM W/O DYE: CPT

## 2024-11-24 PROCEDURE — 97162 PT EVAL MOD COMPLEX 30 MIN: CPT

## 2024-11-24 PROCEDURE — 97116 GAIT TRAINING THERAPY: CPT

## 2024-11-24 PROCEDURE — 97530 THERAPEUTIC ACTIVITIES: CPT

## 2024-11-24 RX ADMIN — DIVALPROEX SODIUM 500 MG: 250 TABLET, DELAYED RELEASE ORAL at 21:10

## 2024-11-24 RX ADMIN — ATORVASTATIN CALCIUM 80 MG: 40 TABLET, FILM COATED ORAL at 21:09

## 2024-11-24 RX ADMIN — DIVALPROEX SODIUM 500 MG: 250 TABLET, DELAYED RELEASE ORAL at 15:26

## 2024-11-24 RX ADMIN — Medication 10 ML: at 21:15

## 2024-11-24 RX ADMIN — DIVALPROEX SODIUM 500 MG: 250 TABLET, DELAYED RELEASE ORAL at 10:53

## 2024-11-24 RX ADMIN — ASPIRIN 81 MG 81 MG: 81 TABLET ORAL at 10:53

## 2024-11-24 RX ADMIN — Medication 10 ML: at 10:54

## 2024-11-24 RX ADMIN — Medication 10 ML: at 05:47

## 2024-11-24 RX ADMIN — Medication 10 ML: at 10:55

## 2024-11-24 NOTE — PLAN OF CARE
Goal Outcome Evaluation:  Plan of Care Reviewed With: patient, spouse, daughter        Progress: no change  Outcome Evaluation: Pt presents below her functional baseline with deficits including RLE weakness/coordination, receptive aphasia, impaired balance, and ADLs warranting skilled OT services. Pt performed all functional transfers with CGA and ambulated short distance with Min A using the RW. Rec IPR at dc for best functional outcomes.    Anticipated Discharge Disposition (OT): inpatient rehabilitation facility

## 2024-11-24 NOTE — THERAPY EVALUATION
Patient Name: Jenna Russell  : 1940    MRN: [id number]                              Today's Date: 2024       Admit Date: 2024    Visit Dx:     ICD-10-CM ICD-9-CM   1. Expressive aphasia  R47.01 784.3   2. Acute right-sided weakness  R53.1 728.87   3. Confusion  R41.0 298.9   4. Right-sided visual neglect  R41.4 781.8     Patient Active Problem List   Diagnosis    PAF (paroxysmal atrial fibrillation)    HLD (hyperlipidemia)    Hypertension    History of CVA (cerebrovascular accident)    Combined receptive and expressive aphasia    Colitis, Clostridium difficile    Collagenous colitis    Seizure disorder    Closed right hip fracture, initial encounter    Anemia    Daily consumption of alcohol    Complex partial seizure    Right sided weakness     Past Medical History:   Diagnosis Date    Acute ischemic stroke     Arterial ischemic stroke 2016    Ataxic aphasia 2016    BP (high blood pressure) 2016    Cerebral infarction, left hemisphere 2016    Cyst of left ovary 2016    3cm    Expressive aphasia     H/O echocardiogram 2015    Global LV wall motion and contractility within normal limits. Normal LVSF.Normal LV diastolic filling. Left atrium midly dilated. RV mild- mod dilated. MIld aortic cusp sclerosis. No evidence of mitral valve prolapse. Mild mitral regurgitation.No pulmonary hypertension or perdicardial effusion. No dilation of aortic root. Venous system appears normal    H/O: stroke     HLD (hyperlipidemia) 2016    Left temporal lobe infarction     LOM (loss of memory)     Osteoarthritis 2016    Paroxysmal atrial fibrillation 2016    Thyroid nodule 2016    1.8x1.2cm    Weakness generalized     Wound infection after surgery     Left hip arthroplasty, staph aureus, 6 weeks IV Rocephin     Past Surgical History:   Procedure Laterality Date    ABDOMINOPLASTY      BREAST ABSCESS INCISION AND DRAINAGE      HIP TROCHANTERIC NAILING WITH INTRAMEDULLARY HIP  SCREW Right 3/11/2022    Procedure: HIP TROCHANTERIC NAILING WITH INTRAMEDULLARY HIP SCREW RIGHT;  Surgeon: Danny Lynne MD;  Location: [facility];  Service: Orthopedics;  Laterality: Right;    TOTAL HIP ARTHROPLASTY Left     Staph aureus wound infection      General Information       Row Name 11/24/24 1314          Physical Therapy Time and Intention    Document Type evaluation  -CD       Row Name 11/24/24 1314          General Information    Patient Profile Reviewed yes  -CD     Prior Level of Function independent:;all household mobility;gait;transfer;bed mobility;ADL's;dependent:;driving;home management  PER WIFE, PT HAS BEEN USING ROLLATOR SINCE FALL SEPT 4, 2024. HAS EXPRESSIVE APHASIA AT BASELINE FROM PRIOR CVA. PTA, WAS ABLE TO AMBULATE WITH ROLLATOR IN [facility]ALLWAYS OF [facility] FOR EXERCISE.  -CD     Existing Precautions/Restrictions fall;seizures  HAD SEIZURE IN ED,  APHASIA AT BASELINE,  EXPRESSIVE >RECEPTIVE. INCREASED RECEPTIVE APHASIA THIS ADMIT.  -CD     Barriers to Rehab medically complex;previous functional deficit  -CD       Row Name 11/24/24 1314          Living Environment    People in Home spouse  -CD       Row Name 11/24/24 1314          Home Main Entrance    Number of Stairs, Main Entrance none  -CD       Row Name 11/24/24 1314          Stairs Within Home, Primary    Number of Stairs, Within Home, Primary none  -CD       Row Name 11/24/24 1314          Cognition    Orientation Status (Cognition) oriented to;person;disoriented to;place;situation;time  APHASIA.  -CD       Row Name 11/24/24 1314          Safety Issues/Impairments Affecting Functional Mobility    Safety Issues Affecting Function (Mobility) positioning of assistive device;safety precaution awareness;safety precautions follow-through/compliance;insight into deficits/self-awareness;ability to follow commands;problem-solving;judgment;awareness of need for assistance;sequencing abilities  -CD     Impairments Affecting Function (Mobility)  balance;cognition;endurance/activity tolerance;strength;coordination;postural/trunk control;visual/perceptual  -CD     Cognitive Impairments, Mobility Safety/Performance attention;awareness, need for assistance;insight into deficits/self-awareness;impulsivity;judgment;problem-solving/reasoning;safety precaution awareness;safety precaution follow-through;sequencing abilities  -CD     Comment, Safety Issues/Impairments (Mobility) R SIDED INATTENTION, IMPAIRED COORDINATION RLE, DECREASED COMMAND FOLLOWING, AND MILD IMPULSIVITY. NEEDS CUES FOR SEQUENCING MOBILITY SAFELY. GAIT IS UNSTEADY WITH R LE SCISSORING.  -CD               User Key  (r) = Recorded By, (t) = Taken By, (c) = Cosigned By      Initials Name Provider Type    CD Gabby Salas PT Physical Therapist                   Mobility       Row Name 11/24/24 1322          Bed Mobility    Bed Mobility supine-sit  -CD     Supine-Sit [place] (Bed Mobility) minimum assist (75% patient effort);verbal cues  -CD     Assistive Device (Bed Mobility) bed rails;repositioning sheet;head of bed elevated  -CD     Comment, (Bed Mobility) INCREASED TIME AND EFFORT TO GET TO EOB WITH FEET ON FLOOR. MILD LOB TO THE R IN SITTING.  -CD       Row Name 11/24/24 1329          Transfers    Comment, (Transfers) CUES FOR HAND PLACEMENT. STS FROM EOB X 2 REPS. MOD ASSIST REQUIRED TO EFRAIN UNDERWEAR, PANTS AND SOCKS/SHOES.  -CD       Row Name 11/24/24 1326          Sit-Stand Transfer    Sit-Stand [place] (Transfers) minimum assist (75% patient effort);verbal cues  -CD     Assistive Device (Sit-Stand Transfers) walker, front-wheeled  -CD       Row Name 11/24/24 1324          Gait/Stairs (Locomotion)    [place] Level (Gait) moderate assist (50% patient effort);verbal cues  -CD     Assistive Device (Gait) walker, 4-wheeled  RECOMMEND REG ROLLING WALKER NEXT SESSION FOR INCREASED STABILITY.  -CD     Distance in Feet (Gait) 80  -CD     Deviations/Abnormal Patterns (Gait) bhavik  decreased;gait speed decreased;stride length decreased;base of support, narrow;scissoring  -CD     Bilateral Gait Deviations forward flexed posture;heel strike decreased  -CD     Right Sided Gait Deviations foot drop/toe drag;weight shift ability decreased;leans right  -CD     Comment, (Gait/Stairs) MOD ASSIST TO GUIDE ROLLATOR AND TO MAINTAIN BALANCE. R LE SCISSORING AND R LEAN OBSERVED. RECOMMEND CHAIR FOLLOW AND REG ROLLING WALKER NEXT SESSION.  -CD               User Key  (r) = Recorded By, (t) = Taken By, (c) = Cosigned By      Initials Name Provider Type    CD Gabby Salas, PT Physical Therapist                   Obj/Interventions       Row Name 11/24/24 1329          Range of Motion Comprehensive    General Range of Motion bilateral lower extremity ROM WFL  -CD       Row Name 11/24/24 1329          Strength Comprehensive (MMT)    General Manual Muscle Testing (MMT) Assessment lower extremity strength deficits identified  -CD     Comment, General Manual Muscle Testing (MMT) Assessment PT UNABLE TO FOLLOW COMMANDS FOR MMT. ABLE TO LIFT B LE'S ANTIGRAVITY WITH LAQ AND HIP FLEXION SO GROSSLY 3/5.  -CD       Row Name 11/24/24 1329          Motor Skills    Motor Skills functional endurance;coordination  -CD     Coordination gross motor deficit;right;lower extremity;moderate impairment  -CD     Functional Endurance O2 SATS STABLE ON RA.  -CD       Row Name 11/24/24 1329          Balance    Balance Assessment sitting static balance;sitting dynamic balance;standing static balance;standing dynamic balance  -CD     Static Sitting Balance contact guard  -CD     Dynamic Sitting Balance minimal assist  -CD     Position, Sitting Balance unsupported;sitting edge of bed  -CD     Static Standing Balance minimal assist  -CD     Dynamic Standing Balance moderate assist  -CD     Position/Device Used, Standing Balance walker, rolling  -CD     Balance Interventions sitting;standing;sit to stand;supported;static;dynamic;weight  shifting activity  -CD     Comment, Balance WORKED ON MIDLINE ORIENTATION WITH STATIC SITTING AND STANDING. MILD LOB TO THE L ON SEVERAL OCCASIONS SITTING EOB. GAIT UNSTEADY DESPITE R WALKER DUE TO SCISSORING R LE/ IMPAIRED BALANCE. PT IS HIGH FALL RISK.  -CD       Row Name 11/24/24 1326          Sensory Assessment (Somatosensory)    Sensory Assessment (Somatosensory) unable/difficult to assess  -CD               User Key  (r) = Recorded By, (t) = Taken By, (c) = Cosigned By      Initials Name Provider Type    CD Gabby Salas, PT Physical Therapist                   Goals/Plan       Row Name 11/24/24 6883          Bed Mobility Goal 1 (PT)    Activity/Assistive Device (Bed Mobility Goal 1, PT) sit to supine/supine to sit  -CD     [place] Level/Cues Needed (Bed Mobility Goal 1, PT) standby assist  -CD     Time Frame (Bed Mobility Goal 1, PT) short term goal (STG);1 week  -CD       Row Name 11/24/24 8720          Transfer Goal 1 (PT)    Activity/Assistive Device (Transfer Goal 1, PT) sit-to-stand/stand-to-sit;bed-to-chair/chair-to-bed  -CD     [place] Level/Cues Needed (Transfer Goal 1, PT) contact guard required  -CD     Time Frame (Transfer Goal 1, PT) long term goal (LTG);2 weeks  -CD       Row Name 11/24/24 2212          Gait Training Goal 1 (PT)    Activity/Assistive Device (Gait Training Goal 1, PT) gait (walking locomotion);walker, rolling  -CD     [place] Level (Gait Training Goal 1, PT) contact guard required  -CD     Distance (Gait Training Goal 1, PT) 300  -CD     Time Frame (Gait Training Goal 1, PT) long term goal (LTG);2 weeks  -CD       Row Name 11/24/24 0999          Therapy Assessment/Plan (PT)    Planned Therapy Interventions (PT) balance training;bed mobility training;strengthening;transfer training;patient/family education;home exercise program;gait training;motor coordination training;neuromuscular re-education  -CD               User Key  (r) = Recorded By, (t) = Taken By, (c) =  Cosigned By      Initials Name Provider Type    CD Gabby Salas, PT Physical Therapist                   Clinical Impression       Row Name 11/24/24 1335          Pain    Additional Documentation Pain Scale: FACES Pre/Post-Treatment (Group)  -CD       Row Name 11/24/24 2725          Pain Scale: FACES Pre/Post-Treatment    Pain: FACES Scale, Pretreatment 0-->no hurt  -CD     Posttreatment Pain Rating 0-->no hurt  -CD       Row Name 11/24/24 0146          Plan of Care Review    Plan of Care Reviewed With patient;spouse;child  -CD     Outcome Evaluation PT PRESENTS WITH EVOLVING SYMPTOMS TO INCLUDE IMPAIRED BALANCE, IMPAIRED COORDINATION R LE, GENERALIZED WEAKNESS, R INATTENTION, WORSENED APHASIA FROM BASELINE AND DECLINE IN FUNCTIONAL MOBILITY. PT REQUIRED MOD ASSIST TO GUIDE ROLLATOR AND TO MAINTAIN BALANCE IN STANDING. R LE SCISSORING AND R LEAN OBSERVED DURING GAIT SHORT DISTANCE. RECOMMEND CHAIR FOLLOW AND REG ROLLING WALKER NEXT SESSION. RECOMMEND IRF AT D/C. FAMILY/PT TO CONSIDER BUT MIGHT CHOOSE TO OPT FOR HIRED CAREGIVER AND HHPT AT D/C. BP ELEVATED, NSG AWARE AND MANAGING.  -CD       Row Name 11/24/24 6891          Therapy Assessment/Plan (PT)    Patient/Family Therapy Goals Statement (PT) TO GO HOME.  -CD     Rehab Potential (PT) good  -CD     Criteria for Skilled Interventions Met (PT) yes;meets criteria;skilled treatment is necessary  -CD     Therapy Frequency (PT) daily  -CD     Predicted Duration of Therapy Intervention (PT) 2 WEEKS.  -CD       Row Name 11/24/24 1335          Vital Signs    Pre Systolic BP Rehab 179  -CD     Pre Treatment Diastolic BP 72  -CD     Intra Systolic BP Rehab 182  -CD     Intra Treatment Diastolic   -CD     Post Systolic BP Rehab 196  -CD     Post Treatment Diastolic BP 72  -CD     Posttreatment Heart Rate (beats/min) 59  -CD     Pre SpO2 (%) 98  -CD     O2 Delivery Pre Treatment room air  -CD     O2 Delivery Intra Treatment room air  -CD     Post SpO2 (%) 98  -CD      O2 Delivery Post Treatment room air  -CD     Pre Patient Position Supine  -CD     Intra Patient Position Standing  -CD     Post Patient Position Sitting  -CD       Row Name 11/24/24 1333          Positioning and Restraints    Pre-Treatment Position in bed  -CD     Post Treatment Position chair  -CD     In Chair reclined;call light within reach;encouraged to call for assist;with family/caregiver;notified nsg;RUE elevated;exit alarm on;LUE elevated;legs elevated  -CD               User Key  (r) = Recorded By, (t) = Taken By, (c) = Cosigned By      Initials Name Provider Type    Gabby Bailey, PT Physical Therapist                   Outcome Measures       Row Name 11/24/24 1341 11/24/24 1031       How much help from another person do you currently need...    Turning from your back to your side while in flat bed without using bedrails? 3  -CD 3  -LM    Moving from lying on back to sitting on the side of a flat bed without bedrails? 3  -CD 3  -LM    Moving to and from a bed to a chair (including a wheelchair)? 3  -CD 3  -LM    Standing up from a chair using your arms (e.g., wheelchair, bedside chair)? 3  -CD 3  -LM    Climbing 3-5 steps with a railing? 2  -CD 3  -LM    To walk in hospital room? 2  -CD 3  -LM    AM-PAC 6 Clicks Score (PT) 16  -CD 18  -LM    Highest Level of Mobility Goal 5 --> Static standing  -CD 6 --> Walk 10 steps or more  -LM      Row Name 11/24/24 1341          Modified [place] Scale    Modified [place] Scale 4 - Moderately severe disability.  Unable to walk without assistance, and unable to attend to own bodily needs without assistance.  -CD       Row Name 11/24/24 1341          Functional Assessment    Outcome Measure Options AM-PAC 6 Clicks Basic Mobility (PT);Modified [place]  -CD               User Key  (r) = Recorded By, (t) = Taken By, (c) = Cosigned By      Initials Name Provider Type    Gabby Bailey, PT Physical Therapist    Estela Martinez RN Registered Nurse                                  Physical Therapy Education       Title: PT OT SLP Therapies (In Progress)       Topic: Physical Therapy (Done)       Point: Mobility training (Done)       Learning Progress Summary            Patient Acceptance, E, VU,NR by CD at 11/24/2024 1342    Comment: BENEFITS OF OOB ACTIVITY, SAFETY WITH MOBILITY, PROGRESSION OF POC, D/C PLANNING,                      Point: Home exercise program (Done)       Learning Progress Summary            Patient Acceptance, E, VU,NR by CD at 11/24/2024 1342    Comment: BENEFITS OF OOB ACTIVITY, SAFETY WITH MOBILITY, PROGRESSION OF POC, D/C PLANNING,                      Point: Body mechanics (Done)       Learning Progress Summary            Patient Acceptance, E, VU,NR by CD at 11/24/2024 1342    Comment: BENEFITS OF OOB ACTIVITY, SAFETY WITH MOBILITY, PROGRESSION OF POC, D/C PLANNING,                      Point: Precautions (Done)       Learning Progress Summary            Patient Acceptance, E, VU,NR by CD at 11/24/2024 1342    Comment: BENEFITS OF OOB ACTIVITY, SAFETY WITH MOBILITY, PROGRESSION OF POC, D/C PLANNING,                                      User Key       Initials Effective Dates Name Provider Type Discipline    CD 02/03/23 -  Gabby Salas, PT Physical Therapist PT                  PT Recommendation and Plan  Planned Therapy Interventions (PT): balance training, bed mobility training, strengthening, transfer training, patient/family education, home exercise program, gait training, motor coordination training, neuromuscular re-education  Outcome Evaluation: PT PRESENTS WITH EVOLVING SYMPTOMS TO INCLUDE IMPAIRED BALANCE, IMPAIRED COORDINATION R LE, GENERALIZED WEAKNESS, R INATTENTION, WORSENED APHASIA FROM BASELINE AND DECLINE IN FUNCTIONAL MOBILITY. PT REQUIRED MOD ASSIST TO GUIDE ROLLATOR AND TO MAINTAIN BALANCE IN STANDING. R LE SCISSORING AND R LEAN OBSERVED DURING GAIT SHORT DISTANCE. RECOMMEND CHAIR FOLLOW AND REG ROLLING WALKER NEXT SESSION. RECOMMEND  IRF AT D/C. FAMILY/PT TO CONSIDER BUT MIGHT CHOOSE TO OPT FOR HIRED CAREGIVER AND HHPT AT D/C. BP ELEVATED, NSG AWARE AND MANAGING.     Time Calculation:   PT Evaluation Complexity  History, PT Evaluation Complexity: 3 or more personal factors and/or comorbidities  Examination of Body Systems (PT Eval Complexity): total of 4 or more elements  Clinical Presentation (PT Evaluation Complexity): evolving  Clinical Decision Making (PT Evaluation Complexity): moderate complexity  Overall Complexity (PT Evaluation Complexity): moderate complexity     PT Charges       Row Name 11/24/24 1344 11/24/24 1228 11/24/24 1148       Time Calculation    Start Time 1035  -CD -- --    PT Received On 11/24/24  -CD -- --    PT Goal Re-Cert Due Date 12/04/24  -CD -- --       Time Calculation- PT    Total Timed Code Minutes- PT 24 minute(s)  -CD -- --       Timed Charges    70789 - Gait Training Minutes  10  -CD -- --    99495 - PT Therapeutic Activity Minutes 14  -CD -- --       Untimed Charges    PT Eval/Re-eval Minutes 60  -CD -- --       Total Minutes    Timed Charges Total Minutes 24  -CD -- --    Untimed Charges Total Minutes 60  -CD -- --     Total Minutes 84  -CD -- --       PT/SLP KX Modifier    Exception criteria met to exceed therapy cap -- Apply KX Modifier  -HG Apply KX Modifier  -CD              User Key  (r) = Recorded By, (t) = Taken By, (c) = Cosigned By      Initials Name Provider Type    CD Gabby Salas, PT Physical Therapist    HG Alyssa Aguillon, MS CCC-SLP Speech and Language Pathologist                  Therapy Charges for Today       Code Description Service Date Service Provider Modifiers Qty    27727292683 HC GAIT TRAINING EA 15 MIN 11/24/2024 Gabby Salas, PT GP, KX 1    97523885200 HC PT THERAPEUTIC ACT EA 15 MIN 11/24/2024 Gabby Salas, PT GP, KX 1    59561200375 HC PT EVAL MOD COMPLEXITY 4 11/24/2024 Gabby Salas, PT GP, KX 1            PT G-Codes  Outcome Measure Options: AM-PAC 6 Clicks Basic  Mobility (PT), Modified [place]  AM-PAC 6 Clicks Score (PT): 16  Modified [place] Scale: 4 - Moderately severe disability.  Unable to walk without assistance, and unable to attend to own bodily needs without assistance.  PT Discharge Summary  Anticipated Discharge Disposition (PT): inpatient rehabilitation facility    Gabby Salas, PT  11/24/2024

## 2024-11-24 NOTE — THERAPY EVALUATION
Patient Name: Jenna Russell  : 1940    MRN: [id number]                              Today's Date: 2024       Admit Date: 2024    Visit Dx:     ICD-10-CM ICD-9-CM   1. Expressive aphasia  R47.01 784.3   2. Acute right-sided weakness  R53.1 728.87   3. Confusion  R41.0 298.9   4. Right-sided visual neglect  R41.4 781.8     Patient Active Problem List   Diagnosis    PAF (paroxysmal atrial fibrillation)    HLD (hyperlipidemia)    Hypertension    History of CVA (cerebrovascular accident)    Combined receptive and expressive aphasia    Colitis, Clostridium difficile    Collagenous colitis    Seizure disorder    Closed right hip fracture, initial encounter    Anemia    Daily consumption of alcohol    Complex partial seizure    Right sided weakness     Past Medical History:   Diagnosis Date    Acute ischemic stroke     Arterial ischemic stroke 2016    Ataxic aphasia 2016    BP (high blood pressure) 2016    Cerebral infarction, left hemisphere 2016    Cyst of left ovary 2016    3cm    Expressive aphasia     H/O echocardiogram 2015    Global LV wall motion and contractility within normal limits. Normal LVSF.Normal LV diastolic filling. Left atrium midly dilated. RV mild- mod dilated. MIld aortic cusp sclerosis. No evidence of mitral valve prolapse. Mild mitral regurgitation.No pulmonary hypertension or perdicardial effusion. No dilation of aortic root. Venous system appears normal    H/O: stroke     HLD (hyperlipidemia) 2016    Left temporal lobe infarction     LOM (loss of memory)     Osteoarthritis 2016    Paroxysmal atrial fibrillation 2016    Thyroid nodule 2016    1.8x1.2cm    Weakness generalized     Wound infection after surgery     Left hip arthroplasty, staph aureus, 6 weeks IV Rocephin     Past Surgical History:   Procedure Laterality Date    ABDOMINOPLASTY      BREAST ABSCESS INCISION AND DRAINAGE      HIP TROCHANTERIC NAILING WITH INTRAMEDULLARY HIP  SCREW Right 3/11/2022    Procedure: HIP TROCHANTERIC NAILING WITH INTRAMEDULLARY HIP SCREW RIGHT;  Surgeon: Danny Lynne MD;  Location: [facility];  Service: Orthopedics;  Laterality: Right;    TOTAL HIP ARTHROPLASTY Left     Staph aureus wound infection      General Information       Row Name 11/24/24 1431          OT Time and Intention    Document Type evaluation  -AN     Mode of Treatment occupational therapy  -AN       Row Name 11/24/24 [facility]          General Information    Patient Profile Reviewed yes  -AN     Prior Level of Function independent:;all household mobility;community mobility;gait;ADL's;dependent:;home management;driving  Pt using rollator since Sep and has expressive aphasia at baseline  -AN     Existing Precautions/Restrictions fall;seizures  expressive aphasia (baseline), new receptive aphasia  -AN     Barriers to Rehab medically complex;cognitive status;hearing deficit;previous functional deficit  -AN       Row Name 11/24/24 [facility]          Living Environment    People in Home spouse  -AN       Row Name 11/24/24 [facility]          Home Main Entrance    Number of Stairs, Main Entrance none  -AN       Row Name 11/24/24 [facility]          Stairs Within Home, Primary    Number of Stairs, Within Home, Primary none  -AN       Row Name 11/24/24 [facility]          Cognition    Orientation Status (Cognition) oriented to;person;place;situation;disoriented to;time  -AN       Row Name 11/24/24 [facility]          Safety Issues/Impairments Affecting Functional Mobility    Safety Issues Affecting Function (Mobility) awareness of need for assistance;insight into deficits/self-awareness;judgment;sequencing abilities;safety precautions follow-through/compliance;safety precaution awareness;problem-solving  -AN     Impairments Affecting Function (Mobility) balance;cognition;endurance/activity tolerance;strength;coordination;postural/trunk control;visual/perceptual  -AN     Cognitive Impairments, Mobility Safety/Performance  attention;safety precaution follow-through;awareness, need for assistance;sequencing abilities;insight into deficits/self-awareness;judgment;problem-solving/reasoning;safety precaution awareness  -AN               User Key  (r) = Recorded By, (t) = Taken By, (c) = Cosigned By      Initials Name Provider Type    Dilma Levin OT Occupational Therapist                     Mobility/ADL's       Row Name 11/24/24 1435          Bed Mobility    Comment, (Bed Mobility) UIC upon arrival  -AN       Row Name 11/24/24 1435          Transfers    Transfers sit-stand transfer;stand-sit transfer  -AN     Comment, (Transfers) Cues for hand placement and transfer technique using the RW  -AN       Row Name 11/24/24 1435          Sit-Stand Transfer    Sit-Stand [place] (Transfers) contact guard  -AN     Assistive Device (Sit-Stand Transfers) walker, front-wheeled  -AN       Row Name 11/24/24 1435          Stand-Sit Transfer    Stand-Sit [place] (Transfers) contact guard  -AN     Assistive Device (Stand-Sit Transfers) walker, front-wheeled  -AN       Row Name 11/24/24 1435          Functional Mobility    Functional Mobility- Ind. Level minimum assist (75% patient effort);1 person;verbal cues required  -AN     Functional Mobility- Device walker, front-wheeled  -AN     Functional Mobility-Distance (Feet) --  <household distance  -AN     Functional Mobility- Safety Issues sequencing ability decreased;step length decreased  -AN     Functional Mobility- Comment Pt ambulated <household distance using the RW with Min A provided at trunk and at RW for navigation. Pt did not like using RW compared to rollator and had difficulty with navigation veering to the L.  -AN       Row Name 11/24/24 1435          Activities of Daily Living    BADL Assessment/Intervention lower body dressing;upper body dressing  -AN       Row Name 11/24/24 1435          Lower Body Dressing Assessment/Training    [place] Level (Lower Body Dressing)  don;doff;socks;moderate assist (50% patient effort)  -AN     Position (Lower Body Dressing) unsupported sitting  -AN       Row Name 11/24/24 1435          Upper Body Dressing Assessment/Training    [place] Level (Upper Body Dressing) don;pajama/robe;set up  -AN     Position (Upper Body Dressing) unsupported sitting  -AN               User Key  (r) = Recorded By, (t) = Taken By, (c) = Cosigned By      Initials Name Provider Type    AN Dilma Carpenter OT Occupational Therapist                   Obj/Interventions       Row Name 11/24/24 1437          Sensory Assessment (Somatosensory)    Sensory Assessment (Somatosensory) UE sensation intact  -AN       Row Name 11/24/24 1437          Vision Assessment/Intervention    Visual Impairment/Limitations WFL  -AN       Row Name 11/24/24 1437          Range of Motion Comprehensive    General Range of Motion bilateral upper extremity ROM WFL  -AN       Row Name 11/24/24 1437          Strength Comprehensive (MMT)    General Manual Muscle Testing (MMT) Assessment upper extremity strength deficits identified  -AN     Comment, General Manual Muscle Testing (MMT) Assessment hx of R shoulder injury limiting to ~90*, therefore deferred MMT; BUE elbow, wrist, and hand 4/5  -AN       Row Name 11/24/24 1437          Motor Skills    Motor Skills coordination  -AN     Coordination fine motor deficit;gross motor deficit;bilateral;upper extremity;WFL  -AN       Row Name 11/24/24 1437          Balance    Balance Assessment sitting static balance;sitting dynamic balance;sit to stand dynamic balance;standing dynamic balance;standing static balance  -AN     Static Sitting Balance standby assist  -AN     Dynamic Sitting Balance standby assist  -AN     Position, Sitting Balance sitting in chair  -AN     Sit to Stand Dynamic Balance verbal cues;contact guard  -AN     Static Standing Balance minimal assist;1-person assist  -AN     Dynamic Standing Balance minimal assist;1-person assist;verbal  cues  -AN     Position/Device Used, Standing Balance supported;walker, rolling  -AN     Balance Interventions standing;sit to stand;supported;static;dynamic;minimal challenge;occupation based/functional task  -AN               User Key  (r) = Recorded By, (t) = Taken By, (c) = Cosigned By      Initials Name Provider Type    Dilma Levin OT Occupational Therapist                   Goals/Plan       Row Name 11/24/24 1440          Bed Mobility Goal 1 (OT)    Activity/Assistive Device (Bed Mobility Goal 1, OT) sit to supine/supine to sit  -AN     [place] Level/Cues Needed (Bed Mobility Goal 1, OT) contact guard required  -AN     Time Frame (Bed Mobility Goal 1, OT) short term goal (STG);3 days  -AN       Row Name 11/24/24 1440          Transfer Goal 1 (OT)    Activity/Assistive Device (Transfer Goal 1, OT) sit-to-stand/stand-to-sit;toilet;walker, rolling  -AN     [place] Level/Cues Needed (Transfer Goal 1, OT) contact guard required  -AN     Time Frame (Transfer Goal 1, OT) long term goal (LTG);1 week  -AN       Row Name 11/24/24 1440          Dressing Goal 1 (OT)    Activity/Device (Dressing Goal 1, OT) lower body dressing  -AN     [place]/Cues Needed (Dressing Goal 1, OT) minimum assist (75% or more patient effort)  -AN     Time Frame (Dressing Goal 1, OT) long term goal (LTG);1 week  -AN       Row Name 11/24/24 1440          Toileting Goal 1 (OT)    Activity/Device (Toileting Goal 1, OT) adjust/manage clothing;perform perineal hygiene;commode  -AN     [place] Level/Cues Needed (Toileting Goal 1, OT) minimum assist (75% or more patient effort)  -AN     Time Frame (Toileting Goal 1, OT) long term goal (LTG);1 week  -AN       Row Name 11/24/24 1440          Therapy Assessment/Plan (OT)    Planned Therapy Interventions (OT) activity tolerance training;adaptive equipment training;BADL retraining;cognitive/visual perception retraining;functional balance retraining;patient/caregiver  education/training;transfer/mobility retraining;strengthening exercise;occupation/activity based interventions  -AN               User Key  (r) = Recorded By, (t) = Taken By, (c) = Cosigned By      Initials Name Provider Type    Dilma Levin, OT Occupational Therapist                   Clinical Impression       Row Name 11/24/24 1438          Pain Assessment    Pretreatment Pain Rating 0/10 - no pain  -AN     Posttreatment Pain Rating 0/10 - no pain  -AN       Row Name 11/24/24 1438          Plan of Care Review    Plan of Care Reviewed With patient;spouse;daughter  -AN     Progress no change  -AN     Outcome Evaluation Pt presents below her functional baseline with deficits including RLE weakness/coordination, receptive aphasia, impaired balance, and ADLs warranting skilled OT services. Pt performed all functional transfers with CGA and ambulated short distance with Min A using the RW. Rec IPR at dc for best functional outcomes.  -AN       Row Name 11/24/24 1438          Therapy Assessment/Plan (OT)    Patient/Family Therapy Goal Statement (OT) Return to PLOF  -AN     Rehab Potential (OT) good  -AN     Criteria for Skilled Therapeutic Interventions Met (OT) yes;skilled treatment is necessary  -AN     Therapy Frequency (OT) daily  -AN     Predicted Duration of Therapy Intervention (OT) 5 days  -AN       Row Name 11/24/24 1438          Therapy Plan Review/Discharge Plan (OT)    Anticipated Discharge Disposition (OT) inpatient rehabilitation facility  -AN       Row Name 11/24/24 1438          Vital Signs    Pre Systolic BP Rehab 196  -AN     Pre Treatment Diastolic BP 72  -AN     Post Systolic BP Rehab 153  -AN     Post Treatment Diastolic BP 71  -AN     Pre Patient Position Sitting  -AN     Intra Patient Position Standing  -AN     Post Patient Position Sitting  -AN       Row Name 11/24/24 1438          Positioning and Restraints    Pre-Treatment Position sitting in chair/recliner  -AN     Post Treatment  Position chair  -AN     In Chair notified nsg;reclined;call light within reach;encouraged to call for assist;exit alarm on;with family/caregiver;waffle cushion;legs elevated  -AN               User Key  (r) = Recorded By, (t) = Taken By, (c) = Cosigned By      Initials Name Provider Type    Dilma Levin, AMANDA Occupational Therapist                   Outcome Measures       Row Name 11/24/24 1441          How much help from another is currently needed...    Putting on and taking off regular lower body clothing? 2  -AN     Bathing (including washing, rinsing, and drying) 2  -AN     Toileting (which includes using toilet bed pan or urinal) 2  -AN     Putting on and taking off regular upper body clothing 3  -AN     Taking care of personal grooming (such as brushing teeth) 3  -AN     Eating meals 3  -AN     AM-PAC 6 Clicks Score (OT) 15  -AN       Row Name 11/24/24 1341 11/24/24 1031       How much help from another person do you currently need...    Turning from your back to your side while in flat bed without using bedrails? 3  -CD 3  -LM    Moving from lying on back to sitting on the side of a flat bed without bedrails? 3  -CD 3  -LM    Moving to and from a bed to a chair (including a wheelchair)? 3  -CD 3  -LM    Standing up from a chair using your arms (e.g., wheelchair, bedside chair)? 3  -CD 3  -LM    Climbing 3-5 steps with a railing? 2  -CD 3  -LM    To walk in hospital room? 2  -CD 3  -LM    AM-PAC 6 Clicks Score (PT) 16  -CD 18  -LM    Highest Level of Mobility Goal 5 --> Static standing  -CD 6 --> Walk 10 steps or more  -LM      Row Name 11/24/24 1441 11/24/24 1341       Modified Ank[place] Scale    Pre-Stroke Modified [place] Scale 6 - Unable to determine (UTD) from the medical record documentation  -AN --    Modified [place] Scale 4 - Moderately severe disability.  Unable to walk without assistance, and unable to attend to own bodily needs without assistance.  -AN 4 - Moderately severe disability.  Unable to  walk without assistance, and unable to attend to own bodily needs without assistance.  -CD      Row Name 11/24/24 1441 11/24/24 1341       Functional Assessment    Outcome Measure Options AM-PAC 6 Clicks Daily Activity (OT);Modified [place]  -AN AM-PAC 6 Clicks Basic Mobility (PT);Modified [place]  -CD              User Key  (r) = Recorded By, (t) = Taken By, (c) = Cosigned By      Initials Name Provider Type    CD Gabby Salas, PT Physical Therapist    Estela Martinez, RN Registered Nurse    Dilma Levin OT Occupational Therapist                    Occupational Therapy Education       Title: PT OT SLP Therapies (In Progress)       Topic: Occupational Therapy (In Progress)       Point: ADL training (Done)       Description:   Instruct learner(s) on proper safety adaptation and remediation techniques during self care or transfers.   Instruct in proper use of assistive devices.                  Learning Progress Summary            Patient Acceptance, E, VU by AN at 11/24/2024 1441   Family Acceptance, E, VU by AN at 11/24/2024 1441   Significant Other Acceptance, E, VU by AN at 11/24/2024 1441                      Point: Home exercise program (Not Started)       Description:   Instruct learner(s) on appropriate technique for monitoring, assisting and/or progressing therapeutic exercises/activities.                  Learner Progress:  Not documented in this visit.              Point: Precautions (Done)       Description:   Instruct learner(s) on prescribed precautions during self-care and functional transfers.                  Learning Progress Summary            Patient Acceptance, E, VU by AN at 11/24/2024 1441   Family Acceptance, E, VU by AN at 11/24/2024 1441   Significant Other Acceptance, E, VU by AN at 11/24/2024 1441                      Point: Body mechanics (Done)       Description:   Instruct learner(s) on proper positioning and spine alignment during self-care, functional mobility activities  and/or exercises.                  Learning Progress Summary            Patient Acceptance, E, VU by AN at 11/24/2024 1441   Family Acceptance, E, VU by AN at 11/24/2024 1441   Significant Other Acceptance, E, VU by AN at 11/24/2024 1441                                      User Key       Initials Effective Dates Name Provider Type Discipline    AN 09/21/21 -  Dilma Carpenter, OT Occupational Therapist OT                  OT Recommendation and Plan  Planned Therapy Interventions (OT): activity tolerance training, adaptive equipment training, BADL retraining, cognitive/visual perception retraining, functional balance retraining, patient/caregiver education/training, transfer/mobility retraining, strengthening exercise, occupation/activity based interventions  Therapy Frequency (OT): daily  Plan of Care Review  Plan of Care Reviewed With: patient, spouse, daughter  Progress: no change  Outcome Evaluation: Pt presents below her functional baseline with deficits including RLE weakness/coordination, receptive aphasia, impaired balance, and ADLs warranting skilled OT services. Pt performed all functional transfers with CGA and ambulated short distance with Min A using the RW. Rec IPR at dc for best functional outcomes.     Time Calculation:   Evaluation Complexity (OT)  Review Occupational Profile/Medical/Therapy History Complexity: expanded/moderate complexity  Assessment, Occupational Performance/Identification of Deficit Complexity: 3-5 performance deficits  Clinical Decision Making Complexity (OT): detailed assessment/moderate complexity  Overall Complexity of Evaluation (OT): moderate complexity     Time Calculation- OT       Row Name 11/24/24 1442 11/24/24 1344          Time Calculation- OT    OT Start Time 1340  -AN --     OT Received On 11/24/24  -AN --     OT Goal Re-Cert Due Date 12/04/24  -AN --        Timed Charges    53419 - Gait Training Minutes  -- 10  -CD        Untimed Charges    OT Eval/Re-eval Minutes  48  -AN --        Total Minutes    Timed Charges Total Minutes -- 10  -CD     Untimed Charges Total Minutes 48  -AN --      Total Minutes 48  -AN 10  -CD               User Key  (r) = Recorded By, (t) = Taken By, (c) = Cosigned By      Initials Name Provider Type    Gabby Bailey, PT Physical Therapist    Dilma Levin OT Occupational Therapist                  Therapy Charges for Today       Code Description Service Date Service Provider Modifiers Qty    [id number] HC OT EVAL MOD COMPLEXITY 4 11/24/2024 Dilma Carpenter OT GO, KX 1                 Dilma Carpenter OT  11/24/2024

## 2024-11-24 NOTE — PROGRESS NOTES
[facility] Medicine Services  PROGRESS NOTE    Patient Name: Jenna Russell  : 1940  MRN: [id number]    Date of Admission: 2024  Primary Care Physician: Judi Rodriguez MD    Subjective   Subjective     CC:  F/u stroke rule out/seizure    HPI:  Patient resting in bed. Getting Echo done. Wife at bedside, says she is doing better now, IV ativan yesterday really made her very drowsy/sleepy.      Objective   Objective     Vital Signs:   Temp:  [97 °F (36.1 °C)-97.7 °F (36.5 °C)] 97.2 °F (36.2 °C)  Heart Rate:  [51-73] 55  Resp:  [14-20] 14  BP: (119-201)/(60-95) 150/71  Flow (L/min) (Oxygen Therapy):  [2] 2     Physical Exam:  Constitutional: No acute distress, awake, alert  HENT: NCAT, mucous membranes moist  Respiratory: Clear to auscultation bilaterally, respiratory effort normal   Cardiovascular: RRR, no murmurs, rubs, or gallops  Gastrointestinal: soft, nontender, nondistended  Musculoskeletal: No bilateral ankle edema  Psychiatric: Appropriate affect, cooperative  Neurologic: Oriented to person, place, somewhat confused at times, right sided weakness   Skin: No rashes      Results Reviewed:  LAB RESULTS:      Lab 24  1233   WBC 7.19   HEMOGLOBIN 12.2   HEMATOCRIT 37.7   PLATELETS 152   NEUTROS ABS 4.10   IMMATURE GRANS (ABS) 0.03   LYMPHS ABS 2.15   MONOS ABS 0.81   EOS ABS 0.08   MCV 97.7*   PROTIME 13.1   APTT 28.7   HSTROP T 14*         Lab 24  0544   HEMOGLOBIN A1C 4.90         Lab 24  1233   ALT (SGPT) 20   AST (SGOT) 46*         Lab 24  1233   HSTROP T 14*   PROTIME 13.1   INR 0.98         Lab 24  0544   CHOLESTEROL 157   LDL CHOL 64   HDL CHOL 80*   TRIGLYCERIDES 67             Brief Urine Lab Results       None            Microbiology Results Abnormal       None            MRI Brain Without Contrast    Result Date: 2024  MRI BRAIN WO CONTRAST Date of Exam: 2024 12:16 AM EST Indication: Stroke, follow up right sided  weakness.  Comparison: CT earlier the same day. Technique:  Routine multiplanar/multisequence sequence images of the brain were obtained without contrast administration. Findings: No acute infarct is present on diffusion weighted sequences. Midline structures are normal and the craniocervical junction appears satisfactory. There is an area of old infarct seen within the left temporal lobe with some associated prominent volume loss  and surrounding gliosis. Age-related changes are present with moderate generalized volume loss and typical T2 hyperintense subcortical and pontine white matter changes, nonspecific but favored to reflect sequela of chronic microvascular ischemia. There is otherwise no evidence of intracranial hemorrhage, mass or mass effect. The ventricles demonstrate some expected ex vacuo prominence. The orbits appear normal. The paranasal sinuses are clear.     Impression: Impression: No acute intracranial findings. Redemonstrated remote left MCA territory infarct without evidence of acute ischemia, hemorrhage or mass effect. Electronically Signed: Randal Allen MD  11/24/2024 6:35 AM EST  Workstation ID: [id number]    XR Chest 1 View    Result Date: 11/23/2024  XR CHEST 1 VW Date of Exam: 11/23/2024 12:34 PM EST Indication: Acute Stroke Protocol (onset < 12 hrs) Comparison: Chest x-ray 8/19/2024 Findings: Redemonstration of left atrial appendage occlusion device. Stable cardiomediastinal silhouette with top normal size of the heart. There is mild diffuse interstitial prominence likely chronic/senescent interstitial change, without evidence of focal consolidation. No pleural effusion. No pneumothorax. The bones are demineralized without acute osseous findings.     Impression: Impression: No acute cardiopulmonary findings. Electronically Signed: Orestes Ferrara MD  11/23/2024 1:08 PM EST  Workstation ID: [id number]    CT Angiogram Head w AI Analysis of LVO    Result Date: 11/23/2024  CT CEREBRAL PERFUSION  W WO CONTRAST, CT ANGIOGRAM HEAD W AI ANALYSIS OF LVO, CT ANGIOGRAM NECK Date of Exam: 11/23/2024 12:26 PM EST Indication: Neuro Deficit, acute, Stroke suspected Neuro deficit, acute stroke suspected.  Comparison: Concurrent noncontrast head CT, CTA head and neck 10/3/2021 Technique: Axial CT images of the brain were obtained prior to and after the administration of 115 mL Isovue-370. Core blood volume, core blood flow, mean transit time, and Tmax images were obtained utilizing the Rapid software protocol. A limited CT angiogram of the head was also performed to measure the blood vessel density. CTA of the head and neck was performed after the uneventful intravenous administration of above contrast. Reconstructed coronal and sagittal images were also obtained. In addition, a 3-D volume rendered image was created for interpretation. Automated exposure control and iterative reconstruction methods were used. The radiation dose reduction device was turned on for each scan per the ALARA (As Low as Reasonably Achievable) protocol. Findings: CT cerebral perfusion: There is small scattered perfusion defects in the area of chronic infarct/encephalomalacia in the left MCA territory. Accounting for this, no evidence of core infarct or ischemic tissue at risk. CTA HEAD: No abrupt cut off/large vessel occlusion, flow-limiting stenosis, dissection, or aneurysm. There is some luminal irregularity and mild stenosis at the carotid siphons from calcified atherosclerosis. There is fetal origin of the bilateral PCAs, normal variant. The cerebral veins and dural venous sinuses appear grossly patent. There is atherosclerosis of the bilateral V4 segments with mild luminal irregularity without flow-limiting stenosis. CTA NECK: No abrupt cut off/large vessel occlusion, flow-limiting stenosis, dissection, or aneurysm. There is atherosclerosis at the carotid bifurcations without flow-limiting stenosis or appreciable luminal narrowing.  Extravascular findings: Upper lungs are grossly clear. There is a hypodense 1.5 cm nodule in the right thyroid lobe. The pharyngeal and laryngeal structures are unremarkable. No acute or suspicious bony findings.     Impression: Impression: No core infarct or ischemic tissue at risk. There are couple small perfusion defects associated with the area of chronic infarct in the left MCA territory. No abrupt cut off/large vessel occlusion, flow-limiting stenosis, dissection, or aneurysm in the head or neck. There is scattered atherosclerosis of the cervical and intracranial arteries, detailed above. Electronically Signed: Orestes Ferrara MD  11/23/2024 1:05 PM EST  Workstation ID: [id number]    CT Angiogram Neck    Result Date: 11/23/2024  CT CEREBRAL PERFUSION W WO CONTRAST, CT ANGIOGRAM HEAD W AI ANALYSIS OF LVO, CT ANGIOGRAM NECK Date of Exam: 11/23/2024 12:26 PM EST Indication: Neuro Deficit, acute, Stroke suspected Neuro deficit, acute stroke suspected.  Comparison: Concurrent noncontrast head CT, CTA head and neck 10/3/2021 Technique: Axial CT images of the brain were obtained prior to and after the administration of 115 mL Isovue-370. Core blood volume, core blood flow, mean transit time, and Tmax images were obtained utilizing the Rapid software protocol. A limited CT angiogram of the head was also performed to measure the blood vessel density. CTA of the head and neck was performed after the uneventful intravenous administration of above contrast. Reconstructed coronal and sagittal images were also obtained. In addition, a 3-D volume rendered image was created for interpretation. Automated exposure control and iterative reconstruction methods were used. The radiation dose reduction device was turned on for each scan per the ALARA (As Low as Reasonably Achievable) protocol. Findings: CT cerebral perfusion: There is small scattered perfusion defects in the area of chronic infarct/encephalomalacia in the left MCA  territory. Accounting for this, no evidence of core infarct or ischemic tissue at risk. CTA HEAD: No abrupt cut off/large vessel occlusion, flow-limiting stenosis, dissection, or aneurysm. There is some luminal irregularity and mild stenosis at the carotid siphons from calcified atherosclerosis. There is fetal origin of the bilateral PCAs, normal variant. The cerebral veins and dural venous sinuses appear grossly patent. There is atherosclerosis of the bilateral V4 segments with mild luminal irregularity without flow-limiting stenosis. CTA NECK: No abrupt cut off/large vessel occlusion, flow-limiting stenosis, dissection, or aneurysm. There is atherosclerosis at the carotid bifurcations without flow-limiting stenosis or appreciable luminal narrowing. Extravascular findings: Upper lungs are grossly clear. There is a hypodense 1.5 cm nodule in the right thyroid lobe. The pharyngeal and laryngeal structures are unremarkable. No acute or suspicious bony findings.     Impression: Impression: No core infarct or ischemic tissue at risk. There are couple small perfusion defects associated with the area of chronic infarct in the left MCA territory. No abrupt cut off/large vessel occlusion, flow-limiting stenosis, dissection, or aneurysm in the head or neck. There is scattered atherosclerosis of the cervical and intracranial arteries, detailed above. Electronically Signed: Orestes Ferrara MD  11/23/2024 1:05 PM EST  Workstation ID: [id number]    CT CEREBRAL PERFUSION WITH & WITHOUT CONTRAST    Result Date: 11/23/2024  CT CEREBRAL PERFUSION W WO CONTRAST, CT ANGIOGRAM HEAD W AI ANALYSIS OF LVO, CT ANGIOGRAM NECK Date of Exam: 11/23/2024 12:26 PM EST Indication: Neuro Deficit, acute, Stroke suspected Neuro deficit, acute stroke suspected.  Comparison: Concurrent noncontrast head CT, CTA head and neck 10/3/2021 Technique: Axial CT images of the brain were obtained prior to and after the administration of 115 mL Isovue-370. Core  blood volume, core blood flow, mean transit time, and Tmax images were obtained utilizing the Rapid software protocol. A limited CT angiogram of the head was also performed to measure the blood vessel density. CTA of the head and neck was performed after the uneventful intravenous administration of above contrast. Reconstructed coronal and sagittal images were also obtained. In addition, a 3-D volume rendered image was created for interpretation. Automated exposure control and iterative reconstruction methods were used. The radiation dose reduction device was turned on for each scan per the ALARA (As Low as Reasonably Achievable) protocol. Findings: CT cerebral perfusion: There is small scattered perfusion defects in the area of chronic infarct/encephalomalacia in the left MCA territory. Accounting for this, no evidence of core infarct or ischemic tissue at risk. CTA HEAD: No abrupt cut off/large vessel occlusion, flow-limiting stenosis, dissection, or aneurysm. There is some luminal irregularity and mild stenosis at the carotid siphons from calcified atherosclerosis. There is fetal origin of the bilateral PCAs, normal variant. The cerebral veins and dural venous sinuses appear grossly patent. There is atherosclerosis of the bilateral V4 segments with mild luminal irregularity without flow-limiting stenosis. CTA NECK: No abrupt cut off/large vessel occlusion, flow-limiting stenosis, dissection, or aneurysm. There is atherosclerosis at the carotid bifurcations without flow-limiting stenosis or appreciable luminal narrowing. Extravascular findings: Upper lungs are grossly clear. There is a hypodense 1.5 cm nodule in the right thyroid lobe. The pharyngeal and laryngeal structures are unremarkable. No acute or suspicious bony findings.     Impression: Impression: No core infarct or ischemic tissue at risk. There are couple small perfusion defects associated with the area of chronic infarct in the left MCA territory. No  abrupt cut off/large vessel occlusion, flow-limiting stenosis, dissection, or aneurysm in the head or neck. There is scattered atherosclerosis of the cervical and intracranial arteries, detailed above. Electronically Signed: Orestes Ferrara MD  11/23/2024 1:05 PM EST  Workstation ID: [id number]    CT Head Without Contrast Stroke Protocol    Result Date: 11/23/2024  CT HEAD WO CONTRAST STROKE PROTOCOL Date of Exam: 11/23/2024 12:23 PM EST Indication: Neuro deficit, acute, stroke suspected Neuro Deficit, acute, Stroke suspected. Comparison: Head CT 11/17/2024 Technique: Axial CT images were obtained of the head without contrast administration.  Reconstructed coronal images were also obtained. Automated exposure control and iterative construction methods were used. Scan Time: 12:24 p.m. 11/23/2024 Results discussed with stroke navigator by Dr. Orestes Ferrara via telephone at 12:30 p.m. 11/23/2024. Findings: Similar confluent hypodensity/encephalomalacia in the left MCA territory compatible with prior infarct. No evidence of new/acute large territory infarct. No acute intracranial hemorrhage. There are scattered white matter hypodensities which are nonspecific and can be seen in the setting of chronic small vessel ischemic change. No extra-axial collections. No midline shift or herniation. Normal size and configuration of the ventricles. Normal appearance of the orbits. The paranasal sinuses and mastoid air cells are clear. No acute osseous findings.     Impression: Impression: No acute intracranial findings. Chronic and senescent changes including sequelae of prior left MCA infarct. Electronically Signed: Orestes Ferrara MD  11/23/2024 12:34 PM EST  Workstation ID: [id number]     Results for orders placed during the hospital encounter of 01/20/18    Adult Transthoracic Echo Complete W/ Cont if Necessary Per Protocol (With Agitated Saline)    Interpretation Summary  · Left ventricular wall thickness is consistent with  hypertrophy.  · Left ventricular systolic function is normal.  · Moderate aortic valve regurgitation is present.  · Mild mitral valve regurgitation is present  · Mild tricuspid valve regurgitation is present.  · Mild pulmonic valve regurgitation is present.      Current medications:  Scheduled Meds:aspirin, 81 mg, Oral, Daily   Or  aspirin, 300 mg, Rectal, Daily  atorvastatin, 80 mg, Oral, Nightly  divalproex, 500 mg, Oral, Q8H  [Held by provider] lisinopril, 10 mg, Oral, Daily  sodium chloride, 10 mL, Intravenous, Q12H  sodium chloride, 10 mL, Intravenous, Q12H      Continuous Infusions:   PRN Meds:.  acetaminophen **OR** acetaminophen **OR** acetaminophen    [Held by provider] ALPRAZolam    senna-docusate sodium **AND** polyethylene glycol **AND** bisacodyl **AND** bisacodyl    Calcium Replacement - Follow Nurse / BPA Driven Protocol    Magnesium Standard Dose Replacement - Follow Nurse / BPA Driven Protocol    [Held by provider] melatonin    ondansetron ODT **OR** ondansetron    Phosphorus Replacement - Follow Nurse / BPA Driven Protocol    Potassium Replacement - Follow Nurse / BPA Driven Protocol    sodium chloride    sodium chloride    sodium chloride    sodium chloride    Assessment & Plan   Assessment & Plan     Active Hospital Problems    Diagnosis  POA    Complex partial seizure [G40.209]  Yes    Right sided weakness [R53.1]  Yes    Seizure disorder [G40.909]  Yes    History of CVA (cerebrovascular accident) [Z86.73]  Not Applicable    HLD (hyperlipidemia) [E78.5]  Yes    Hypertension [I10]  Yes    PAF (paroxysmal atrial fibrillation) [I48.0]  Yes      Resolved Hospital Problems    Diagnosis Date Resolved POA    **CVA (cerebral vascular accident) [I63.9] 11/23/2024 Yes        Brief Hospital Course to date:  Jenna Russell is a 83 y.o. female with PMHx significant for seizure disorder, PAF, HLD, HTN, prior stroke who presented with right sided weakness and seizures.    Right sided weakness  Hx CVA +  ICH  HTN  HLD  - stroke team following, Recommended DAPT x 21 days + statin.   - MRI brain without evidence for new stroke, possible stroke recrudescence   - normal BP goals  - PT/OT, patient wife states she plans to take patient home at discharge  - SLP evals pending  - Echo pending    Complex partial seizure  Hx Seizure Disorder  - neuro recommending increasing depakote to 500 mg from home 250 mg.     PAF w/p watchman    Expected Discharge Location and Transportation: Home  Expected Discharge   Expected discharge date/ time has not been documented.     VTE Prophylaxis:  Mechanical VTE prophylaxis orders are present.         AM-PAC 6 Clicks Score (PT): 12 (11/23/24 2000)    CODE STATUS:   Code Status and Medical Interventions: CPR (Attempt to Resuscitate); Full Support   Ordered at: 11/23/24 1409     Code Status (Patient has no pulse and is not breathing):    CPR (Attempt to Resuscitate)     Medical Interventions (Patient has pulse or is breathing):    Full Support       Yari Kramer, DO  11/24/24

## 2024-11-24 NOTE — PLAN OF CARE
Goal Outcome Evaluation:  Plan of Care Reviewed With: patient, spouse, family        Progress: improving       Anticipated Discharge Disposition (SLP): home with home health    SLP Diagnosis: moderate, aphasia (receptive and expressive) (11/24/24 0930)     SLP Swallowing Diagnosis: swallow WFL/no suspected pharyngeal impairment (11/24/24 0930)

## 2024-11-24 NOTE — PROGRESS NOTES
Stroke Progress Note       Chief Complaint: Seizures    Subjective    Subjective     Subjective:  Patient is doing okay,      Objective      Temp:  [97 °F (36.1 °C)-97.7 °F (36.5 °C)] 97.2 °F (36.2 °C)  Heart Rate:  [51-73] 55  Resp:  [14-20] 14  BP: (119-201)/(60-95) 150/71          Continues to have some mild residual right-sided deficits, with right arm/leg, right leg 3/5.  Able to feed herself, has some occasional confusion.  With some slurred speech.    Results Review:    I reviewed the patient's new clinical results.    Lab Results (last 24 hours)       Procedure Component Value Units Date/Time    POC Glucose Once [[id number]]  (Normal) Collected: 11/24/24 1135    Specimen: Blood Updated: 11/24/24 1140     Glucose 128 mg/dL     Lipid Panel [[id number]]  (Abnormal) Collected: 11/24/24 0544    Specimen: Blood Updated: 11/24/24 0741     Total Cholesterol 157 mg/dL      Triglycerides 67 mg/dL      HDL Cholesterol 80 mg/dL      LDL Cholesterol  64 mg/dL      VLDL Cholesterol 13 mg/dL      LDL/HDL Ratio 0.80    Narrative:      Cholesterol Reference Ranges  (U.S. Department of Health and Human Services ATP III Classifications)    Desirable          <200 mg/dL  Borderline High    200-239 mg/dL  High Risk          >240 mg/dL      Triglyceride Reference Ranges  (U.S. Department of Health and Human Services ATP III Classifications)    Normal           <150 mg/dL  Borderline High  150-199 mg/dL  High             200-499 mg/dL  Very High        >500 mg/dL    HDL Reference Ranges  (U.S. Department of Health and Human Services ATP III Classifications)    Low     <40 mg/dl (major risk factor for CHD)  High    >60 mg/dl ('negative' risk factor for CHD)        LDL Reference Ranges  (U.S. Department of Health and Human Services ATP III Classifications)    Optimal          <100 mg/dL  Near Optimal     100-129 mg/dL  Borderline High  130-159 mg/dL  High             160-189 mg/dL  Very High        >189 mg/dL    Hemoglobin A1c  [[id number]]  (Normal) Collected: 11/24/24 0544    Specimen: Blood Updated: 11/24/24 0710     Hemoglobin A1C 4.90 %     Narrative:      Hemoglobin A1C Ranges:    Increased Risk for Diabetes  5.7% to 6.4%  Diabetes                     >= 6.5%  Diabetic Goal                < 7.0%    POC Glucose Once [[id number]]  (Normal) Collected: 11/24/24 0545    Specimen: Blood Updated: 11/24/24 0556     Glucose 80 mg/dL     POC Glucose Once [[id number]]  (Normal) Collected: 11/24/24 0027    Specimen: Blood Updated: 11/24/24 0029     Glucose 74 mg/dL     POC Glucose Once [[id number]]  (Normal) Collected: 11/23/24 1736    Specimen: Blood Updated: 11/23/24 1738     Glucose 78 mg/dL     Valproic Acid Level, Total [[id number]]  (Normal) Collected: 11/23/24 1233    Specimen: Blood Updated: 11/23/24 1351     Valproic Acid 68.8 mcg/mL     Narrative:      Therapeutic Ranges for Valproic Acid    Epilepsy:       mcg/ml  Bipolar/Ramya  up to 125 mcg/ml      AST [[id number]]  (Abnormal) Collected: 11/23/24 1233    Specimen: Blood Updated: 11/23/24 1300     AST (SGOT) 46 U/L     ALT [[id number]]  (Normal) Collected: 11/23/24 1233    Specimen: Blood Updated: 11/23/24 1300     ALT (SGPT) 20 U/L     Single High Sensitivity Troponin T [[id number]]  (Abnormal) Collected: 11/23/24 1233    Specimen: Blood Updated: 11/23/24 1259     HS Troponin T 14 ng/L     Narrative:      High Sensitive Troponin T Reference Range:  <14.0 ng/L- Negative Female for AMI  <22.0 ng/L- Negative Male for AMI  >=14 - Abnormal Female indicating possible myocardial injury.  >=22 - Abnormal Male indicating possible myocardial injury.   Clinicians would have to utilize clinical acumen, EKG, Troponin, and serial changes to determine if it is an Acute Myocardial Infarction or myocardial injury due to an underlying chronic condition.         Protime-INR [[id number]]  (Normal) Collected: 11/23/24 1233    Specimen: Blood Updated: 11/23/24 1252     Protime 13.1 Seconds      INR 0.98     aPTT [[id number]]  (Normal) Collected: 11/23/24 1233    Specimen: Blood Updated: 11/23/24 1252     PTT 28.7 seconds     Narrative:      PTT = The equivalent PTT values for the therapeutic range of heparin levels at 0.3 to 0.5 U/ml are 60 to 70 seconds.    [place] Draw [[id number]] Collected: 11/23/24 1233    Specimen: Blood Updated: 11/23/24 1245    Narrative:      The following orders were created for panel order [place] Draw.  Procedure                               Abnormality         Status                     ---------                               -----------         ------                     Green Top (Gel)[[id number]]                                  Final result               Lavender Top[[id number]]                                     Final result               Gold Top - SST[[id number]]                                   Final result               Gray Top[[id number]]                                         Final result               Light Blue Top[[id number]]                                   Final result                 Please view results for these tests on the individual orders.    Green Top (Gel) [[id number]] Collected: 11/23/24 1233    Specimen: Blood Updated: 11/23/24 1245     Extra Tube Hold for add-ons.     Comment: Auto resulted.       Lavender Top [[id number]] Collected: 11/23/24 1233    Specimen: Blood Updated: 11/23/24 1245     Extra Tube hold for add-on     Comment: Auto resulted       Gold Top - SST [[id number]] Collected: 11/23/24 1233    Specimen: Blood Updated: 11/23/24 1245     Extra Tube Hold for add-ons.     Comment: Auto resulted.       Gray Top [[id number]] Collected: 11/23/24 1233    Specimen: Blood Updated: 11/23/24 1245     Extra Tube Hold for add-ons.     Comment: Auto resulted.       Light Blue Top [[id number]] Collected: 11/23/24 1233    Specimen: Blood Updated: 11/23/24 1245     Extra Tube Hold for add-ons.     Comment: Auto resulted       CBC & Differential [[id number]]  (Abnormal)  Collected: 11/23/24 1233    Specimen: Blood Updated: 11/23/24 1241    Narrative:      The following orders were created for panel order CBC & Differential.  Procedure                               Abnormality         Status                     ---------                               -----------         ------                     CBC Auto Differential[[id number]]        Abnormal            Final result                 Please view results for these tests on the individual orders.    CBC Auto Differential [[id number]]  (Abnormal) Collected: 11/23/24 1233    Specimen: Blood Updated: 11/23/24 1241     WBC 7.19 10*3/mm3      RBC 3.86 10*6/mm3      Hemoglobin 12.2 g/dL      Hematocrit 37.7 %      MCV 97.7 fL      MCH 31.6 pg      MCHC 32.4 g/dL      RDW 13.6 %      RDW-SD 48.0 fl      MPV 9.0 fL      Platelets 152 10*3/mm3      Neutrophil % 57.0 %      Lymphocyte % 29.9 %      Monocyte % 11.3 %      Eosinophil % 1.1 %      Basophil % 0.3 %      Immature Grans % 0.4 %      Neutrophils, Absolute 4.10 10*3/mm3      Lymphocytes, Absolute 2.15 10*3/mm3      Monocytes, Absolute 0.81 10*3/mm3      Eosinophils, Absolute 0.08 10*3/mm3      Basophils, Absolute 0.02 10*3/mm3      Immature Grans, Absolute 0.03 10*3/mm3      nRBC 0.0 /100 WBC           MRI Brain Without Contrast    Result Date: 11/24/2024  Impression: No acute intracranial findings. Redemonstrated remote left MCA territory infarct without evidence of acute ischemia, hemorrhage or mass effect. Electronically Signed: Randal Allen MD  11/24/2024 6:35 AM EST  Workstation ID: [id number]    XR Chest 1 View    Result Date: 11/23/2024  Impression: No acute cardiopulmonary findings. Electronically Signed: Orestes Ferrara MD  11/23/2024 1:08 PM EST  Workstation ID: [id number]    CT Angiogram Head w AI Analysis of LVO    Result Date: 11/23/2024  Impression: No core infarct or ischemic tissue at risk. There are couple small perfusion defects associated with the area of chronic infarct  in the left MCA territory. No abrupt cut off/large vessel occlusion, flow-limiting stenosis, dissection, or aneurysm in the head or neck. There is scattered atherosclerosis of the cervical and intracranial arteries, detailed above. Electronically Signed: Orestes Ferrara MD  11/23/2024 1:05 PM EST  Workstation ID: [id number]    CT Angiogram Neck    Result Date: 11/23/2024  Impression: No core infarct or ischemic tissue at risk. There are couple small perfusion defects associated with the area of chronic infarct in the left MCA territory. No abrupt cut off/large vessel occlusion, flow-limiting stenosis, dissection, or aneurysm in the head or neck. There is scattered atherosclerosis of the cervical and intracranial arteries, detailed above. Electronically Signed: Orestes Ferrara MD  11/23/2024 1:05 PM EST  Workstation ID: [id number]    CT CEREBRAL PERFUSION WITH & WITHOUT CONTRAST    Result Date: 11/23/2024  Impression: No core infarct or ischemic tissue at risk. There are couple small perfusion defects associated with the area of chronic infarct in the left MCA territory. No abrupt cut off/large vessel occlusion, flow-limiting stenosis, dissection, or aneurysm in the head or neck. There is scattered atherosclerosis of the cervical and intracranial arteries, detailed above. Electronically Signed: Orestes Ferrara MD  11/23/2024 1:05 PM EST  Workstation ID: [id number]    CT Head Without Contrast Stroke Protocol    Result Date: 11/23/2024  Impression: No acute intracranial findings. Chronic and senescent changes including sequelae of prior left MCA infarct. Electronically Signed: Orestes Ferrara MD  11/23/2024 12:34 PM EST  Workstation ID: [id number]   Results for orders placed during the hospital encounter of 11/23/24    Adult Transthoracic Echo Complete W/ Cont if Necessary Per Protocol (With Agitated Saline)    Interpretation Summary    Left ventricular ejection fraction appears to be 56 - 60%.    Left ventricular diastolic  function was normal.    Left atrial volume is mildly increased.    Moderate aortic valve regurgitation is present.    Estimated right ventricular systolic pressure from tricuspid regurgitation is mildly elevated (35-45 mmHg). Calculated right ventricular systolic pressure from tricuspid regurgitation is 36 mmHg.            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  -Mobilize the patient,  -Continue aspirin daily.  -No need for Plavix.    Watch for another day.    If patient is back to normal, patient can be discharged from neurology standpoint with neurology follow-up outpatient.          Manuel Berkowitz MD  11/24/24  12:24 EST

## 2024-11-24 NOTE — PLAN OF CARE
Goal Outcome Evaluation:  Plan of Care Reviewed With: patient, spouse, child           Outcome Evaluation: PT PRESENTS WITH EVOLVING SYMPTOMS TO INCLUDE IMPAIRED BALANCE, IMPAIRED COORDINATION R LE, GENERALIZED WEAKNESS, R INATTENTION, WORSENED APHASIA FROM BASELINE AND DECLINE IN FUNCTIONAL MOBILITY. PT REQUIRED MOD ASSIST TO GUIDE ROLLATOR AND TO MAINTAIN BALANCE IN STANDING. R LE SCISSORING AND R LEAN OBSERVED DURING GAIT SHORT DISTANCE. RECOMMEND CHAIR FOLLOW AND REG ROLLING WALKER NEXT SESSION. RECOMMEND IRF AT D/C. FAMILY/PT TO CONSIDER BUT MIGHT CHOOSE TO OPT FOR HIRED CAREGIVER AND HHPT AT D/C. BP ELEVATED, NSG AWARE AND MANAGING.    Anticipated Discharge Disposition (PT): inpatient rehabilitation facility

## 2024-11-25 PROCEDURE — G0378 HOSPITAL OBSERVATION PER HR: HCPCS

## 2024-11-25 PROCEDURE — 99213 OFFICE O/P EST LOW 20 MIN: CPT

## 2024-11-25 PROCEDURE — 99232 SBSQ HOSP IP/OBS MODERATE 35: CPT | Performed by: INTERNAL MEDICINE

## 2024-11-25 RX ADMIN — DIVALPROEX SODIUM 500 MG: 250 TABLET, DELAYED RELEASE ORAL at 14:24

## 2024-11-25 RX ADMIN — ASPIRIN 81 MG 81 MG: 81 TABLET ORAL at 09:19

## 2024-11-25 RX ADMIN — ATORVASTATIN CALCIUM 80 MG: 40 TABLET, FILM COATED ORAL at 21:29

## 2024-11-25 RX ADMIN — Medication 10 ML: at 21:29

## 2024-11-25 RX ADMIN — DIVALPROEX SODIUM 500 MG: 250 TABLET, DELAYED RELEASE ORAL at 06:46

## 2024-11-25 RX ADMIN — Medication 10 ML: at 09:19

## 2024-11-25 RX ADMIN — DIVALPROEX SODIUM 500 MG: 250 TABLET, DELAYED RELEASE ORAL at 21:29

## 2024-11-25 NOTE — CONSULTS
Order noted for diabetes education per stroke protocol. A1c 4.9%. Does not qualify for OP diabetes/stroke class.

## 2024-11-25 NOTE — PROGRESS NOTES
Stroke Progress Note       Chief Complaint:  Right- sided weakness    Subjective    Subjective     Subjective:  She is sitting up in bed, smiling and feeding herself. Family member at the bedside. Her speech and exam is remarkably better than my first exam in the ER on 11/23. She does have a hard time hearing me as she does not have her hearing aids. No tremors are noted.  No new or strokelike symptoms.  Ambulated to the bathroom with PT/OT.  All questions and concerns answered.    Review of Systems   Neurological:  Positive for weakness (Generalized). Negative for tremors.   All other systems reviewed and are negative.           Objective    Objective      Temp:  [97.8 °F (36.6 °C)-98.1 °F (36.7 °C)] 98 °F (36.7 °C)  Heart Rate:  [64-76] 64  Resp:  [14-18] 18  BP: (138-196)/(67-84) 164/74        Neurological Exam  Mental Status  Awake, alert and oriented to person, place and time. Oriented to person, place, and time. Recent and remote memory are intact. Speech is normal. Expressive aphasia present. Language: Has some expressive aphasia at baseline. Attention and concentration are normal.    Cranial Nerves  CN II: Visual fields full to confrontation.  CN III, IV, VI: Extraocular movements intact bilaterally. Pupils equal round and reactive to light bilaterally.  CN V: Facial sensation is normal.  CN VII: Full and symmetric facial movement.  CN VIII: [place], does not have her hearing aids .  CN IX, X: Palate elevates symmetrically  CN XII: Tongue midline without atrophy or fasciculations.    Motor  Normal muscle bulk throughout. Normal muscle tone. Strength is 5/5 in all four extremities except as noted.  RUE 4/5  RLE 4/5.    Sensory  Light touch is normal in upper and lower extremities.  No right-sided hemispatial neglect. No left-sided hemispatial neglect.    Coordination    No dysmetria noted.    Gait   Normal gait.  Not observed.      Physical Exam  Vitals and nursing note reviewed.   HENT:      Head: Normocephalic.    Eyes:      Extraocular Movements: Extraocular movements intact.      Pupils: Pupils are equal, round, and reactive to light.   Cardiovascular:      Rate and Rhythm: Normal rate and regular rhythm.      Pulses: Normal pulses.      Heart sounds: Normal heart sounds.   Pulmonary:      Effort: Pulmonary effort is normal. No respiratory distress.      Breath sounds: Normal breath sounds.   Skin:     General: Skin is warm and dry.      Capillary Refill: Capillary refill takes less than 2 seconds.   Neurological:      Mental Status: She is oriented to person, place, and time.      Motor: Weakness present.      Gait: Gait is intact.   Psychiatric:         Attention and Perception: Attention normal.         Mood and Affect: Mood normal.         Speech: Speech normal.         Behavior: Behavior normal.         Results Review:    I reviewed the patient's new clinical results.    Results for orders placed during the hospital encounter of 11/23/24    Adult Transthoracic Echo Complete W/ Cont if Necessary Per Protocol (With Agitated Saline)    Interpretation Summary    Left ventricular ejection fraction appears to be 56 - 60%.    Left ventricular diastolic function was normal.    Left atrial volume is mildly increased.    Moderate aortic valve regurgitation is present.    Estimated right ventricular systolic pressure from tricuspid regurgitation is mildly elevated (35-45 mmHg). Calculated right ventricular systolic pressure from tricuspid regurgitation is 36 mmHg.        MRI Brain Without Contrast    Result Date: 11/24/2024  Impression: No acute intracranial findings. Redemonstrated remote left MCA territory infarct without evidence of acute ischemia, hemorrhage or mass effect. Electronically Signed: Randal Allen MD  11/24/2024 6:35 AM EST  Workstation ID: [id number]    CT Angiogram Head w AI Analysis of LVO    Result Date: 11/23/2024  Impression: No core infarct or ischemic tissue at risk. There are couple small perfusion  defects associated with the area of chronic infarct in the left MCA territory. No abrupt cut off/large vessel occlusion, flow-limiting stenosis, dissection, or aneurysm in the head or neck. There is scattered atherosclerosis of the cervical and intracranial arteries, detailed above. Electronically Signed: Orestes Ferrara MD  11/23/2024 1:05 PM EST  Workstation ID: [id number]    CT Angiogram Neck    Result Date: 11/23/2024  Impression: No core infarct or ischemic tissue at risk. There are couple small perfusion defects associated with the area of chronic infarct in the left MCA territory. No abrupt cut off/large vessel occlusion, flow-limiting stenosis, dissection, or aneurysm in the head or neck. There is scattered atherosclerosis of the cervical and intracranial arteries, detailed above. Electronically Signed: Orestes Ferrara MD  11/23/2024 1:05 PM EST  Workstation ID: [id number]    CT CEREBRAL PERFUSION WITH & WITHOUT CONTRAST    Result Date: 11/23/2024  Impression: No core infarct or ischemic tissue at risk. There are couple small perfusion defects associated with the area of chronic infarct in the left MCA territory. No abrupt cut off/large vessel occlusion, flow-limiting stenosis, dissection, or aneurysm in the head or neck. There is scattered atherosclerosis of the cervical and intracranial arteries, detailed above. Electronically Signed: Orestes Ferrara MD  11/23/2024 1:05 PM EST  Workstation ID: [id number]    CT Head Without Contrast Stroke Protocol    Result Date: 11/23/2024  Impression: No acute intracranial findings. Chronic and senescent changes including sequelae of prior left MCA infarct. Electronically Signed: Orestes Ferrara MD  11/23/2024 12:34 PM EST  Workstation ID: [id number]       11/24/2024 A1c 4.9  WBC   Date Value Ref Range Status   11/23/2024 7.19 3.40 - 10.80 10*3/mm3 Final     RBC   Date Value Ref Range Status   11/23/2024 3.86 3.77 - 5.28 10*6/mm3 Final     Hemoglobin   Date Value Ref Range  Status   11/23/2024 12.2 12.0 - 15.9 g/dL Final     Hematocrit   Date Value Ref Range Status   11/23/2024 37.7 34.0 - 46.6 % Final     MCV   Date Value Ref Range Status   11/23/2024 97.7 (H) 79.0 - 97.0 fL Final     MCH   Date Value Ref Range Status   11/23/2024 31.6 26.6 - 33.0 pg Final     MCHC   Date Value Ref Range Status   11/23/2024 32.4 31.5 - 35.7 g/dL Final     RDW   Date Value Ref Range Status   11/23/2024 13.6 12.3 - 15.4 % Final     RDW-SD   Date Value Ref Range Status   11/23/2024 48.0 37.0 - 54.0 fl Final     MPV   Date Value Ref Range Status   11/23/2024 9.0 6.0 - 12.0 fL Final     Platelets   Date Value Ref Range Status   11/23/2024 152 140 - 450 10*3/mm3 Final     Neutrophil %   Date Value Ref Range Status   11/23/2024 57.0 42.7 - 76.0 % Final     Lymphocyte %   Date Value Ref Range Status   11/23/2024 29.9 19.6 - 45.3 % Final     Monocyte %   Date Value Ref Range Status   11/23/2024 11.3 5.0 - 12.0 % Final     Eosinophil %   Date Value Ref Range Status   11/23/2024 1.1 0.3 - 6.2 % Final     Basophil %   Date Value Ref Range Status   11/23/2024 0.3 0.0 - 1.5 % Final     Immature Grans %   Date Value Ref Range Status   11/23/2024 0.4 0.0 - 0.5 % Final     Neutrophils, Absolute   Date Value Ref Range Status   11/23/2024 4.10 1.70 - 7.00 10*3/mm3 Final     Lymphocytes, Absolute   Date Value Ref Range Status   11/23/2024 2.15 0.70 - 3.10 10*3/mm3 Final     Monocytes, Absolute   Date Value Ref Range Status   11/23/2024 0.81 0.10 - 0.90 10*3/mm3 Final     Eosinophils, Absolute   Date Value Ref Range Status   11/23/2024 0.08 0.00 - 0.40 10*3/mm3 Final     Basophils, Absolute   Date Value Ref Range Status   11/23/2024 0.02 0.00 - 0.20 10*3/mm3 Final     Immature Grans, Absolute   Date Value Ref Range Status   11/23/2024 0.03 0.00 - 0.05 10*3/mm3 Final     nRBC   Date Value Ref Range Status   11/23/2024 0.0 0.0 - 0.2 /100 WBC Final     11/23/2024 AST 46  11/23/2024 ALT 20        Assessment/Plan      Assessment/Plan:    This is a 83-year-old female with past medical history of PAF (watchman device), HLD, HTN, sequela of left hemispheric CVA (residual expressive aphasia), complex partial seizure presented to [facility] emergency department via EMS for further of right sided weakness, confusion, slurred speech. CT head was negative for any acute intracranial findings, showed chronic sequelae of prior left MCA infarct.  CTA head/neck and perfusion showed no core infarct or ischemic tissue at risk there are a couple small presumed deficits associated within the area of the chronic infarct of the left MCA territory however no LVO or flow-limiting stenosis.  She does have scattered atherosclerosis of the cervical and intracranial arteries.  She is not a candidate for IV thrombolytic therapy as she has a history of ICH.  She is not a candidate for neurosurgical intervention as there is no LVO on CT scan.  She will be admitted to the hospitalist service for further evaluation.       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  Redemonstrated remote left MCA territory infarct without evidence of acute ischemia, hemorrhage or mass effect  -Seizure precautions  -Follow-up with [facility] [place] for seizures (added to ADT)    2.  Essential hypertension,   -Okay to normalize blood pressure  -Primary team to manage     3.  Hyperlipidemia  -Lipid panel on 11/25/2024 LDL  64  -Atorvastatin 80mg nightly  -Recommend recheck at primary care in 3 months    Discussed the importance of medication compliance Aspirin 81mg daily and lifestyle modifications adequate control of blood pressure, adequate control of cholesterol (goal LDL <70), increased physical activity, and implementation of healthy diet to help reduce the risk of future cerebrovascular events.  Also discussed the signs symptoms that would warrant the patient return back to the emergency department including unilateral weakness, unilateral numbness, visual disturbances, loss of balance, speech difficulties, and/or a sudden severe headache.  Patient verbalized understanding.    From a neurology standpoint, patient is okay to be discharged to rehab facility when bed is available.  Stroke neurology will sign off.  Please call with any questions or concerns.    Sweetie Bledsoe, APRN  11/25/24  08:56 EST

## 2024-11-25 NOTE — DISCHARGE INSTRUCTIONS
Continue aspirin 81 mg daily for secondary stroke prevention.  Continue Depakote 500 mg 3 times daily to prevent seizures.  He will follow-up with neurology outpatient for seizure management.  If you develop unilateral weakness, unilateral numbness, visual disturbance, loss of balance, speech difficulties, and/or sudden severe headache please return to the emergency department.

## 2024-11-25 NOTE — PROGRESS NOTES
Malnutrition Severity Assessment    Patient Name:  Jenna Russell  YOB: 1940  MRN: [id number]  Admit Date:  11/23/2024    Patient meets criteria for : Severe Malnutrition    Malnutrition Severity Assessment  Malnutrition Type: Chronic Disease - Related Malnutrition  Malnutrition Type (Last 8 Hours)       Malnutrition Severity Assessment       Row Name 11/25/24 1614       Malnutrition Severity Assessment    Malnutrition Type Chronic Disease - Related Malnutrition      Row Name 11/25/24 1614       Insufficient Energy Intake     Insufficient Energy Intake Findings Moderate    Insufficient Energy Intake  <75% of est. energy requirement for > or equal to 1 month      Row Name 11/25/24 1614       Unintentional Weight Loss     Unintentional Weight Loss Findings None      Row Name 11/25/24 1614       Muscle Loss    Loss of Muscle Mass Findings Severe    [place] Severe - deep hollowing/scooping, lack of muscle to touch, facial bones well defined    Clavicle Bone Region Severe - protruding prominent bone    Acromion Bone Region Severe - squared shoulders, bones, and acromion process protrusion prominent    Dorsal Hand Region Severe - prominent depression    Patellar Region Severe - prominent bone, square looking, very little muscle definition    Anterior Thigh Region Severe - line/depression along thigh, obviously thin    Posterior Calf Region Severe - thin with very little definition/firmness      Row Name 11/25/24 1614       Fat Loss    Subcutaneous Fat Loss Findings Severe    Orbital Region  Severe - pronounced hollowness/depression, dark circles, loose saggy skin    Upper Arm Region Severe - mostly skin, very little space between folds, fingers touch      Row Name 11/25/24 1614       Declining Functional Status    Declining Functional Status Findings N/A      Row Name 11/25/24 1614       Criteria Met (Must meet criteria for severity in at least 2 of these categories: M Wasting, Fat Loss, Fluid,  Secondary Signs, Wt. Status, Intake)    Patient meets criteria for  Severe Malnutrition                    Electronically signed by:  Yanira Osman MS,RD,LD  11/25/24 16:14 EST

## 2024-11-25 NOTE — PLAN OF CARE
Problem: Adult Inpatient Plan of Care  Goal: Plan of Care Review  Outcome: Progressing  Flowsheets (Taken 11/25/2024 0500)  Progress: improving  Plan of Care Reviewed With:   patient   spouse  Goal: Patient-Specific Goal (Individualized)  Outcome: Progressing  Goal: Absence of Hospital-Acquired Illness or Injury  Outcome: Progressing  Intervention: Identify and Manage Fall Risk  Description: Perform standard risk assessment on admission using a validated tool or comprehensive approach appropriate to the patient; reassess fall risk frequently, with change in status or transfer to another level of care.  Communicate risk to interprofessional healthcare team; ensure fall risk visible cue.  Determine need for increased observation, equipment and environmental modification, as well as use of supportive, nonskid footwear.  Adjust safety measures to individual needs and identified risk factors.  Reinforce the importance of active participation with fall risk prevention, safety, and physical activity with the patient and family.  Perform regular intentional rounding to assess need for position change, pain assessment and personal needs, including assistance with toileting.  Recent Flowsheet Documentation  Taken 11/25/2024 0041 by Claribel Gilbert RN  Safety Promotion/Fall Prevention:   nonskid shoes/slippers when out of bed   safety round/check completed   room organization consistent   lighting adjusted   activity supervised   assistive device/personal items within reach   clutter free environment maintained  Taken 11/24/2024 2210 by Claribel Gilbert RN  Safety Promotion/Fall Prevention:   nonskid shoes/slippers when out of bed   safety round/check completed   room organization consistent   lighting adjusted   activity supervised   assistive device/personal items within reach   clutter free environment maintained  Intervention: Prevent Skin Injury  Description: Perform a screening for skin injury risk, such as  pressure or moisture-associated skin damage on admission and at regular intervals throughout hospital stay.  Keep all areas of skin (especially folds) clean and dry.  Maintain adequate skin hydration.  Relieve and redistribute pressure and protect bony prominences and skin at risk for injury; implement measures based on patient-specific risk factors.  Match turning and repositioning schedule to clinical condition.  Encourage weight shift frequently; assist with reposition if unable to complete independently.  Float heels off bed; avoid pressure on the Achilles tendon.  Keep skin free from extended contact with medical devices.  Optimize nutrition and hydration.  Encourage functional activity and mobility, as early as tolerated.  Use aids (e.g., slide boards, mechanical lift) during transfer.  Recent Flowsheet Documentation  Taken 11/25/2024 0041 by Claribel Gilbert RN  Body Position: position changed independently  Taken 11/24/2024 2210 by Claribel Gilbert RN  Body Position: position changed independently  Intervention: Prevent Infection  Description: Maintain skin and mucous membrane integrity; promote hand, oral and pulmonary hygiene.  Optimize fluid balance, nutrition, sleep and glycemic control to maximize infection resistance.  Identify potential sources of infection early to prevent or mitigate progression of infection (e.g., wound, lines, devices).  Evaluate ongoing need for invasive devices; remove promptly when no longer indicated.  Review vaccination status.  Recent Flowsheet Documentation  Taken 11/25/2024 0041 by Claribel Gilbert RN  Infection Prevention:   environmental surveillance performed   rest/sleep promoted   hand hygiene promoted  Taken 11/24/2024 2210 by Claribel Gilbert RN  Infection Prevention:   environmental surveillance performed   rest/sleep promoted   hand hygiene promoted  Goal: Optimal Comfort and Wellbeing  Outcome: Progressing  Intervention: Provide Person-Centered  Care  Description: Use a family-focused approach to care; encourage support system presence and participation.  Develop trust and rapport by proactively providing information, encouraging questions, addressing concerns and offering reassurance.  Acknowledge emotional response to hospitalization.  Recognize and utilize personal coping strategies and strengths; develop goals via shared decision-making.  Honor spiritual and cultural preferences.  Recent Flowsheet Documentation  Taken 11/24/2024 2000 by Claribel Gilbert RN  Trust Relationship/Rapport:   care explained   choices provided   emotional support provided   empathic listening provided   questions encouraged   questions answered   reassurance provided   thoughts/feelings acknowledged  Goal: Readiness for Transition of Care  Outcome: Progressing     Problem: Violence Risk or Actual  Goal: Anger and Impulse Control  Outcome: Progressing  Intervention: Minimize Safety Risk  Description: Listen actively, observing verbal and nonverbal cues (e.g., irritability, confusion, lack of cooperation, demanding behavior, body posture, expression); take threats seriously.  Maintain a therapeutic presence; utilize calm, empathetic tone of voice, nonjudgmental attitude and nonthreatening body language; listen carefully.  Assess and provide for unmet needs, including nutrition, comfort, hydration, hygiene, companionship and appropriate rest.  Utilize empathetic but firm and concise communication; set limits, offer choices and propose alternatives.  Remove stimuli and objects that may lead to harming self or others.  Maintain clear path to room exit; keep door open during care.  Ask directly about homicidal and suicidal intent; provide additional safety measures based on level of risk (e.g., one-on-one observation, duty to warn).  Implement least restrictive measures if attempts to de-escalate violent or injurious behaviors are unsuccessful.  Recent Flowsheet  Documentation  Taken 11/25/2024 0041 by Claribel Gilbert RN  Enhanced Safety Measures: bed alarm set  Taken 11/24/2024 2210 by Claribel Gilbert RN  Enhanced Safety Measures: bed alarm set     Problem: Comorbidity Management  Goal: Maintenance of Osteoarthritis Symptom Control  Outcome: Progressing  Goal: Maintenance of Seizure Control  Outcome: Progressing     Problem: Fall Injury Risk  Goal: Absence of Fall and Fall-Related Injury  Outcome: Progressing  Intervention: Promote Injury-Free Environment  Description: Provide a safe, barrier-free environment that encourages independent activity.  Keep care area uncluttered and well-lighted.  Determine need for increased observation or monitoring.  Avoid use of devices that minimize mobility, such as restraints or indwelling urinary catheter.  Recent Flowsheet Documentation  Taken 11/25/2024 0041 by Claribel Gilbert RN  Safety Promotion/Fall Prevention:   nonskid shoes/slippers when out of bed   safety round/check completed   room organization consistent   lighting adjusted   activity supervised   assistive device/personal items within reach   clutter free environment maintained  Taken 11/24/2024 2210 by Claribel Gilbert RN  Safety Promotion/Fall Prevention:   nonskid shoes/slippers when out of bed   safety round/check completed   room organization consistent   lighting adjusted   activity supervised   assistive device/personal items within reach   clutter free environment maintained   Goal Outcome Evaluation:  Plan of Care Reviewed With: patient, spouse        Progress: improving

## 2024-11-25 NOTE — PROGRESS NOTES
[facility] Medicine Services  PROGRESS NOTE    Patient Name: Jenna Russell  : 1940  MRN: [id number]    Date of Admission: 2024  Primary Care Physician: Judi Rodriguez MD    Subjective   Subjective     CC:  F/u stroke rule out/seizure    HPI:  Patient resting in bed. Getting Echo done. Wife at bedside, says she is doing better now, IV ativan yesterday really made her very drowsy/sleepy.      Objective   Objective     Vital Signs:   Temp:  [97.8 °F (36.6 °C)-98.1 °F (36.7 °C)] 98 °F (36.7 °C)  Heart Rate:  [64-76] 64  Resp:  [14-18] 18  BP: (138-196)/(67-84) 164/74     Physical Exam:  Constitutional: No acute distress, awake, alert  HENT: NCAT, mucous membranes moist  Respiratory: Clear to auscultation bilaterally, respiratory effort normal   Cardiovascular: RRR, no murmurs, rubs, or gallops  Gastrointestinal: soft, nontender, nondistended  Musculoskeletal: No bilateral ankle edema  Psychiatric: Appropriate affect, cooperative  Neurologic: Oriented to person, place, somewhat confused at times, right sided weakness   Skin: No rashes      Results Reviewed:  LAB RESULTS:      Lab 24  1233   WBC 7.19   HEMOGLOBIN 12.2   HEMATOCRIT 37.7   PLATELETS 152   NEUTROS ABS 4.10   IMMATURE GRANS (ABS) 0.03   LYMPHS ABS 2.15   MONOS ABS 0.81   EOS ABS 0.08   MCV 97.7*   PROTIME 13.1   APTT 28.7   HSTROP T 14*         Lab 24  0544   HEMOGLOBIN A1C 4.90         Lab 24  1233   ALT (SGPT) 20   AST (SGOT) 46*         Lab 24  1233   HSTROP T 14*   PROTIME 13.1   INR 0.98         Lab 24  0544   CHOLESTEROL 157   LDL CHOL 64   HDL CHOL 80*   TRIGLYCERIDES 67             Brief Urine Lab Results       None            Microbiology Results Abnormal       None            MRI Brain Without Contrast    Result Date: 2024  MRI BRAIN WO CONTRAST Date of Exam: 2024 12:16 AM EST Indication: Stroke, follow up right sided weakness.  Comparison: CT earlier the same  day. Technique:  Routine multiplanar/multisequence sequence images of the brain were obtained without contrast administration. Findings: No acute infarct is present on diffusion weighted sequences. Midline structures are normal and the craniocervical junction appears satisfactory. There is an area of old infarct seen within the left temporal lobe with some associated prominent volume loss  and surrounding gliosis. Age-related changes are present with moderate generalized volume loss and typical T2 hyperintense subcortical and pontine white matter changes, nonspecific but favored to reflect sequela of chronic microvascular ischemia. There is otherwise no evidence of intracranial hemorrhage, mass or mass effect. The ventricles demonstrate some expected ex vacuo prominence. The orbits appear normal. The paranasal sinuses are clear.     Impression: Impression: No acute intracranial findings. Redemonstrated remote left MCA territory infarct without evidence of acute ischemia, hemorrhage or mass effect. Electronically Signed: Randal Allen MD  11/24/2024 6:35 AM EST  Workstation ID: [id number]    XR Chest 1 View    Result Date: 11/23/2024  XR CHEST 1 VW Date of Exam: 11/23/2024 12:34 PM EST Indication: Acute Stroke Protocol (onset < 12 hrs) Comparison: Chest x-ray 8/19/2024 Findings: Redemonstration of left atrial appendage occlusion device. Stable cardiomediastinal silhouette with top normal size of the heart. There is mild diffuse interstitial prominence likely chronic/senescent interstitial change, without evidence of focal consolidation. No pleural effusion. No pneumothorax. The bones are demineralized without acute osseous findings.     Impression: Impression: No acute cardiopulmonary findings. Electronically Signed: Orestes Ferrara MD  11/23/2024 1:08 PM EST  Workstation ID: [id number]    CT Angiogram Head w AI Analysis of LVO    Result Date: 11/23/2024  CT CEREBRAL PERFUSION W WO CONTRAST, CT ANGIOGRAM HEAD W AI  ANALYSIS OF LVO, CT ANGIOGRAM NECK Date of Exam: 11/23/2024 12:26 PM EST Indication: Neuro Deficit, acute, Stroke suspected Neuro deficit, acute stroke suspected.  Comparison: Concurrent noncontrast head CT, CTA head and neck 10/3/2021 Technique: Axial CT images of the brain were obtained prior to and after the administration of 115 mL Isovue-370. Core blood volume, core blood flow, mean transit time, and Tmax images were obtained utilizing the Rapid software protocol. A limited CT angiogram of the head was also performed to measure the blood vessel density. CTA of the head and neck was performed after the uneventful intravenous administration of above contrast. Reconstructed coronal and sagittal images were also obtained. In addition, a 3-D volume rendered image was created for interpretation. Automated exposure control and iterative reconstruction methods were used. The radiation dose reduction device was turned on for each scan per the ALARA (As Low as Reasonably Achievable) protocol. Findings: CT cerebral perfusion: There is small scattered perfusion defects in the area of chronic infarct/encephalomalacia in the left MCA territory. Accounting for this, no evidence of core infarct or ischemic tissue at risk. CTA HEAD: No abrupt cut off/large vessel occlusion, flow-limiting stenosis, dissection, or aneurysm. There is some luminal irregularity and mild stenosis at the carotid siphons from calcified atherosclerosis. There is fetal origin of the bilateral PCAs, normal variant. The cerebral veins and dural venous sinuses appear grossly patent. There is atherosclerosis of the bilateral V4 segments with mild luminal irregularity without flow-limiting stenosis. CTA NECK: No abrupt cut off/large vessel occlusion, flow-limiting stenosis, dissection, or aneurysm. There is atherosclerosis at the carotid bifurcations without flow-limiting stenosis or appreciable luminal narrowing. Extravascular findings: Upper lungs are  grossly clear. There is a hypodense 1.5 cm nodule in the right thyroid lobe. The pharyngeal and laryngeal structures are unremarkable. No acute or suspicious bony findings.     Impression: Impression: No core infarct or ischemic tissue at risk. There are couple small perfusion defects associated with the area of chronic infarct in the left MCA territory. No abrupt cut off/large vessel occlusion, flow-limiting stenosis, dissection, or aneurysm in the head or neck. There is scattered atherosclerosis of the cervical and intracranial arteries, detailed above. Electronically Signed: Orestes Ferrara MD  11/23/2024 1:05 PM EST  Workstation ID: [id number]    CT Angiogram Neck    Result Date: 11/23/2024  CT CEREBRAL PERFUSION W WO CONTRAST, CT ANGIOGRAM HEAD W AI ANALYSIS OF LVO, CT ANGIOGRAM NECK Date of Exam: 11/23/2024 12:26 PM EST Indication: Neuro Deficit, acute, Stroke suspected Neuro deficit, acute stroke suspected.  Comparison: Concurrent noncontrast head CT, CTA head and neck 10/3/2021 Technique: Axial CT images of the brain were obtained prior to and after the administration of 115 mL Isovue-370. Core blood volume, core blood flow, mean transit time, and Tmax images were obtained utilizing the Rapid software protocol. A limited CT angiogram of the head was also performed to measure the blood vessel density. CTA of the head and neck was performed after the uneventful intravenous administration of above contrast. Reconstructed coronal and sagittal images were also obtained. In addition, a 3-D volume rendered image was created for interpretation. Automated exposure control and iterative reconstruction methods were used. The radiation dose reduction device was turned on for each scan per the ALARA (As Low as Reasonably Achievable) protocol. Findings: CT cerebral perfusion: There is small scattered perfusion defects in the area of chronic infarct/encephalomalacia in the left MCA territory. Accounting for this, no evidence  of core infarct or ischemic tissue at risk. CTA HEAD: No abrupt cut off/large vessel occlusion, flow-limiting stenosis, dissection, or aneurysm. There is some luminal irregularity and mild stenosis at the carotid siphons from calcified atherosclerosis. There is fetal origin of the bilateral PCAs, normal variant. The cerebral veins and dural venous sinuses appear grossly patent. There is atherosclerosis of the bilateral V4 segments with mild luminal irregularity without flow-limiting stenosis. CTA NECK: No abrupt cut off/large vessel occlusion, flow-limiting stenosis, dissection, or aneurysm. There is atherosclerosis at the carotid bifurcations without flow-limiting stenosis or appreciable luminal narrowing. Extravascular findings: Upper lungs are grossly clear. There is a hypodense 1.5 cm nodule in the right thyroid lobe. The pharyngeal and laryngeal structures are unremarkable. No acute or suspicious bony findings.     Impression: Impression: No core infarct or ischemic tissue at risk. There are couple small perfusion defects associated with the area of chronic infarct in the left MCA territory. No abrupt cut off/large vessel occlusion, flow-limiting stenosis, dissection, or aneurysm in the head or neck. There is scattered atherosclerosis of the cervical and intracranial arteries, detailed above. Electronically Signed: Orestes Ferrara MD  11/23/2024 1:05 PM EST  Workstation ID: [id number]    CT CEREBRAL PERFUSION WITH & WITHOUT CONTRAST    Result Date: 11/23/2024  CT CEREBRAL PERFUSION W WO CONTRAST, CT ANGIOGRAM HEAD W AI ANALYSIS OF LVO, CT ANGIOGRAM NECK Date of Exam: 11/23/2024 12:26 PM EST Indication: Neuro Deficit, acute, Stroke suspected Neuro deficit, acute stroke suspected.  Comparison: Concurrent noncontrast head CT, CTA head and neck 10/3/2021 Technique: Axial CT images of the brain were obtained prior to and after the administration of 115 mL Isovue-370. Core blood volume, core blood flow, mean transit  time, and Tmax images were obtained utilizing the Rapid software protocol. A limited CT angiogram of the head was also performed to measure the blood vessel density. CTA of the head and neck was performed after the uneventful intravenous administration of above contrast. Reconstructed coronal and sagittal images were also obtained. In addition, a 3-D volume rendered image was created for interpretation. Automated exposure control and iterative reconstruction methods were used. The radiation dose reduction device was turned on for each scan per the ALARA (As Low as Reasonably Achievable) protocol. Findings: CT cerebral perfusion: There is small scattered perfusion defects in the area of chronic infarct/encephalomalacia in the left MCA territory. Accounting for this, no evidence of core infarct or ischemic tissue at risk. CTA HEAD: No abrupt cut off/large vessel occlusion, flow-limiting stenosis, dissection, or aneurysm. There is some luminal irregularity and mild stenosis at the carotid siphons from calcified atherosclerosis. There is fetal origin of the bilateral PCAs, normal variant. The cerebral veins and dural venous sinuses appear grossly patent. There is atherosclerosis of the bilateral V4 segments with mild luminal irregularity without flow-limiting stenosis. CTA NECK: No abrupt cut off/large vessel occlusion, flow-limiting stenosis, dissection, or aneurysm. There is atherosclerosis at the carotid bifurcations without flow-limiting stenosis or appreciable luminal narrowing. Extravascular findings: Upper lungs are grossly clear. There is a hypodense 1.5 cm nodule in the right thyroid lobe. The pharyngeal and laryngeal structures are unremarkable. No acute or suspicious bony findings.     Impression: Impression: No core infarct or ischemic tissue at risk. There are couple small perfusion defects associated with the area of chronic infarct in the left MCA territory. No abrupt cut off/large vessel occlusion,  flow-limiting stenosis, dissection, or aneurysm in the head or neck. There is scattered atherosclerosis of the cervical and intracranial arteries, detailed above. Electronically Signed: Orestes Ferrara MD  11/23/2024 1:05 PM EST  Workstation ID: [id number]    CT Head Without Contrast Stroke Protocol    Result Date: 11/23/2024  CT HEAD WO CONTRAST STROKE PROTOCOL Date of Exam: 11/23/2024 12:23 PM EST Indication: Neuro deficit, acute, stroke suspected Neuro Deficit, acute, Stroke suspected. Comparison: Head CT 11/17/2024 Technique: Axial CT images were obtained of the head without contrast administration.  Reconstructed coronal images were also obtained. Automated exposure control and iterative construction methods were used. Scan Time: 12:24 p.m. 11/23/2024 Results discussed with stroke navigator by Dr. Orestes Ferrara via telephone at 12:30 p.m. 11/23/2024. Findings: Similar confluent hypodensity/encephalomalacia in the left MCA territory compatible with prior infarct. No evidence of new/acute large territory infarct. No acute intracranial hemorrhage. There are scattered white matter hypodensities which are nonspecific and can be seen in the setting of chronic small vessel ischemic change. No extra-axial collections. No midline shift or herniation. Normal size and configuration of the ventricles. Normal appearance of the orbits. The paranasal sinuses and mastoid air cells are clear. No acute osseous findings.     Impression: Impression: No acute intracranial findings. Chronic and senescent changes including sequelae of prior left MCA infarct. Electronically Signed: Orestes Ferrara MD  11/23/2024 12:34 PM EST  Workstation ID: [id number]     Results for orders placed during the hospital encounter of 11/23/24    Adult Transthoracic Echo Complete W/ Cont if Necessary Per Protocol (With Agitated Saline)    Interpretation Summary    Left ventricular ejection fraction appears to be 56 - 60%.    Left ventricular diastolic  function was normal.    Left atrial volume is mildly increased.    Moderate aortic valve regurgitation is present.    Estimated right ventricular systolic pressure from tricuspid regurgitation is mildly elevated (35-45 mmHg). Calculated right ventricular systolic pressure from tricuspid regurgitation is 36 mmHg.      Current medications:  Scheduled Meds:aspirin, 81 mg, Oral, Daily   Or  aspirin, 300 mg, Rectal, Daily  atorvastatin, 80 mg, Oral, Nightly  divalproex, 500 mg, Oral, Q8H  [Held by provider] lisinopril, 10 mg, Oral, Daily  sodium chloride, 10 mL, Intravenous, Q12H  sodium chloride, 10 mL, Intravenous, Q12H      Continuous Infusions:   PRN Meds:.  acetaminophen **OR** acetaminophen **OR** acetaminophen    [Held by provider] ALPRAZolam    senna-docusate sodium **AND** polyethylene glycol **AND** bisacodyl **AND** bisacodyl    Calcium Replacement - Follow Nurse / BPA Driven Protocol    Magnesium Standard Dose Replacement - Follow Nurse / BPA Driven Protocol    [Held by provider] melatonin    ondansetron ODT **OR** ondansetron    Phosphorus Replacement - Follow Nurse / BPA Driven Protocol    Potassium Replacement - Follow Nurse / BPA Driven Protocol    sodium chloride    sodium chloride    sodium chloride    sodium chloride    Assessment & Plan   Assessment & Plan     Active Hospital Problems    Diagnosis  POA    Complex partial seizure [G40.209]  Yes    Right sided weakness [R53.1]  Yes    Seizure disorder [G40.909]  Yes    History of CVA (cerebrovascular accident) [Z86.73]  Not Applicable    HLD (hyperlipidemia) [E78.5]  Yes    Hypertension [I10]  Yes    PAF (paroxysmal atrial fibrillation) [I48.0]  Yes      Resolved Hospital Problems    Diagnosis Date Resolved POA    **CVA (cerebral vascular accident) [I63.9] 11/23/2024 Yes        Brief Hospital Course to date:  Jenna Russell is a 83 y.o. female with PMHx significant for seizure disorder, PAF, HLD, HTN, prior stroke who presented with right sided  weakness and seizures.    Focal Seizure w/worsening Right sided weakness  Hx L MCA Stroke   - stroke team following, okay with disconinuation of plavix since MRI negative, continue ASA/statin  - MRI brain without evidence for new stroke,likely stroke recrudescence in setting of seizure  - PT/OT, plan is [facility] when bed available  - SLP cleared for regular diet  - Echo with normal EF 56-60%    Complex partial seizure  Hx Seizure Disorder  - neuro recommending increasing depakote to 500 mg from home 250 mg.     PAF w/p watchman    HTN:  - resume home lisinopril    Anxiety:  - continue home xanax PRN    Expected Discharge Location and Transportation: Home  Expected Discharge   Expected discharge date/ time has not been documented.     VTE Prophylaxis:  Mechanical VTE prophylaxis orders are present.         AM-PAC 6 Clicks Score (PT): 17 (11/25/24 0861)    CODE STATUS:   Code Status and Medical Interventions: CPR (Attempt to Resuscitate); Full Support   Ordered at: 11/23/24 1409     Code Status (Patient has no pulse and is not breathing):    CPR (Attempt to Resuscitate)     Medical Interventions (Patient has pulse or is breathing):    Full Support       Yari Kramer, DO  11/25/24

## 2024-11-25 NOTE — CASE MANAGEMENT/SOCIAL WORK
Discharge Planning Assessment  [facility]     Patient Name: Jenna Russell  MRN: [id number]  Today's Date: 11/25/2024    Admit Date: 11/23/2024    Plan: [facility]   Discharge Needs Assessment       Row Name 11/25/24 0900       Living Environment    People in Home spouse    Name(s) of People in Home Lianna Martinez (spouse) [phone]    Current Living Arrangements apartment    Potentially Unsafe Housing Conditions none    In the past 12 months has the electric, gas, oil, or water company threatened to shut off services in your home? No    Primary Care Provided by self    Provides Primary Care For no one    Family Caregiver if Needed spouse    Quality of Family Relationships helpful;involved;supportive    Able to Return to Prior Arrangements yes       Resource/Environmental Concerns    Resource/Environmental Concerns none    Transportation Concerns none       Transportation Needs    In the past 12 months, has lack of transportation kept you from medical appointments or from getting medications? no    In the past 12 months, has lack of transportation kept you from meetings, work, or from getting things needed for daily living? No       Food Insecurity    Within the past 12 months, you worried that your food would run out before you got the money to buy more. Never true    Within the past 12 months, the food you bought just didn't last and you didn't have money to get more. Never true       Transition Planning    Patient/Family Anticipates Transition to inpatient rehabilitation facility    Patient/Family Anticipated Services at Transition     Transportation Anticipated family or friend will provide       Discharge Needs Assessment    Readmission Within the Last 30 Days no previous admission in last 30 days    Equipment Currently Used at Home rollator    Concerns to be Addressed denies needs/concerns at this time    Do you want help finding or keeping work or a job? I do not need or want help    Do  you want help with school or training? For example, starting or completing job training or getting a high school diploma, GED or equivalent No    Anticipated Changes Related to Illness none    Equipment Needed After Discharge none    Outpatient/Agency/Support Group Needs inpatient rehabilitation facility    Discharge Facility/Level of Care Needs rehabilitation facility    Provided Post Acute Provider List? Yes    Post Acute Provider List Inpatient Rehab    Provided Post Acute Provider Quality & Resource List? Yes    Post Acute Provider Quality and Resource List Inpatient Rehab    Delivered To Patient;Support Person    Support Person Lianna    Method of Delivery In person    Offered/Gave Vendor List yes    Patient's Choice of Community Agency(s) [facility]                   Discharge Plan       Row Name 11/25/24 0902       Plan    Plan [facility]    Patient/Family in Agreement with Plan yes    Plan Comments Spoke to patient at bedside. Lives with Lianna Martinez (spouse) [phone] in [place]. Is independent with ADL's. No problems affording [employer] Medicare or medications. Uses [facility] pharmacy. Uses a rollator. PCP is Judi Fuentes MD. Plan is [facility]. [facility] has referral. Spouse will transport. CM will continue to follow    Final Discharge Disposition Code 62 - inpatient rehab facility                  Continued Care and Services - Admitted Since 11/23/2024    No active coordination exists for this encounter.          Demographic Summary       Row Name 11/25/24 0859       General Information    Admission Type observation    Arrived From emergency department    Referral Source admission list    Reason for Consult discharge planning    Preferred Language English       Contact Information    Permission Granted to Share Info With     Contact Information Obtained for                    Functional Status       Row Name 11/25/24 0900       Functional Status    Usual  Activity Tolerance good    Current Activity Tolerance good       Physical Activity    On average, how many days per week do you engage in moderate to strenuous exercise (like a brisk walk)? 5 days    On average, how many minutes do you engage in exercise at this level? 20 min    Number of minutes of exercise per week 100       Functional Status, IADL    Medications independent    Meal Preparation independent    Housekeeping independent    Laundry independent    Shopping independent       Mental Status    General Appearance WDL WDL       Mental Status Summary    Recent Changes in Mental Status/Cognitive Functioning no changes       Employment/    Employment Status retired                   Psychosocial    No documentation.                  Abuse/Neglect    No documentation.                  Legal    No documentation.                  Substance Abuse    No documentation.                  Patient Forms    No documentation.                     Roderick Carlos RN

## 2024-11-25 NOTE — CASE MANAGEMENT/SOCIAL WORK
Continued Stay Note  [facility]     Patient Name: eJnna Russell  MRN: [id number]  Today's Date: 11/25/2024    Admit Date: 11/23/2024    Plan: [facility]   Discharge Plan       Row Name 11/25/24 1101       Plan    Plan [facility]    Patient/Family in Agreement with Plan yes    Plan Comments Plan is [facility]. [facility] has accepted and started the precert. CM will continue to follow.    Final Discharge Disposition Code 62 - inpatient rehab facility      Row Name 11/25/24 0902       Plan    Plan [facility]    Patient/Family in Agreement with Plan yes    Plan Comments Spoke to patient at bedside. Lives with Lianna Martinez (spouse) [phone] in [place]. Is independent with ADL's. No problems affording [employer] Medicare or medications. Uses [facility] pharmacy. Uses a rollator. PCP is Judi Fuentes MD. Plan is [facility]. [facility]ne has referral. Spouse will transport. CM will continue to follow    Final Discharge Disposition Code 62 - inpatient rehab facility                   Discharge Codes    No documentation.                       Roderick Carlos RN

## 2024-11-25 NOTE — CONSULTS
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    PSH:  She  has a past surgical history that includes Total hip arthroplasty (Left); Abdominoplasty; Breast Abscess Incision and Drainage; and Hip Trochanteric Nailing (Right, 3/11/2022).    Applicable Nutrition History:   (11/23) SLP: regular textures, thin liquid     Anthropometrics     Height: Height: 172.7 cm (67.99\")  Last Filed Weight: Weight: 54 kg (119 lb 0.8 oz) (11/24/24 1048)  Method: Weight Method: Bed scale  BMI: BMI (Calculated): 18.1    UBW:     Weight     Weight (kg) Weight (lbs)   3/11/2022 53.071 kg  117 lb    9/4/2024 53.1 kg  117 lb 1 oz    11/23/2024 53.978 kg  119 lb     53.524 kg  118 lb    11/24/2024 54 kg  119 lb 0.8 oz      Weight change: No significant changes    Nutrition Focused Physical Exam    Date:  11/25/24       Patient meets criteria for malnutrition diagnosis, see MSA note.     Subjective "   Reported/Observed/Food/Nutrition Related History:     11/25/24  Spoke with patient and daughter at bedside. Patient does have some difficulties hearing and understanding certain words. Daughter helped with relay. Reported good appetite, but minimal PO intake. Drinks Ensure occasionally. Agreeable to trial van boost plus BID. SLP cleared patient for regular textures, thin liq. Daughter reported pistachio allergy - will enter in EMR.    Current Nutrition Prescription   PO: Diet: Regular/House; Texture: Regular (IDDSI 7); Fluid Consistency: Thin (IDDSI 0)  Oral Nutrition Supplement: n/a  Intake: Insufficient data    Assessment & Plan   Nutrition Diagnosis   Date:  11/25 Updated:  Problem Malnutrition, chronic severe   Etiology Adv age, CVA   Signs/Symptoms <75% of EEN x >/= 1 month, severe muscle wasting, and severe subcutaneous fat loss   Status: New    Goal:   Nutrition to support treatment and Establish PO    Nutrition Intervention      Follow treatment progress, Care plan reviewed, Advise alternate selection, Advised available snacks, Interview for preferences, Await begin PO, Encourage intake, Supplement provided, Education provided    Nutrition POC  Ordering van boost plus BID for patient to trial  Encourage adequate nutrition  Discussed home diet + vitamins    Monitoring/Evaluation:   Per protocol, PO intake, Supplement intake, Pertinent labs, Weight, GI status, Symptoms, POC/GOC    Yanira Osman MS,RD,LD  Time Spent: 30min

## 2024-11-26 PROBLEM — E43 SEVERE PROTEIN-CALORIE MALNUTRITION: Status: ACTIVE | Noted: 2024-11-26

## 2024-11-26 PROCEDURE — 97116 GAIT TRAINING THERAPY: CPT

## 2024-11-26 PROCEDURE — 97110 THERAPEUTIC EXERCISES: CPT

## 2024-11-26 PROCEDURE — G0378 HOSPITAL OBSERVATION PER HR: HCPCS

## 2024-11-26 PROCEDURE — 99232 SBSQ HOSP IP/OBS MODERATE 35: CPT | Performed by: INTERNAL MEDICINE

## 2024-11-26 PROCEDURE — 97530 THERAPEUTIC ACTIVITIES: CPT

## 2024-11-26 PROCEDURE — 92507 TX SP LANG VOICE COMM INDIV: CPT

## 2024-11-26 PROCEDURE — 97535 SELF CARE MNGMENT TRAINING: CPT

## 2024-11-26 RX ORDER — BUDESONIDE 3 MG/1
6 CAPSULE, COATED PELLETS ORAL DAILY
Status: DISCONTINUED | OUTPATIENT
Start: 2024-11-26 | End: 2024-11-27 | Stop reason: HOSPADM

## 2024-11-26 RX ORDER — METOPROLOL SUCCINATE 25 MG/1
25 TABLET, EXTENDED RELEASE ORAL
Status: DISCONTINUED | OUTPATIENT
Start: 2024-11-26 | End: 2024-11-27 | Stop reason: HOSPADM

## 2024-11-26 RX ADMIN — DIVALPROEX SODIUM 500 MG: 250 TABLET, DELAYED RELEASE ORAL at 13:46

## 2024-11-26 RX ADMIN — METOPROLOL SUCCINATE 25 MG: 25 TABLET, EXTENDED RELEASE ORAL at 17:36

## 2024-11-26 RX ADMIN — DIVALPROEX SODIUM 500 MG: 250 TABLET, DELAYED RELEASE ORAL at 21:16

## 2024-11-26 RX ADMIN — Medication 10 ML: at 08:40

## 2024-11-26 RX ADMIN — ATORVASTATIN CALCIUM 80 MG: 40 TABLET, FILM COATED ORAL at 21:16

## 2024-11-26 RX ADMIN — Medication 10 ML: at 21:26

## 2024-11-26 RX ADMIN — LISINOPRIL 10 MG: 10 TABLET ORAL at 08:39

## 2024-11-26 RX ADMIN — ASPIRIN 81 MG 81 MG: 81 TABLET ORAL at 08:39

## 2024-11-26 RX ADMIN — DIVALPROEX SODIUM 500 MG: 250 TABLET, DELAYED RELEASE ORAL at 06:04

## 2024-11-26 RX ADMIN — BUDESONIDE 6 MG: 3 CAPSULE, COATED PELLETS ORAL at 13:46

## 2024-11-26 NOTE — PLAN OF CARE
Goal Outcome Evaluation:  Plan of Care Reviewed With: patient, spouse, daughter        Progress: improving  Outcome Evaluation: Pt engaged in ADL routine that included fine/gross motor tasks, dynamic balance in sitting and standing, and increasing activity tolerance. Pt is limited by poor safety awareness, decreased activity tolerance, and balance impairments with dynamic movements. Pt would benefit from ongoing skilled IPOT services to progress to PLOF. Recommend d/c to IRF.    Anticipated Discharge Disposition (OT): inpatient rehabilitation facility

## 2024-11-26 NOTE — PLAN OF CARE
Goal Outcome Evaluation:  Plan of Care Reviewed With: patient, spouse        Progress: improving  Outcome Evaluation: Patient demonstrated improved gait mechanics today with improved balance, requiring less assist. Good effort with LE ther ex, cues required for decreased pace and improved control. Patient currently below functional baseline, demonstrating decreased functional mobility status, impaired balance, decreased endurance, and decreased strength. Will address these deficits to promote return to PLOF. Recommend IRF at d/c.    Anticipated Discharge Disposition (PT): inpatient rehabilitation facility

## 2024-11-26 NOTE — THERAPY TREATMENT NOTE
Acute Care - Speech Language Pathology Treatment Note  [facility]     Patient Name: Jenna Russell  : 1940  MRN: [id number]  Today's Date: 2024               Admit Date: 2024     Visit Dx:    ICD-10-CM ICD-9-CM   1. Expressive aphasia  R47.01 784.3   2. Acute right-sided weakness  R53.1 728.87   3. Confusion  R41.0 298.9   4. Right-sided visual neglect  R41.4 781.8   5. Complex partial seizure  G40.209 345.40   6. Aphasia  R47.01 784.3     Patient Active Problem List   Diagnosis    PAF (paroxysmal atrial fibrillation)    HLD (hyperlipidemia)    Hypertension    History of CVA (cerebrovascular accident)    Combined receptive and expressive aphasia    Colitis, Clostridium difficile    Collagenous colitis    Seizure disorder    Closed right hip fracture, initial encounter    Anemia    Daily consumption of alcohol    Complex partial seizure    Right sided weakness    Severe protein-calorie malnutrition     Past Medical History:   Diagnosis Date    Acute ischemic stroke     Arterial ischemic stroke 2016    Ataxic aphasia 2016    BP (high blood pressure) 2016    Cerebral infarction, left hemisphere 2016    Cyst of left ovary 2016    3cm    Expressive aphasia     H/O echocardiogram 2015    Global LV wall motion and contractility within normal limits. Normal LVSF.Normal LV diastolic filling. Left atrium midly dilated. RV mild- mod dilated. MIld aortic cusp sclerosis. No evidence of mitral valve prolapse. Mild mitral regurgitation.No pulmonary hypertension or perdicardial effusion. No dilation of aortic root. Venous system appears normal    H/O: stroke     HLD (hyperlipidemia) 2016    Left temporal lobe infarction     LOM (loss of memory)     Osteoarthritis 2016    Paroxysmal atrial fibrillation 2016    Thyroid nodule 2016    1.8x1.2cm    Weakness generalized     Wound infection after surgery     Left hip arthroplasty, staph aureus, 6 weeks IV Rocephin     Past  Surgical History:   Procedure Laterality Date    ABDOMINOPLASTY      BREAST ABSCESS INCISION AND DRAINAGE      HIP TROCHANTERIC NAILING WITH INTRAMEDULLARY HIP SCREW Right 3/11/2022    Procedure: HIP TROCHANTERIC NAILING WITH INTRAMEDULLARY HIP SCREW RIGHT;  Surgeon: Danny Lynne MD;  Location: [facility];  Service: Orthopedics;  Laterality: Right;    TOTAL HIP ARTHROPLASTY Left     Staph aureus wound infection       SLP Recommendation and Plan                    Anticipated Discharge Disposition (SLP): (P) inpatient rehabilitation facility (11/26/24 1107)        Therapy Frequency (SLP SLC): (P) 3 days per week (11/26/24 1107)  Predicted Duration Therapy Intervention (Days): (P) 1 week (11/26/24 1107)        Daily Summary of Progress (SLP): (P) progress toward functional goals is good (11/26/24 1107)        Communication Strategy Suggestions: (P) avoid multi-step instructions, eliminate distractions when speaking with patient, avoid open-ended questions (11/26/24 1107)  Treatment Assessment (SLP): (P) continued, moderate, aphasia, suspected, cognitive-linguistic disorder (11/26/24 1107)     Plan for Continued Treatment (SLP): (P) continue treatment per plan of care (11/26/24 1107)         SLP EVALUATION (Last 72 Hours)       SLP SLC Evaluation       Row Name 11/26/24 1107 11/24/24 1413                Communication Assessment/Intervention    Document Type therapy note (daily note) (P)   -SM --       Subjective Information no complaints (P)   -SM --       Patient Observations alert;cooperative (P)   -SM --       Patient/Family/Caregiver Comments/Observations Daughter and granddaughter present in room (P)   -SM --       Patient Effort good (P)   -SM --       Comment Pt's family provided information regarding baseline functioning. Pt currently lives in an apartment with her spouse who manages the complex functional tasks. Pt's family report that pt is nearing her baseline in terms of communication, but is still  functioning slightly below, especially in auditory comprehension. Pt occasionally uses writing to communicate. She is very aware of her verbal expression difficulties. She has recieved speech therapy in the past, but currently just uses strategies independently at home. (P)   -SM --       Symptoms Noted During/After Treatment none (P)   -SM --          General Information    Patient Level of Education -- Pt is a retired   -       Prior Level of Function-Communication -- receptive language impairment;expressive language impairment  -       Patient's Goals for Discharge -- return to home;take care of myself at home;return to all previous roles/activities  -          Pain    Pretreatment Pain Rating -- (P)   -SM --       Posttreatment Pain Rating -- (P)   -SM --       Additional Documentation Pain Scale: FACES Pre/Post-Treatment (Group) (P)   -SM --          Pain Scale: FACES Pre/Post-Treatment    Pain: FACES Scale, Pretreatment 0-->no hurt (P)   -SM --       Posttreatment Pain Rating 0-->no hurt (P)   -SM --          Auditory Comprehension Assessment/Intervention    Auditory Comprehension (Communication) -- moderate impairment;severe impairment  -HG       Able to Identify Objects/Pictures (Communication) -- body part;moderate impairment  -HG       Answers Questions (Communication) -- yes/no;simple;moderate impairment  -HG       Able to Follow Commands (Communication) -- 1-step;body part;moderate impairment;severe impairment  -HG       Auditory Comprehension Communication, Comment -- Family notes a decrease in comprehension abilties.  -          Reading Comprehension Assessment/Intervention    Reading Comprehension (Communication) -- moderate impairment  -       Scanning (Reading) -- sentences;mild impairment;moderate impairment  -       Visual Matching Ability (Reading) -- picture/word;moderate impairment  -HG       Reading Comprehension, Comment -- Family notes that reading is difficult at baseline.   -          Verbal Expression Assessment/Intervention    Automatic Speech (Communication) -- counting 1-20;L  -       Repetition -- words;mild impairment  -       Responsive Naming -- simple;moderate impairment;severe impairment  -       Confrontational Naming -- high frequency;moderate impairment;severe impairment;phonemic paraphasias  -       Spontaneous/Functional Words -- moderate impairment  -          Graphic Expression Assessment/Intervention    Graphic Expression, Comment -- Graphic expression is difficult at baseline and family notes a change in written name.  -          Motor Speech Assessment/Intervention    Motor Speech Function -- mild impairment  -       Characteristics Consistent with Dysarthria -- slurred speech  -       Motor Speech, Comment -- Baseline mild slurred speech  -HG          Cursory Voice Assessment/Intervention    Quality and Resonance (Voice) -- WFL  -          Cognitive Assessment Intervention- SLP    Orientation Status (Cognition) -- severe impairment  -       Thought Organization (Cognitive) -- concrete divergent;severe impairment  -HG       Cognition, Comment -- Not assessed this date. Pt was getting tired and family sensed some frustration with herself of her performance.  -          SLP Evaluation Clinical Impressions    SLP Diagnosis -- moderate;aphasia  receptive and expressive  -          SLP Treatment Clinical Impressions    Treatment Assessment (SLP) continued;moderate;aphasia;suspected;cognitive-linguistic disorder (P)   -SM --       Daily Summary of Progress (SLP) progress toward functional goals is good (P)   -SM --       Barriers to Overall Progress (SLP) Baseline deficits (P)   -SM --       Plan for Continued Treatment (SLP) continue treatment per plan of care (P)   -SM --       Care Plan Review care plan/treatment goals reviewed;patient/other agree to care plan (P)   -SM --       Care Plan Review, Other Participant(s) daughter;family (P)    -SM --          Recommendations    Therapy Frequency (SLP SLC) 3 days per week (P)   - --       Predicted Duration Therapy Intervention (Days) 1 week (P)   - --       Anticipated Discharge Disposition (SLP) inpatient rehabilitation facility (P)   - --       Communication Strategy Suggestions avoid multi-step instructions;eliminate distractions when speaking with patient;avoid open-ended questions (P)   - --          Patient will demonstrate functional language skills for return to discharge environment     [place] -- with minimal cues  -       Time frame -- 1 week  -HG          Words/Phrases/Sentences Goal 1 (SLP)    Improve Ability to Comprehend Words/Phrases/Sentences Through: Goal 1 (SLP) -- identify pictures, field of;identify body part;identify familiar objects;90%;with minimal cues (75-90%)  -       Time Frame (Identify Objects and Pictures Goal 1, SLP) -- short term goal (STG)  -HG          Follow Directions Goal 2 (SLP)    Improve Ability to Follow Directions Goal 1 (SLP) -- 1 step direction with objects;2 step commands;90%;with minimal cues (75-90%)  -       Time Frame (Follow Directions Goal 1, SLP) -- short term goal (STG)  -HG          Comprehension at Phrase and Sentence Level Goal 1 (SLP)    Improve Reading Comprehension at Phrase and Sentence Level Goal 1 (SLP) -- select written word to complete phrase/sentence;answer simple written y/n questions;answer complex written y/n questions;follow simple written directions;90%;with minimal cues (75-90%)  -       Time Frame (Reading Comprehension at Phrase and Sentence Level Goal 1, SLP) -- short term goal (STG)  -HG          Word Retrieval Skills Goal 1 (SLP)    Improve Word Retrieval Skills By Goal 1 (SLP) -- confrontational naming task;high frequency;responsive naming task;simple;repeating words;repeating phrases;2-5 words;6-10 words;answer WH question with one word;completing a divergent task;90%;with minimal cues (75-90%)  -        Time Frame (Word Retrieval Goal 1, SLP) -- short term goal (STG)  -          Orientation Goal 1 (SLP)    Improve Orientation Through Goal 1 (SLP) -- demonstrating orientation to day;demonstrating orientation to month;demonstrating orientation to year;demonstrating orientation to place;90%;with minimal cues (75-90%)  -       Time Frame (Orientation Goal 1, SLP) -- short term goal (STG)  -                 User Key  (r) = Recorded By, (t) = Taken By, (c) = Cosigned By      Initials Name Effective Dates     Alyssa Aguillon, MS CCC-SLP 06/16/21 -      Tomeka Ojeda, Speech Therapy Student 07/30/24 -                        EDUCATION  The patient has been educated in the following areas:     Cognitive Impairment Communication Impairment.           SLP GOALS       Row Name 11/26/24 1107 11/24/24 0930          Patient will demonstrate functional language skills for return to discharge environment     [place] with minimal cues (P)   -SM with minimal cues  -     Time frame 1 week (P)   -SM 1 week  -HG     Barriers baseline deficits (P)   -SM --     Progress/Outcomes continuing progress toward goal (P)   -SM --        Words/Phrases/Sentences Goal 1 (SLP)    Improve Ability to Comprehend Words/Phrases/Sentences Through: Goal 1 (SLP) identify pictures, field of;identify body part;identify familiar objects;90%;with minimal cues (75-90%) (P)   - identify pictures, field of;identify body part;identify familiar objects;90%;with minimal cues (75-90%)  -     Time Frame (Identify Objects and Pictures Goal 1, SLP) short term goal (STG) (P)   -SM short term goal (STG)  -     Progress/Outcomes (Identify Objects and Pictures Goal 1, SLP) goal no longer appropriate (P)   -SM --        Comprehend Questions Goal 1 (SLP)    Improve Ability to Comprehend Questions Goal 1 (SLP) questions about personal information;simple wh questions;concrete general questions;80%;with moderate cues (50-74%);other (comment) (P)    Questions about personally relevant topics/ context of conversation  -SM --     Time Frame (Comprehend Questions Goal 1, SLP) 1 week (P)   -SM --     Progress/Outcomes (Comprehend Questions Goal 1, SLP) new goal (P)   -SM --     Comment (Comprehend Questions Goal 1, SLP) With use of comprehension enhancement strategies (P)   -SM --        Follow Directions Goal 2 (SLP)    Improve Ability to Follow Directions Goal 1 (SLP) 1 step direction with objects;2 step commands;90%;with minimal cues (75-90%) (P)   -SM 1 step direction with objects;2 step commands;90%;with minimal cues (75-90%)  -HG     Time Frame (Follow Directions Goal 1, SLP) short term goal (STG) (P)   -SM short term goal (STG)  -HG     Progress/Outcomes (Follow Directions Goal 1, SLP) goal no longer appropriate (P)   -SM --        Comprehend Narrative Discourse Goal 1 (SLP)    Improve Comprehension of Narrative Discourse Goal 1 (SLP) respond appropriately to simple conversational statements;respond appropriately to simple conversational questions;80%;with moderate cues (50-74%) (P)   -SM --     Time Frame (Comprehend Narrative Discourse Goal 1, SLP) 1 week (P)   -SM --     Progress/Outcomes (Comprehend Narrative Discourse Goal 1, SLP) new goal (P)   -SM --     Comment (Comprehend Narrative Discourse Goal 1, SLP) With use of comprehension enhancement strategies (P)   -SM --        Comprehension at Phrase and Sentence Level Goal 1 (SLP)    Improve Reading Comprehension at Phrase and Sentence Level Goal 1 (SLP) -- (P)   -SM select written word to complete phrase/sentence;answer simple written y/n questions;answer complex written y/n questions;follow simple written directions;90%;with minimal cues (75-90%)  -HG     Time Frame (Reading Comprehension at Phrase and Sentence Level Goal 1, SLP) -- (P)   -SM short term goal (STG)  -HG     Progress/Outcomes (Reading Comprehension at Phrase and Sentence Level Goal 1, SLP) -- (P)   -SM --        Word Retrieval Skills  Goal 1 (SLP)    Improve Word Retrieval Skills By Goal 1 (SLP) answer WH question with one word;completing functional word finding tasks;80%;with moderate cues (50-74%);other (comment) (P)   Personally relevant/in context of conversation  - confrontational naming task;high frequency;responsive naming task;simple;repeating words;repeating phrases;2-5 words;6-10 words;answer WH question with one word;completing a divergent task;90%;with minimal cues (75-90%)  -     Time Frame (Word Retrieval Goal 1, SLP) 1 week (P)   - short term goal (STG)  -     Progress/Outcomes (Word Retrieval Goal 1, SLP) goal revised this date (P)   - --     Comment (Word Retrieval Goal 1, SLP) With use of verbal expression strategies (P)   - --        Attention Goal 1 (SLP)    Improve Attention by Goal 1 (SLP) attending to task;complete sustained attention task;80%;with minimal cues (75-90%) (P)   - --     Time Frame (Attention Goal 1, SLP) 1 week (P)   - --     Progress/Outcomes (Attention Goal 1, SLP) new goal (P)   - --        Orientation Goal 1 (SLP)    Improve Orientation Through Goal 1 (SLP) demonstrating orientation to day;demonstrating orientation to month;demonstrating orientation to year;demonstrating orientation to place;90%;with minimal cues (75-90%) (P)   - demonstrating orientation to day;demonstrating orientation to month;demonstrating orientation to year;demonstrating orientation to place;90%;with minimal cues (75-90%)  -     Time Frame (Orientation Goal 1, SLP) short term goal (STG) (P)   - short term goal (STG)  -     Progress/Outcomes (Orientation Goal 1, SLP) goal no longer appropriate (P)   - --               User Key  (r) = Recorded By, (t) = Taken By, (c) = Cosigned By      Initials Name Provider Type    Alyssa Lucia MS CCC-SLP Speech and Language Pathologist    Tomeka Howard, Speech Therapy Student SLP Student                              Time Calculation:      Time Calculation-  SLP       Row Name 11/26/24 1311 11/26/24 1146          Time Calculation- SLP    SLP Start Time 1107 (P)   - --     SLP Received On 11/26/24 (P)   -SM --        Untimed Charges    [id number] Treatment/ST Modification Prosth Aug Alter  75 (P)   -SM --        Total Minutes    Untimed Charges Total Minutes 75 (P)   -SM --      Total Minutes 75 (P)   -SM --        PT/SLP KX Modifier    Exception criteria met to exceed therapy cap -- Apply KX Modifier  -AC               User Key  (r) = Recorded By, (t) = Taken By, (c) = Cosigned By      Initials Name Provider Type    AC Myra Tilley, MS CCC-SLP Speech and Language Pathologist    Tomeka Howard, Speech Therapy Student SLP Student                    Therapy Charges for Today       Code Description Service Date Service Provider Modifiers Qty    [id number]  ST TREATMENT SPEECH 5 11/26/2024 Tomeka Ojeda, Speech Therapy Student GN, KX 1                       Tomeka Ojeda Speech Therapy Student  11/26/2024

## 2024-11-26 NOTE — PROGRESS NOTES
[facility] Medicine Services  PROGRESS NOTE    Patient Name: Jenna Russell  : 1940  MRN: [id number]    Date of Admission: 2024  Primary Care Physician: Judi Rodriguez MD    Subjective   Subjective     CC:  Follow-up communication deficit    HPI:  2 family members at bedside.  Patient with communication deficits, per family close to baseline but slightly more confused.  Also she chronically takes budesonide daily for diarrhea.  Awaiting pre-CERT for rehab      Objective   Objective     Vital Signs:   Temp:  [97.9 °F (36.6 °C)-98.8 °F (37.1 °C)] 98.8 °F (37.1 °C)  Heart Rate:  [] 102  Resp:  [16-18] 16  BP: (141-169)/(67-96) 141/76  Flow (L/min) (Oxygen Therapy):  [2] 2     Physical Exam:  Constitutional: Awake, alert, elderly female sitting up in bed no acute distress  Respiratory: Clear to auscultation bilaterally, respiratory effort normal   Cardiovascular: RRR, palpable radial pulse  Gastrointestinal: Positive bowel sounds, soft, nontender, nondistended  Psychiatric: Appropriate affect, cooperative  Neurologic: Speech is clear, expressive/receptive aphasia    Results Reviewed:  LAB RESULTS:      Lab 24  1233   WBC 7.19   HEMOGLOBIN 12.2   HEMATOCRIT 37.7   PLATELETS 152   NEUTROS ABS 4.10   IMMATURE GRANS (ABS) 0.03   LYMPHS ABS 2.15   MONOS ABS 0.81   EOS ABS 0.08   MCV 97.7*   PROTIME 13.1   APTT 28.7   HSTROP T 14*         Lab 24  0544   HEMOGLOBIN A1C 4.90         Lab 24  1233   ALT (SGPT) 20   AST (SGOT) 46*         Lab 24  1233   HSTROP T 14*   PROTIME 13.1   INR 0.98         Lab 24  0544   CHOLESTEROL 157   LDL CHOL 64   HDL CHOL 80*   TRIGLYCERIDES 67             Brief Urine Lab Results       None            Microbiology Results Abnormal       None            No radiology results from the last 24 hrs    Results for orders placed during the hospital encounter of 24    Adult Transthoracic Echo Complete W/ Cont if  Necessary Per Protocol (With Agitated Saline)    Interpretation Summary    Left ventricular ejection fraction appears to be 56 - 60%.    Left ventricular diastolic function was normal.    Left atrial volume is mildly increased.    Moderate aortic valve regurgitation is present.    Estimated right ventricular systolic pressure from tricuspid regurgitation is mildly elevated (35-45 mmHg). Calculated right ventricular systolic pressure from tricuspid regurgitation is 36 mmHg.      Current medications:  Scheduled Meds:aspirin, 81 mg, Oral, Daily   Or  aspirin, 300 mg, Rectal, Daily  atorvastatin, 80 mg, Oral, Nightly  Budesonide, 6 mg, Oral, Daily  divalproex, 500 mg, Oral, Q8H  lisinopril, 10 mg, Oral, Daily  sodium chloride, 10 mL, Intravenous, Q12H  sodium chloride, 10 mL, Intravenous, Q12H      Continuous Infusions:   PRN Meds:.  acetaminophen **OR** acetaminophen **OR** acetaminophen    [Held by provider] ALPRAZolam    senna-docusate sodium **AND** polyethylene glycol **AND** bisacodyl **AND** bisacodyl    Calcium Replacement - Follow Nurse / BPA Driven Protocol    Magnesium Standard Dose Replacement - Follow Nurse / BPA Driven Protocol    [Held by provider] melatonin    ondansetron ODT **OR** ondansetron    Phosphorus Replacement - Follow Nurse / BPA Driven Protocol    Potassium Replacement - Follow Nurse / BPA Driven Protocol    sodium chloride    sodium chloride    sodium chloride    sodium chloride    Assessment & Plan   Assessment & Plan     Active Hospital Problems    Diagnosis  POA    **Complex partial seizure [G40.209]  Yes    Severe protein-calorie malnutrition [E43]  Yes    Right sided weakness [R53.1]  Yes    Seizure disorder [G40.909]  Yes    History of CVA (cerebrovascular accident) [Z86.73]  Not Applicable    HLD (hyperlipidemia) [E78.5]  Yes    Hypertension [I10]  Yes    PAF (paroxysmal atrial fibrillation) [I48.0]  Yes      Resolved Hospital Problems   No resolved problems to display.        Brief  Hospital Course to date:  Jenna Russell is a 83 y.o. female w/ prior stroke, expressive aphasia, paroxysmal A-fib, seizure disorder, who awoke with slurred speech, RT sided weakness, and had progressive confusion; she was treated to stroke alert on arrival, after returning from head CT she had rhythmic RT arm twitching concerning for seizure activity and received 4 mg Ativan; stroke team has followed, MRI of the brain was without evidence of stroke, her clinical syndrome was considered related to seizure activity and stroke symptom recrudescence, her Depakote was increased from 250 mg to 500 mg dosing    The following problems are new to me today    Assessment/plan    Complex focal seizure  Recrudescence of LT MCA stroke symptoms  Chronic expressive aphasia  -MRI negative for acute stroke  -ASA, statin  -Per stroke team, Depakote increased to 500 mg TID  -PT/OT, awaiting insurance pre-CERT    Hypertension  Hyperlipidemia  Paroxysmal A-fib  -cont lisinopril  -restart Toprol-XL    Diarrheal illness  -Takes Entocort at home, restart    Expected Discharge Location and Transportation: [facility] pending insurance approval  Expected Discharge   Expected Discharge Date: 11/27/2024; Expected Discharge Time:      VTE Prophylaxis:  Mechanical VTE prophylaxis orders are present.         AM-PAC 6 Clicks Score (PT): 19 (11/26/24 1030)    CODE STATUS:   Code Status and Medical Interventions: CPR (Attempt to Resuscitate); Full Support   Ordered at: 11/23/24 1409     Code Status (Patient has no pulse and is not breathing):    CPR (Attempt to Resuscitate)     Medical Interventions (Patient has pulse or is breathing):    Full Support       Lamont Mane, DO  11/26/24

## 2024-11-26 NOTE — THERAPY TREATMENT NOTE
Patient Name: Jenna Russell  : 1940    MRN: [id number]                              Today's Date: 2024       Admit Date: 2024    Visit Dx:     ICD-10-CM ICD-9-CM   1. Expressive aphasia  R47.01 784.3   2. Acute right-sided weakness  R53.1 728.87   3. Confusion  R41.0 298.9   4. Right-sided visual neglect  R41.4 781.8   5. Complex partial seizure  G40.209 345.40   6. Aphasia  R47.01 784.3     Patient Active Problem List   Diagnosis    PAF (paroxysmal atrial fibrillation)    HLD (hyperlipidemia)    Hypertension    History of CVA (cerebrovascular accident)    Combined receptive and expressive aphasia    Colitis, Clostridium difficile    Collagenous colitis    Seizure disorder    Closed right hip fracture, initial encounter    Anemia    Daily consumption of alcohol    Complex partial seizure    Right sided weakness    Severe protein-calorie malnutrition     Past Medical History:   Diagnosis Date    Acute ischemic stroke     Arterial ischemic stroke 2016    Ataxic aphasia 2016    BP (high blood pressure) 2016    Cerebral infarction, left hemisphere 2016    Cyst of left ovary 2016    3cm    Expressive aphasia     H/O echocardiogram 2015    Global LV wall motion and contractility within normal limits. Normal LVSF.Normal LV diastolic filling. Left atrium midly dilated. RV mild- mod dilated. MIld aortic cusp sclerosis. No evidence of mitral valve prolapse. Mild mitral regurgitation.No pulmonary hypertension or perdicardial effusion. No dilation of aortic root. Venous system appears normal    H/O: stroke     HLD (hyperlipidemia) 2016    Left temporal lobe infarction     LOM (loss of memory)     Osteoarthritis 2016    Paroxysmal atrial fibrillation 2016    Thyroid nodule 2016    1.8x1.2cm    Weakness generalized     Wound infection after surgery     Left hip arthroplasty, staph aureus, 6 weeks IV Rocephin     Past Surgical History:   Procedure Laterality Date     ABDOMINOPLASTY      BREAST ABSCESS INCISION AND DRAINAGE      HIP TROCHANTERIC NAILING WITH INTRAMEDULLARY HIP SCREW Right 3/11/2022    Procedure: HIP TROCHANTERIC NAILING WITH INTRAMEDULLARY HIP SCREW RIGHT;  Surgeon: Danny Lynne MD;  Location: [facility];  Service: Orthopedics;  Laterality: Right;    TOTAL HIP ARTHROPLASTY Left     Staph aureus wound infection      General Information       Row Name 24 1527          Physical Therapy Time and Intention    Document Type therapy note (daily note)  -LR     Mode of Treatment physical therapy;individual therapy  -LR       Row Name 24 1527          General Information    Patient Profile Reviewed yes  -LR     Existing Precautions/Restrictions fall;other (see comments);seizures  baseline expressive aphasia, new receptive aphasia, [place]  -LR     Barriers to Rehab medically complex;previous functional deficit;hearing deficit  -LR       Row Name 24 152          Cognition    Orientation Status (Cognition) oriented to;verbal cues/prompts needed for orientation;person;disoriented to;place;situation;time;other (see comments)  cues for name/, choices required to correctly state hospital  -LR       Row Name 24 1527          Safety Issues/Impairments Affecting Functional Mobility    Safety Issues Affecting Function (Mobility) ability to follow commands;awareness of need for assistance;insight into deficits/self-awareness;judgment;positioning of assistive device;safety precautions follow-through/compliance;safety precaution awareness  -LR     Impairments Affecting Function (Mobility) cognition;balance;strength;endurance/activity tolerance;coordination;motor control  -LR               User Key  (r) = Recorded By, (t) = Taken By, (c) = Cosigned By      Initials Name Provider Type    LR Katelynn Conteh, PT Physical Therapist                   Mobility       Row Name 24 1527          Bed Mobility    Supine-Sit [place] (Bed Mobility)  not tested  -LR     Comment, (Bed Mobility) [facility] on arrival and at end of treatment.  -LR       Row Name 11/26/24 1527          Transfers    Comment, (Transfers) Verbal cues for correct hand placement with t/f. Requires cues and assist to lock brakes on rollator.  -LR       Row Name 11/26/24 1527          Bed-Chair Transfer    Bed-Chair [place] (Transfers) not tested  -LR       Row Name 11/26/24 1527          Sit-Stand Transfer    Sit-Stand [place] (Transfers) verbal cues;contact guard;1 person to manage equipment  -LR     Assistive Device (Sit-Stand Transfers) walker, 4-wheeled  -LR       Row Name 11/26/24 1527          Gait/Stairs (Locomotion)    [place] Level (Gait) verbal cues;minimum assist (75% patient effort)  -LR     Assistive Device (Gait) walker, 4-wheeled  -LR     Patient was able to Ambulate yes  -LR     Distance in Feet (Gait) 50  a few bouts of 50 feet  -LR     Deviations/Abnormal Patterns (Gait) bilateral deviations;base of support, narrow;bhavik decreased;stride length decreased;gait speed decreased  -LR     Bilateral Gait Deviations heel strike decreased  -LR     [place] Level (Stairs) not tested  -LR     Comment, (Gait/Stairs) Patient ambulated with step through gait pattern at slow pace with narrow L[place]. No scissoring noted today, improved coordination noted in L LE. Min assist required at times to steer rollator. Cues for upright posture. Gait limited by fatigue.  -LR               User Key  (r) = Recorded By, (t) = Taken By, (c) = Cosigned By      Initials Name Provider Type    LR Katelynn Conteh, PT Physical Therapist                   Obj/Interventions       Row Name 11/26/24 1527          Motor Skills    Therapeutic Exercise ankle;knee;hip;other (see comments)  cues for technique, for full ROM with ther ex and decreased pace with improved control, no assist required  -LR       Row Name 11/26/24 1527          Hip (Therapeutic Exercise)    Hip (Therapeutic Exercise)  strengthening exercise  -LR     Hip Strengthening (Therapeutic Exercise) bilateral;heel slides;aBduction;marching while seated;sitting;10 repetitions  -LR       Row Name 11/26/24 1527          Knee (Therapeutic Exercise)    Knee (Therapeutic Exercise) strengthening exercise  -LR     Knee Strengthening (Therapeutic Exercise) bilateral;SLR (straight leg raise);LAQ (long arc quad);sitting;10 repetitions  -LR       Row Name 11/26/24 1527          Ankle (Therapeutic Exercise)    Ankle (Therapeutic Exercise) AROM (active range of motion)  -LR     Ankle AROM (Therapeutic Exercise) bilateral;dorsiflexion;plantarflexion;sitting;10 repetitions  -LR       Row Name 11/26/24 1527          Balance    Balance Assessment sitting dynamic balance;sitting static balance;standing static balance;standing dynamic balance  -LR     Static Sitting Balance standby assist  -LR     Dynamic Sitting Balance standby assist  -LR     Position, Sitting Balance unsupported;sitting in chair  -LR     Static Standing Balance contact guard;1 person to manage equipment  -LR     Dynamic Standing Balance minimal assist;1 person to manage equipment  -LR     Position/Device Used, Standing Balance supported;walker, 4-wheeled  -LR               User Key  (r) = Recorded By, (t) = Taken By, (c) = Cosigned By      Initials Name Provider Type    LR Katelynn Conteh, PT Physical Therapist                   Goals/Plan       Row Name 11/26/24 1427          Bed Mobility Goal 1 (PT)    Activity/Assistive Device (Bed Mobility Goal 1, PT) sit to supine/supine to sit  -LR     [place] Level/Cues Needed (Bed Mobility Goal 1, PT) standby assist  -LR     Time Frame (Bed Mobility Goal 1, PT) short term goal (STG);1 week  -LR     Progress/Outcomes (Bed Mobility Goal 1, PT) goal ongoing  -LR       Row Name 11/26/24 1427          Transfer Goal 1 (PT)    Activity/Assistive Device (Transfer Goal 1, PT) sit-to-stand/stand-to-sit;bed-to-chair/chair-to-bed  -LR      [place] Level/Cues Needed (Transfer Goal 1, PT) modified independence  -LR     Time Frame (Transfer Goal 1, PT) long term goal (LTG);2 weeks  -LR     Progress/Outcome (Transfer Goal 1, PT) good progress toward goal;goal met;goal revised this date;goal ongoing  -LR       Row Name 11/26/24 1427          Gait Training Goal 1 (PT)    Activity/Assistive Device (Gait Training Goal 1, PT) gait (walking locomotion);walker, rolling  -LR     [place] Level (Gait Training Goal 1, PT) modified independence  -LR     Distance (Gait Training Goal 1, PT) 500 feet  -LR     Time Frame (Gait Training Goal 1, PT) long term goal (LTG);2 weeks  -LR     Progress/Outcome (Gait Training Goal 1, PT) good progress toward goal;goal partially met;goal ongoing;goal revised this date  -LR               User Key  (r) = Recorded By, (t) = Taken By, (c) = Cosigned By      Initials Name Provider Type    LR Katelynn Conteh, PT Physical Therapist                   Clinical Impression       Row Name 11/26/24 1427          Pain    Pretreatment Pain Rating 0/10 - no pain  -LR     Posttreatment Pain Rating 0/10 - no pain  -LR       Row Name 11/26/24 1427          Plan of Care Review    Plan of Care Reviewed With patient;spouse  -LR     Progress improving  -LR     Outcome Evaluation Patient demonstrated improved gait mechanics today with improved balance, requiring less assist. Good effort with LE ther ex, cues required for decreased pace and improved control. Patient currently below functional baseline, demonstrating decreased functional mobility status, impaired balance, decreased endurance, and decreased strength. Will address these deficits to promote return to PLOF. Recommend IRF at d/c.  -LR       Row Name 11/26/24 1427          Therapy Assessment/Plan (PT)    Rehab Potential (PT) good  -LR     Criteria for Skilled Interventions Met (PT) yes;meets criteria;skilled treatment is necessary  -LR     Therapy Frequency (PT) daily  -LR        Row Name 11/26/24 1427          Vital Signs    Pre Systolic BP Rehab 138  -LR     Pre Treatment Diastolic BP 74  -LR     Post Systolic BP Rehab 141  -LR     Post Treatment Diastolic BP 76  -LR     Pretreatment Heart Rate (beats/min) 87  -LR     Posttreatment Heart Rate (beats/min) 87  -LR     Pre SpO2 (%) 96  -LR     O2 Delivery Pre Treatment room air  -LR     Post SpO2 (%) 97  -LR     O2 Delivery Post Treatment room air  -LR     Pre Patient Position Sitting  -LR     Post Patient Position Sitting  -LR       Row Name 11/26/24 1427          Positioning and Restraints    Pre-Treatment Position sitting in chair/recliner  -LR     Post Treatment Position chair  -LR     In Chair notified nsg;reclined;sitting;call light within reach;encouraged to call for assist;exit alarm on;with family/caregiver;legs elevated;waffle cushion  -LR               User Key  (r) = Recorded By, (t) = Taken By, (c) = Cosigned By      Initials Name Provider Type    LR Katelynn Conteh, PT Physical Therapist                   Outcome Measures       Row Name 11/26/24 1427 11/26/24 1030       How much help from another person do you currently need...    Turning from your back to your side while in flat bed without using bedrails? 4  -LR 4  -LM    Moving from lying on back to sitting on the side of a flat bed without bedrails? 4  -LR 4  -LM    Moving to and from a bed to a chair (including a wheelchair)? 3  -LR 3  -LM    Standing up from a chair using your arms (e.g., wheelchair, bedside chair)? 3  -LR 3  -LM    Climbing 3-5 steps with a railing? 2  -LR 2  -LM    To walk in hospital room? 3  -LR 3  -LM    AM-PAC 6 Clicks Score (PT) 19  -LR 19  -LM    Highest Level of Mobility Goal 6 --> Walk 10 steps or more  -LR 6 --> Walk 10 steps or more  -LM      Row Name 11/26/24 1455 11/26/24 1427       Functional Assessment    Outcome Measure Options AM-PAC 6 Clicks Daily Activity (OT)  -AJ AM-PAC 6 Clicks Basic Mobility (PT)  -LR              User Key   (r) = Recorded By, (t) = Taken By, (c) = Cosigned By      Initials Name Provider Type    Katelynn Dorantes, PT Physical Therapist    Estela Martinez RN Registered Nurse    Lluvia Musa, OT Occupational Therapist                                 Physical Therapy Education       Title: PT OT SLP Therapies (In Progress)       Topic: Physical Therapy (Done)       Point: Mobility training (Done)       Learning Progress Summary            Patient Acceptance, E,D, VU,NR by LR at 11/26/2024 1427    Comment: Educated on precautions, safety with mobility, benefits of mobility, LE HEP, correct sit<->stand t/f technique, correct gait mechanics, and progression of POC.    Acceptance, E, VU,NR by CD at 11/24/2024 1342    Comment: BENEFITS OF OOB ACTIVITY, SAFETY WITH MOBILITY, PROGRESSION OF POC, D/C PLANNING,   Significant Other Acceptance, E,D, VU,NR by LR at 11/26/2024 1427    Comment: Educated on precautions, safety with mobility, benefits of mobility, LE HEP, correct sit<->stand t/f technique, correct gait mechanics, and progression of POC.                      Point: Home exercise program (Done)       Learning Progress Summary            Patient Acceptance, E,D, VU,NR by LR at 11/26/2024 1427    Comment: Educated on precautions, safety with mobility, benefits of mobility, LE HEP, correct sit<->stand t/f technique, correct gait mechanics, and progression of POC.    Acceptance, E, VU,NR by CD at 11/24/2024 1342    Comment: BENEFITS OF OOB ACTIVITY, SAFETY WITH MOBILITY, PROGRESSION OF POC, D/C PLANNING,   Significant Other Acceptance, E,D, VU,NR by LR at 11/26/2024 1427    Comment: Educated on precautions, safety with mobility, benefits of mobility, LE HEP, correct sit<->stand t/f technique, correct gait mechanics, and progression of POC.                      Point: Body mechanics (Done)       Learning Progress Summary            Patient Acceptance, E,D, VU,NR by LR at 11/26/2024 1427    Comment: Educated on  precautions, safety with mobility, benefits of mobility, LE HEP, correct sit<->stand t/f technique, correct gait mechanics, and progression of POC.    Acceptance, E, VU,NR by CD at 11/24/2024 1342    Comment: BENEFITS OF OOB ACTIVITY, SAFETY WITH MOBILITY, PROGRESSION OF POC, D/C PLANNING,   Significant Other Acceptance, E,D, VU,NR by LR at 11/26/2024 1427    Comment: Educated on precautions, safety with mobility, benefits of mobility, LE HEP, correct sit<->stand t/f technique, correct gait mechanics, and progression of POC.                      Point: Precautions (Done)       Learning Progress Summary            Patient Acceptance, E,D, VU,NR by LR at 11/26/2024 1427    Comment: Educated on precautions, safety with mobility, benefits of mobility, LE HEP, correct sit<->stand t/f technique, correct gait mechanics, and progression of POC.    Acceptance, E, VU,NR by CD at 11/24/2024 1342    Comment: BENEFITS OF OOB ACTIVITY, SAFETY WITH MOBILITY, PROGRESSION OF POC, D/C PLANNING,   Significant Other Acceptance, E,D, VU,NR by LR at 11/26/2024 1427    Comment: Educated on precautions, safety with mobility, benefits of mobility, LE HEP, correct sit<->stand t/f technique, correct gait mechanics, and progression of POC.                                      User Key       Initials Effective Dates Name Provider Type Discipline    CD 02/03/23 -  Gabby Salas, PT Physical Therapist PT    LR 02/03/23 -  Katelynn Conteh, YESY Physical Therapist PT                  PT Recommendation and Plan     Progress: improving  Outcome Evaluation: Patient demonstrated improved gait mechanics today with improved balance, requiring less assist. Good effort with LE ther ex, cues required for decreased pace and improved control. Patient currently below functional baseline, demonstrating decreased functional mobility status, impaired balance, decreased endurance, and decreased strength. Will address these deficits to promote return to PLOF.  Recommend IRF at d/c.     Time Calculation:         PT Charges       Row Name 11/26/24 1608 11/26/24 1427 11/26/24 1146       Time Calculation    Start Time -- 1427  -LR --    PT Received On -- 11/26/24  -LR --    PT Goal Re-Cert Due Date -- 12/04/24  -LR --       Timed Charges    50935 - PT Therapeutic Exercise Minutes -- 10  -LR --    58530 - Gait Training Minutes  -- 17  -LR --       Total Minutes    Timed Charges Total Minutes -- 27  -LR --     Total Minutes -- 27  -LR --       PT/SLP KX Modifier    Exception criteria met to exceed therapy cap Apply KX Modifier  -LR -- Apply KX Modifier  -AC              User Key  (r) = Recorded By, (t) = Taken By, (c) = Cosigned By      Initials Name Provider Type    Katelynn Dorantes, PT Physical Therapist    Myra Nguyen, MS CCC-SLP Speech and Language Pathologist                  Therapy Charges for Today       Code Description Service Date Service Provider Modifiers Qty    56824705343 HC PT THER PROC EA 15 MIN 11/26/2024 Katelynn Conteh, PT GP, KX 1    37853218280 HC GAIT TRAINING EA 15 MIN 11/26/2024 Katelynn Conteh, PT GP, KX 1    66486424597 HC PT THER SUPP EA 15 MIN 11/26/2024 Katelynn Conteh, PT GP 4            PT G-Codes  Outcome Measure Options: AM-PAC 6 Clicks Daily Activity (OT)  AM-PAC 6 Clicks Score (PT): 19  AM-PAC 6 Clicks Score (OT): 19  Modified Caroline Scale: 4 - Moderately severe disability.  Unable to walk without assistance, and unable to attend to own bodily needs without assistance.  PT Discharge Summary  Anticipated Discharge Disposition (PT): inpatient rehabilitation facility    Katelynn Conteh, PT  11/26/2024

## 2024-11-26 NOTE — CASE MANAGEMENT/SOCIAL WORK
Continued Stay Note   [place]     Patient Name: Jenna Russell  MRN: [id number]  Today's Date: 11/26/2024    Admit Date: 11/23/2024    Plan: [facility]   Discharge Plan       Row Name 11/26/24 1301       Plan    Plan [facility]    Patient/Family in Agreement with Plan yes    Plan Comments Discussed Ms. Russell in MDR. Marert is pending from her [employer] for the rehab at [facility]. CM will continue to follow up.    Final Discharge Disposition Code 62 - inpatient rehab facility                   Discharge Codes    No documentation.                       Tata Valdez RN

## 2024-11-26 NOTE — NURSING NOTE
Pt d/o to time and situation, [employer] on tele, no c/o pain. NIH=3. Spouse at bedside. Up with one and walker to toilet, one to bedside commode. Oral food intake encouragedl. Up to chair. Turns encouraged. Seizure and safety precautions in place.

## 2024-11-26 NOTE — PLAN OF CARE
Goal Outcome Evaluation:  Plan of Care Reviewed With: (P) patient, family                Anticipated Discharge Disposition (SLP): (P) inpatient rehabilitation facility             Treatment Assessment (SLP): (P) continued, moderate, aphasia, suspected, cognitive-linguistic disorder (11/26/24 1107)     Plan for Continued Treatment (SLP): (P) continue treatment per plan of care (11/26/24 1107)

## 2024-11-26 NOTE — THERAPY TREATMENT NOTE
Patient Name: Jenna Russell  : 1940    MRN: [id number]                              Today's Date: 2024       Admit Date: 2024    Visit Dx:     ICD-10-CM ICD-9-CM   1. Expressive aphasia  R47.01 784.3   2. Acute right-sided weakness  R53.1 728.87   3. Confusion  R41.0 298.9   4. Right-sided visual neglect  R41.4 781.8   5. Complex partial seizure  G40.209 345.40   6. Aphasia  R47.01 784.3     Patient Active Problem List   Diagnosis    PAF (paroxysmal atrial fibrillation)    HLD (hyperlipidemia)    Hypertension    History of CVA (cerebrovascular accident)    Combined receptive and expressive aphasia    Colitis, Clostridium difficile    Collagenous colitis    Seizure disorder    Closed right hip fracture, initial encounter    Anemia    Daily consumption of alcohol    Complex partial seizure    Right sided weakness    Severe protein-calorie malnutrition     Past Medical History:   Diagnosis Date    Acute ischemic stroke     Arterial ischemic stroke 2016    Ataxic aphasia 2016    BP (high blood pressure) 2016    Cerebral infarction, left hemisphere 2016    Cyst of left ovary 2016    3cm    Expressive aphasia     H/O echocardiogram 2015    Global LV wall motion and contractility within normal limits. Normal LVSF.Normal LV diastolic filling. Left atrium midly dilated. RV mild- mod dilated. MIld aortic cusp sclerosis. No evidence of mitral valve prolapse. Mild mitral regurgitation.No pulmonary hypertension or perdicardial effusion. No dilation of aortic root. Venous system appears normal    H/O: stroke     HLD (hyperlipidemia) 2016    Left temporal lobe infarction     LOM (loss of memory)     Osteoarthritis 2016    Paroxysmal atrial fibrillation 2016    Thyroid nodule 2016    1.8x1.2cm    Weakness generalized     Wound infection after surgery     Left hip arthroplasty, staph aureus, 6 weeks IV Rocephin     Past Surgical History:   Procedure Laterality Date     ABDOMINOPLASTY      BREAST ABSCESS INCISION AND DRAINAGE      HIP TROCHANTERIC NAILING WITH INTRAMEDULLARY HIP SCREW Right 3/11/2022    Procedure: HIP TROCHANTERIC NAILING WITH INTRAMEDULLARY HIP SCREW RIGHT;  Surgeon: Danny Lynne MD;  Location: [facility];  Service: Orthopedics;  Laterality: Right;    TOTAL HIP ARTHROPLASTY Left     Staph aureus wound infection      General Information       Row Name 11/26/24 1440          OT Time and Intention    Document Type therapy note (daily note)  -AJ     Mode of Treatment occupational therapy  -AJ     Patient Effort excellent  -AJ       Row Name 11/26/24 1445          General Information    Patient Profile Reviewed yes  -AJ     Existing Precautions/Restrictions other (see comments);fall  expressive aphasia (baseline), new receptive aphasia  -AJ     Barriers to Rehab medically complex;cognitive status;hearing deficit  -AJ       Row Name 11/26/24 1442          Cognition    Orientation Status (Cognition) oriented to;person;place;disoriented to;time  -AJ       Row Name 11/26/24 1448          Safety Issues/Impairments Affecting Functional Mobility    Safety Issues Affecting Function (Mobility) awareness of need for assistance;ability to follow commands;impulsivity;insight into deficits/self-awareness;positioning of assistive device;problem-solving;safety precaution awareness;safety precautions follow-through/compliance;sequencing abilities;steps too close to assistive device  -AJ     Impairments Affecting Function (Mobility) balance;cognition;coordination;endurance/activity tolerance;visual/perceptual  -AJ     Cognitive Impairments, Mobility Safety/Performance awareness, need for assistance;impulsivity;sequencing abilities;insight into deficits/self-awareness;judgment;problem-solving/reasoning;safety precaution awareness;safety precaution follow-through  -AJ               User Key  (r) = Recorded By, (t) = Taken By, (c) = Cosigned By      Initials Name Provider Type      Lluvia Quinn, OT Occupational Therapist                     Mobility/ADL's       Row Name 11/26/24 1443          Bed Mobility    Bed Mobility supine-sit  -     Supine-Sit Parke (Bed Mobility) standby assist  -     Assistive Device (Bed Mobility) head of bed elevated  -       Row Name 11/26/24 1443          Transfers    Transfers sit-stand transfer;stand-sit transfer  -       Row Name 11/26/24 1443          Sit-Stand Transfer    Sit-Stand Parke (Transfers) contact guard;1 person assist;verbal cues;nonverbal cues (demo/gesture)  -     Assistive Device (Sit-Stand Transfers) walker, front-wheeled  -     Comment, (Sit-Stand Transfer) Verbal and tactile cues for body mechanics and correcting flexed posturing  -       Row Name 11/26/24 1443          Stand-Sit Transfer    Stand-Sit Parke (Transfers) contact guard  -     Assistive Device (Stand-Sit Transfers) walker, front-wheeled  -       Row Name 11/26/24 1443          Functional Mobility    Functional Mobility- Ind. Level contact guard assist;1 person;verbal cues required  -     Functional Mobility- Device walker, front-wheeled  -     Functional Mobility-Distance (Feet) --   distance  -     Functional Mobility- Comment VCs for navigating FWW with ambulation. Poor safety awareness demonstrated by pt asking to continue walking while legs were visable shaking suspect d/t fatigue.  -       Row Name 11/26/24 1443          Activities of Daily Living    BADL Assessment/Intervention grooming;toileting  -       Row Name 11/26/24 1443          Grooming Assessment/Training    Parke Level (Grooming) oral care regimen;supervision  -     Position (Grooming) supported standing  -       Row Name 11/26/24 1443          Toileting Assessment/Training    Parke Level (Toileting) adjust/manage clothing;contact guard assist;perform perineal hygiene;dependent (less than 25% patient effort)  -     Assistive Devices  (Toileting) commode chair;grab bar/safety frame  -     Position (Toileting) unsupported sitting  -AJ     Comment, (Toileting) BSC over commode for added height and arm rests for stability. Dep for pericare, CGA for managing clothing.  -               User Key  (r) = Recorded By, (t) = Taken By, (c) = Cosigned By      Initials Name Provider Type    Lluvia Musa OT Occupational Therapist                   Obj/Interventions       Row Name 11/26/24 1448          Balance    Balance Assessment sitting static balance;sitting dynamic balance;sit to stand dynamic balance;standing static balance;standing dynamic balance  -     Static Sitting Balance standby assist  -     Dynamic Sitting Balance standby assist  -     Position, Sitting Balance unsupported;sitting edge of bed  -     Static Standing Balance contact guard  -     Dynamic Standing Balance contact guard  -     Position/Device Used, Standing Balance supported;walker, front-wheeled  -     Balance Interventions sitting;standing;sit to stand;supported;static;dynamic;occupation based/functional task  -               User Key  (r) = Recorded By, (t) = Taken By, (c) = Cosigned By      Initials Name Provider Type    Lluvia Musa OT Occupational Therapist                   Goals/Plan    No documentation.                  Clinical Impression       Row Name 11/26/24 1449          Pain Assessment    Pretreatment Pain Rating 0/10 - no pain  -     Posttreatment Pain Rating 0/10 - no pain  -       Row Name 11/26/24 1449          Plan of Care Review    Plan of Care Reviewed With patient;spouse;daughter  -     Progress improving  -     Outcome Evaluation Pt engaged in ADL routine that included fine/gross motor tasks, dynamic balance in sitting and standing, and increasing activity tolerance. Pt is limited by poor safety awareness, decreased activity tolerance, and balance impairments with dynamic movements. Pt would benefit from ongoing  skilled IPOT services to progress to PLOF. Recommend d/c to IRF.  -       Row Name 11/26/24 1449          Therapy Plan Review/Discharge Plan (OT)    Anticipated Discharge Disposition (OT) inpatient rehabilitation facility  -       Row Name 11/26/24 1449          Vital Signs    Pre Systolic BP Rehab 155  -AJ     Pre Treatment Diastolic BP 76  -AJ     Post Systolic BP Rehab 138  -AJ     Post Treatment Diastolic BP 74  -AJ     Pre SpO2 (%) 97  -AJ     O2 Delivery Pre Treatment room air  -AJ     Post SpO2 (%) 95  -AJ     O2 Delivery Post Treatment room air  -AJ     Pre Patient Position Supine  -AJ     Intra Patient Position Standing  -AJ     Post Patient Position Sitting  -AJ       Row Name 11/26/24 1449          Positioning and Restraints    Pre-Treatment Position in bed  -AJ     Post Treatment Position chair  -AJ     In Chair notified nsg;sitting;call light within reach;encouraged to call for assist;exit alarm on;with family/caregiver;waffle cushion;legs elevated  -AJ               User Key  (r) = Recorded By, (t) = Taken By, (c) = Cosigned By      Initials Name Provider Type    Lluvia Musa, OT Occupational Therapist                   Outcome Measures       Row Name 11/26/24 1455          How much help from another is currently needed...    Putting on and taking off regular lower body clothing? 3  -AJ     Bathing (including washing, rinsing, and drying) 2  -AJ     Toileting (which includes using toilet bed pan or urinal) 3  -AJ     Putting on and taking off regular upper body clothing 3  -AJ     Taking care of personal grooming (such as brushing teeth) 4  -AJ     Eating meals 4  -AJ     AM-PAC 6 Clicks Score (OT) 19  -AJ       Row Name 11/26/24 1030          How much help from another person do you currently need...    Turning from your back to your side while in flat bed without using bedrails? 4  -LM     Moving from lying on back to sitting on the side of a flat bed without bedrails? 4  -LM     Moving to  and from a bed to a chair (including a wheelchair)? 3  -LM     Standing up from a chair using your arms (e.g., wheelchair, bedside chair)? 3  -LM     Climbing 3-5 steps with a railing? 2  -LM     To walk in hospital room? 3  -LM     AM-PAC 6 Clicks Score (PT) 19  -LM     Highest Level of Mobility Goal 6 --> Walk 10 steps or more  -LM       Row Name 11/26/24 1455          Functional Assessment    Outcome Measure Options AM-PAC 6 Clicks Daily Activity (OT)  -LAXMI               User Key  (r) = Recorded By, (t) = Taken By, (c) = Cosigned By      Initials Name Provider Type    Estela Martinez, RN Registered Nurse    Lluvia Musa, OT Occupational Therapist                    Occupational Therapy Education       Title: PT OT SLP Therapies (In Progress)       Topic: Occupational Therapy (In Progress)       Point: ADL training (Done)       Description:   Instruct learner(s) on proper safety adaptation and remediation techniques during self care or transfers.   Instruct in proper use of assistive devices.                  Learning Progress Summary            Patient Acceptance, E, VU,NR by LAXMI at 11/26/2024 1455    Acceptance, E, VU by AN at 11/24/2024 1441   Family Acceptance, E, VU,NR by LAXMI at 11/26/2024 1455    Acceptance, E, VU by NATALIIA at 11/24/2024 1441   Significant Other Acceptance, E, VU by AN at 11/24/2024 1441                      Point: Home exercise program (Not Started)       Description:   Instruct learner(s) on appropriate technique for monitoring, assisting and/or progressing therapeutic exercises/activities.                  Learner Progress:  Not documented in this visit.              Point: Precautions (Done)       Description:   Instruct learner(s) on prescribed precautions during self-care and functional transfers.                  Learning Progress Summary            Patient Acceptance, E, VU,NR by LAXMI at 11/26/2024 1455    Acceptance, E, VU by AN at 11/24/2024 1441   Family Acceptance, E, VU,NR by  AJ at 11/26/2024 1455    Acceptance, E, VU by AN at 11/24/2024 1441   Significant Other Acceptance, E, VU by AN at 11/24/2024 1441                      Point: Body mechanics (Done)       Description:   Instruct learner(s) on proper positioning and spine alignment during self-care, functional mobility activities and/or exercises.                  Learning Progress Summary            Patient Acceptance, E, VU,NR by AJ at 11/26/2024 1455    Acceptance, E, VU by AN at 11/24/2024 1441   Family Acceptance, E, VU,NR by AJ at 11/26/2024 1455    Acceptance, E, VU by AN at 11/24/2024 1441   Significant Other Acceptance, E, VU by AN at 11/24/2024 1441                                      User Key       Initials Effective Dates Name Provider Type Discipline     09/21/21 -  Dilma Carpenter OT Occupational Therapist OT     08/26/24 -  Lluvia Quinn OT Occupational Therapist OT                  OT Recommendation and Plan     Plan of Care Review  Plan of Care Reviewed With: patient, spouse, daughter  Progress: improving  Outcome Evaluation: Pt engaged in ADL routine that included fine/gross motor tasks, dynamic balance in sitting and standing, and increasing activity tolerance. Pt is limited by poor safety awareness, decreased activity tolerance, and balance impairments with dynamic movements. Pt would benefit from ongoing skilled IPOT services to progress to PLOF. Recommend d/c to IRF.     Time Calculation:         Time Calculation- OT       Row Name 11/26/24 1455             Time Calculation- OT    OT Start Time 1348  -AJ      OT Received On 11/26/24  -AJ      OT Goal Re-Cert Due Date 12/04/24  -         Timed Charges    43080 - OT Therapeutic Activity Minutes 20  -AJ      78390 - OT Self Care/Mgmt Minutes 20  -AJ         Total Minutes    Timed Charges Total Minutes 40  -AJ       Total Minutes 40  -AJ                User Key  (r) = Recorded By, (t) = Taken By, (c) = Cosigned By      Initials Name Provider Type    AJ  Lluvia Quinn OT Occupational Therapist                  Therapy Charges for Today       Code Description Service Date Service Provider Modifiers Qty    [id number]  OT THERAPEUTIC ACT EA 15 MIN 11/26/2024 Lluvia Quinn OT GO, KX 2    [id number]  OT SELF CARE/MGMT/TRAIN EA 15 MIN 11/26/2024 Lluvia Quinn OT GO, KX 1                 Lluvia Quinn OT  11/26/2024

## 2024-11-27 ENCOUNTER — TELEPHONE (OUTPATIENT)
Dept: NEUROLOGY | Facility: CLINIC | Age: 84
End: 2024-11-27
Payer: MEDICARE

## 2024-11-27 VITALS
HEART RATE: 59 BPM | RESPIRATION RATE: 16 BRPM | SYSTOLIC BLOOD PRESSURE: 174 MMHG | BODY MASS INDEX: 18.04 KG/M2 | TEMPERATURE: 97.6 F | WEIGHT: 119.05 LBS | OXYGEN SATURATION: 97 % | HEIGHT: 68 IN | DIASTOLIC BLOOD PRESSURE: 74 MMHG

## 2024-11-27 LAB
QT INTERVAL: 416 MS
QTC INTERVAL: 449 MS

## 2024-11-27 PROCEDURE — 99239 HOSP IP/OBS DSCHRG MGMT >30: CPT | Performed by: INTERNAL MEDICINE

## 2024-11-27 PROCEDURE — G0378 HOSPITAL OBSERVATION PER HR: HCPCS

## 2024-11-27 RX ORDER — DIVALPROEX SODIUM 500 MG/1
500 TABLET, DELAYED RELEASE ORAL EVERY 8 HOURS SCHEDULED
Start: 2024-11-27

## 2024-11-27 RX ORDER — BUDESONIDE 3 MG/1
6 CAPSULE, COATED PELLETS ORAL DAILY
Start: 2024-11-28

## 2024-11-27 RX ORDER — ATORVASTATIN CALCIUM 80 MG/1
80 TABLET, FILM COATED ORAL NIGHTLY
Start: 2024-11-27

## 2024-11-27 RX ADMIN — BUDESONIDE 6 MG: 3 CAPSULE, COATED PELLETS ORAL at 08:58

## 2024-11-27 RX ADMIN — ACETAMINOPHEN 650 MG: 325 TABLET ORAL at 03:50

## 2024-11-27 RX ADMIN — Medication 10 ML: at 08:59

## 2024-11-27 RX ADMIN — METOPROLOL SUCCINATE 25 MG: 25 TABLET, EXTENDED RELEASE ORAL at 08:58

## 2024-11-27 RX ADMIN — LISINOPRIL 10 MG: 10 TABLET ORAL at 08:58

## 2024-11-27 RX ADMIN — DIVALPROEX SODIUM 500 MG: 250 TABLET, DELAYED RELEASE ORAL at 05:40

## 2024-11-27 RX ADMIN — ASPIRIN 81 MG 81 MG: 81 TABLET ORAL at 08:58

## 2024-11-27 NOTE — TELEPHONE ENCOUNTER
Spoke with spouse (Lianna) and she stated that Jenna is currently in hospital and will be released to go to [facility].  She is unable to schedule anything sooner right now as they are unsure when she will be back home.  I told her once she is back home and is needing to reschedule for her to give us a call.  She was in good understanding.

## 2024-11-27 NOTE — DISCHARGE SUMMARY
[facility] Medicine Services  DISCHARGE SUMMARY    Patient Name: Jenna Russell  : 1940  MRN: [id number]    Date of Admission: 2024 12:17 PM  Date of Discharge: 2024  Primary Care Physician: Judi Rodriguez MD    Consults       Date and Time Order Name Status Description    2024 12:22 PM Inpatient Neurology Consult Stroke Completed             Hospital Course       Active Hospital Problems    Diagnosis  POA    **Complex partial seizure [G40.209]  Yes    Severe protein-calorie malnutrition [E43]  Yes    Right sided weakness [R53.1]  Yes    Seizure disorder [G40.909]  Yes    History of CVA (cerebrovascular accident) [Z86.73]  Not Applicable    HLD (hyperlipidemia) [E78.5]  Yes    Hypertension [I10]  Yes    PAF (paroxysmal atrial fibrillation) [I48.0]  Yes      Resolved Hospital Problems   No resolved problems to display.          Hospital Course:  Jenna Russell is a 83 y.o. female with prior stroke, expressive aphasia, paroxysmal A-fib, seizure disorder, who awoke with slurred speech, RT sided weakness, and had progressive confusion.  On presentation stroke alert was called, after returning from initial head CT she had rhythmic RT arm twitching concerning for seizure activity.  She received 4 mg of Ativan and stroke team was consulted.  MRI of the brain ultimately was without evidence for acute stroke, her clinical syndrome was attributed to seizure activity with potential recrudescence of prior stroke symptoms.  Neurology has increased her Depakote from 250 mg to 500 mg dosing and have increased her home statin therapy.  She chronically takes oral budesonide for diarrhea which was not captured on her home MAR on arrival, this has been added for completeness on reconciliation.  Neurology will see her in the clinic in follow-up and have placed a referral under the ADT tab.    Complex focal seizure  Recrudescence of LT MCA stroke symptoms  Chronic expressive  aphasia  -MRI negative for acute stroke  -ASA, statin (dose increased this admit)  -Per stroke team, Depakote increased to 500 mg TID  -Transfer to [facility] rehab     Hypertension  Hyperlipidemia  Paroxysmal A-fib  -cont lisinopril, Toprol-XL     Diarrheal illness  -Takes Entocort at home, continue at discharge    Discharge Follow Up Recommendations for outpatient labs/diagnostics:  PCP 1 to 2 weeks postdischarge  Neurology follow-up, NEREYDA Robledo, placed under ADT tab    Day of Discharge     HPI:   No new issues this morning, speech slightly better per spouse at bedside.    Vital Signs:   Temp:  [97.6 °F (36.4 °C)-98.8 °F (37.1 °C)] 97.6 °F (36.4 °C)  Heart Rate:  [] 59  Resp:  [16-18] 16  BP: (130-174)/(62-76) 174/74      Physical Exam:  Constitutional: Awake, alert, medically female laying in bed in no acute distress  Respiratory: Clear to auscultation bilaterally, respiratory effort normal   Cardiovascular: RRR, palpable radial pulse  Gastrointestinal: Positive bowel sounds, soft, nontender, nondistended  Psychiatric: Appropriate affect, cooperative  Neurologic: Speech is clear, still with some expressive aphasia but slightly better than yesterday    Pertinent  and/or Most Recent Results     LAB RESULTS:      Lab 11/23/24  1233   WBC 7.19   HEMOGLOBIN 12.2   HEMATOCRIT 37.7   PLATELETS 152   NEUTROS ABS 4.10   IMMATURE GRANS (ABS) 0.03   LYMPHS ABS 2.15   MONOS ABS 0.81   EOS ABS 0.08   MCV 97.7*   PROTIME 13.1   APTT 28.7         Lab 11/24/24  0544   HEMOGLOBIN A1C 4.90         Lab 11/23/24  1233   ALT (SGPT) 20   AST (SGOT) 46*         Lab 11/23/24  1233   HSTROP T 14*   PROTIME 13.1   INR 0.98         Lab 11/24/24  0544   CHOLESTEROL 157   LDL CHOL 64   HDL CHOL 80*   TRIGLYCERIDES 67             Brief Urine Lab Results       None          Microbiology Results (last 10 days)       ** No results found for the last 240 hours. **            MRI Brain Without Contrast    Result Date:  11/24/2024  MRI BRAIN WO CONTRAST Date of Exam: 11/24/2024 12:16 AM EST Indication: Stroke, follow up right sided weakness.  Comparison: CT earlier the same day. Technique:  Routine multiplanar/multisequence sequence images of the brain were obtained without contrast administration. Findings: No acute infarct is present on diffusion weighted sequences. Midline structures are normal and the craniocervical junction appears satisfactory. There is an area of old infarct seen within the left temporal lobe with some associated prominent volume loss  and surrounding gliosis. Age-related changes are present with moderate generalized volume loss and typical T2 hyperintense subcortical and pontine white matter changes, nonspecific but favored to reflect sequela of chronic microvascular ischemia. There is otherwise no evidence of intracranial hemorrhage, mass or mass effect. The ventricles demonstrate some expected ex vacuo prominence. The orbits appear normal. The paranasal sinuses are clear.     Impression: No acute intracranial findings. Redemonstrated remote left MCA territory infarct without evidence of acute ischemia, hemorrhage or mass effect. Electronically Signed: Randal Allen MD  11/24/2024 6:35 AM EST  Workstation ID: [id number]    XR Chest 1 View    Result Date: 11/23/2024  XR CHEST 1 VW Date of Exam: 11/23/2024 12:34 PM EST Indication: Acute Stroke Protocol (onset < 12 hrs) Comparison: Chest x-ray 8/19/2024 Findings: Redemonstration of left atrial appendage occlusion device. Stable cardiomediastinal silhouette with top normal size of the heart. There is mild diffuse interstitial prominence likely chronic/senescent interstitial change, without evidence of focal consolidation. No pleural effusion. No pneumothorax. The bones are demineralized without acute osseous findings.     Impression: No acute cardiopulmonary findings. Electronically Signed: Orestes Ferrara MD  11/23/2024 1:08 PM EST  Workstation ID:  [id number]    CT Angiogram Head w AI Analysis of LVO    Result Date: 11/23/2024  CT CEREBRAL PERFUSION W WO CONTRAST, CT ANGIOGRAM HEAD W AI ANALYSIS OF LVO, CT ANGIOGRAM NECK Date of Exam: 11/23/2024 12:26 PM EST Indication: Neuro Deficit, acute, Stroke suspected Neuro deficit, acute stroke suspected.  Comparison: Concurrent noncontrast head CT, CTA head and neck 10/3/2021 Technique: Axial CT images of the brain were obtained prior to and after the administration of 115 mL Isovue-370. Core blood volume, core blood flow, mean transit time, and Tmax images were obtained utilizing the Rapid software protocol. A limited CT angiogram of the head was also performed to measure the blood vessel density. CTA of the head and neck was performed after the uneventful intravenous administration of above contrast. Reconstructed coronal and sagittal images were also obtained. In addition, a 3-D volume rendered image was created for interpretation. Automated exposure control and iterative reconstruction methods were used. The radiation dose reduction device was turned on for each scan per the ALARA (As Low as Reasonably Achievable) protocol. Findings: CT cerebral perfusion: There is small scattered perfusion defects in the area of chronic infarct/encephalomalacia in the left MCA territory. Accounting for this, no evidence of core infarct or ischemic tissue at risk. CTA HEAD: No abrupt cut off/large vessel occlusion, flow-limiting stenosis, dissection, or aneurysm. There is some luminal irregularity and mild stenosis at the carotid siphons from calcified atherosclerosis. There is fetal origin of the bilateral PCAs, normal variant. The cerebral veins and dural venous sinuses appear grossly patent. There is atherosclerosis of the bilateral V4 segments with mild luminal irregularity without flow-limiting stenosis. CTA NECK: No abrupt cut off/large vessel occlusion, flow-limiting stenosis, dissection, or aneurysm. There is atherosclerosis  at the carotid bifurcations without flow-limiting stenosis or appreciable luminal narrowing. Extravascular findings: Upper lungs are grossly clear. There is a hypodense 1.5 cm nodule in the right thyroid lobe. The pharyngeal and laryngeal structures are unremarkable. No acute or suspicious bony findings.     Impression: No core infarct or ischemic tissue at risk. There are couple small perfusion defects associated with the area of chronic infarct in the left MCA territory. No abrupt cut off/large vessel occlusion, flow-limiting stenosis, dissection, or aneurysm in the head or neck. There is scattered atherosclerosis of the cervical and intracranial arteries, detailed above. Electronically Signed: Orestes Ferrara MD  11/23/2024 1:05 PM EST  Workstation ID: [id number]    CT Angiogram Neck    Result Date: 11/23/2024  CT CEREBRAL PERFUSION W WO CONTRAST, CT ANGIOGRAM HEAD W AI ANALYSIS OF LVO, CT ANGIOGRAM NECK Date of Exam: 11/23/2024 12:26 PM EST Indication: Neuro Deficit, acute, Stroke suspected Neuro deficit, acute stroke suspected.  Comparison: Concurrent noncontrast head CT, CTA head and neck 10/3/2021 Technique: Axial CT images of the brain were obtained prior to and after the administration of 115 mL Isovue-370. Core blood volume, core blood flow, mean transit time, and Tmax images were obtained utilizing the Rapid software protocol. A limited CT angiogram of the head was also performed to measure the blood vessel density. CTA of the head and neck was performed after the uneventful intravenous administration of above contrast. Reconstructed coronal and sagittal images were also obtained. In addition, a 3-D volume rendered image was created for interpretation. Automated exposure control and iterative reconstruction methods were used. The radiation dose reduction device was turned on for each scan per the ALARA (As Low as Reasonably Achievable) protocol. Findings: CT cerebral perfusion: There is small scattered  perfusion defects in the area of chronic infarct/encephalomalacia in the left MCA territory. Accounting for this, no evidence of core infarct or ischemic tissue at risk. CTA HEAD: No abrupt cut off/large vessel occlusion, flow-limiting stenosis, dissection, or aneurysm. There is some luminal irregularity and mild stenosis at the carotid siphons from calcified atherosclerosis. There is fetal origin of the bilateral PCAs, normal variant. The cerebral veins and dural venous sinuses appear grossly patent. There is atherosclerosis of the bilateral V4 segments with mild luminal irregularity without flow-limiting stenosis. CTA NECK: No abrupt cut off/large vessel occlusion, flow-limiting stenosis, dissection, or aneurysm. There is atherosclerosis at the carotid bifurcations without flow-limiting stenosis or appreciable luminal narrowing. Extravascular findings: Upper lungs are grossly clear. There is a hypodense 1.5 cm nodule in the right thyroid lobe. The pharyngeal and laryngeal structures are unremarkable. No acute or suspicious bony findings.     Impression: No core infarct or ischemic tissue at risk. There are couple small perfusion defects associated with the area of chronic infarct in the left MCA territory. No abrupt cut off/large vessel occlusion, flow-limiting stenosis, dissection, or aneurysm in the head or neck. There is scattered atherosclerosis of the cervical and intracranial arteries, detailed above. Electronically Signed: Orestes Ferrara MD  11/23/2024 1:05 PM EST  Workstation ID: [id number]    CT CEREBRAL PERFUSION WITH & WITHOUT CONTRAST    Result Date: 11/23/2024  CT CEREBRAL PERFUSION W WO CONTRAST, CT ANGIOGRAM HEAD W AI ANALYSIS OF LVO, CT ANGIOGRAM NECK Date of Exam: 11/23/2024 12:26 PM EST Indication: Neuro Deficit, acute, Stroke suspected Neuro deficit, acute stroke suspected.  Comparison: Concurrent noncontrast head CT, CTA head and neck 10/3/2021 Technique: Axial CT images of the brain were  obtained prior to and after the administration of 115 mL Isovue-370. Core blood volume, core blood flow, mean transit time, and Tmax images were obtained utilizing the Rapid software protocol. A limited CT angiogram of the head was also performed to measure the blood vessel density. CTA of the head and neck was performed after the uneventful intravenous administration of above contrast. Reconstructed coronal and sagittal images were also obtained. In addition, a 3-D volume rendered image was created for interpretation. Automated exposure control and iterative reconstruction methods were used. The radiation dose reduction device was turned on for each scan per the ALARA (As Low as Reasonably Achievable) protocol. Findings: CT cerebral perfusion: There is small scattered perfusion defects in the area of chronic infarct/encephalomalacia in the left MCA territory. Accounting for this, no evidence of core infarct or ischemic tissue at risk. CTA HEAD: No abrupt cut off/large vessel occlusion, flow-limiting stenosis, dissection, or aneurysm. There is some luminal irregularity and mild stenosis at the carotid siphons from calcified atherosclerosis. There is fetal origin of the bilateral PCAs, normal variant. The cerebral veins and dural venous sinuses appear grossly patent. There is atherosclerosis of the bilateral V4 segments with mild luminal irregularity without flow-limiting stenosis. CTA NECK: No abrupt cut off/large vessel occlusion, flow-limiting stenosis, dissection, or aneurysm. There is atherosclerosis at the carotid bifurcations without flow-limiting stenosis or appreciable luminal narrowing. Extravascular findings: Upper lungs are grossly clear. There is a hypodense 1.5 cm nodule in the right thyroid lobe. The pharyngeal and laryngeal structures are unremarkable. No acute or suspicious bony findings.     Impression: No core infarct or ischemic tissue at risk. There are couple small perfusion defects associated  with the area of chronic infarct in the left MCA territory. No abrupt cut off/large vessel occlusion, flow-limiting stenosis, dissection, or aneurysm in the head or neck. There is scattered atherosclerosis of the cervical and intracranial arteries, detailed above. Electronically Signed: Orestes Ferrara MD  11/23/2024 1:05 PM EST  Workstation ID: [id number]    CT Head Without Contrast Stroke Protocol    Result Date: 11/23/2024  CT HEAD WO CONTRAST STROKE PROTOCOL Date of Exam: 11/23/2024 12:23 PM EST Indication: Neuro deficit, acute, stroke suspected Neuro Deficit, acute, Stroke suspected. Comparison: Head CT 11/17/2024 Technique: Axial CT images were obtained of the head without contrast administration.  Reconstructed coronal images were also obtained. Automated exposure control and iterative construction methods were used. Scan Time: 12:24 p.m. 11/23/2024 Results discussed with stroke navigator by Dr. Orestes Ferrara via telephone at 12:30 p.m. 11/23/2024. Findings: Similar confluent hypodensity/encephalomalacia in the left MCA territory compatible with prior infarct. No evidence of new/acute large territory infarct. No acute intracranial hemorrhage. There are scattered white matter hypodensities which are nonspecific and can be seen in the setting of chronic small vessel ischemic change. No extra-axial collections. No midline shift or herniation. Normal size and configuration of the ventricles. Normal appearance of the orbits. The paranasal sinuses and mastoid air cells are clear. No acute osseous findings.     Impression: No acute intracranial findings. Chronic and senescent changes including sequelae of prior left MCA infarct. Electronically Signed: Orestes Ferrara MD  11/23/2024 12:34 PM EST  Workstation ID: [id number]    CT Head With & Without Contrast    Result Date: 11/18/2024  CT HEAD W WO CONTRAST Date of Exam: 11/17/2024 2:56 PM EST Indication: hypersomnia. Comparison: 9/4/2024. Technique: Axial CT images were  obtained of the head before and after the uneventful intravenous administration of 90 mL Isovue-300.  Reconstructed coronal and sagittal images were also obtained. Automated exposure control and iterative construction methods were used. FINDINGS: Gray-white differentiation is maintained and there is no evidence of intracranial hemorrhage, mass or mass effect. Age-related changes of the brain are present including volume loss and typical periventricular sequela of chronic small vessel ischemia. There are also redemonstrated areas of old infarct in the left MCA territory involving the temporal and inferior parietal lobes. There is otherwise no evidence of intracranial hemorrhage, mass or mass effect. There is no abnormal enhancement. The ventricles are normal in size and configuration accounting for surrounding volume loss. The orbits are normal and the paranasal sinuses are grossly clear.     Chronic and age-related changes of the brain as above, otherwise without evidence of acute intracranial abnormality. There is no abnormal enhancement. Electronically Signed: Randal Allen MD  11/18/2024 10:29 AM EST  Workstation ID: [id number]             Results for orders placed during the hospital encounter of 11/23/24    Adult Transthoracic Echo Complete W/ Cont if Necessary Per Protocol (With Agitated Saline)    Interpretation Summary    Left ventricular ejection fraction appears to be 56 - 60%.    Left ventricular diastolic function was normal.    Left atrial volume is mildly increased.    Moderate aortic valve regurgitation is present.    Estimated right ventricular systolic pressure from tricuspid regurgitation is mildly elevated (35-45 mmHg). Calculated right ventricular systolic pressure from tricuspid regurgitation is 36 mmHg.        Discharge Details        Discharge Medications        New Medications        Instructions Start Date   Budesonide 3 MG 24 hr capsule  Commonly known as: ENTOCORT EC   6 mg, Oral, Daily    Start Date: November 28, 2024            Changes to Medications        Instructions Start Date   atorvastatin 80 MG tablet  Commonly known as: LIPITOR  What changed:   medication strength  how much to take   80 mg, Oral, Nightly      divalproex 500 MG DR tablet  Commonly known as: DEPAKOTE  What changed:   medication strength  how much to take   500 mg, Oral, Every 8 Hours Scheduled             Continue These Medications        Instructions Start Date   acetaminophen 325 MG tablet  Commonly known as: TYLENOL   650 mg, Oral, Every 4 Hours PRN      ALPRAZolam 0.5 MG tablet  Commonly known as: XANAX   0.25 mg, Oral, 2 Times Daily PRN      aspirin 81 MG EC tablet   81 mg, Oral, Every Other Day      calcium carbonate  MG chewable tablet  Commonly known as: TUMS EX   750 mg, Oral, 2 Times Daily PRN      Diclofenac Sodium 1 % gel gel  Commonly known as: VOLTAREN   2 g, Topical, 4 Times Daily      docusate sodium 100 MG capsule   100 mg, Oral, 2 Times Daily      folic acid 1 MG tablet  Commonly known as: FOLVITE   1 mg, Oral, Daily      hyoscyamine 0.125 MG tablet  Commonly known as: ANASPAZ,LEVSIN   0.125 mg, Oral, Every 4 Hours PRN      lidocaine 5 %  Commonly known as: LIDODERM   1 patch, Transdermal, Every 24 Hours Scheduled, Remove & Discard patch within 12 hours or as directed by MD      lisinopril 10 MG tablet  Commonly known as: PRINIVIL,ZESTRIL   10 mg, Oral, Daily      melatonin 5 MG tablet tablet   5 mg, Oral, Nightly PRN      metoprolol succinate XL 25 MG 24 hr tablet  Commonly known as: TOPROL-XL   25 mg, Oral, Daily      pantoprazole 40 MG EC tablet  Commonly known as: PROTONIX   40 mg, Oral, Daily      polyethylene glycol 17 g packet  Commonly known as: MIRALAX   17 g, Oral, Daily      Vitamin D 50 MCG (2000 UT) capsule   2,000 Units, Oral, Daily               Allergies   Allergen Reactions    Penicillins Anaphylaxis    Pistachio Nut Unknown - High Severity     Daughter reported          Discharge  Disposition:  Rehab Facility or Unit (DC - External)    Diet:  Hospital:  Diet Order   Procedures    Diet: Regular/House; Texture: Regular (IDDSI 7); Fluid Consistency: Thin (IDDSI 0)              CODE STATUS:    Code Status and Medical Interventions: CPR (Attempt to Resuscitate); Full Support   Ordered at: 11/23/24 1409     Code Status (Patient has no pulse and is not breathing):    CPR (Attempt to Resuscitate)     Medical Interventions (Patient has pulse or is breathing):    Full Support       Future Appointments   Date Time Provider Department Center   3/10/2025  1:00 PM Angelica Churchill APRN [facility]       Additional Instructions for the Follow-ups that You Need to Schedule       Ambulatory Referral to Neurology   As directed      For seizure    In 1 month.   Any provider is ok    Order Comments: For seizure         Discharge Follow-up with PCP   As directed       Currently Documented PCP:    Judi Rodriguez MD    PCP Phone Number:    [phone]     Follow Up Details: 1-2 weeks post rehab discharge                      Lamont Mane DO  11/27/24      Time Spent on Discharge:  I spent  40  minutes on this discharge activity which included: face-to-face encounter with the patient, reviewing the data in the system, coordination of the care with the nursing staff as well as consultants, documentation, and entering orders.

## 2024-11-27 NOTE — PLAN OF CARE
Goal Outcome Evaluation:      Pt d/o to time and situation, RA, tele d/c'd, no c/o pain. Daughter at bedside. Discharge teaching complete, daughter in agreement with plan to transfer to [facility]. Report called to Hayde. IV removed. No tele box present, room is hardwired. Transport requested. Daughter to transport to facility.  All belongings removed by family.

## 2024-11-27 NOTE — CASE MANAGEMENT/SOCIAL WORK
Case Management Discharge Note    Final Note: Ms. Russell has a rehab bed at [facility] today if medically ready. Confirmed with Neha with facility. Ms. Russell's Daughter will be transporting patient to facility by private vehicle. Please call report to [phone]. CM will fax the discharge summary.      Provided Post Acute Provider List?: Yes  Post Acute Provider List: Inpatient Rehab  Provided Post Acute Provider Quality & Resource List?: Yes  Post Acute Provider Quality and Resource List: Inpatient Rehab  Delivered To: Patient, Support Person  Support Person: Lianna  Method of Delivery: In person    Selected Continued Care - Admitted Since 11/23/2024       Destination Coordination complete.      Service Provider Services Address Phone Fax Patient Preferred    [facility] [address] [id number] [phone] --                               Final Discharge Disposition Code: 62 - inpatient rehab facility

## 2024-11-27 NOTE — TELEPHONE ENCOUNTER
Caller: Jenna Russell    Relationship to patient: Self    Best call back number: [phone]    New or established patient?  [x] New  [] Established    Date of discharge: 11/27/2024    Facility discharged from: [facility]    Diagnosis/Symptoms: SEIZURE    Length of stay (If applicable): 4 DAYS    Specialty Only: Did you see a [facility] provider?    [x] Yes  [] No  If so, who? ANATOLY EASLEY    Additional Details: DISCHARGE NURSE STATES FOLLOW UP IN 1 MONTH. HUB SCHEDULED 1ST AVAILABLE, 3/10/25, AND ADDED TO WAIT LIST. PLEASE ADVISE IF SOONER APPT IS OPEN.

## 2024-11-27 NOTE — DISCHARGE PLACEMENT REQUEST
"Jenna Russell (83 y.o. Female)       Date of Birth   1940    Social Security Number       Address   [address] [address] [facility] [address]    Home Phone   [phone]    MRN   [id number]       Yazidi   None    Marital Status                               Admission Date   24    Admission Type   Emergency    Admitting Provider   Lamont Mane DO    Attending Provider   Lamont Mane DO    Department, Room/Bed   [facility] 3F, S304/1       Discharge Date       Discharge Disposition   Rehab Facility or Unit (DC - External)    Discharge Destination                                 Attending Provider: Lamont Mane DO    Allergies: Penicillins, Pistachio Nut    Isolation: None   Infection: None   Code Status: CPR    Ht: 172.7 cm (67.99\")   Wt: 54 kg (119 lb 0.8 oz)    Admission Cmt: None   Principal Problem: Complex partial seizure [G40.209]                   Active Insurance as of 2024       Primary Coverage       Payor Plan Insurance Group Employer/Plan Group    HUMANA MEDICARE REPLACEMENT HU[employer]NA MED ADV GROUP [id number]       Payor Plan Address Payor Plan Phone Number Payor Plan Fax Number Effective Dates    [address] [address] [phone]  2018 - None Entered    [facility] [address]         Subscriber Name Subscriber Birth Date Member ID       JENNA RUSSELL 1940 [id number]                     Emergency Contacts        (Rel.) Home Phone Work Phone Mobile Phone    Lianna Martinez (Spouse) [phone] -- [phone]    VIRI SALGADO (Daughter) [phone] -- --    BRIAN LEVINE (Relative) [phone] -- --    FAISAL ZIMMERMAN (Grandchild) -- -- [phone]                 Discharge Summary        Lamont Mane DO at 24 0848              [facility] Medicine Services  DISCHARGE SUMMARY    Patient Name: Jenna Russell  : 1940  MRN: [id number]    Date of Admission: " 11/23/2024 12:17 PM  Date of Discharge: 11/27/2024  Primary Care Physician: Judi Rodriguez MD    Consults       Date and Time Order Name Status Description    11/23/2024 12:22 PM Inpatient Neurology Consult Stroke Completed             Hospital Course       Active Hospital Problems    Diagnosis  POA    **Complex partial seizure [G40.209]  Yes    Severe protein-calorie malnutrition [E43]  Yes    Right sided weakness [R53.1]  Yes    Seizure disorder [G40.909]  Yes    History of CVA (cerebrovascular accident) [Z86.73]  Not Applicable    HLD (hyperlipidemia) [E78.5]  Yes    Hypertension [I10]  Yes    PAF (paroxysmal atrial fibrillation) [I48.0]  Yes      Resolved Hospital Problems   No resolved problems to display.          Hospital Course:  Jenna Russell is a 83 y.o. female with prior stroke, expressive aphasia, paroxysmal A-fib, seizure disorder, who awoke with slurred speech, RT sided weakness, and had progressive confusion.  On presentation stroke alert was called, after returning from initial head CT she had rhythmic RT arm twitching concerning for seizure activity.  She received 4 mg of Ativan and stroke team was consulted.  MRI of the brain ultimately was without evidence for acute stroke, her clinical syndrome was attributed to seizure activity with potential recrudescence of prior stroke symptoms.  Neurology has increased her Depakote from 250 mg to 500 mg dosing and have increased her home statin therapy.  She chronically takes oral budesonide for diarrhea which was not captured on her home MAR on arrival, this has been added for completeness on reconciliation.  Neurology will see her in the clinic in follow-up and have placed a referral under the ADT tab.    Complex focal seizure  Recrudescence of LT MCA stroke symptoms  Chronic expressive aphasia  -MRI negative for acute stroke  -ASA, statin (dose increased this admit)  -Per stroke team, Depakote increased to 500 mg TID  -Transfer to [facility]  rehab     Hypertension  Hyperlipidemia  Paroxysmal A-fib  -cont lisinopril, Toprol-XL     Diarrheal illness  -Takes Entocort at home, continue at discharge    Discharge Follow Up Recommendations for outpatient labs/diagnostics:  PCP 1 to 2 weeks postdischarge  Neurology follow-up, NEREYDA Robledo, placed under ADT tab    Day of Discharge     HPI:   No new issues this morning, speech slightly better per spouse at bedside.    Vital Signs:   Temp:  [97.6 °F (36.4 °C)-98.8 °F (37.1 °C)] 97.6 °F (36.4 °C)  Heart Rate:  [] 59  Resp:  [16-18] 16  BP: (130-174)/(62-76) 174/74      Physical Exam:  Constitutional: Awake, alert, medically female laying in bed in no acute distress  Respiratory: Clear to auscultation bilaterally, respiratory effort normal   Cardiovascular: RRR, palpable radial pulse  Gastrointestinal: Positive bowel sounds, soft, nontender, nondistended  Psychiatric: Appropriate affect, cooperative  Neurologic: Speech is clear, still with some expressive aphasia but slightly better than yesterday    Pertinent  and/or Most Recent Results     LAB RESULTS:      Lab 11/23/24  1233   WBC 7.19   HEMOGLOBIN 12.2   HEMATOCRIT 37.7   PLATELETS 152   NEUTROS ABS 4.10   IMMATURE GRANS (ABS) 0.03   LYMPHS ABS 2.15   MONOS ABS 0.81   EOS ABS 0.08   MCV 97.7*   PROTIME 13.1   APTT 28.7         Lab 11/24/24  0544   HEMOGLOBIN A1C 4.90         Lab 11/23/24  1233   ALT (SGPT) 20   AST (SGOT) 46*         Lab 11/23/24  1233   HSTROP T 14*   PROTIME 13.1   INR 0.98         Lab 11/24/24  0544   CHOLESTEROL 157   LDL CHOL 64   HDL CHOL 80*   TRIGLYCERIDES 67             Brief Urine Lab Results       None          Microbiology Results (last 10 days)       ** No results found for the last 240 hours. **            MRI Brain Without Contrast    Result Date: 11/24/2024  MRI BRAIN WO CONTRAST Date of Exam: 11/24/2024 12:16 AM EST Indication: Stroke, follow up right sided weakness.  Comparison: CT earlier the same day. Technique:   Routine multiplanar/multisequence sequence images of the brain were obtained without contrast administration. Findings: No acute infarct is present on diffusion weighted sequences. Midline structures are normal and the craniocervical junction appears satisfactory. There is an area of old infarct seen within the left temporal lobe with some associated prominent volume loss  and surrounding gliosis. Age-related changes are present with moderate generalized volume loss and typical T2 hyperintense subcortical and pontine white matter changes, nonspecific but favored to reflect sequela of chronic microvascular ischemia. There is otherwise no evidence of intracranial hemorrhage, mass or mass effect. The ventricles demonstrate some expected ex vacuo prominence. The orbits appear normal. The paranasal sinuses are clear.     Impression: No acute intracranial findings. Redemonstrated remote left MCA territory infarct without evidence of acute ischemia, hemorrhage or mass effect. Electronically Signed: Randal Allen MD  11/24/2024 6:35 AM EST  Workstation ID: [id number]    XR Chest 1 View    Result Date: 11/23/2024  XR CHEST 1 VW Date of Exam: 11/23/2024 12:34 PM EST Indication: Acute Stroke Protocol (onset < 12 hrs) Comparison: Chest x-ray 8/19/2024 Findings: Redemonstration of left atrial appendage occlusion device. Stable cardiomediastinal silhouette with top normal size of the heart. There is mild diffuse interstitial prominence likely chronic/senescent interstitial change, without evidence of focal consolidation. No pleural effusion. No pneumothorax. The bones are demineralized without acute osseous findings.     Impression: No acute cardiopulmonary findings. Electronically Signed: Orestes Ferrara MD  11/23/2024 1:08 PM EST  Workstation ID: [id number]    CT Angiogram Head w AI Analysis of LVO    Result Date: 11/23/2024  CT CEREBRAL PERFUSION W WO CONTRAST, CT ANGIOGRAM HEAD W AI ANALYSIS OF LVO, CT ANGIOGRAM NECK Date of  Exam: 11/23/2024 12:26 PM EST Indication: Neuro Deficit, acute, Stroke suspected Neuro deficit, acute stroke suspected.  Comparison: Concurrent noncontrast head CT, CTA head and neck 10/3/2021 Technique: Axial CT images of the brain were obtained prior to and after the administration of 115 mL Isovue-370. Core blood volume, core blood flow, mean transit time, and Tmax images were obtained utilizing the Rapid software protocol. A limited CT angiogram of the head was also performed to measure the blood vessel density. CTA of the head and neck was performed after the uneventful intravenous administration of above contrast. Reconstructed coronal and sagittal images were also obtained. In addition, a 3-D volume rendered image was created for interpretation. Automated exposure control and iterative reconstruction methods were used. The radiation dose reduction device was turned on for each scan per the ALARA (As Low as Reasonably Achievable) protocol. Findings: CT cerebral perfusion: There is small scattered perfusion defects in the area of chronic infarct/encephalomalacia in the left MCA territory. Accounting for this, no evidence of core infarct or ischemic tissue at risk. CTA HEAD: No abrupt cut off/large vessel occlusion, flow-limiting stenosis, dissection, or aneurysm. There is some luminal irregularity and mild stenosis at the carotid siphons from calcified atherosclerosis. There is fetal origin of the bilateral PCAs, normal variant. The cerebral veins and dural venous sinuses appear grossly patent. There is atherosclerosis of the bilateral V4 segments with mild luminal irregularity without flow-limiting stenosis. CTA NECK: No abrupt cut off/large vessel occlusion, flow-limiting stenosis, dissection, or aneurysm. There is atherosclerosis at the carotid bifurcations without flow-limiting stenosis or appreciable luminal narrowing. Extravascular findings: Upper lungs are grossly clear. There is a hypodense 1.5 cm  nodule in the right thyroid lobe. The pharyngeal and laryngeal structures are unremarkable. No acute or suspicious bony findings.     Impression: No core infarct or ischemic tissue at risk. There are couple small perfusion defects associated with the area of chronic infarct in the left MCA territory. No abrupt cut off/large vessel occlusion, flow-limiting stenosis, dissection, or aneurysm in the head or neck. There is scattered atherosclerosis of the cervical and intracranial arteries, detailed above. Electronically Signed: Orestes Ferrara MD  11/23/2024 1:05 PM EST  Workstation ID: [id number]    CT Angiogram Neck    Result Date: 11/23/2024  CT CEREBRAL PERFUSION W WO CONTRAST, CT ANGIOGRAM HEAD W AI ANALYSIS OF LVO, CT ANGIOGRAM NECK Date of Exam: 11/23/2024 12:26 PM EST Indication: Neuro Deficit, acute, Stroke suspected Neuro deficit, acute stroke suspected.  Comparison: Concurrent noncontrast head CT, CTA head and neck 10/3/2021 Technique: Axial CT images of the brain were obtained prior to and after the administration of 115 mL Isovue-370. Core blood volume, core blood flow, mean transit time, and Tmax images were obtained utilizing the Rapid software protocol. A limited CT angiogram of the head was also performed to measure the blood vessel density. CTA of the head and neck was performed after the uneventful intravenous administration of above contrast. Reconstructed coronal and sagittal images were also obtained. In addition, a 3-D volume rendered image was created for interpretation. Automated exposure control and iterative reconstruction methods were used. The radiation dose reduction device was turned on for each scan per the ALARA (As Low as Reasonably Achievable) protocol. Findings: CT cerebral perfusion: There is small scattered perfusion defects in the area of chronic infarct/encephalomalacia in the left MCA territory. Accounting for this, no evidence of core infarct or ischemic tissue at risk. CTA HEAD:  No abrupt cut off/large vessel occlusion, flow-limiting stenosis, dissection, or aneurysm. There is some luminal irregularity and mild stenosis at the carotid siphons from calcified atherosclerosis. There is fetal origin of the bilateral PCAs, normal variant. The cerebral veins and dural venous sinuses appear grossly patent. There is atherosclerosis of the bilateral V4 segments with mild luminal irregularity without flow-limiting stenosis. CTA NECK: No abrupt cut off/large vessel occlusion, flow-limiting stenosis, dissection, or aneurysm. There is atherosclerosis at the carotid bifurcations without flow-limiting stenosis or appreciable luminal narrowing. Extravascular findings: Upper lungs are grossly clear. There is a hypodense 1.5 cm nodule in the right thyroid lobe. The pharyngeal and laryngeal structures are unremarkable. No acute or suspicious bony findings.     Impression: No core infarct or ischemic tissue at risk. There are couple small perfusion defects associated with the area of chronic infarct in the left MCA territory. No abrupt cut off/large vessel occlusion, flow-limiting stenosis, dissection, or aneurysm in the head or neck. There is scattered atherosclerosis of the cervical and intracranial arteries, detailed above. Electronically Signed: Orestes Ferrara MD  11/23/2024 1:05 PM EST  Workstation ID: [id number]    CT CEREBRAL PERFUSION WITH & WITHOUT CONTRAST    Result Date: 11/23/2024  CT CEREBRAL PERFUSION W WO CONTRAST, CT ANGIOGRAM HEAD W AI ANALYSIS OF LVO, CT ANGIOGRAM NECK Date of Exam: 11/23/2024 12:26 PM EST Indication: Neuro Deficit, acute, Stroke suspected Neuro deficit, acute stroke suspected.  Comparison: Concurrent noncontrast head CT, CTA head and neck 10/3/2021 Technique: Axial CT images of the brain were obtained prior to and after the administration of 115 mL Isovue-370. Core blood volume, core blood flow, mean transit time, and Tmax images were obtained utilizing the Rapid software  protocol. A limited CT angiogram of the head was also performed to measure the blood vessel density. CTA of the head and neck was performed after the uneventful intravenous administration of above contrast. Reconstructed coronal and sagittal images were also obtained. In addition, a 3-D volume rendered image was created for interpretation. Automated exposure control and iterative reconstruction methods were used. The radiation dose reduction device was turned on for each scan per the ALARA (As Low as Reasonably Achievable) protocol. Findings: CT cerebral perfusion: There is small scattered perfusion defects in the area of chronic infarct/encephalomalacia in the left MCA territory. Accounting for this, no evidence of core infarct or ischemic tissue at risk. CTA HEAD: No abrupt cut off/large vessel occlusion, flow-limiting stenosis, dissection, or aneurysm. There is some luminal irregularity and mild stenosis at the carotid siphons from calcified atherosclerosis. There is fetal origin of the bilateral PCAs, normal variant. The cerebral veins and dural venous sinuses appear grossly patent. There is atherosclerosis of the bilateral V4 segments with mild luminal irregularity without flow-limiting stenosis. CTA NECK: No abrupt cut off/large vessel occlusion, flow-limiting stenosis, dissection, or aneurysm. There is atherosclerosis at the carotid bifurcations without flow-limiting stenosis or appreciable luminal narrowing. Extravascular findings: Upper lungs are grossly clear. There is a hypodense 1.5 cm nodule in the right thyroid lobe. The pharyngeal and laryngeal structures are unremarkable. No acute or suspicious bony findings.     Impression: No core infarct or ischemic tissue at risk. There are couple small perfusion defects associated with the area of chronic infarct in the left MCA territory. No abrupt cut off/large vessel occlusion, flow-limiting stenosis, dissection, or aneurysm in the head or neck. There is  scattered atherosclerosis of the cervical and intracranial arteries, detailed above. Electronically Signed: Orestes Ferrara MD  11/23/2024 1:05 PM EST  Workstation ID: [id number]    CT Head Without Contrast Stroke Protocol    Result Date: 11/23/2024  CT HEAD WO CONTRAST STROKE PROTOCOL Date of Exam: 11/23/2024 12:23 PM EST Indication: Neuro deficit, acute, stroke suspected Neuro Deficit, acute, Stroke suspected. Comparison: Head CT 11/17/2024 Technique: Axial CT images were obtained of the head without contrast administration.  Reconstructed coronal images were also obtained. Automated exposure control and iterative construction methods were used. Scan Time: 12:24 p.m. 11/23/2024 Results discussed with stroke navigator by Dr. Orestes Ferrara via telephone at 12:30 p.m. 11/23/2024. Findings: Similar confluent hypodensity/encephalomalacia in the left MCA territory compatible with prior infarct. No evidence of new/acute large territory infarct. No acute intracranial hemorrhage. There are scattered white matter hypodensities which are nonspecific and can be seen in the setting of chronic small vessel ischemic change. No extra-axial collections. No midline shift or herniation. Normal size and configuration of the ventricles. Normal appearance of the orbits. The paranasal sinuses and mastoid air cells are clear. No acute osseous findings.     Impression: No acute intracranial findings. Chronic and senescent changes including sequelae of prior left MCA infarct. Electronically Signed: Orestes Ferrara MD  11/23/2024 12:34 PM EST  Workstation ID: [id number]    CT Head With & Without Contrast    Result Date: 11/18/2024  CT HEAD W WO CONTRAST Date of Exam: 11/17/2024 2:56 PM EST Indication: hypersomnia. Comparison: 9/4/2024. Technique: Axial CT images were obtained of the head before and after the uneventful intravenous administration of 90 mL Isovue-300.  Reconstructed coronal and sagittal images were also obtained. Automated  exposure control and iterative construction methods were used. FINDINGS: Gray-white differentiation is maintained and there is no evidence of intracranial hemorrhage, mass or mass effect. Age-related changes of the brain are present including volume loss and typical periventricular sequela of chronic small vessel ischemia. There are also redemonstrated areas of old infarct in the left MCA territory involving the temporal and inferior parietal lobes. There is otherwise no evidence of intracranial hemorrhage, mass or mass effect. There is no abnormal enhancement. The ventricles are normal in size and configuration accounting for surrounding volume loss. The orbits are normal and the paranasal sinuses are grossly clear.     Chronic and age-related changes of the brain as above, otherwise without evidence of acute intracranial abnormality. There is no abnormal enhancement. Electronically Signed: Randal Allen MD  11/18/2024 10:29 AM EST  Workstation ID: [id number]             Results for orders placed during the hospital encounter of 11/23/24    Adult Transthoracic Echo Complete W/ Cont if Necessary Per Protocol (With Agitated Saline)    Interpretation Summary    Left ventricular ejection fraction appears to be 56 - 60%.    Left ventricular diastolic function was normal.    Left atrial volume is mildly increased.    Moderate aortic valve regurgitation is present.    Estimated right ventricular systolic pressure from tricuspid regurgitation is mildly elevated (35-45 mmHg). Calculated right ventricular systolic pressure from tricuspid regurgitation is 36 mmHg.        Discharge Details        Discharge Medications        New Medications        Instructions Start Date   Budesonide 3 MG 24 hr capsule  Commonly known as: ENTOCORT EC   6 mg, Oral, Daily   Start Date: November 28, 2024            Changes to Medications        Instructions Start Date   atorvastatin 80 MG tablet  Commonly known as: LIPITOR  What changed:    medication strength  how much to take   80 mg, Oral, Nightly      divalproex 500 MG DR tablet  Commonly known as: DEPAKOTE  What changed:   medication strength  how much to take   500 mg, Oral, Every 8 Hours Scheduled             Continue These Medications        Instructions Start Date   acetaminophen 325 MG tablet  Commonly known as: TYLENOL   650 mg, Oral, Every 4 Hours PRN      ALPRAZolam 0.5 MG tablet  Commonly known as: XANAX   0.25 mg, Oral, 2 Times Daily PRN      aspirin 81 MG EC tablet   81 mg, Oral, Every Other Day      calcium carbonate  MG chewable tablet  Commonly known as: TUMS EX   750 mg, Oral, 2 Times Daily PRN      Diclofenac Sodium 1 % gel gel  Commonly known as: VOLTAREN   2 g, Topical, 4 Times Daily      docusate sodium 100 MG capsule   100 mg, Oral, 2 Times Daily      folic acid 1 MG tablet  Commonly known as: FOLVITE   1 mg, Oral, Daily      hyoscyamine 0.125 MG tablet  Commonly known as: ANASPAZ,LEVSIN   0.125 mg, Oral, Every 4 Hours PRN      lidocaine 5 %  Commonly known as: LIDODERM   1 patch, Transdermal, Every 24 Hours Scheduled, Remove & Discard patch within 12 hours or as directed by MD      lisinopril 10 MG tablet  Commonly known as: PRINIVIL,ZESTRIL   10 mg, Oral, Daily      melatonin 5 MG tablet tablet   5 mg, Oral, Nightly PRN      metoprolol succinate XL 25 MG 24 hr tablet  Commonly known as: TOPROL-XL   25 mg, Oral, Daily      pantoprazole 40 MG EC tablet  Commonly known as: PROTONIX   40 mg, Oral, Daily      polyethylene glycol 17 g packet  Commonly known as: MIRALAX   17 g, Oral, Daily      Vitamin D 50 MCG (2000 UT) capsule   2,000 Units, Oral, Daily               Allergies   Allergen Reactions    Penicillins Anaphylaxis    Pistachio Nut Unknown - High Severity     Daughter reported          Discharge Disposition:  Rehab Facility or Unit (DC - External)    Diet:  Hospital:  Diet Order   Procedures    Diet: Regular/House; Texture: Regular (IDDSI 7); Fluid Consistency:  Thin (IDDSI 0)              CODE STATUS:    Code Status and Medical Interventions: CPR (Attempt to Resuscitate); Full Support   Ordered at: 11/23/24 8455     Code Status (Patient has no pulse and is not breathing):    CPR (Attempt to Resuscitate)     Medical Interventions (Patient has pulse or is breathing):    Full Support       Future Appointments   Date Time Provider Department Center   3/10/2025  1:00 PM Angelica Churchill APRN [facility]       Additional Instructions for the Follow-ups that You Need to Schedule       Ambulatory Referral to Neurology   As directed      For seizure    In 1 month.   Any provider is ok    Order Comments: For seizure         Discharge Follow-up with PCP   As directed       Currently Documented PCP:    Judi Rodriguez MD    PCP Phone Number:    [phone]     Follow Up Details: 1-2 weeks post rehab discharge                      Lamont Mane DO  11/27/24      Time Spent on Discharge:  I spent  40  minutes on this discharge activity which included: face-to-face encounter with the patient, reviewing the data in the system, coordination of the care with the nursing staff as well as consultants, documentation, and entering orders.            Electronically signed by Lamont Mane DO at 11/27/24 9606

## 2025-02-20 ENCOUNTER — APPOINTMENT (OUTPATIENT)
Dept: GENERAL RADIOLOGY | Facility: HOSPITAL | Age: 85
End: 2025-02-20
Payer: MEDICARE

## 2025-02-20 ENCOUNTER — APPOINTMENT (OUTPATIENT)
Dept: CT IMAGING | Facility: HOSPITAL | Age: 85
End: 2025-02-20
Payer: MEDICARE

## 2025-02-20 ENCOUNTER — HOSPITAL ENCOUNTER (EMERGENCY)
Facility: HOSPITAL | Age: 85
Discharge: HOME | End: 2025-02-20
Attending: EMERGENCY MEDICINE
Payer: MEDICARE

## 2025-02-20 VITALS
OXYGEN SATURATION: 93 % | SYSTOLIC BLOOD PRESSURE: 122 MMHG | WEIGHT: 116 LBS | RESPIRATION RATE: 16 BRPM | HEIGHT: 67 IN | DIASTOLIC BLOOD PRESSURE: 52 MMHG | BODY MASS INDEX: 18.21 KG/M2 | TEMPERATURE: 99.3 F | HEART RATE: 60 BPM

## 2025-02-20 DIAGNOSIS — Z86.79 HISTORY OF ATRIAL FIBRILLATION: ICD-10-CM

## 2025-02-20 DIAGNOSIS — Z86.59 HISTORY OF DEMENTIA: ICD-10-CM

## 2025-02-20 DIAGNOSIS — R05.1 ACUTE COUGH: Primary | ICD-10-CM

## 2025-02-20 DIAGNOSIS — R53.1 GENERALIZED WEAKNESS: ICD-10-CM

## 2025-02-20 DIAGNOSIS — Z86.73 HISTORY OF CVA (CEREBROVASCULAR ACCIDENT): ICD-10-CM

## 2025-02-20 LAB
ALBUMIN SERPL-MCNC: 3.4 G/DL (ref 3.5–5.2)
ALBUMIN/GLOB SERPL: 1.4 G/DL
ALP SERPL-CCNC: 46 U/L (ref 39–117)
ALT SERPL W P-5'-P-CCNC: 6 U/L (ref 1–33)
ANION GAP SERPL CALCULATED.3IONS-SCNC: 16 MMOL/L (ref 5–15)
AST SERPL-CCNC: 17 U/L (ref 1–32)
BACTERIA UR QL AUTO: NORMAL /HPF
BASOPHILS # BLD MANUAL: 0.06 10*3/MM3 (ref 0–0.2)
BASOPHILS NFR BLD MANUAL: 1 % (ref 0–1.5)
BILIRUB SERPL-MCNC: 0.4 MG/DL (ref 0–1.2)
BILIRUB UR QL STRIP: NEGATIVE
BUN SERPL-MCNC: 6 MG/DL (ref 8–23)
BUN/CREAT SERPL: 11.5 (ref 7–25)
CALCIUM SPEC-SCNC: 8.6 MG/DL (ref 8.6–10.5)
CHLORIDE SERPL-SCNC: 97 MMOL/L (ref 98–107)
CLARITY UR: CLEAR
CO2 SERPL-SCNC: 25 MMOL/L (ref 22–29)
COLOR UR: YELLOW
CREAT SERPL-MCNC: 0.52 MG/DL (ref 0.57–1)
DEPRECATED RDW RBC AUTO: 49.5 FL (ref 37–54)
EGFRCR SERPLBLD CKD-EPI 2021: 92.9 ML/MIN/1.73
EOSINOPHIL # BLD MANUAL: 0 10*3/MM3 (ref 0–0.4)
EOSINOPHIL NFR BLD MANUAL: 0 % (ref 0.3–6.2)
ERYTHROCYTE [DISTWIDTH] IN BLOOD BY AUTOMATED COUNT: 14 % (ref 12.3–15.4)
FLUAV RNA RESP QL NAA+PROBE: NOT DETECTED
FLUBV RNA RESP QL NAA+PROBE: NOT DETECTED
GEN 5 1HR TROPONIN T REFLEX: 13 NG/L
GLOBULIN UR ELPH-MCNC: 2.4 GM/DL
GLUCOSE SERPL-MCNC: 67 MG/DL (ref 65–99)
GLUCOSE UR STRIP-MCNC: NEGATIVE MG/DL
HCT VFR BLD AUTO: 36.7 % (ref 34–46.6)
HGB BLD-MCNC: 11.7 G/DL (ref 12–15.9)
HGB UR QL STRIP.AUTO: ABNORMAL
HOLD SPECIMEN: NORMAL
HYALINE CASTS UR QL AUTO: NORMAL /LPF
KETONES UR QL STRIP: ABNORMAL
LEUKOCYTE ESTERASE UR QL STRIP.AUTO: NEGATIVE
LYMPHOCYTES # BLD MANUAL: 1.83 10*3/MM3 (ref 0.7–3.1)
LYMPHOCYTES NFR BLD MANUAL: 11 % (ref 5–12)
MCH RBC QN AUTO: 30.4 PG (ref 26.6–33)
MCHC RBC AUTO-ENTMCNC: 31.9 G/DL (ref 31.5–35.7)
MCV RBC AUTO: 95.3 FL (ref 79–97)
MONOCYTES # BLD: 0.61 10*3/MM3 (ref 0.1–0.9)
NEUTROPHILS # BLD AUTO: 3.05 10*3/MM3 (ref 1.7–7)
NEUTROPHILS NFR BLD MANUAL: 49 % (ref 42.7–76)
NEUTS BAND NFR BLD MANUAL: 6 % (ref 0–5)
NEUTS VAC BLD QL SMEAR: ABNORMAL
NITRITE UR QL STRIP: NEGATIVE
NT-PROBNP SERPL-MCNC: 1101 PG/ML (ref 0–1800)
OVALOCYTES BLD QL SMEAR: ABNORMAL
PH UR STRIP.AUTO: 6 [PH] (ref 5–8)
PLAT MORPH BLD: NORMAL
PLATELET # BLD AUTO: 94 10*3/MM3 (ref 140–450)
PMV BLD AUTO: 9.1 FL (ref 6–12)
POTASSIUM SERPL-SCNC: 3.2 MMOL/L (ref 3.5–5.2)
PROT SERPL-MCNC: 5.8 G/DL (ref 6–8.5)
PROT UR QL STRIP: NEGATIVE
RBC # BLD AUTO: 3.85 10*6/MM3 (ref 3.77–5.28)
RBC # UR STRIP: NORMAL /HPF
REF LAB TEST METHOD: NORMAL
SARS-COV-2 RNA RESP QL NAA+PROBE: NOT DETECTED
SCHISTOCYTES BLD QL SMEAR: ABNORMAL
SODIUM SERPL-SCNC: 138 MMOL/L (ref 136–145)
SP GR UR STRIP: 1.01 (ref 1–1.03)
SQUAMOUS #/AREA URNS HPF: NORMAL /HPF
TROPONIN T % DELTA: -13
TROPONIN T NUMERIC DELTA: -2 NG/L
TROPONIN T SERPL HS-MCNC: 15 NG/L
UROBILINOGEN UR QL STRIP: ABNORMAL
VARIANT LYMPHS NFR BLD MANUAL: 12 % (ref 0–5)
VARIANT LYMPHS NFR BLD MANUAL: 21 % (ref 19.6–45.3)
WBC # UR STRIP: NORMAL /HPF
WBC NRBC COR # BLD AUTO: 5.55 10*3/MM3 (ref 3.4–10.8)
WHOLE BLOOD HOLD COAG: NORMAL
WHOLE BLOOD HOLD SPECIMEN: NORMAL

## 2025-02-20 PROCEDURE — 99285 EMERGENCY DEPT VISIT HI MDM: CPT

## 2025-02-20 PROCEDURE — 25510000001 IOPAMIDOL PER 1 ML: Performed by: EMERGENCY MEDICINE

## 2025-02-20 PROCEDURE — 36415 COLL VENOUS BLD VENIPUNCTURE: CPT

## 2025-02-20 PROCEDURE — 80053 COMPREHEN METABOLIC PANEL: CPT | Performed by: EMERGENCY MEDICINE

## 2025-02-20 PROCEDURE — 71045 X-RAY EXAM CHEST 1 VIEW: CPT

## 2025-02-20 PROCEDURE — 94799 UNLISTED PULMONARY SVC/PX: CPT

## 2025-02-20 PROCEDURE — P9612 CATHETERIZE FOR URINE SPEC: HCPCS

## 2025-02-20 PROCEDURE — 85007 BL SMEAR W/DIFF WBC COUNT: CPT | Performed by: EMERGENCY MEDICINE

## 2025-02-20 PROCEDURE — 81001 URINALYSIS AUTO W/SCOPE: CPT | Performed by: EMERGENCY MEDICINE

## 2025-02-20 PROCEDURE — 87086 URINE CULTURE/COLONY COUNT: CPT | Performed by: EMERGENCY MEDICINE

## 2025-02-20 PROCEDURE — 71275 CT ANGIOGRAPHY CHEST: CPT

## 2025-02-20 PROCEDURE — 85025 COMPLETE CBC W/AUTO DIFF WBC: CPT | Performed by: EMERGENCY MEDICINE

## 2025-02-20 PROCEDURE — 83880 ASSAY OF NATRIURETIC PEPTIDE: CPT | Performed by: EMERGENCY MEDICINE

## 2025-02-20 PROCEDURE — 93005 ELECTROCARDIOGRAM TRACING: CPT | Performed by: EMERGENCY MEDICINE

## 2025-02-20 PROCEDURE — 87636 SARSCOV2 & INF A&B AMP PRB: CPT | Performed by: EMERGENCY MEDICINE

## 2025-02-20 PROCEDURE — 94640 AIRWAY INHALATION TREATMENT: CPT

## 2025-02-20 PROCEDURE — 84484 ASSAY OF TROPONIN QUANT: CPT | Performed by: EMERGENCY MEDICINE

## 2025-02-20 RX ORDER — SODIUM CHLORIDE 0.9 % (FLUSH) 0.9 %
10 SYRINGE (ML) INJECTION AS NEEDED
Status: DISCONTINUED | OUTPATIENT
Start: 2025-02-20 | End: 2025-02-21 | Stop reason: HOSPADM

## 2025-02-20 RX ORDER — IOPAMIDOL 755 MG/ML
100 INJECTION, SOLUTION INTRAVASCULAR
Status: COMPLETED | OUTPATIENT
Start: 2025-02-20 | End: 2025-02-20

## 2025-02-20 RX ORDER — IPRATROPIUM BROMIDE AND ALBUTEROL SULFATE 2.5; .5 MG/3ML; MG/3ML
3 SOLUTION RESPIRATORY (INHALATION) ONCE
Status: COMPLETED | OUTPATIENT
Start: 2025-02-20 | End: 2025-02-20

## 2025-02-20 RX ADMIN — IPRATROPIUM BROMIDE AND ALBUTEROL SULFATE 3 ML: 2.5; .5 SOLUTION RESPIRATORY (INHALATION) at 17:44

## 2025-02-20 RX ADMIN — IOPAMIDOL 80 ML: 755 INJECTION, SOLUTION INTRAVENOUS at 19:20

## 2025-02-20 NOTE — ED PROVIDER NOTES
[place]    EMERGENCY DEPARTMENT ENCOUNTER      Pt Name: Jenna Russell  MRN: [id number]  YOB: 1940  Date of evaluation: 2/20/2025  Provider: Alan Edmonds MD    CHIEF COMPLAINT       Chief Complaint   Patient presents with    Shortness of Breath         HISTORY OF PRESENT ILLNESS   Jenna Russell is a 84 y.o. female who presents to the emergency department with reported dry cough and increased work of breathing over the course the past several days.  Patient family member recently diagnosed with flu.  She has history of stroke and dementia, lives at home with family, has baseline confusion.  Per report from EMS, family was concerned because home pulse oximeter was reading 82%.  She has been saturating at 95% on room air for them with otherwise stable vital signs.  Per report from family, she is currently at her neurologic baseline.  She has not had any fever, chills, vomiting, or diarrhea.  Patient has no specific complaints on my exam      Nursing notes were reviewed.    REVIEW OF SYSTEMS     ROS:  A chief complaint appropriate review of systems was completed and is negative except as noted in the HPI.      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 Normal LVSF.Normal LV diastolic filling. Left atrium midly dilated. RV mild- mod dilated. MIld aortic cusp sclerosis. No evidence of mitral valve prolapse. Mild mitral regurgitation.No pulmonary hypertension or perdicardial effusion. No dilation of aortic root. Venous system appears normal    H/O: stroke     HLD (hyperlipidemia) 9/8/2016    Left temporal lobe infarction     LOM (loss of memory)      Osteoarthritis 9/8/2016    Paroxysmal atrial fibrillation 9/8/2016    Thyroid nodule 9/8/2016    1.8x1.2cm    Weakness generalized     Wound infection after surgery     Left hip arthroplasty, staph aureus, 6 weeks IV Rocephin         SURGICAL HISTORY       Past Surgical History:   Procedure Laterality Date    ABDOMINOPLASTY      BREAST ABSCESS INCISION AND DRAINAGE      HIP TROCHANTERIC NAILING WITH INTRAMEDULLARY HIP SCREW Right 3/11/2022    Procedure: HIP TROCHANTERIC NAILING WITH INTRAMEDULLARY HIP SCREW RIGHT;  Surgeon: Danny Lynne MD;  Location: [facility];  Service: Orthopedics;  Laterality: Right;    TOTAL HIP ARTHROPLASTY Left     Staph aureus wound infection         CURRENT MEDICATIONS       Current Facility-Administered Medications:     sodium chloride 0.9 % flush 10 mL, 10 mL, Intravenous, PRN, Alan Edmonds MD    Current Outpatient Medications:     acetaminophen (TYLENOL) 325 MG tablet, Take 2 tablets by mouth Every 4 (Four) Hours As Needed for Mild Pain ., Disp: , Rfl:     ALPRAZolam (XANAX) 0.5 MG tablet, Take 0.5 tablets by mouth 2 (Two) Times a Day As Needed for Anxiety., Disp: 3 tablet, Rfl: 0    aspirin 81 MG EC tablet, Take 1 tablet by mouth Every Other Day., Disp: , Rfl:     atorvastatin (LIPITOR) 80 MG tablet, Take 1 tablet by mouth Every Night., Disp: , Rfl:     Budesonide (ENTOCORT EC) 3 MG 24 hr capsule, Take 2 capsules by mouth Daily., Disp: , Rfl:     calcium carbonate EX (TUMS EX) 750 MG chewable tablet, Chew 1 tablet 2 (Two) Times a Day As Needed for Indigestion or Heartburn., Disp: , Rfl:     Cholecalciferol (VITAMIN D) 2000 UNITS capsule, Take 2,000 Units by mouth Daily., Disp: , Rfl:     Diclofenac Sodium (VOLTAREN) 1 % gel gel, Apply 2 g topically to the appropriate area as directed 4 (Four) Times a Day., Disp: , Rfl:     divalproex (DEPAKOTE) 500 MG DR tablet, Take 1 tablet by mouth Every 8 (Eight) Hours., Disp: , Rfl:     docusate sodium 100 MG capsule, Take 1 capsule  by mouth 2 (Two) Times a Day., Disp: , Rfl:     folic acid (FOLVITE) 1 MG tablet, Take 1 tablet by mouth Daily., Disp: , Rfl: 0    hyoscyamine (ANASPAZ,LEVSIN) 0.125 MG tablet, Take 1 tablet by mouth Every 4 (Four) Hours As Needed for Cramping., Disp: , Rfl:     lidocaine (LIDODERM) 5 %, Place 1 patch on the skin as directed by provider Daily. Remove & Discard patch within 12 hours or as directed by MD, Disp: , Rfl:     lisinopril (PRINIVIL,ZESTRIL) 10 MG tablet, Take 10 mg by mouth Daily., Disp: , Rfl:     melatonin 5 MG tablet tablet, Take 1 tablet by mouth At Night As Needed (sleep)., Disp: , Rfl:     metoprolol succinate XL (TOPROL-XL) 25 MG 24 hr tablet, Take 1 tablet by mouth Daily., Disp: , Rfl:     pantoprazole (PROTONIX) 40 MG EC tablet, TAKE 1 TABLET BY MOUTH DAILY, Disp: 90 tablet, Rfl: 3    polyethylene glycol (MIRALAX) 17 g packet, Take 17 g by mouth Daily., Disp: , Rfl:     ALLERGIES     Penicillins and Pistachio nut    FAMILY HISTORY       Family History   Problem Relation Age of Onset    Stroke Mother     Other Mother         Cardiac disorder    Diabetes Mother     Hypertension Mother     Mental illness Mother     Heart attack Father     Heart failure Father     No Known Problems Sister     Heart attack Maternal Grandfather     Heart disease Paternal Grandfather     No Known Problems Sister           SOCIAL HISTORY       Social History     Socioeconomic History    Marital status:     Number of children: 3   Tobacco Use    Smoking status: Former     Current packs/day: 0.00     Types: Cigarettes     Start date:      Quit date:      Years since quittin.1    Smokeless tobacco: Never    Tobacco comments:     OV says she quit smoking within the past year   Vaping Use    Vaping status: Never Used   Substance and Sexual Activity    Alcohol use: Yes     Alcohol/week: 14.0 standard drinks of alcohol     Types: 14 Glasses of wine per week    Drug use: No    Sexual activity: Defer          PHYSICAL EXAM    (up to 7 for level 4, 8 or more for level 5)     Vitals:    02/20/25 2059 02/20/25 2102 02/20/25 2130 02/20/25 2145   BP:   122/52    BP Location:       Patient Position:       Pulse: 55 58 58 60   Resp:       Temp:       TempSrc:       SpO2: 95% 99%  93%   Weight:       Height:           General: Awake, alert, no acute distress.  HEENT: Conjunctivae normal.  Neck: Trachea midline.  Cardiac: Heart regular rate, rhythm, no murmurs, rubs, or gallops  Lungs: Lungs are clear to auscultation, there is no wheezing, rhonchi, or rales. There is no use of accessory muscles.  Chest wall: There is no tenderness to palpation over the chest wall or over ribs  Abdomen: Abdomen is soft, nontender, nondistended. There are no firm or pulsatile masses, no rebound rigidity or guarding.   Musculoskeletal: No deformity.  Neuro: Alert and oriented to person.  Moves all extremities equally.  Dermatology: Skin is warm and dry  Psych: Mentation is grossly normal, cognition is grossly normal. Affect is appropriate.        DIAGNOSTIC RESULTS     EKG: All EKGs are interpreted by the Emergency Department Physician who either signs or Co-signs this chart in the absence of a cardiologist.    ECG 12 Lead ED Triage Standing Order; SOA   Preliminary Result   Test Reason : ED Triage Standing Order~   Blood Pressure :   */*   mmHG   Vent. Rate :  64 BPM     Atrial Rate :  69 BPM      P-R Int : 154 ms          QRS Dur :  82 ms       QT Int : 404 ms       P-R-T Axes :  80   0 263 degrees     QTcB Int : 416 ms      Sinus rhythm with marked sinus arrhythmia   ST & T wave abnormality, consider lateral ischemia   Abnormal ECG   When compared with ECG of 23-Nov-2024 13:01,   Nonspecific T wave abnormality now evident in Lateral leads      Referred By: ED MD           Confirmed By:             RADIOLOGY:   [x] Radiologist's Report Reviewed:  CT Angiogram Chest Pulmonary Embolism   Final Result   No pulmonary embolus. No acute  cardiopulmonary disease         Electronically Signed: Freddy Santos MD     2/20/2025 7:25 PM EST     Workstation ID: [id number]      XR Chest 1 View   Final Result   Impression:   No acute process identified         Electronically Signed: Ric Manuel MD     2/20/2025 6:14 PM EST     Workstation ID: [id number]          I ordered and independently reviewed the above noted radiographic studies.        LABS:    I have reviewed and interpreted all of the currently available lab results from this visit (if applicable):  Results for orders placed or performed during the hospital encounter of 02/20/25   ECG 12 Lead ED Triage Standing Order; SOA    Collection Time: 02/20/25  5:34 PM   Result Value Ref Range    QT Interval 404 ms    QTC Interval 416 ms   COVID-19 and FLU A/B PCR, 1 HR TAT - Swab, Nasopharynx    Collection Time: 02/20/25  5:41 PM    Specimen: Nasopharynx; Swab   Result Value Ref Range    COVID19 Not Detected Not Detected - Ref. Range    Influenza A PCR Not Detected Not Detected    Influenza B PCR Not Detected Not Detected   Comprehensive Metabolic Panel    Collection Time: 02/20/25  5:41 PM    Specimen: Blood   Result Value Ref Range    Glucose 67 65 - 99 mg/dL    BUN 6 (L) 8 - 23 mg/dL    Creatinine 0.52 (L) 0.57 - 1.00 mg/dL    Sodium 138 136 - 145 mmol/L    Potassium 3.2 (L) 3.5 - 5.2 mmol/L    Chloride 97 (L) 98 - 107 mmol/L    CO2 25.0 22.0 - 29.0 mmol/L    Calcium 8.6 8.6 - 10.5 mg/dL    Total Protein 5.8 (L) 6.0 - 8.5 g/dL    Albumin 3.4 (L) 3.5 - 5.2 g/dL    ALT (SGPT) 6 1 - 33 U/L    AST (SGOT) 17 1 - 32 U/L    Alkaline Phosphatase 46 39 - 117 U/L    Total Bilirubin 0.4 0.0 - 1.2 mg/dL    Globulin 2.4 gm/dL    A/G Ratio 1.4 g/dL    BUN/Creatinine Ratio 11.5 7.0 - 25.0    Anion Gap 16.0 (H) 5.0 - 15.0 mmol/L    eGFR 92.9 >60.0 mL/min/1.73   BNP    Collection Time: 02/20/25  5:41 PM    Specimen: Blood   Result Value Ref Range    proBNP 1,101.0 0.0 - 1,800.0 pg/mL   High Sensitivity Troponin T     Collection Time: 02/20/25  5:41 PM    Specimen: Blood   Result Value Ref Range    HS Troponin T 15 (H) <14 ng/L   CBC Auto Differential    Collection Time: 02/20/25  5:41 PM    Specimen: Blood   Result Value Ref Range    WBC 5.55 3.40 - 10.80 10*3/mm3    RBC 3.85 3.77 - 5.28 10*6/mm3    Hemoglobin 11.7 (L) 12.0 - 15.9 g/dL    Hematocrit 36.7 34.0 - 46.6 %    MCV 95.3 79.0 - 97.0 fL    MCH 30.4 26.6 - 33.0 pg    MCHC 31.9 31.5 - 35.7 g/dL    RDW 14.0 12.3 - 15.4 %    RDW-SD 49.5 37.0 - 54.0 fl    MPV 9.1 6.0 - 12.0 fL    Platelets 94 (L) 140 - 450 10*3/mm3   Manual Differential    Collection Time: 02/20/25  5:41 PM    Specimen: Blood   Result Value Ref Range    Neutrophil % 49.0 42.7 - 76.0 %    Lymphocyte % 21.0 19.6 - 45.3 %    Monocyte % 11.0 5.0 - 12.0 %    Eosinophil % 0.0 (L) 0.3 - 6.2 %    Basophil % 1.0 0.0 - 1.5 %    Bands %  6.0 (H) 0.0 - 5.0 %    Atypical Lymphocyte % 12.0 (H) 0.0 - 5.0 %    Neutrophils Absolute 3.05 1.70 - 7.00 10*3/mm3    Lymphocytes Absolute 1.83 0.70 - 3.10 10*3/mm3    Monocytes Absolute 0.61 0.10 - 0.90 10*3/mm3    Eosinophils Absolute 0.00 0.00 - 0.40 10*3/mm3    Basophils Absolute 0.06 0.00 - 0.20 10*3/mm3    Ovalocytes Slight/1+ None Seen    Schistocytes Slight/1+ None Seen    Vacuolated Neutrophils Slight/1+ None Seen    Platelet Morphology Normal Normal   Green Top (Gel)    Collection Time: 02/20/25  5:41 PM   Result Value Ref Range    Extra Tube Hold for add-ons.    Lavender Top    Collection Time: 02/20/25  5:41 PM   Result Value Ref Range    Extra Tube hold for add-on    Gold Top - SST    Collection Time: 02/20/25  5:41 PM   Result Value Ref Range    Extra Tube Hold for add-ons.    Gray Top    Collection Time: 02/20/25  5:41 PM   Result Value Ref Range    Extra Tube Hold for add-ons.    Light Blue Top    Collection Time: 02/20/25  5:41 PM   Result Value Ref Range    Extra Tube Hold for add-ons.    Urinalysis With Culture If Indicated - Straight Cath    Collection Time:  02/20/25  7:52 PM    Specimen: Straight Cath; Urine   Result Value Ref Range    Color, UA Yellow Yellow, Straw    Appearance, UA Clear Clear    pH, UA 6.0 5.0 - 8.0    Specific Gravity, UA 1.010 1.001 - 1.030    Glucose, UA Negative Negative    Ketones, UA 80 mg/dL (3+) (A) Negative    Bilirubin, UA Negative Negative    Blood, UA Moderate (2+) (A) Negative    Protein, UA Negative Negative    Leuk Esterase, UA Negative Negative    Nitrite, UA Negative Negative    Urobilinogen, UA 0.2 E.U./dL 0.2 - 1.0 E.U./dL   Urinalysis, Microscopic Only - Straight Cath    Collection Time: 02/20/25  7:52 PM    Specimen: Straight Cath; Urine   Result Value Ref Range    RBC, UA 0-2 None Seen, 0-2 /HPF    WBC, UA 0-2 None Seen, 0-2 /HPF    Bacteria, UA None Seen None Seen, Trace /HPF    Squamous Epithelial Cells, UA 0-2 None Seen, 0-2 /HPF    Hyaline Casts, UA None Seen 0 - 6 /LPF    Methodology Manual Light Microscopy    High Sensitivity Troponin T 1Hr    Collection Time: 02/20/25  8:00 PM    Specimen: Blood   Result Value Ref Range    HS Troponin T 13 <14 ng/L    Troponin T Numeric Delta -2 ng/L    Troponin T % Delta -13 Abnormal if >/= 20%        If labs were ordered, I independently reviewed the results and considered them in treating the patient.      EMERGENCY DEPARTMENT COURSE and DIFFERENTIAL DIAGNOSIS/MDM:   Vitals:  AS OF 00:20 EST    BP - 122/52  HR - 60  TEMP - 99.3 °F (37.4 °C) (Oral)  O2 SATS - 93%        Discussion below represents my analysis of pertinent findings related to patient's condition, differential diagnosis, treatment plan and final disposition.      Differential diagnosis:  The differential diagnosis associated with the patient's presentation includes: COVID, flu, pneumonia, PE, COPD, CHF, ACS, dysrhythmia, urinary tract infection      Independent interpretations (ECG/rhythm strip/X-ray/US/CT scan): I independently interpreted the patient's chest x-ray and cardiac monitor.  There is no lobar infiltrate and  the patient is in sinus rhythm      Additional sources:  Discussed/obtained information from independent historians:   [] Spouse:   [] Parent:   [] Friend:   [x] EMS: Report was taken from EMS.  Vital signs stable during transport.   [] Other:  External (non-ED) record review:   [] Inpatient record:   [] Office record:   [] Outpatient record:   [] Prior Outpatient labs:   [] Prior Outpatient radiology:   [] Primary Care record:   [] Outside ED record:   [] Other:       Patient's care impacted by:   [] Diabetes   [x] Hypertension   [] Coronary Artery Disease   [] Cancer   [x] Other: Hyperlipidemia, afib    Care significantly affected by Social Determinants of Health (housing and economic circumstances, unemployment)    [] Yes     [x] No   If yes, Patient's care significantly limited by  Social Determinants of Health including:    [] Inadequate housing    [] Low income    [] Alcoholism and drug addiction in family    [] Problems related to primary support group    [] Unemployment    [] Problems related to employment    [] Other Social Determinants of Health:       Consideration of admission/observation vs discharge: Considered admission, ever patient well-appearing and nontoxic, normal vital signs, unremarkable workup, at neurologic baseline per report from family at bedside.  Feel that she is stable for discharge home with close observation and return precautions.      I considered prescription management with:    [] Pain medication:   [] Antiviral:   [x] Antibiotic: Considered prescription for antibiotics, however feel that patient's symptoms are most likely viral in nature   [] Other:    ED Course:    ED Course as of 02/21/25 0020   Thu Feb 20, 2025 2204 On reexamination, the patient's family is at bedside with her.  She is resting comfortably in bed and smiling on reexamination.  Vital signs remain within normal limits.  Per family at bedside, she is currently at her cognitive baseline.  There is no evidence of  significant alteration in mental status, she is moving all extremities and sitting herself up in bed.  Doubt CVA or neurologic emergency and did not feel that head imaging was necessary given clinical history.  She does have some coarse cough but no fever or leukocytosis and chest CT demonstrates no evidence of pulmonary infiltrate.  Labs are reassuring and show no evidence of significant dehydration or electrolyte derangement.  Urinalysis is clear.  Patient's family feels comfortable taking her home at this point.  Discussed the importance of hydration, rest, and returning to the emergency department if she has any increased work of breathing or any other concerning symptoms.  They understand to have the patient follow-up with her PCP as soon as possible. [NS]      ED Course User Index  [NS] Alan Edmonds MD             I had a discussion with the patient/family regarding diagnosis, diagnostic results, treatment plan, and medications.  The patient/family indicated understanding of these instructions.  I spent adequate time at the bedside preceding discharge necessary to personally discuss the aftercare instructions, giving patient education, providing explanations of the results of our evaluations/findings, and my decision making to assure that the patient/family understand the plan of care.  Time was allotted to answer questions at that time and throughout the ED course.  Emphasis was placed on timely follow-up after discharge.  I also discussed the potential for the development of an acute emergent condition requiring further evaluation, admission, or even surgical intervention. I discussed that we found nothing during the visit today indicating the need for further workup, admission, or the presence of an unstable medical condition.  I encouraged the patient to return to the emergency department immediately for ANY concerns, worsening, new complaints, or if symptoms persist and unable to seek follow-up in a  timely fashion.  The patient/family expressed understanding and agreement with this plan.  The patient will follow-up with their PCP in 1-2 days for reevaluation.           PROCEDURES:  Procedures    CRITICAL CARE TIME        FINAL IMPRESSION      1. Acute cough    2. Generalized weakness    3. History of dementia    4. History of CVA (cerebrovascular accident)    5. History of atrial fibrillation          DISPOSITION/PLAN     ED Disposition       ED Disposition   Discharge    Condition   Stable    Comment   --                 Comment: Please note this report has been produced using speech recognition software.      Alan Edmonds MD  Attending Emergency Physician             Alan Edmonds MD  02/21/25 0023

## 2025-02-21 LAB
QT INTERVAL: 404 MS
QTC INTERVAL: 416 MS

## 2025-02-22 LAB — BACTERIA SPEC AEROBE CULT: NO GROWTH

## 2025-02-28 LAB
QT INTERVAL: 404 MS
QTC INTERVAL: 416 MS

## 2025-04-03 ENCOUNTER — APPOINTMENT (OUTPATIENT)
Dept: CT IMAGING | Facility: HOSPITAL | Age: 85
DRG: 535 | End: 2025-04-03
Payer: MEDICARE

## 2025-04-03 ENCOUNTER — HOSPITAL ENCOUNTER (INPATIENT)
Facility: HOSPITAL | Age: 85
LOS: 7 days | Discharge: NURSING, REHAB OR LONG-TERM CARE | DRG: 535 | End: 2025-04-10
Attending: EMERGENCY MEDICINE | Admitting: INTERNAL MEDICINE
Payer: MEDICARE

## 2025-04-03 ENCOUNTER — APPOINTMENT (OUTPATIENT)
Dept: GENERAL RADIOLOGY | Facility: HOSPITAL | Age: 85
DRG: 535 | End: 2025-04-03
Payer: MEDICARE

## 2025-04-03 DIAGNOSIS — S52.502A CLOSED FRACTURE OF DISTAL END OF LEFT RADIUS, UNSPECIFIED FRACTURE MORPHOLOGY, INITIAL ENCOUNTER: ICD-10-CM

## 2025-04-03 DIAGNOSIS — S01.81XA FACIAL LACERATION, INITIAL ENCOUNTER: ICD-10-CM

## 2025-04-03 DIAGNOSIS — F41.9 ANXIETY: ICD-10-CM

## 2025-04-03 DIAGNOSIS — Z96.649 PERIPROSTHETIC HIP FRACTURE, INITIAL ENCOUNTER: Primary | ICD-10-CM

## 2025-04-03 DIAGNOSIS — S72.009A CLOSED FRACTURE OF HIP, UNSPECIFIED LATERALITY, INITIAL ENCOUNTER: ICD-10-CM

## 2025-04-03 DIAGNOSIS — M97.8XXA PERIPROSTHETIC HIP FRACTURE, INITIAL ENCOUNTER: Primary | ICD-10-CM

## 2025-04-03 LAB
ALBUMIN SERPL-MCNC: 3.4 G/DL (ref 3.5–5.2)
ALBUMIN/GLOB SERPL: 1.5 G/DL
ALP SERPL-CCNC: 57 U/L (ref 39–117)
ALT SERPL W P-5'-P-CCNC: 5 U/L (ref 1–33)
ANION GAP SERPL CALCULATED.3IONS-SCNC: 10 MMOL/L (ref 5–15)
AST SERPL-CCNC: 16 U/L (ref 1–32)
BASOPHILS # BLD AUTO: 0.01 10*3/MM3 (ref 0–0.2)
BASOPHILS NFR BLD AUTO: 0.1 % (ref 0–1.5)
BILIRUB SERPL-MCNC: 0.2 MG/DL (ref 0–1.2)
BUN SERPL-MCNC: 14 MG/DL (ref 8–23)
BUN/CREAT SERPL: 18.9 (ref 7–25)
CALCIUM SPEC-SCNC: 8.8 MG/DL (ref 8.6–10.5)
CHLORIDE SERPL-SCNC: 94 MMOL/L (ref 98–107)
CO2 SERPL-SCNC: 26 MMOL/L (ref 22–29)
CREAT SERPL-MCNC: 0.74 MG/DL (ref 0.57–1)
DEPRECATED RDW RBC AUTO: 45.1 FL (ref 37–54)
EGFRCR SERPLBLD CKD-EPI 2021: 80.9 ML/MIN/1.73
EOSINOPHIL # BLD AUTO: 0.05 10*3/MM3 (ref 0–0.4)
EOSINOPHIL NFR BLD AUTO: 0.7 % (ref 0.3–6.2)
ERYTHROCYTE [DISTWIDTH] IN BLOOD BY AUTOMATED COUNT: 13.1 % (ref 12.3–15.4)
ETHANOL BLD-MCNC: <10 MG/DL (ref 0–10)
GLOBULIN UR ELPH-MCNC: 2.2 GM/DL
GLUCOSE SERPL-MCNC: 119 MG/DL (ref 65–99)
HCT VFR BLD AUTO: 28.6 % (ref 34–46.6)
HGB BLD-MCNC: 9.3 G/DL (ref 12–15.9)
IMM GRANULOCYTES # BLD AUTO: 0.03 10*3/MM3 (ref 0–0.05)
IMM GRANULOCYTES NFR BLD AUTO: 0.4 % (ref 0–0.5)
LYMPHOCYTES # BLD AUTO: 1.02 10*3/MM3 (ref 0.7–3.1)
LYMPHOCYTES NFR BLD AUTO: 15.1 % (ref 19.6–45.3)
MCH RBC QN AUTO: 30.5 PG (ref 26.6–33)
MCHC RBC AUTO-ENTMCNC: 32.5 G/DL (ref 31.5–35.7)
MCV RBC AUTO: 93.8 FL (ref 79–97)
MONOCYTES # BLD AUTO: 0.68 10*3/MM3 (ref 0.1–0.9)
MONOCYTES NFR BLD AUTO: 10 % (ref 5–12)
NEUTROPHILS NFR BLD AUTO: 4.98 10*3/MM3 (ref 1.7–7)
NEUTROPHILS NFR BLD AUTO: 73.7 % (ref 42.7–76)
NRBC BLD AUTO-RTO: 0 /100 WBC (ref 0–0.2)
PLATELET # BLD AUTO: 114 10*3/MM3 (ref 140–450)
PMV BLD AUTO: 10 FL (ref 6–12)
POTASSIUM SERPL-SCNC: 4.5 MMOL/L (ref 3.5–5.2)
PROT SERPL-MCNC: 5.6 G/DL (ref 6–8.5)
RBC # BLD AUTO: 3.05 10*6/MM3 (ref 3.77–5.28)
SODIUM SERPL-SCNC: 130 MMOL/L (ref 136–145)
VALPROATE SERPL-MCNC: 98.4 MCG/ML (ref 50–125)
WBC NRBC COR # BLD AUTO: 6.77 10*3/MM3 (ref 3.4–10.8)

## 2025-04-03 PROCEDURE — 82077 ASSAY SPEC XCP UR&BREATH IA: CPT | Performed by: EMERGENCY MEDICINE

## 2025-04-03 PROCEDURE — 80053 COMPREHEN METABOLIC PANEL: CPT | Performed by: EMERGENCY MEDICINE

## 2025-04-03 PROCEDURE — 70450 CT HEAD/BRAIN W/O DYE: CPT

## 2025-04-03 PROCEDURE — 99285 EMERGENCY DEPT VISIT HI MDM: CPT

## 2025-04-03 PROCEDURE — 25010000002 HYDROMORPHONE PER 4 MG: Performed by: EMERGENCY MEDICINE

## 2025-04-03 PROCEDURE — 73502 X-RAY EXAM HIP UNI 2-3 VIEWS: CPT

## 2025-04-03 PROCEDURE — 71045 X-RAY EXAM CHEST 1 VIEW: CPT

## 2025-04-03 PROCEDURE — 85025 COMPLETE CBC W/AUTO DIFF WBC: CPT | Performed by: EMERGENCY MEDICINE

## 2025-04-03 PROCEDURE — 80164 ASSAY DIPROPYLACETIC ACD TOT: CPT | Performed by: EMERGENCY MEDICINE

## 2025-04-03 PROCEDURE — 99222 1ST HOSP IP/OBS MODERATE 55: CPT | Performed by: INTERNAL MEDICINE

## 2025-04-03 PROCEDURE — 73110 X-RAY EXAM OF WRIST: CPT

## 2025-04-03 RX ORDER — HYDROMORPHONE HYDROCHLORIDE 1 MG/ML
0.5 INJECTION, SOLUTION INTRAMUSCULAR; INTRAVENOUS; SUBCUTANEOUS ONCE
Status: COMPLETED | OUTPATIENT
Start: 2025-04-03 | End: 2025-04-03

## 2025-04-03 RX ADMIN — HYDROMORPHONE HYDROCHLORIDE 0.5 MG: 1 INJECTION, SOLUTION INTRAMUSCULAR; INTRAVENOUS; SUBCUTANEOUS at 22:28

## 2025-04-03 NOTE — LETTER
EMS Transport Request  For use at [facility], [place], [place], [place], and [place] only   Patient Name: Jenna Russell : 1940   Weight:52 kg (114 lb 10.2 oz) Pick-up Location: [facility] BLS/ALS:    Insurance: [employer] MEDICARE REPLACEMENT Auth End Date:    Pre-Cert #: D/C Summary complete:    Destination: [place]   Contact Precautions:    Equipment (O2, Fluids, etc.):    Arrive By Date/Time: 4/10/25 Stretcher/WC: Stretcher   CM Requesting: Rashaad Jameson RN Ext: [phone]   Notes/Medical Necessity:      ______________________________________________________________________    *Only 2 patient bags OR 1 carry-on size bag are permitted.  Wheelchairs and walkers CANNOT transported with the patient. Acknowledge: Yes

## 2025-04-04 ENCOUNTER — APPOINTMENT (OUTPATIENT)
Dept: CT IMAGING | Facility: HOSPITAL | Age: 85
DRG: 535 | End: 2025-04-04
Payer: MEDICARE

## 2025-04-04 LAB
ABO GROUP BLD: NORMAL
ABO GROUP BLD: NORMAL
ALBUMIN SERPL-MCNC: 2.8 G/DL (ref 3.5–5.2)
ALBUMIN/GLOB SERPL: 1.8 G/DL
ALP SERPL-CCNC: 39 U/L (ref 39–117)
ALT SERPL W P-5'-P-CCNC: <5 U/L (ref 1–33)
AMPHET+METHAMPHET UR QL: NEGATIVE
AMPHETAMINES UR QL: NEGATIVE
ANION GAP SERPL CALCULATED.3IONS-SCNC: 6 MMOL/L (ref 5–15)
AST SERPL-CCNC: 11 U/L (ref 1–32)
BARBITURATES UR QL SCN: NEGATIVE
BASOPHILS # BLD AUTO: 0.01 10*3/MM3 (ref 0–0.2)
BASOPHILS NFR BLD AUTO: 0.2 % (ref 0–1.5)
BENZODIAZ UR QL SCN: NEGATIVE
BILIRUB SERPL-MCNC: 0.4 MG/DL (ref 0–1.2)
BLD GP AB SCN SERPL QL: NEGATIVE
BUN SERPL-MCNC: 11 MG/DL (ref 8–23)
BUN/CREAT SERPL: 19.6 (ref 7–25)
BUPRENORPHINE SERPL-MCNC: NEGATIVE NG/ML
CALCIUM SPEC-SCNC: 8.2 MG/DL (ref 8.6–10.5)
CANNABINOIDS SERPL QL: NEGATIVE
CHLORIDE SERPL-SCNC: 95 MMOL/L (ref 98–107)
CO2 SERPL-SCNC: 27 MMOL/L (ref 22–29)
COCAINE UR QL: NEGATIVE
CREAT SERPL-MCNC: 0.56 MG/DL (ref 0.57–1)
DEPRECATED RDW RBC AUTO: 45 FL (ref 37–54)
EGFRCR SERPLBLD CKD-EPI 2021: 91.3 ML/MIN/1.73
EOSINOPHIL # BLD AUTO: 0.03 10*3/MM3 (ref 0–0.4)
EOSINOPHIL NFR BLD AUTO: 0.5 % (ref 0.3–6.2)
ERYTHROCYTE [DISTWIDTH] IN BLOOD BY AUTOMATED COUNT: 13 % (ref 12.3–15.4)
FENTANYL UR-MCNC: POSITIVE NG/ML
GLOBULIN UR ELPH-MCNC: 1.6 GM/DL
GLUCOSE SERPL-MCNC: 89 MG/DL (ref 65–99)
HCT VFR BLD AUTO: 21 % (ref 34–46.6)
HGB BLD-MCNC: 6.9 G/DL (ref 12–15.9)
IMM GRANULOCYTES # BLD AUTO: 0.01 10*3/MM3 (ref 0–0.05)
IMM GRANULOCYTES NFR BLD AUTO: 0.2 % (ref 0–0.5)
LYMPHOCYTES # BLD AUTO: 1.52 10*3/MM3 (ref 0.7–3.1)
LYMPHOCYTES NFR BLD AUTO: 26.8 % (ref 19.6–45.3)
MCH RBC QN AUTO: 30.9 PG (ref 26.6–33)
MCHC RBC AUTO-ENTMCNC: 32.9 G/DL (ref 31.5–35.7)
MCV RBC AUTO: 94.2 FL (ref 79–97)
METHADONE UR QL SCN: NEGATIVE
MONOCYTES # BLD AUTO: 0.94 10*3/MM3 (ref 0.1–0.9)
MONOCYTES NFR BLD AUTO: 16.5 % (ref 5–12)
NEUTROPHILS NFR BLD AUTO: 3.17 10*3/MM3 (ref 1.7–7)
NEUTROPHILS NFR BLD AUTO: 55.8 % (ref 42.7–76)
NRBC BLD AUTO-RTO: 0 /100 WBC (ref 0–0.2)
OPIATES UR QL: POSITIVE
OXYCODONE UR QL SCN: NEGATIVE
PCP UR QL SCN: NEGATIVE
PLATELET # BLD AUTO: 87 10*3/MM3 (ref 140–450)
PMV BLD AUTO: 9.5 FL (ref 6–12)
POTASSIUM SERPL-SCNC: 4.3 MMOL/L (ref 3.5–5.2)
PROT SERPL-MCNC: 4.4 G/DL (ref 6–8.5)
RBC # BLD AUTO: 2.23 10*6/MM3 (ref 3.77–5.28)
RH BLD: POSITIVE
RH BLD: POSITIVE
SODIUM SERPL-SCNC: 128 MMOL/L (ref 136–145)
T&S EXPIRATION DATE: NORMAL
TRICYCLICS UR QL SCN: NEGATIVE
WBC NRBC COR # BLD AUTO: 5.68 10*3/MM3 (ref 3.4–10.8)

## 2025-04-04 PROCEDURE — 36430 TRANSFUSION BLD/BLD COMPNT: CPT

## 2025-04-04 PROCEDURE — 80307 DRUG TEST PRSMV CHEM ANLYZR: CPT | Performed by: EMERGENCY MEDICINE

## 2025-04-04 PROCEDURE — 85025 COMPLETE CBC W/AUTO DIFF WBC: CPT | Performed by: INTERNAL MEDICINE

## 2025-04-04 PROCEDURE — 25810000003 LACTATED RINGERS PER 1000 ML: Performed by: INTERNAL MEDICINE

## 2025-04-04 PROCEDURE — P9016 RBC LEUKOCYTES REDUCED: HCPCS

## 2025-04-04 PROCEDURE — 86901 BLOOD TYPING SEROLOGIC RH(D): CPT | Performed by: STUDENT IN AN ORGANIZED HEALTH CARE EDUCATION/TRAINING PROGRAM

## 2025-04-04 PROCEDURE — 25810000003 LACTATED RINGERS SOLUTION: Performed by: STUDENT IN AN ORGANIZED HEALTH CARE EDUCATION/TRAINING PROGRAM

## 2025-04-04 PROCEDURE — 72192 CT PELVIS W/O DYE: CPT

## 2025-04-04 PROCEDURE — 86850 RBC ANTIBODY SCREEN: CPT | Performed by: STUDENT IN AN ORGANIZED HEALTH CARE EDUCATION/TRAINING PROGRAM

## 2025-04-04 PROCEDURE — 25010000002 MORPHINE PER 10 MG: Performed by: INTERNAL MEDICINE

## 2025-04-04 PROCEDURE — 80053 COMPREHEN METABOLIC PANEL: CPT | Performed by: INTERNAL MEDICINE

## 2025-04-04 PROCEDURE — 99232 SBSQ HOSP IP/OBS MODERATE 35: CPT | Performed by: STUDENT IN AN ORGANIZED HEALTH CARE EDUCATION/TRAINING PROGRAM

## 2025-04-04 PROCEDURE — 86901 BLOOD TYPING SEROLOGIC RH(D): CPT

## 2025-04-04 PROCEDURE — 86923 COMPATIBILITY TEST ELECTRIC: CPT

## 2025-04-04 PROCEDURE — 86900 BLOOD TYPING SEROLOGIC ABO: CPT

## 2025-04-04 PROCEDURE — 86900 BLOOD TYPING SEROLOGIC ABO: CPT | Performed by: STUDENT IN AN ORGANIZED HEALTH CARE EDUCATION/TRAINING PROGRAM

## 2025-04-04 RX ORDER — ACETAMINOPHEN 325 MG/1
650 TABLET ORAL EVERY 4 HOURS PRN
Status: DISCONTINUED | OUTPATIENT
Start: 2025-04-04 | End: 2025-04-07

## 2025-04-04 RX ORDER — BUDESONIDE 3 MG/1
6 CAPSULE, COATED PELLETS ORAL DAILY
Status: DISCONTINUED | OUTPATIENT
Start: 2025-04-04 | End: 2025-04-04

## 2025-04-04 RX ORDER — ALPRAZOLAM 0.25 MG
0.25 TABLET ORAL 2 TIMES DAILY PRN
Status: DISCONTINUED | OUTPATIENT
Start: 2025-04-04 | End: 2025-04-06

## 2025-04-04 RX ORDER — AMOXICILLIN 250 MG
2 CAPSULE ORAL 2 TIMES DAILY PRN
Status: DISCONTINUED | OUTPATIENT
Start: 2025-04-04 | End: 2025-04-07

## 2025-04-04 RX ORDER — MULTIVIT WITH MINERALS/LUTEIN
250 TABLET ORAL 2 TIMES DAILY
COMMUNITY

## 2025-04-04 RX ORDER — ATORVASTATIN CALCIUM 40 MG/1
80 TABLET, FILM COATED ORAL NIGHTLY
Status: DISCONTINUED | OUTPATIENT
Start: 2025-04-04 | End: 2025-04-04

## 2025-04-04 RX ORDER — SODIUM CHLORIDE 0.9 % (FLUSH) 0.9 %
10 SYRINGE (ML) INJECTION EVERY 12 HOURS SCHEDULED
Status: DISCONTINUED | OUTPATIENT
Start: 2025-04-04 | End: 2025-04-10 | Stop reason: HOSPADM

## 2025-04-04 RX ORDER — ASPIRIN 81 MG/1
81 TABLET ORAL EVERY OTHER DAY
Status: DISCONTINUED | OUTPATIENT
Start: 2025-04-04 | End: 2025-04-05

## 2025-04-04 RX ORDER — SODIUM CHLORIDE 9 MG/ML
40 INJECTION, SOLUTION INTRAVENOUS AS NEEDED
Status: DISCONTINUED | OUTPATIENT
Start: 2025-04-04 | End: 2025-04-10 | Stop reason: HOSPADM

## 2025-04-04 RX ORDER — OXYCODONE AND ACETAMINOPHEN 5; 325 MG/1; MG/1
1 TABLET ORAL EVERY 6 HOURS PRN
Refills: 0 | Status: DISCONTINUED | OUTPATIENT
Start: 2025-04-04 | End: 2025-04-07

## 2025-04-04 RX ORDER — SODIUM CHLORIDE, SODIUM LACTATE, POTASSIUM CHLORIDE, CALCIUM CHLORIDE 600; 310; 30; 20 MG/100ML; MG/100ML; MG/100ML; MG/100ML
50 INJECTION, SOLUTION INTRAVENOUS CONTINUOUS
Status: DISCONTINUED | OUTPATIENT
Start: 2025-04-04 | End: 2025-04-04

## 2025-04-04 RX ORDER — ATORVASTATIN CALCIUM 40 MG/1
40 TABLET, FILM COATED ORAL NIGHTLY
Status: DISCONTINUED | OUTPATIENT
Start: 2025-04-04 | End: 2025-04-10 | Stop reason: HOSPADM

## 2025-04-04 RX ORDER — ONDANSETRON 2 MG/ML
4 INJECTION INTRAMUSCULAR; INTRAVENOUS EVERY 6 HOURS PRN
Status: DISCONTINUED | OUTPATIENT
Start: 2025-04-04 | End: 2025-04-10 | Stop reason: HOSPADM

## 2025-04-04 RX ORDER — NALOXONE HCL 0.4 MG/ML
0.4 VIAL (ML) INJECTION
Status: DISCONTINUED | OUTPATIENT
Start: 2025-04-04 | End: 2025-04-07

## 2025-04-04 RX ORDER — LANOLIN ALCOHOL/MO/W.PET/CERES
1000 CREAM (GRAM) TOPICAL DAILY
COMMUNITY

## 2025-04-04 RX ORDER — BISACODYL 5 MG/1
5 TABLET, DELAYED RELEASE ORAL DAILY PRN
Status: DISCONTINUED | OUTPATIENT
Start: 2025-04-04 | End: 2025-04-07

## 2025-04-04 RX ORDER — BISACODYL 10 MG
10 SUPPOSITORY, RECTAL RECTAL DAILY PRN
Status: DISCONTINUED | OUTPATIENT
Start: 2025-04-04 | End: 2025-04-07

## 2025-04-04 RX ORDER — SODIUM CHLORIDE 0.9 % (FLUSH) 0.9 %
10 SYRINGE (ML) INJECTION AS NEEDED
Status: DISCONTINUED | OUTPATIENT
Start: 2025-04-04 | End: 2025-04-10 | Stop reason: HOSPADM

## 2025-04-04 RX ORDER — DIVALPROEX SODIUM 250 MG/1
500 TABLET, DELAYED RELEASE ORAL EVERY 8 HOURS SCHEDULED
Status: DISCONTINUED | OUTPATIENT
Start: 2025-04-04 | End: 2025-04-07

## 2025-04-04 RX ORDER — POLYETHYLENE GLYCOL 3350 17 G/17G
17 POWDER, FOR SOLUTION ORAL DAILY PRN
Status: DISCONTINUED | OUTPATIENT
Start: 2025-04-04 | End: 2025-04-07

## 2025-04-04 RX ORDER — PANTOPRAZOLE SODIUM 40 MG/1
40 TABLET, DELAYED RELEASE ORAL DAILY
Status: DISCONTINUED | OUTPATIENT
Start: 2025-04-04 | End: 2025-04-10 | Stop reason: HOSPADM

## 2025-04-04 RX ORDER — LISINOPRIL 10 MG/1
10 TABLET ORAL DAILY
Status: DISCONTINUED | OUTPATIENT
Start: 2025-04-04 | End: 2025-04-10 | Stop reason: HOSPADM

## 2025-04-04 RX ORDER — BUDESONIDE 3 MG/1
3 CAPSULE, COATED PELLETS ORAL DAILY
Status: DISCONTINUED | OUTPATIENT
Start: 2025-04-04 | End: 2025-04-10 | Stop reason: HOSPADM

## 2025-04-04 RX ORDER — ACETAMINOPHEN 160 MG/5ML
650 SOLUTION ORAL EVERY 4 HOURS PRN
Status: DISCONTINUED | OUTPATIENT
Start: 2025-04-04 | End: 2025-04-07

## 2025-04-04 RX ORDER — MORPHINE SULFATE 2 MG/ML
2 INJECTION, SOLUTION INTRAMUSCULAR; INTRAVENOUS EVERY 4 HOURS PRN
Status: DISCONTINUED | OUTPATIENT
Start: 2025-04-04 | End: 2025-04-07

## 2025-04-04 RX ORDER — IBUPROFEN 200 MG
200 TABLET ORAL EVERY 6 HOURS PRN
COMMUNITY
End: 2025-04-10 | Stop reason: HOSPADM

## 2025-04-04 RX ORDER — SPIRONOLACTONE 25 MG/1
25 TABLET ORAL DAILY
COMMUNITY

## 2025-04-04 RX ORDER — ACETAMINOPHEN 650 MG/1
650 SUPPOSITORY RECTAL EVERY 4 HOURS PRN
Status: DISCONTINUED | OUTPATIENT
Start: 2025-04-04 | End: 2025-04-07

## 2025-04-04 RX ORDER — UBIDECARENONE 75 MG
400 CAPSULE ORAL DAILY
COMMUNITY

## 2025-04-04 RX ORDER — METOPROLOL SUCCINATE 25 MG/1
25 TABLET, EXTENDED RELEASE ORAL DAILY
Status: DISCONTINUED | OUTPATIENT
Start: 2025-04-04 | End: 2025-04-10 | Stop reason: HOSPADM

## 2025-04-04 RX ORDER — NITROGLYCERIN 0.4 MG/1
0.4 TABLET SUBLINGUAL
Status: DISCONTINUED | OUTPATIENT
Start: 2025-04-04 | End: 2025-04-10 | Stop reason: HOSPADM

## 2025-04-04 RX ADMIN — Medication 10 ML: at 02:19

## 2025-04-04 RX ADMIN — OXYCODONE AND ACETAMINOPHEN 1 TABLET: 5; 325 TABLET ORAL at 17:34

## 2025-04-04 RX ADMIN — MORPHINE SULFATE 2 MG: 2 INJECTION, SOLUTION INTRAMUSCULAR; INTRAVENOUS at 02:19

## 2025-04-04 RX ADMIN — BUDESONIDE 3 MG: 3 CAPSULE, COATED PELLETS ORAL at 11:19

## 2025-04-04 RX ADMIN — PANTOPRAZOLE SODIUM 40 MG: 40 TABLET, DELAYED RELEASE ORAL at 11:19

## 2025-04-04 RX ADMIN — ATORVASTATIN CALCIUM 40 MG: 40 TABLET, FILM COATED ORAL at 21:30

## 2025-04-04 RX ADMIN — MORPHINE SULFATE 2 MG: 2 INJECTION, SOLUTION INTRAMUSCULAR; INTRAVENOUS at 22:55

## 2025-04-04 RX ADMIN — Medication 10 ML: at 21:31

## 2025-04-04 RX ADMIN — SODIUM CHLORIDE, SODIUM LACTATE, POTASSIUM CHLORIDE, CALCIUM CHLORIDE 50 ML/HR: 20; 30; 600; 310 INJECTION, SOLUTION INTRAVENOUS at 02:32

## 2025-04-04 RX ADMIN — Medication 10 ML: at 11:20

## 2025-04-04 RX ADMIN — SODIUM CHLORIDE, SODIUM LACTATE, POTASSIUM CHLORIDE, CALCIUM CHLORIDE 500 ML: 20; 30; 600; 310 INJECTION, SOLUTION INTRAVENOUS at 10:02

## 2025-04-04 RX ADMIN — DIVALPROEX SODIUM 500 MG: 250 TABLET, DELAYED RELEASE ORAL at 21:30

## 2025-04-04 RX ADMIN — MORPHINE SULFATE 2 MG: 2 INJECTION, SOLUTION INTRAMUSCULAR; INTRAVENOUS at 10:05

## 2025-04-04 NOTE — CONSULTS
Orthopedic Consult      Patient: Jenna Russell    Date of Admission: 4/3/2025  7:57 PM    YOB: 1940    Medical Record Number: [id number]    Attending Physician: Clara Bacon MD    Consulting Physician: Chinmay Feliciano MD      Chief Complaints: Hip fracture [S72.009A]      History of Present Illness: 84 y.o. female admitted to [facility] with Hip fracture [S72.009A]. past medical history of CVA, hypertension who presented to the emergency department after a fall. Patient was reportedly walking without her walker and slipped and fell. She did not lose consciousness.  She landed on her left side. In the emergency department she has noted to have left periprosthetic femur fracture along with left distal radius fracture.  Patient is confused and unable to give much additional history.  Family member at bedside does give a history.  Patient denies any acute complaints.           Allergies   Allergen Reactions    Penicillins Anaphylaxis    Pistachio Nut Unknown - High Severity     Daughter reported         Home Medications:  Medications Prior to Admission   Medication Sig Dispense Refill Last Dose/Taking    aspirin 81 MG EC tablet Take 1 tablet by mouth Every Other Day.   4/3/2025    atorvastatin (LIPITOR) 80 MG tablet Take 1 tablet by mouth Every Night.   Past Week    calcium carbonate EX (TUMS EX) 750 MG chewable tablet Chew 1 tablet 2 (Two) Times a Day As Needed for Indigestion or Heartburn.   4/3/2025    Cholecalciferol (VITAMIN D) 2000 UNITS capsule Take 1 capsule by mouth Daily.   4/3/2025    Coenzyme Q10 (Co Q-10) 200 MG capsule Take 200 mg by mouth Daily.   4/3/2025    Diclofenac Sodium (VOLTAREN) 1 % gel gel Apply 2 g topically to the appropriate area as directed 4 (Four) Times a Day.   Past Week    divalproex (DEPAKOTE) 500 MG DR tablet Take 1 tablet by mouth Every 8 (Eight) Hours.   4/3/2025    ibuprofen (ADVIL,MOTRIN) 200 MG tablet Take 1 tablet by mouth Every 6 (Six) Hours As  Needed for Mild Pain.   4/3/2025    lidocaine (LIDODERM) 5 % Place 1 patch on the skin as directed by provider Daily. Remove & Discard patch within 12 hours or as directed by MD   Past Month    lisinopril (PRINIVIL,ZESTRIL) 10 MG tablet Take 2 tablets by mouth 2 (Two) Times a Day.   4/3/2025    metoprolol succinate XL (TOPROL-XL) 25 MG 24 hr tablet Take 1 tablet by mouth Every Night.   4/3/2025    pantoprazole (PROTONIX) 40 MG EC tablet TAKE 1 TABLET BY MOUTH DAILY 90 tablet 3 4/3/2025    spironolactone (ALDACTONE) 25 MG tablet Take 1 tablet by mouth Daily.   4/3/2025    vitamin B-12 (CYANOCOBALAMIN) 1000 MCG tablet Take 1 tablet by mouth Daily.   4/3/2025    vitamin C (ASCORBIC ACID) 250 MG tablet Take 1 tablet by mouth 2 (Two) Times a Day.   4/3/2025    acetaminophen (TYLENOL) 325 MG tablet Take 2 tablets by mouth Every 4 (Four) Hours As Needed for Mild Pain .   More than a month    Budesonide (ENTOCORT EC) 3 MG 24 hr capsule Take 2 capsules by mouth Daily.   More than a month    hyoscyamine (ANASPAZ,LEVSIN) 0.125 MG tablet Take 1 tablet by mouth Every 4 (Four) Hours As Needed for Cramping.   More than a month    melatonin 5 MG tablet tablet Take 1 tablet by mouth At Night As Needed (sleep).   Unknown    polyethylene glycol (MIRALAX) 17 g packet Take 17 g by mouth Daily.   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 Normal LVSF.Normal LV diastolic filling. Left atrium midly dilated. RV mild- mod dilated. MIld aortic cusp sclerosis. No evidence of mitral valve prolapse. Mild mitral regurgitation.No pulmonary hypertension or perdicardial effusion. No dilation of  aortic root. Venous system appears normal    H/O: stroke     HLD (hyperlipidemia) 2016    Left temporal lobe infarction     LOM (loss of memory)     Osteoarthritis 2016    Paroxysmal atrial fibrillation 2016    Thyroid nodule 2016    1.8x1.2cm    Weakness generalized     Wound infection after surgery     Left hip arthroplasty, staph aureus, 6 weeks IV Rocephin        Past Surgical History:   Procedure Laterality Date    ABDOMINOPLASTY      BREAST ABSCESS INCISION AND DRAINAGE      HIP TROCHANTERIC NAILING WITH INTRAMEDULLARY HIP SCREW Right 3/11/2022    Procedure: HIP TROCHANTERIC NAILING WITH INTRAMEDULLARY HIP SCREW RIGHT;  Surgeon: Danny Lynne MD;  [facility];  Service: Orthopedics;  Laterality: Right;    TOTAL HIP ARTHROPLASTY Left     Staph aureus wound infection        Social History     Occupational History    Occupation: retired,    Tobacco Use    Smoking status: Former     Current packs/day: 0.00     Types: Cigarettes     Start date:      Quit date:      Years since quittin.2    Smokeless tobacco: Never    Tobacco comments:     OV says she quit smoking within the past year   Vaping Use    Vaping status: Never Used   Substance and Sexual Activity    Alcohol use: Yes     Alcohol/week: 14.0 standard drinks of alcohol     Types: 14 Glasses of wine per week    Drug use: No    Sexual activity: Defer      Social History     Social History Narrative    With her partner Lianna for 36yrs.. She drinks 1 cup of coffee per day.        Family History   Problem Relation Age of Onset    Stroke Mother     Other Mother         Cardiac disorder    Diabetes Mother     Hypertension Mother     Mental illness Mother     Heart attack Father     Heart failure Father     No Known Problems Sister     Heart attack Maternal Grandfather     Heart disease Paternal Grandfather     No Known Problems Sister          Review of Systems:   Unable to obtain      Physical Exam: 84 y.o.  female  General Appearance:    Alert, cooperative, in no acute distress                   Vitals:    04/04/25 0339 04/04/25 0449 04/04/25 0453 04/04/25 0645   BP: 95/53 92/49 92/45 95/57   BP Location: Right arm Right arm  Right arm   Patient Position: Lying Lying  Lying   Pulse: 55   58   Resp: 18   18   Temp: 97.6 °F (36.4 °C)   98 °F (36.7 °C)   TempSrc: Oral   Oral   SpO2: 96%   96%   Weight:       Height:            Head:    Normocephalic, without obvious abnormality, atraumatic      Right Upper Extremity:  No obvious deformity, painless ROM shoulder, elbow, wrist, no joint instability,   Left Upper Extremity: This is splinted.  This was not removed today as it does appear to be a well-fitting splint.  She is noted to wiggle her fingers.  She is able to move at her shoulder without significant discomfort.    Right Lower Extremity:  No obvious deformity, painless ROM hip, knee, ankle, compartments soft, n  Left Lower Extremity:  No obvious deformity, hip range of motion was deferred due to known fracture.  No outward signs of trauma at the knee foot or ankle.  She is noted to wiggle her toes foot is warm well-perfused distally.         Diagnostic Tests:    I have reviewed the labs, radiology results and diagnostic studies:  Hip x-rays show evidence of a nondisplaced greater trochanter fracture.  No obvious extension distally.  Total hip arthroplasty is in place does not appear to be grossly loose.    3 views of the left wrist are available for review which show a minimally displaced distal radius fracture.  Results from last 7 days   Lab Units 04/03/25  2202   WBC 10*3/mm3 6.77   HEMOGLOBIN g/dL 9.3*   PLATELETS 10*3/mm3 114*     Results from last 7 days   Lab Units 04/03/25  2202   SODIUM mmol/L 130*   POTASSIUM mmol/L 4.5   CO2 mmol/L 26.0   CREATININE mg/dL 0.74   GLUCOSE mg/dL 119*           Assessment:    Hip fracture    84-year-old with left hip periprosthetic greater trochanter fracture and left distal  radius fracture.  Plan to be to get a CT scan for her left periprosthetic hip fracture to make sure no loosening of the implant or other signs which would require surgical intervention.  She will be nonweightbearing on that side for now until we get the CT scan.  Will follow-up with Dr. Hoyt for definitive treatment plan  As for the left distal radius this can be treated nonoperatively.  She can have the wrist splint remain in place until outpatient follow-up for this can be arranged in the next 1 to 2 weeks.    Plan:    Patient can eat today as per Dr. Hoyt's note would not plan on any surgical intervention tonight.  Order CT scan of the left hip.          Chinmay Feliciano MD  04/04/25  10:25 EDT

## 2025-04-04 NOTE — PLAN OF CARE
Goal Outcome Evaluation:  Plan of Care Reviewed With: patient, spouse        Progress: no change  Outcome Evaluation: Received pt from ED. Pt's BP been soft, MD notified, keep an eye for now. Seizure precaustions initiated as spouse stated pt had seizure episode in Nov 2024. No urine output. NS for hydration. NPO md for possible sx. Pt is fall precautions d/t frequent episode of falls. A&O x 1-2.

## 2025-04-04 NOTE — ED NOTES
Jenna Russell    Nursing Report ED to Floor:  Mental status: A&OX3  Ambulatory status: NON-AMB - FEMUR FX  Oxygen Therapy:  2 LPM N/C  Cardiac Rhythm: SB  Admitted from: HOME  Safety Concerns:  FALL RISK  Precautions: NONE  Social Issues: N/A  ED Room #:  16    ED Nurse Phone Extension - [phone] or may call [phone].      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 Normal LVSF.Normal LV diastolic filling. Left atrium midly dilated. RV mild- mod dilated. MIld aortic cusp sclerosis. No evidence of mitral valve prolapse. Mild mitral regurgitation.No pulmonary hypertension or perdicardial effusion. No dilation of aortic root. Venous system appears normal    H/O: stroke     HLD (hyperlipidemia) 9/8/2016    Left temporal lobe infarction     LOM (loss of memory)     Osteoarthritis 9/8/2016    Paroxysmal atrial fibrillation 9/8/2016    Thyroid nodule 9/8/2016    1.8x1.2cm    Weakness generalized     Wound infection after surgery     Left hip arthroplasty, staph aureus, 6 weeks IV Rocephin        Past Surgical History:  Past Surgical History:   Procedure Laterality Date    ABDOMINOPLASTY      BREAST ABSCESS INCISION AND DRAINAGE      HIP TROCHANTERIC NAILING WITH INTRAMEDULLARY HIP SCREW Right 3/11/2022    Procedure: HIP TROCHANTERIC NAILING WITH INTRAMEDULLARY HIP SCREW RIGHT;  Surgeon: Danny Lynne MD;  Location: [facility];  Service: Orthopedics;  Laterality: Right;    TOTAL HIP ARTHROPLASTY Left     Staph aureus wound infection        Admitting Doctor:   No admitting provider for patient  encounter.    Consulting Provider(s):  Consults       No orders found for last 30 day(s).             Admitting Diagnosis:   The primary encounter diagnosis was Periprosthetic hip fracture, initial encounter. Diagnoses of Closed fracture of distal end of left radius, unspecified fracture morphology, initial encounter and Facial laceration, initial encounter were also pertinent to this visit.    Most Recent Vitals:   Vitals:    04/03/25 2345 04/04/25 0000 04/04/25 0015 04/04/25 0030   BP: 105/58 98/53 (!) 89/54 93/52   BP Location:       Patient Position:       Pulse: 50 (!) 49 (!) 49 (!) 49   Resp:       Temp:       TempSrc:       SpO2: 98% 100% 100%    Weight:       Height:           Active LDAs/IV Access:   Lines, Drains & Airways       Active LDAs       Name Placement date Placement time Site Days    Peripheral IV 04/03/25 2204 Anterior;Right Forearm 04/03/25 2204  Forearm  less than 1                    Labs (abnormal labs have a star):   Labs Reviewed   COMPREHENSIVE METABOLIC PANEL - Abnormal; Notable for the following components:       Result Value    Glucose 119 (*)     Sodium 130 (*)     Chloride 94 (*)     Total Protein 5.6 (*)     Albumin 3.4 (*)     All other components within normal limits    Narrative:     GFR Categories in Chronic Kidney Disease (CKD)      GFR Category          GFR (mL/min/1.73)    Interpretation  G1                     90 or greater         Normal or high (1)  G2                      60-89                Mild decrease (1)  G3a                   45-59                Mild to moderate decrease  G3b                   30-44                Moderate to severe decrease  G4                    15-29                Severe decrease  G5                    14 or less           Kidney failure          (1)In the absence of evidence of kidney disease, neither GFR category G1 or G2 fulfill the criteria for CKD.    eGFR calculation 2021 CKD-EPI creatinine equation, which does not include race as a  factor   CBC WITH AUTO DIFFERENTIAL - Abnormal; Notable for the following components:    RBC 3.05 (*)     Hemoglobin 9.3 (*)     Hematocrit 28.6 (*)     Platelets 114 (*)     Lymphocyte % 15.1 (*)     All other components within normal limits   ETHANOL - Normal    Narrative:     Elevated lactic acid concentration and lactate dehydrogenase(LD) activity may falsely elevate enzymatically determined ethanol levels. Not for legal purposes.    VALPROIC ACID LEVEL, TOTAL - Normal    Narrative:     Therapeutic Ranges for Valproic Acid    Epilepsy:       mcg/ml  Bipolar/Ramya  up to 125 mcg/ml     URINE DRUG SCREEN   CBC AND DIFFERENTIAL    Narrative:     The following orders were created for panel order CBC & Differential.  Procedure                               Abnormality         Status                     ---------                               -----------         ------                     CBC Auto Differential[[id number]]        Abnormal            Final result               Scan Slide[[id number]]                                                                    Please view results for these tests on the individual orders.       Meds Given in ED:   Medications   HYDROmorphone (DILAUDID) injection 0.5 mg (0.5 mg Intravenous Given 4/3/25 2228)     No current facility-administered medications for this encounter.       Last NIH score:                                                          Dysphagia screening results:  Patient Factors Component (Dysphagia:Stroke or Rule-out)  Best Eye Response: 4-->(E4) spontaneous (04/03/25 2250)  Best Motor Response: 6-->(M6) obeys commands (04/03/25 2250)  Best Verbal Response: 5-->(V5) oriented (04/03/25 2250)  Jayant Coma Scale Score: 15 (04/03/25 2250)     Jayant Coma Scale:  No data recorded     CIWA:        Restraint Type:            Isolation Status:  No active isolations

## 2025-04-04 NOTE — PROGRESS NOTES
Orthopedic brief note     Asked by Dr. Boudreaux to assume care as her surgery was initially done with Dr. Avina.  Chart reviewed. She has a nondisplaced left hip greater trochanter periprosthetic fracture.     No plans for OR today.  Okay to eat.  Protected weight-bearing on a walker at all times with no active hip abduction.     Formal consult note to follow

## 2025-04-04 NOTE — H&P
[facility] HISTORY AND PHYSICAL  Date: 4/3/2025   Patient Name: Jenna Russell  : 1940  MRN: [id number]  Primary Care Physician:  Judi Rodriguez MD  Date of admission: 4/3/2025    Subjective   Subjective     Chief Complaint: Fall    HPI:    Jenna Russell is a 84 y.o. female past medical history of CVA, hypertension who presented to the emergency department after a fall.  Patient was reportedly walking without her walker and slipped and fell.  She did not lose consciousness.  History was taken from significant other at bedside.  She landed on her left side.  In the emergency department she has noted to have left periprosthetic femur fracture along with left radial fracture.  Orthopedics has been called and patient will be admitted for ongoing monitoring management.  Patient denies any acute complaints.  However, she is currently oriented x 1.  Significant other does endorse confusion at baseline which she states has gotten worse over the past few months.  Patient specifically denied any fevers, chills, sweats, nausea, vomiting, chest pain, shortness of breath, palpitations, abdominal pain diarrhea constipation, dysuria, weakness.      Personal History     Past Medical History:  Past Medical History:   Diagnosis Date    Acute ischemic stroke     Arterial ischemic stroke 2016    Ataxic aphasia 2016    BP (high blood pressure) 2016    Cerebral infarction, left hemisphere 2016    Cyst of left ovary 2016    3cm    Expressive aphasia     H/O echocardiogram 2015    Global LV wall motion and contractility within normal limits. Normal LVSF.Normal LV diastolic filling. Left atrium midly dilated. RV mild- mod dilated. MIld aortic cusp sclerosis. No evidence of mitral valve prolapse. Mild mitral regurgitation.No pulmonary hypertension or perdicardial effusion. No dilation of aortic root. Venous system appears normal    H/O: stroke     HLD (hyperlipidemia) 2016     Left temporal lobe infarction     LOM (loss of memory)     Osteoarthritis 2016    Paroxysmal atrial fibrillation 2016    Thyroid nodule 2016    1.8x1.2cm    Weakness generalized     Wound infection after surgery     Left hip arthroplasty, staph aureus, 6 weeks IV Rocephin         Past Surgical History:  Past Surgical History:   Procedure Laterality Date    ABDOMINOPLASTY      BREAST ABSCESS INCISION AND DRAINAGE      HIP TROCHANTERIC NAILING WITH INTRAMEDULLARY HIP SCREW Right 3/11/2022    Procedure: HIP TROCHANTERIC NAILING WITH INTRAMEDULLARY HIP SCREW RIGHT;  Surgeon: Danny Lynne MD;  Location: [facility];  Service: Orthopedics;  Laterality: Right;    TOTAL HIP ARTHROPLASTY Left     Staph aureus wound infection         Family History:   Family History   Problem Relation Age of Onset    Stroke Mother     Other Mother         Cardiac disorder    Diabetes Mother     Hypertension Mother     Mental illness Mother     Heart attack Father     Heart failure Father     No Known Problems Sister     Heart attack Maternal Grandfather     Heart disease Paternal Grandfather     No Known Problems Sister          Social History:   Social History     Tobacco Use    Smoking status: Former     Current packs/day: 0.00     Types: Cigarettes     Start date:      Quit date:      Years since quittin.2    Smokeless tobacco: Never    Tobacco comments:     OV says she quit smoking within the past year   Vaping Use    Vaping status: Never Used   Substance Use Topics    Alcohol use: Yes     Alcohol/week: 14.0 standard drinks of alcohol     Types: 14 Glasses of wine per week    Drug use: No         Home Medications:  ALPRAZolam, Budesonide, Diclofenac Sodium, Vitamin D, acetaminophen, aspirin, atorvastatin, calcium carbonate EX, divalproex, docusate sodium, folic acid, hyoscyamine, lidocaine, lisinopril, melatonin, metoprolol succinate XL, pantoprazole, and polyethylene glycol    Allergies:  Allergies    Allergen Reactions    Penicillins Anaphylaxis    Pistachio Nut Unknown - High Severity     Daughter reported        Review of Systems   All systems were reviewed and negative except for: Left hip pain    Objective   Objective     Vitals:   Temp:  [98.1 °F (36.7 °C)] 98.1 °F (36.7 °C)  Heart Rate:  [49-60] 50  Resp:  [18] 18  BP: ()/(55-91) 105/58  Flow (L/min) (Oxygen Therapy):  [2] 2    Physical Exam    Constitutional: Awake, alert, no acute distress   Eyes: Pupils equal, sclerae anicteric, no conjunctival injection   HENT: Left supraorbital laceration   Neck: Supple, no thyromegaly, no lymphadenopathy, trachea midline   Respiratory: Clear to auscultation bilaterally, nonlabored respirations    Cardiovascular: RRR, no murmurs, rubs, or gallops, palpable pedal pulses bilaterally   Gastrointestinal: Positive bowel sounds, soft, nontender, nondistended   Musculoskeletal: Left lower extremity limited range of motion due to pain   Psychiatric: Appropriate affect, cooperative   Neurologic: Oriented x 1, strength symmetric in all extremities, Cranial Nerves grossly intact to confrontation, speech clear   Skin: No rashes     Result Review    Result Review:  I have personally reviewed the results from the time of this admission to 4/3/2025 23:59 EDT and agree with these findings:  [x]  Laboratory  []  Microbiology  [x]  Radiology  []  EKG/Telemetry   []  Cardiology/Vascular   []  Pathology  []  Old records  []  Other:      Assessment & Plan   Assessment / Plan     Assessment/Plan:   Left periprosthetic hip fracture: Orthopedics called from ER, appreciate recommendations.  Supportive care and pain control.  Will keep patient n.p.o. for now with gentle hydration.  PT/OT and VTE prophylaxis when okay with orthopedics.  Left radius fracture: Orthopedics called as above.  Will appreciate the recommendations going forward.  Supportive care and pain control.  Hypertension: Add back home as appropriate      VTE  Prophylaxis:  Mechanical VTE prophylaxis orders are signed & held.          CODE STATUS:    Code Status (Patient has no pulse and is not breathing): No CPR (Do Not Attempt to Resuscitate)  Medical Interventions (Patient has pulse or is breathing): Limited Support  Medical Intervention Limits: No intubation (DNI)      Admission Status:  I believe this patient meets inpatient status.    Electronically signed by Rodrick Rodriguez Jr, MD, 04/03/25, 11:59 PM EDT.

## 2025-04-04 NOTE — PROGRESS NOTES
[facility] Medicine Services  PROGRESS NOTE    Patient Name: Jenna Russell  : 1940  MRN: [id number]    Date of Admission: 4/3/2025  Primary Care Physician: Judi Rodriguez MD    Subjective   Subjective     CC:  Hip fracture    HPI:  Has hip pain.  No numbness or tingling.  No chest pain or shortness of breath.  Patient's wife at bedside.  Ortho at bedside.      Objective   Objective     Vital Signs:   Temp:  [97.6 °F (36.4 °C)-98.1 °F (36.7 °C)] 97.6 °F (36.4 °C)  Heart Rate:  [49-60] 55  Resp:  [18] 18  BP: ()/(45-91) 92/45  Flow (L/min) (Oxygen Therapy):  [0-2] 0     Physical Exam:  Constitutional: No acute distress, awake, alert  HENT: NCAT, mucous membranes moist  Respiratory: Clear to auscultation bilaterally, respiratory effort normal   Cardiovascular: RRR  Gastrointestinal: Positive bowel sounds, soft, nontender, nondistended  Musculoskeletal: No bilateral ankle edema; left arm in splint  Psychiatric: Appropriate affect, cooperative  Neurologic: Alert, oriented to self, symmetric facies, speech clear  Skin: No rashes      Results Reviewed:  LAB RESULTS:      Lab 25   WBC 6.77   HEMOGLOBIN 9.3*   HEMATOCRIT 28.6*   PLATELETS 114*   NEUTROS ABS 4.98   IMMATURE GRANS (ABS) 0.03   LYMPHS ABS 1.02   MONOS ABS 0.68   EOS ABS 0.05   MCV 93.8         Lab 25   SODIUM 130*   POTASSIUM 4.5   CHLORIDE 94*   CO2 26.0   ANION GAP 10.0   BUN 14   CREATININE 0.74   EGFR 80.9   GLUCOSE 119*   CALCIUM 8.8         Lab 25   TOTAL PROTEIN 5.6*   ALBUMIN 3.4*   GLOBULIN 2.2   ALT (SGPT) 5   AST (SGOT) 16   BILIRUBIN 0.2   ALK PHOS 57                     Brief Urine Lab Results  (Last result in the past 365 days)        Color   Clarity   Blood   Leuk Est   Nitrite   Protein   CREAT   Urine HCG        25 Yellow   Clear   Moderate (2+)   Negative   Negative   Negative                   Microbiology Results Abnormal       None            XR  Wrist 3+ View Left  Result Date: 4/3/2025  XR WRIST 3+ VW LEFT Date of Exam: 4/3/2025 8:49 PM EDT Indication: fall/pain Comparison: None available. Findings: Carpal alignment is intact. Mild scattered degenerative changes. There is likely nondisplaced fracture of the distal radius best seen on AP view.     Impression: Impression: Likely nondisplaced fracture of the distal radius best seen on AP view. Recommend correlation with point tenderness. Electronically Signed: Flex Vela MD  4/3/2025 9:39 PM EDT  Workstation ID: [id number]    XR Chest 1 View  Result Date: 4/3/2025  XR CHEST 1 VW Date of Exam: 4/3/2025 8:50 PM EDT Indication: fall/confusion Comparison: 2/20/2025 FINDINGS: No definite focal or diffuse pulmonary infiltrate is identified.  No pneumothorax or significant pleural effusion. Heart size appears unchanged. Occlusion device is present, likely in the left atrial appendage, as before. Calcific atherosclerosis of the aorta. Advanced arthropathy at the glenohumeral joints.     Impression: No radiographic findings of acute cardiopulmonary abnormality. Electronically Signed: Guerrero Luis  4/3/2025 9:37 PM EDT  Workstation ID: [id number]    XR Hip With or Without Pelvis 2 - 3 View Left  Result Date: 4/3/2025  XR HIP W OR WO PELVIS 2-3 VIEW LEFT Date of Exam: 4/3/2025 8:50 PM EDT Indication: fall/pain Comparison: Right femur radiograph 9/4/2024. CT pelvis 9/4/2024. Findings: Left hip arthroplasty. Cortical defects at the lateral aspect of the left proximal femur suspicious for an acute nondisplaced fracture. Partially imaged right proximal femoral fixation hardware. Degenerative changes at the right hip, bilateral sacroiliac joints, and pubic symphysis. Lower lumbar degenerative changes, incompletely imaged and evaluated. Vascular calcifications.     Impression: Impression: Cortical defects at the lateral aspect of the left proximal femur suspicious for an acute nondisplaced periprosthetic fracture.  Electronically Signed: Fransisco Brooks  4/3/2025 9:36 PM EDT  Workstation ID: [id number]    CT Head Without Contrast  Result Date: 4/3/2025  CT HEAD WO CONTRAST Date of Exam: 4/3/2025 8:46 PM EDT Indication: fall/head injury. Comparison: 11/23/2024 Technique: Axial CT images were obtained of the head without contrast administration.  Automated exposure control and iterative construction methods were used. Findings: No intracranial hemorrhage. Remote left temporoparietal lobe infarct. Gray-white matter differentiation is maintained without evidence of an acute infarction. Multiple foci of decreased attenuation are present within the subcortical, deep cerebral, and periventricular white matter consistent with chronic small vessel/microangiopathic ischemic changes. No extra-axial mass or collection. The ventricles and sulci are prominent commensurate with involutional changes. The posterior fossa appears grossly normal. Sellar and suprasellar structures are normal. Bilateral lens replacements. The paranasal sinuses, ethmoid air cells, and mastoid air cells are aerated. The bony calvarium is intact.     Impression: Impression: No acute intracranial pathology. Electronically Signed: Freddy Santos MD  4/3/2025 9:20 PM EDT  Workstation ID: [id number]      Results for orders placed during the hospital encounter of 11/23/24    Adult Transthoracic Echo Complete W/ Cont if Necessary Per Protocol (With Agitated Saline)    Interpretation Summary    Left ventricular ejection fraction appears to be 56 - 60%.    Left ventricular diastolic function was normal.    Left atrial volume is mildly increased.    Moderate aortic valve regurgitation is present.    Estimated right ventricular systolic pressure from tricuspid regurgitation is mildly elevated (35-45 mmHg). Calculated right ventricular systolic pressure from tricuspid regurgitation is 36 mmHg.      Current medications:  Scheduled Meds:[Held by provider] aspirin, 81 mg, Oral, Every  Other Day  atorvastatin, 80 mg, Oral, Nightly  Budesonide, 6 mg, Oral, Daily  divalproex, 500 mg, Oral, Q8H  [Held by provider] lisinopril, 10 mg, Oral, Daily  [Held by provider] metoprolol succinate XL, 25 mg, Oral, Daily  pantoprazole, 40 mg, Oral, Daily  sodium chloride, 10 mL, Intravenous, Q12H      Continuous Infusions:lactated ringers, 50 mL/hr, Last Rate: 50 mL/hr (04/04/25 0232)      PRN Meds:.  acetaminophen **OR** acetaminophen **OR** acetaminophen    [Held by provider] ALPRAZolam    senna-docusate sodium **AND** polyethylene glycol **AND** bisacodyl **AND** bisacodyl    Calcium Replacement - Follow Nurse / BPA Driven Protocol    Magnesium Standard Dose Replacement - Follow Nurse / BPA Driven Protocol    Morphine **AND** naloxone    nitroglycerin    ondansetron    oxyCODONE-acetaminophen    Phosphorus Replacement - Follow Nurse / BPA Driven Protocol    Potassium Replacement - Follow Nurse / BPA Driven Protocol    sodium chloride    sodium chloride    Assessment & Plan   Assessment & Plan     Active Hospital Problems    Diagnosis  POA    **Hip fracture [S72.009A]  Yes      Resolved Hospital Problems   No resolved problems to display.        Brief Hospital Course to date:  Jenna Russell is a 84 y.o. female with history of prior CVA with associated aphasia, HTN, HLD, prior A-fib, presented after a fall found to have a left periprosthetic hip fracture.  ER contacted Dr. Boudreaux from orthopedics.    Left periprosthetic hip fracture  -Orthopedics consulted  -[facility] for D[place] prophylaxis until cleared by Ortho for additional  -As needed pain control  -CT pelvis per Ortho  -No surgery planned today; okay to eat; protected weight-bearing on a walker at all times with no active hip abduction    Left radius fracture-orthopedic consult, splint for now    Hypotension  -Hold antihypertensives  -IVF    HLD-continue atorvastatin  HTN-hold metoprolol, lisinopril, spironolactone; see above  Seizure disorder-continue  Depakote  Memory impairment-previously on donepezil  GERD-PPI  Documented history of prior A-fib-not on anticoagulation    Pharmacy consult for med rec    Pt's wife updated bedside this AM w ortho    Expected Discharge Location and Transportation: likely rehab  Expected Discharge   Expected discharge date/ time has not been documented.     VTE Prophylaxis:  Mechanical VTE prophylaxis orders are present.         AM-PAC 6 Clicks Score (PT): 6 (04/04/25 2839)    CODE STATUS:   Code Status and Medical Interventions: No CPR (Do Not Attempt to Resuscitate); Limited Support; No intubation (DNI)   Ordered at: 04/03/25 7128     Code Status (Patient has no pulse and is not breathing):    No CPR (Do Not Attempt to Resuscitate)     Medical Interventions (Patient has pulse or is breathing):    Limited Support     Medical Intervention Limits:    No intubation (DNI)       Clara Bacon MD  04/04/25

## 2025-04-04 NOTE — PROGRESS NOTES
Orthopedic Note    CT reviewed. Greater trochanter periprosthetic fracture with some extension into less trochanter. Stem still appears well fixed. Recommend protected weight bearing on a walker and follow up in 2 weeks.

## 2025-04-04 NOTE — PLAN OF CARE
Patient alert to self and family.  Wife at bedside helps to reorient.  LUE in splint.  LLE painful and treated with prn medication.  IV fluids given with a bolus.  H&H dropped.  MD notified and orders given for 1unit PRBC.  Not ready for infusion at the time of this note.  BP soft.  NPO after MN except meds.  I&O cath required.  UA sent and EKG completed.  Anticipated surgical intervention on 4/5/25.

## 2025-04-04 NOTE — PROGRESS NOTES
Malnutrition Severity Assessment    Patient Name:  Jenna Russell  YOB: 1940  MRN: [id number]  Admit Date:  4/3/2025    Patient meets criteria for : Severe Malnutrition    Comments:  Pt meets criteria for severe, chronic malnutrition with the indicators of severe muscle wasting and severe subcutaneous fat loss. Of note, pt with ongoing malabsorption as well as general decline in status.    Malnutrition Severity Assessment  Malnutrition Type: Chronic Disease - Related Malnutrition  Malnutrition Type (Last 8 Hours)       Malnutrition Severity Assessment       Row Name 04/04/25 1039       Malnutrition Severity Assessment    Malnutrition Type Chronic Disease - Related Malnutrition      Row Name 04/04/25 1039       Muscle Loss    Loss of Muscle Mass Findings Severe    [place] Region Moderate - slight depression    Clavicle Bone Region Severe - protruding prominent bone    Acromion Bone Region Severe - squared shoulders, bones, and acromion process protrusion prominent    Dorsal Hand Region Severe - prominent depression    Patellar Region Severe - prominent bone, square looking, very little muscle definition      Row Name 04/04/25 1039       Fat Loss    Subcutaneous Fat Loss Findings Severe    Orbital Region  Severe - pronounced hollowness/depression, dark circles, loose saggy skin    Upper Arm Region Severe - mostly skin, very little space between folds, fingers touch      Row Name 04/04/25 1039       Criteria Met (Must meet criteria for severity in at least 2 of these categories: M Wasting, Fat Loss, Fluid, Secondary Signs, Wt. Status, Intake)    Patient meets criteria for  Severe Malnutrition                    Electronically signed by:  Clover Gonzalez, MS,RD,LD  04/04/25 10:47 EDT

## 2025-04-04 NOTE — CASE MANAGEMENT/SOCIAL WORK
Discharge Planning Assessment  [facility]     Patient Name: Jenna Russell  MRN: [id number]  Today's Date: 4/4/2025    Admit Date: 4/3/2025    Plan: rehab vs home   Discharge Needs Assessment       Row Name 04/04/25 1422       Living Environment    People in Home spouse    Current Living Arrangements home    Potentially Unsafe Housing Conditions none    In the past 12 months has the electric, gas, oil, or water company threatened to shut off services in your home? No    Primary Care Provided by spouse/significant other;homecare agency    Provides Primary Care For no one, unable/limited ability to care for self    Family Caregiver if Needed spouse    Quality of Family Relationships helpful;involved;supportive    Able to Return to Prior Arrangements yes       Resource/Environmental Concerns    Resource/Environmental Concerns none       Transportation Needs    In the past 12 months, has lack of transportation kept you from medical appointments or from getting medications? no    In the past 12 months, has lack of transportation kept you from meetings, work, or from getting things needed for daily living? No       Food Insecurity    Within the past 12 months, you worried that your food would run out before you got the money to buy more. Never true    Within the past 12 months, the food you bought just didn't last and you didn't have money to get more. Never true       Transition Planning    Patient/Family Anticipates Transition to home with family;inpatient rehabilitation facility    Patient/Family Anticipated Services at Transition     Transportation Anticipated family or friend will provide;health plan transportation       Discharge Needs Assessment    Readmission Within the Last 30 Days no previous admission in last 30 days    Equipment Currently Used at Home rollator;bath bench;commode;cpap;grab bar    Concerns to be Addressed discharge planning                   Discharge Plan       Row Name 04/04/25  1422       Plan    Plan rehab vs home    Patient/Family in Agreement with Plan yes    Plan Comments Met with patient and her family at the bedside. Patient lives with her wife in a condo in [place]. At baseline, patient is dependent with ADLs and uses a rollator to assist with mobilty. Patient also has a commode, shower bench, grab bars, and a CPAP at home. Patient's wife denies any active home health services or home oxygen use. Patient has private caregivers five days a week. Patient has [employer] Medicare with prescription benefits and prefers to fill scripts at the [facility] in [place]. Patient's plan will be determined after possible surgical intervention. CM will continue to follow.    Final Discharge Disposition Code 01 - home or self-care                       Demographic Summary       Row Name 04/04/25 1421       General Information    Admission Type inpatient    Arrived From home    Referral Source physician    Reason for Consult discharge planning    Preferred Language English    General Information Comments PCP Judi Rodriguez       Contact Information    Permission Granted to Share Info With family/designee    Contact Information Comments Lianna Martinez (Spouse)  [phone] (Mobile)                   Functional Status       Row Name 04/04/25 1421       Functional Status    Usual Activity Tolerance moderate    Current Activity Tolerance fair       Functional Status, IADL    Medications completely dependent    Meal Preparation completely dependent    Housekeeping completely dependent    Laundry completely dependent    Shopping completely dependent       Mental Status    General Appearance WDL WDL       Mental Status Summary    Recent Changes in Mental Status/Cognitive Functioning unable to assess       Employment/    Employment Status retired                   Psychosocial    No documentation.                  Abuse/Neglect    No documentation.                  Legal    No documentation.                   Substance Abuse    No documentation.                  Patient Forms    No documentation.                     Rashaad Jameson RN

## 2025-04-04 NOTE — PROGRESS NOTES
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    PSH:  She  has a past surgical history that includes Total hip arthroplasty (Left); Abdominoplasty; Breast Abscess Incision and Drainage; and Hip Trochanteric Nailing (Right, 3/11/2022).    Applicable Nutrition History:   [place]    Malnutrition Hx:  ASM (11/25/24)    Anthropometrics     Height: Height: 170.2 cm (67\")  Last Filed Weight: Weight: 52 kg (114 lb 10.2 oz) (04/04/25 0044)  Method: Weight Method: Stated  BMI: BMI (Calculated): 18    UBW:     Weight      Weight (kg) Weight (lbs) Weight Method   9/4/2024 53.1 kg  117 lb 1 oz     11/23/2024 53.978 kg  119 lb  Bed scale    11/24/2024 54 kg  119 lb 0.8 oz     2/20/2025 52.617 kg  116 lb  Stated    4/3/2025 52 kg  114 lb 10.2 oz  Stated    4/4/2025 52 kg  114 lb 10.2 oz       Weight change: No significant changes    Nutrition Focused Physical Exam    Date:  4/4       Patient meets criteria for malnutrition diagnosis, see MSA note.     Subjective   Reported/Observed/Food/Nutrition Related History:     Pt laying in bed at time of visit, spouse present in room at time of visit. Pt aroused to voice " however current confusion reported. Primary nutrition hx provided by spouse. Reports pt having a healthy appetite however 2/2 not taking medication for microscopic colitis has diarrhea following intake. Suspect malabsorption. No concern for dysphagia. Tried ONS on prior admission, disliked; agreeable to trial alternative supplements. Known food allergy listed in chart.     Current Nutrition Prescription   PO: NPO Diet NPO Type: Sips with Meds  Oral Nutrition Supplement: N/A  Intake:  NPO    Assessment & Plan   Nutrition Diagnosis   Date:  4/4 Updated:  Problem Malnutrition, chronic severe   Etiology ? malabsorption   Signs/Symptoms severe muscle wasting and severe subcutaneous fat loss   Status: New    Date:  4/4            Updated:    Problem Inadequate oral intake    Etiology Clinical status   Signs/Symptoms NPO   Status: New    Goal:   Nutrition to support treatment and Advance diet as medically feasible/appropriate      Nutrition Intervention      Follow treatment progress, Care plan reviewed, Await begin PO    Advance diet as clinically indicated/tolerated  Will provide Magic Cup at L/D & fortified yogurt at Breakfast    Monitoring/Evaluation:   Per protocol, I&O, Pertinent labs, Symptoms, POC/GOC    Clover Gonzalez MS,RD,LD  Time Spent: 25min

## 2025-04-04 NOTE — ED PROVIDER NOTES
Subjective   History of Present Illness  84-year-old female who presents for evaluation of left hip pain, left wrist pain, and facial laceration after a fall.  The patient was attempting to walk through her house without her walker when she fell striking her left side.  She has left hip pain and left wrist pain and a small laceration over the left lateral eyebrow.  Bleeding of the laceration has been controlled.  The patient has baseline dementia and mentation is currently consistent with baseline.  No recent fever or infectious symptoms.  No chest pain or abdominal pain.  No neck or midline back pain.  No other acute complaints.  She does not take anticoagulation.      Review of Systems   Constitutional:  Negative for chills, fatigue and fever.   HENT:  Negative for congestion, ear pain, postnasal drip, sinus pressure and sore throat.    Eyes:  Negative for pain, redness and visual disturbance.   Respiratory:  Negative for cough, chest tightness and shortness of breath.    Cardiovascular:  Negative for chest pain, palpitations and leg swelling.   Gastrointestinal:  Negative for abdominal pain, anal bleeding, blood in stool, diarrhea, nausea and vomiting.   Endocrine: Negative for polydipsia and polyuria.   Genitourinary:  Negative for difficulty urinating, dysuria, frequency and urgency.   Musculoskeletal:  Positive for arthralgias. Negative for back pain and neck pain.   Skin:  Positive for wound. Negative for pallor and rash.   Allergic/Immunologic: Negative for environmental allergies and immunocompromised state.   Neurological:  Positive for headaches. Negative for dizziness and weakness.   Hematological:  Negative for adenopathy.   Psychiatric/Behavioral:  Negative for confusion, self-injury and suicidal ideas. The patient is not nervous/anxious.    All other systems reviewed and are negative.      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 Normal LVSF.Normal LV diastolic filling. Left atrium midly dilated. RV mild- mod dilated. MIld aortic cusp sclerosis. No evidence of mitral valve prolapse. Mild mitral regurgitation.No pulmonary hypertension or perdicardial effusion. No dilation of aortic root. Venous system appears normal    H/O: stroke     HLD (hyperlipidemia) 9/8/2016    Left temporal lobe infarction     LOM (loss of memory)     Osteoarthritis 9/8/2016    Paroxysmal atrial fibrillation 9/8/2016    Thyroid nodule 9/8/2016    1.8x1.2cm    Weakness generalized     Wound infection after surgery     Left hip arthroplasty, staph aureus, 6 weeks IV Rocephin       Allergies   Allergen Reactions    Penicillins Anaphylaxis    P[place]achio Nut Unknown - High Severity     Daughter reported        Past Surgical History:   Procedure Laterality Date    ABDOMINOPLASTY      BREAST ABSCESS INCISION AND DRAINAGE      HIP TROCHANTERIC NAILING WITH INTRAMEDULLARY HIP SCREW Right 3/11/2022    Procedure: HIP TROCHANTERIC NAILING WITH INTRAMEDULLARY HIP SCREW RIGHT;  Surgeon: Danny Lynne MD;  Location: [facility];  Service: Orthopedics;  Laterality: Right;    TOTAL HIP ARTHROPLASTY Left     Staph aureus wound infection       Family History   Problem Relation Age of Onset    Stroke Mother     Other Mother         Cardiac disorder    Diabetes Mother     Hypertension Mother     Mental illness Mother     Heart attack Father     Heart failure Father     No Known Problems Sister     Heart attack Maternal Grandfather     Heart disease Paternal Grandfather     No Known Problems Sister        Social History     Socioeconomic History    Marital status:     Number of children: 3   Tobacco Use    Smoking status:  Former     Current packs/day: 0.00     Types: Cigarettes     Start date:      Quit date:      Years since quittin.2    Smokeless tobacco: Never    Tobacco comments:     OV says she quit smoking within the past year   Vaping Use    Vaping status: Never Used   Substance and Sexual Activity    Alcohol use: Yes     Alcohol/week: 14.0 standard drinks of alcohol     Types: 14 Glasses of wine per week    Drug use: No    Sexual activity: Defer           Objective   Physical Exam  Vitals and nursing note reviewed.   Constitutional:       General: She is not in acute distress.     Appearance: Normal appearance. She is well-developed. She is not toxic-appearing or diaphoretic.   HENT:      Head: Normocephalic and atraumatic.      Comments: Less than half of a centimeter laceration to the left lateral eyebrow.    Bleeding controlled with pressure only.     Right Ear: External ear normal.      Left Ear: External ear normal.      Nose: Nose normal.   Eyes:      General: Lids are normal.      Pupils: Pupils are equal, round, and reactive to light.   Neck:      Trachea: No tracheal deviation.   Cardiovascular:      Rate and Rhythm: Normal rate and regular rhythm.      Pulses: No decreased pulses.      Heart sounds: Normal heart sounds. No murmur heard.     No friction rub. No gallop.   Pulmonary:      Effort: Pulmonary effort is normal. No respiratory distress.      Breath sounds: Normal breath sounds. No decreased breath sounds, wheezing, rhonchi or rales.   Abdominal:      General: Bowel sounds are normal.      Palpations: Abdomen is soft.      Tenderness: There is no abdominal tenderness. There is no guarding or rebound.   Musculoskeletal:         General: No deformity. Normal range of motion.      Left wrist: Swelling, tenderness and bony tenderness present.      Cervical back: Normal range of motion and neck supple.      Left hip: Deformity, tenderness and bony tenderness present.      Comments: Shortening and  external rotation of the left hip/proximal femur.   Lymphadenopathy:      Cervical: No cervical adenopathy.   Skin:     General: Skin is warm and dry.      Findings: No rash.   Neurological:      Mental Status: She is alert and oriented to person, place, and time.      Cranial Nerves: No cranial nerve deficit.      Sensory: No sensory deficit.   Psychiatric:         Speech: Speech normal.         Behavior: Behavior normal.         Thought Content: Thought content normal.         Judgment: Judgment normal.         Procedures           ED Course  ED Course as of 04/03/25 2305   Thu Apr 03, 2025   6000 The patient suffered a mechanical fall while walking through her living room without the assistance of her walker which she is supposed to use.  She fell on her left side.  She has a left periprosthetic hip fracture, left impacted nondisplaced radius fracture, and had a laceration to the left eyebrow.  The eyebrow laceration did not require any repair.  CT scan of the head is negative.  Splint will be applied to the left wrist.  Dr. Boudreaux of orthopedic surgery is aware of the left hip and is agreeable with admission and will consult inpatient.  Her pain is well-controlled right now and she is hemodynamically stable.  She does not take anticoagulation. [NS]      ED Course User Index  [NS] La Maya MD                                                       Medical Decision Making  Differential includes hip fracture, wrist fracture, intracranial hemorrhage, facial laceration, other unspecified etiology.    X-ray of the left wrist independently Dartt myself shows mildly impacted left distal radius fracture.    X-ray of the left hip independently Dartt myself shows periprosthetic left proximal femur fracture.    CT scan of the head without contrast independently interpreted by myself shows no acute intracranial hemorrhage.    Chest x-ray independently interpreted unsell shows normal heart size, chronic lung  changes, no acute disease.    Labs show no significant abnormalities.    I discussed the patient with the orthopedic surgeon, Dr. Boudreaux, who will consult on the patient in the hospital.    I discussed the patient with the hospitalist who will consult for admission.    Problems Addressed:  Closed fracture of distal end of left radius, unspecified fracture morphology, initial encounter: complicated acute illness or injury with systemic symptoms  Facial laceration, initial encounter: complicated acute illness or injury with systemic symptoms  Periprosthetic hip fracture, initial encounter: complicated acute illness or injury with systemic symptoms that poses a threat to life or bodily functions    Amount and/or Complexity of Data Reviewed  Independent Historian: spouse     Details: Wife provides additional history.  External Data Reviewed: labs, radiology and ECG.  Labs: ordered. Decision-making details documented in ED Course.  Radiology: ordered and independent interpretation performed. Decision-making details documented in ED Course.    Risk  Prescription drug management.  Decision regarding hospitalization.        Final diagnoses:   Periprosthetic hip fracture, initial encounter   Closed fracture of distal end of left radius, unspecified fracture morphology, initial encounter   Facial laceration, initial encounter       ED Disposition  ED Disposition       ED Disposition   Decision to Admit    Condition   --    Comment   --               No follow-up provider specified.       Medication List      No changes were made to your prescriptions during this visit.            La Maya MD  04/03/25 1623

## 2025-04-05 LAB
ANION GAP SERPL CALCULATED.3IONS-SCNC: 9 MMOL/L (ref 5–15)
BACTERIA UR QL AUTO: ABNORMAL /HPF
BH BB BLOOD EXPIRATION DATE: NORMAL
BH BB BLOOD TYPE BARCODE: 5100
BH BB DISPENSE STATUS: NORMAL
BH BB PRODUCT CODE: NORMAL
BH BB UNIT NUMBER: NORMAL
BILIRUB UR QL STRIP: NEGATIVE
BUN SERPL-MCNC: 7 MG/DL (ref 8–23)
BUN/CREAT SERPL: 12.3 (ref 7–25)
CALCIUM SPEC-SCNC: 8.9 MG/DL (ref 8.6–10.5)
CHLORIDE SERPL-SCNC: 97 MMOL/L (ref 98–107)
CLARITY UR: CLEAR
CO2 SERPL-SCNC: 26 MMOL/L (ref 22–29)
COLOR UR: YELLOW
CREAT SERPL-MCNC: 0.57 MG/DL (ref 0.57–1)
CROSSMATCH INTERPRETATION: NORMAL
DEPRECATED RDW RBC AUTO: 44.7 FL (ref 37–54)
EGFRCR SERPLBLD CKD-EPI 2021: 90.9 ML/MIN/1.73
ERYTHROCYTE [DISTWIDTH] IN BLOOD BY AUTOMATED COUNT: 13.3 % (ref 12.3–15.4)
GLUCOSE SERPL-MCNC: 107 MG/DL (ref 65–99)
GLUCOSE UR STRIP-MCNC: ABNORMAL MG/DL
HCT VFR BLD AUTO: 27.1 % (ref 34–46.6)
HGB BLD-MCNC: 9.1 G/DL (ref 12–15.9)
HGB UR QL STRIP.AUTO: ABNORMAL
HYALINE CASTS UR QL AUTO: ABNORMAL /LPF
KETONES UR QL STRIP: ABNORMAL
LEUKOCYTE ESTERASE UR QL STRIP.AUTO: ABNORMAL
MCH RBC QN AUTO: 30.5 PG (ref 26.6–33)
MCHC RBC AUTO-ENTMCNC: 33.6 G/DL (ref 31.5–35.7)
MCV RBC AUTO: 90.9 FL (ref 79–97)
NITRITE UR QL STRIP: NEGATIVE
PH UR STRIP.AUTO: 6 [PH] (ref 5–8)
PLATELET # BLD AUTO: 92 10*3/MM3 (ref 140–450)
PMV BLD AUTO: 9.5 FL (ref 6–12)
POTASSIUM SERPL-SCNC: 3.8 MMOL/L (ref 3.5–5.2)
PROT UR QL STRIP: NEGATIVE
RBC # BLD AUTO: 2.98 10*6/MM3 (ref 3.77–5.28)
RBC # UR STRIP: ABNORMAL /HPF
REF LAB TEST METHOD: ABNORMAL
SODIUM SERPL-SCNC: 132 MMOL/L (ref 136–145)
SP GR UR STRIP: 1.01 (ref 1–1.03)
SQUAMOUS #/AREA URNS HPF: ABNORMAL /HPF
UNIT  ABO: NORMAL
UNIT  RH: NORMAL
UROBILINOGEN UR QL STRIP: ABNORMAL
WBC # UR STRIP: ABNORMAL /HPF
WBC NRBC COR # BLD AUTO: 6.12 10*3/MM3 (ref 3.4–10.8)

## 2025-04-05 PROCEDURE — 97162 PT EVAL MOD COMPLEX 30 MIN: CPT

## 2025-04-05 PROCEDURE — 97166 OT EVAL MOD COMPLEX 45 MIN: CPT

## 2025-04-05 PROCEDURE — 97530 THERAPEUTIC ACTIVITIES: CPT

## 2025-04-05 PROCEDURE — 97535 SELF CARE MNGMENT TRAINING: CPT

## 2025-04-05 PROCEDURE — 85027 COMPLETE CBC AUTOMATED: CPT | Performed by: STUDENT IN AN ORGANIZED HEALTH CARE EDUCATION/TRAINING PROGRAM

## 2025-04-05 PROCEDURE — 80048 BASIC METABOLIC PNL TOTAL CA: CPT | Performed by: STUDENT IN AN ORGANIZED HEALTH CARE EDUCATION/TRAINING PROGRAM

## 2025-04-05 PROCEDURE — 99232 SBSQ HOSP IP/OBS MODERATE 35: CPT | Performed by: STUDENT IN AN ORGANIZED HEALTH CARE EDUCATION/TRAINING PROGRAM

## 2025-04-05 PROCEDURE — 81001 URINALYSIS AUTO W/SCOPE: CPT | Performed by: PHYSICIAN ASSISTANT

## 2025-04-05 RX ORDER — LORAZEPAM 0.5 MG/1
0.5 TABLET ORAL ONCE
Status: COMPLETED | OUTPATIENT
Start: 2025-04-05 | End: 2025-04-05

## 2025-04-05 RX ORDER — TAMSULOSIN HYDROCHLORIDE 0.4 MG/1
0.4 CAPSULE ORAL DAILY
Status: DISCONTINUED | OUTPATIENT
Start: 2025-04-05 | End: 2025-04-10 | Stop reason: HOSPADM

## 2025-04-05 RX ORDER — ASPIRIN 81 MG/1
81 TABLET ORAL 2 TIMES DAILY
Status: DISCONTINUED | OUTPATIENT
Start: 2025-04-05 | End: 2025-04-10 | Stop reason: HOSPADM

## 2025-04-05 RX ADMIN — Medication 10 ML: at 21:35

## 2025-04-05 RX ADMIN — LORAZEPAM 0.5 MG: 0.5 TABLET ORAL at 21:35

## 2025-04-05 RX ADMIN — BISACODYL 5 MG: 5 TABLET, COATED ORAL at 09:00

## 2025-04-05 RX ADMIN — DIVALPROEX SODIUM 500 MG: 250 TABLET, DELAYED RELEASE ORAL at 14:29

## 2025-04-05 RX ADMIN — ACETAMINOPHEN 650 MG: 325 TABLET, FILM COATED ORAL at 09:00

## 2025-04-05 RX ADMIN — ACETAMINOPHEN 650 MG: 325 TABLET, FILM COATED ORAL at 14:29

## 2025-04-05 RX ADMIN — ATORVASTATIN CALCIUM 40 MG: 40 TABLET, FILM COATED ORAL at 21:35

## 2025-04-05 RX ADMIN — BUDESONIDE 3 MG: 3 CAPSULE, COATED PELLETS ORAL at 09:00

## 2025-04-05 RX ADMIN — Medication 10 ML: at 09:01

## 2025-04-05 RX ADMIN — DIVALPROEX SODIUM 500 MG: 250 TABLET, DELAYED RELEASE ORAL at 21:35

## 2025-04-05 RX ADMIN — DIVALPROEX SODIUM 500 MG: 250 TABLET, DELAYED RELEASE ORAL at 05:09

## 2025-04-05 RX ADMIN — ASPIRIN 81 MG: 81 TABLET, COATED ORAL at 21:35

## 2025-04-05 RX ADMIN — TAMSULOSIN HYDROCHLORIDE 0.4 MG: 0.4 CAPSULE ORAL at 09:00

## 2025-04-05 RX ADMIN — PANTOPRAZOLE SODIUM 40 MG: 40 TABLET, DELAYED RELEASE ORAL at 09:01

## 2025-04-05 NOTE — NURSING NOTE
Pt does not void overnight, last urine output was 700 at 1440 on 4/4/25 via straight cath.    Pt straight cath again this am, output was 650 at 0610 on 4/5/25.    Pt mighty benefit UA and burns cath placement.

## 2025-04-05 NOTE — PLAN OF CARE
Goal Outcome Evaluation:  Plan of Care Reviewed With: patient, spouse, family        Progress: no change (Initial Eval)  Outcome Evaluation: Patient presenting below her functional baseline w/ mobility, transfers and balance requiring increased assist for ADLs. Pt requiring assist for bed mobility, transfers and LB dressing. Deficits warrant skilled OT services. Recommend SNF when medically appropriate for d/c.    Anticipated Discharge Disposition (OT): skilled nursing facility

## 2025-04-05 NOTE — THERAPY EVALUATION
Patient Name: Jenna Russell  : 1940    MRN: [id number]                              Today's Date: 2025       Admit Date: 4/3/2025    Visit Dx:     ICD-10-CM ICD-9-CM   1. Periprosthetic hip fracture, initial encounter  M97.8XXA 996.44    Z96.649 V43.64   2. Closed fracture of distal end of left radius, unspecified fracture morphology, initial encounter  S52.502A 813.42   3. Facial laceration, initial encounter  S01.81XA 873.40     Patient Active Problem List   Diagnosis    PAF (paroxysmal atrial fibrillation)    HLD (hyperlipidemia)    Hypertension    History of CVA (cerebrovascular accident)    Combined receptive and expressive aphasia    Colitis, Clostridium difficile    Collagenous colitis    Seizure disorder    Closed right hip fracture, initial encounter    Anemia    Daily consumption of alcohol    Complex partial seizure    Right sided weakness    Severe protein-calorie malnutrition    Hip fracture     Past Medical History:   Diagnosis Date    Acute ischemic stroke     Arterial ischemic stroke 2016    Ataxic aphasia 2016    BP (high blood pressure) 2016    Cerebral infarction, left hemisphere 2016    Cyst of left ovary 2016    3cm    Expressive aphasia     H/O echocardiogram 2015    Global LV wall motion and contractility within normal limits. Normal LVSF.Normal LV diastolic filling. Left atrium midly dilated. RV mild- mod dilated. MIld aortic cusp sclerosis. No evidence of mitral valve prolapse. Mild mitral regurgitation.No pulmonary hypertension or perdicardial effusion. No dilation of aortic root. Venous system appears normal    H/O: stroke     HLD (hyperlipidemia) 2016    Left temporal lobe infarction     LOM (loss of memory)     Osteoarthritis 2016    Paroxysmal atrial fibrillation 2016    Thyroid nodule 2016    1.8x1.2cm    Weakness generalized     Wound infection after surgery     Left hip arthroplasty, staph aureus, 6 weeks IV Rocephin     Past  Surgical History:   Procedure Laterality Date    ABDOMINOPLASTY      BREAST ABSCESS INCISION AND DRAINAGE      HIP TROCHANTERIC NAILING WITH INTRAMEDULLARY HIP SCREW Right 3/11/2022    Procedure: HIP TROCHANTERIC NAILING WITH INTRAMEDULLARY HIP SCREW RIGHT;  Surgeon: Danny Lynne MD;  Location: [facility];  Service: Orthopedics;  Laterality: Right;    TOTAL HIP ARTHROPLASTY Left     Staph aureus wound infection      General Information       Row Name 04/05/25 1012          Physical Therapy Time and Intention    Document Type evaluation  -     Mode of Treatment physical therapy  -       Row Name 04/05/25 1012          General Information    Patient Profile Reviewed yes  -AC     Prior Level of Function independent:;gait;transfer;bed mobility  Pt has significant dementia at baseline. Per wife pt has had several close falls due to forgetting to use walker at home  -     Existing Precautions/Restrictions fall;left;other (see comments)  Protected weight bearing AAT, L distal radius fx, no active L hip abduction  -     Barriers to Rehab medically complex;previous functional deficit;cognitive status  -       Row Name 04/05/25 1012          Living Environment    Current Living Arrangements home  -     People in Home spouse  -       Row Name 04/05/25 1012          Home Main Entrance    Number of Stairs, Main Entrance other (see comments)  elevator  -       Row Name 04/05/25 1012          Stairs Within Home, Primary    Number of Stairs, Within Home, Primary none  -       Row Name 04/05/25 1012          Cognition    Orientation Status (Cognition) oriented to;person;disoriented to;place;situation;time  -       Row Name 04/05/25 1012          Safety Issues/Impairments Affecting Functional Mobility    Safety Issues Affecting Function (Mobility) ability to follow commands;awareness of need for assistance;insight into deficits/self-awareness;judgment;problem-solving;safety precaution awareness;safety  precautions follow-through/compliance;sequencing abilities  -     Impairments Affecting Function (Mobility) balance;cognition;endurance/activity tolerance;strength;pain  -     Cognitive Impairments, Mobility Safety/Performance attention;awareness, need for assistance;insight into deficits/self-awareness;judgment;problem-solving/reasoning;safety precaution awareness;safety precaution follow-through;sequencing abilities  -AC     Comment, Safety Issues/Impairments (Mobility) fear of mobility, anxiety with movement  -               User Key  (r) = Recorded By, (t) = Taken By, (c) = Cosigned By      Initials Name Provider Type    AC Corin Roberto, PT Physical Therapist                   Mobility       Row Name 04/05/25 1018          Bed Mobility    Bed Mobility rolling left;rolling right  -     Rolling Left [place]win (Bed Mobility) moderate assist (50% patient effort);1 person assist  -     Rolling Right [place] (Bed Mobility) moderate assist (50% patient effort);1 person assist  -     Assistive Device (Bed Mobility) bed rails;repositioning sheet  -     Comment, (Bed Mobility) Pt rolled to either side w/ modAx1 and VC for sequencing in order to place lift sling  -       Row Name 04/05/25 1018          Bed-Chair Transfer    Bed-Chair Goodwin (Transfers) dependent (less than 25% patient effort)  -     Assistive Device (Bed-Chair Transfers) lift device  -       Row Name 04/05/25 1018          Sit-Stand Transfer    Sit-Stand Goodwin (Transfers) minimum assist (75% patient effort);2 person assist;verbal cues;nonverbal cues (demo/gesture)  -     Assistive Device (Sit-Stand Transfers) walker, 4-wheeled  -     Comment, (Sit-Stand Transfer) Pt complete 2 STS from bedside chair w/ minAx2. In standing pt was able to weight shift however was unable to take steps forward due to anxiety and confusion  -       Row Name 04/05/25 1018          Gait/Stairs (Locomotion)    Patient was able to  Ambulate no, other medical factors prevent ambulation  -     Reason Patient was unable to Ambulate Excessive Weakness;Uncontrolled Pain  -       Row Name 04/05/25 1018          Mobility    Extremity Weight-bearing Status left upper extremity;left lower extremity  -     Left Upper Extremity (Weight-bearing Status) non weight-bearing (NWB)  -     Left Lower Extremity (Weight-bearing Status) other (see comments)  Protected weight bearing AAT  -               User Key  (r) = Recorded By, (t) = Taken By, (c) = Cosigned By      Initials Name Provider Type     Corin Roberto, PT Physical Therapist                   Obj/Interventions       Row Name 04/05/25 1026          Range of Motion Comprehensive    General Range of Motion bilateral lower extremity ROM WNL  -       Row Name 04/05/25 1026          Strength Comprehensive (MMT)    General Manual Muscle Testing (MMT) Assessment lower extremity strength deficits identified  -     Comment, General Manual Muscle Testing (MMT) Assessment BLE's grossly WFL  -       Row Name 04/05/25 1026          Balance    Balance Assessment sitting static balance;sitting dynamic balance;standing static balance  -     Static Sitting Balance standby assist  -     Dynamic Sitting Balance contact guard  -AC     Position, Sitting Balance supported;sitting in chair  -     Static Standing Balance minimal assist;2-person assist  -     Position/Device Used, Standing Balance supported;walker, front-wheeled  -     Balance Interventions sitting;standing;sit to stand;supported;static  -       Row Name 04/05/25 1026          Sensory Assessment (Somatosensory)    Sensory Assessment (Somatosensory) unable/difficult to assess  -               User Key  (r) = Recorded By, (t) = Taken By, (c) = Cosigned By      Initials Name Provider Type     Corin Roberto, PT Physical Therapist                   Goals/Plan       Row Name 04/05/25 1032          Bed Mobility Goal 1 (PT)     Activity/Assistive Device (Bed Mobility Goal 1, PT) supine to sit  -AC     Baxter Level/Cues Needed (Bed Mobility Goal 1, PT) contact guard required  -AC     Time Frame (Bed Mobility Goal 1, PT) short term goal (STG);5 days  -AC       Row Name 04/05/25 1032          Transfer Goal 1 (PT)    Activity/Assistive Device (Transfer Goal 1, PT) sit-to-stand/stand-to-sit  -AC     [place]ter Level/Cues Needed (Transfer Goal 1, PT) contact guard required  -AC     Time Frame (Transfer Goal 1, PT) short term goal (STG);5 days  -AC       Row Name 04/05/25 1032          Gait Training Goal 1 (PT)    Activity/Assistive Device (Gait Training Goal 1, PT) gait (walking locomotion);decrease fall risk;improve balance and speed;increase endurance/gait distance  -AC     [place]ter Level (Gait Training Goal 1, PT) minimum assist (75% or more patient effort)  -AC     Distance (Gait Training Goal 1, PT) 80  -AC     Time Frame (Gait Training Goal 1, PT) long term goal (LTG);10 days  -AC       Row Name 04/05/25 1032          Therapy Assessment/Plan (PT)    Planned Therapy Interventions (PT) balance training;bed mobility training;gait training;home exercise program;patient/family education;strengthening;transfer training;postural re-education;ROM (range of motion)  -AC               User Key  (r) = Recorded By, (t) = Taken By, (c) = Cosigned By      Initials Name Provider Type    AC Corin Roberto, PT Physical Therapist                   Clinical Impression       Row Name 04/05/25 1028          Pain    Pain Location hip  -AC     Pain Side/Orientation left  -AC     Pain Management Interventions activity modification encouraged;exercise or physical activity utilized;positioning techniques utilized  -AC     Response to Pain Interventions activity participation with tolerable pain  -AC     Additional Documentation Pain Scale: FACES Pre/Post-Treatment (Group)  -       Row Name 04/05/25 1028          Pain Scale: FACES Pre/Post-Treatment     Pain: FACES Scale, Pretreatment 2-->hurts little bit  -AC     Posttreatment Pain Rating 2-->hurts little bit  -AC       Row Name 04/05/25 1028          Plan of Care Review    Plan of Care Reviewed With patient;spouse;family  -AC     Progress no change  -AC     Outcome Evaluation PT initial evaluation complete. Pt presents w/ generalized weakness, decreased activity tolerance, and significant confusion this date. Pt tolerated rolling and transferring to bedside chair w/ the mechanical lift well. In addition pt completed 2 STS from bedside chair w/ minAx2 and was able to participate in weight shifting however further mobility was limited due to significant fear and confusion. IPPT services warranted to address deficits listed above. D/c rec is SNF when medically ready for discharge for best outcome.  -AC       Row Name 04/05/25 1028          Therapy Assessment/Plan (PT)    Rehab Potential (PT) fair  -AC     Criteria for Skilled Interventions Met (PT) yes  -AC     Therapy Frequency (PT) daily  -AC     Predicted Duration of Therapy Intervention (PT) 10 days  -       Row Name 04/05/25 1028          Vital Signs    O2 Delivery Pre Treatment room air  -AC     O2 Delivery Intra Treatment room air  -AC     O2 Delivery Post Treatment room air  -AC     Pre Patient Position Supine  -AC     Intra Patient Position Standing  -AC     Post Patient Position Sitting  -AC       Row Name 04/05/25 1028          Positioning and Restraints    Pre-Treatment Position in bed  -AC     Post Treatment Position chair  -AC     In Chair notified nsg;reclined;sitting;call light within reach;encouraged to call for assist;exit alarm on;waffle cushion;legs elevated;on mechanical lift sling  -AC               User Key  (r) = Recorded By, (t) = Taken By, (c) = Cosigned By      Initials Name Provider Type    AC Corin Roberto, PT Physical Therapist                   Outcome Measures       Row Name 04/05/25 1034 04/05/25 0830       How much help from another  person do you currently need...    Turning from your back to your side while in flat bed without using bedrails? 2  -AC 2  -SC    Moving from lying on back to sitting on the side of a flat bed without bedrails? 2  -AC 1  -SC    Moving to and from a bed to a chair (including a wheelchair)? 2  -AC 1  -SC    Standing up from a chair using your arms (e.g., wheelchair, bedside chair)? 2  -AC 1  -SC    Climbing 3-5 steps with a railing? 1  -AC 1  -SC    To walk in hospital room? 2  -AC 1  -SC    AM-PAC 6 Clicks Score (PT) 11  -AC 7  -SC    Highest Level of Mobility Goal 4 --> Transfer to chair/commode  -AC 2 --> Bed activities/dependent transfer  -SC      Row Name 04/05/25 1034          Functional Assessment    Outcome Measure Options AM-PAC 6 Clicks Basic Mobility (PT)  -               User Key  (r) = Recorded By, (t) = Taken By, (c) = Cosigned By      Initials Name Provider Type    SC Tomeka Arce RN Registered Nurse     Corin Roberto, PT Physical Therapist                                 Physical Therapy Education       Title: PT OT SLP Therapies (In Progress)       Topic: Physical Therapy (In Progress)       Point: Mobility training (In Progress)       Learning Progress Summary            Patient Acceptance, E, NR by  at 4/5/2025 1035                      Point: Home exercise program (In Progress)       Learning Progress Summary            Patient Acceptance, E, NR by  at 4/5/2025 1035                      Point: Body mechanics (In Progress)       Learning Progress Summary            Patient Acceptance, E, NR by  at 4/5/2025 1035                      Point: Precautions (In Progress)       Learning Progress Summary            Patient Acceptance, E, NR by  at 4/5/2025 1035                                      User Key       Initials Effective Dates Name Provider Type Discipline     07/11/24 -  Corin Roberto, YESY Physical Therapist PT                  PT Recommendation and Plan  Planned Therapy  Interventions (PT): balance training, bed mobility training, gait training, home exercise program, patient/family education, strengthening, transfer training, postural re-education, ROM (range of motion)  Progress: no change  Outcome Evaluation: PT initial evaluation complete. Pt presents w/ generalized weakness, decreased activity tolerance, and significant confusion this date. Pt tolerated rolling and transferring to bedside chair w/ the mechanical lift well. In addition pt completed 2 STS from bedside chair w/ minAx2 and was able to participate in weight shifting however further mobility was limited due to significant fear and confusion. IPPT services warranted to address deficits listed above. D/c rec is SNF when medically ready for discharge for best outcome.     Time Calculation:   PT Evaluation Complexity  History, PT Evaluation Complexity: 1-2 personal factors and/or comorbidities  Examination of Body Systems (PT Eval Complexity): total of 3 or more elements  Clinical Presentation (PT Evaluation Complexity): evolving  Clinical Decision Making (PT Evaluation Complexity): moderate complexity  Overall Complexity (PT Evaluation Complexity): moderate complexity     PT Charges       Row Name 04/05/25 [facility]             Time Calculation    Start Time 0932  -AC      PT Received On 04/05/25  -AC      PT Goal Re-Cert Due Date 04/15/25  -AC         Time Calculation- PT    Total Timed Code Minutes- PT 10 minute(s)  -AC         Timed Charges    20022 - PT Therapeutic Activity Minutes 10  -AC         Untimed Charges    PT Eval/Re-eval Minutes 49  -AC         Total Minutes    Timed Charges Total Minutes 10  -AC      Untimed Charges Total Minutes 49  -AC       Total Minutes 59  -AC                User Key  (r) = Recorded By, (t) = Taken By, (c) = Cosigned By      Initials Name Provider Type    Corin Aguiar, PT Physical Therapist                  Therapy Charges for Today       Code Description Service Date Service Provider  Modifiers Qty    83094112304 HC PT THERAPEUTIC ACT EA 15 MIN 4/5/2025 Corin Roberto, PT GP 1    [id number] HC PT EVAL MOD COMPLEXITY 4 4/5/2025 Corin Roberto, PT GP 1            PT G-Codes  Outcome Measure Options: AM-PAC 6 Clicks Basic Mobility (PT)  AM-PAC 6 Clicks Score (PT): 11  PT Discharge Summary  Anticipated Discharge Disposition (PT): inpatient rehabilitation facility    Corin Roberto PT  4/5/2025

## 2025-04-05 NOTE — THERAPY EVALUATION
Patient Name: Jenna Russell  : 1940    MRN: [id number]                              Today's Date: 2025       Admit Date: 4/3/2025    Visit Dx:     ICD-10-CM ICD-9-CM   1. Periprosthetic hip fracture, initial encounter  M97.8XXA 996.44    Z96.649 V43.64   2. Closed fracture of distal end of left radius, unspecified fracture morphology, initial encounter  S52.502A 813.42   3. Facial laceration, initial encounter  S01.81XA 873.40     Patient Active Problem List   Diagnosis    PAF (paroxysmal atrial fibrillation)    HLD (hyperlipidemia)    Hypertension    History of CVA (cerebrovascular accident)    Combined receptive and expressive aphasia    Colitis, Clostridium difficile    Collagenous colitis    Seizure disorder    Closed right hip fracture, initial encounter    Anemia    Daily consumption of alcohol    Complex partial seizure    Right sided weakness    Severe protein-calorie malnutrition    Hip fracture     Past Medical History:   Diagnosis Date    Acute ischemic stroke     Arterial ischemic stroke 2016    Ataxic aphasia 2016    BP (high blood pressure) 2016    Cerebral infarction, left hemisphere 2016    Cyst of left ovary 2016    3cm    Expressive aphasia     H/O echocardiogram 2015    Global LV wall motion and contractility within normal limits. Normal LVSF.Normal LV diastolic filling. Left atrium midly dilated. RV mild- mod dilated. MIld aortic cusp sclerosis. No evidence of mitral valve prolapse. Mild mitral regurgitation.No pulmonary hypertension or perdicardial effusion. No dilation of aortic root. Venous system appears normal    H/O: stroke     HLD (hyperlipidemia) 2016    Left temporal lobe infarction     LOM (loss of memory)     Osteoarthritis 2016    Paroxysmal atrial fibrillation 2016    Thyroid nodule 2016    1.8x1.2cm    Weakness generalized     Wound infection after surgery     Left hip arthroplasty, staph aureus, 6 weeks IV Rocephin     Past  Surgical History:   Procedure Laterality Date    ABDOMINOPLASTY      BREAST ABSCESS INCISION AND DRAINAGE      HIP TROCHANTERIC NAILING WITH INTRAMEDULLARY HIP SCREW Right 3/11/2022    Procedure: HIP TROCHANTERIC NAILING WITH INTRAMEDULLARY HIP SCREW RIGHT;  Surgeon: Danny Lynne MD;  Location: [facility];  Service: Orthopedics;  Laterality: Right;    TOTAL HIP ARTHROPLASTY Left     Staph aureus wound infection      General Information       Row Name 04/05/25 1110          OT Time and Intention    Document Type evaluation  -MR     Mode of Treatment occupational therapy  -MR       Row Name 04/05/25 1110          General Information    Patient Profile Reviewed yes  -MR     Prior Level of Function independent:;gait;transfer;bed mobility  Pt has significant dementia at baseline. Per wife pt has had several close falls due to forgetting to use walker at home  -MR     Existing Precautions/Restrictions fall;left;other (see comments)  Protected weight bearing AAT, L distal radius fx, no active L hip abduction  -MR     Barriers to Rehab medically complex;previous functional deficit;cognitive status  -MR       Row Name 04/05/25 1110          Living Environment    Current Living Arrangements home  -MR     People in Home spouse  -MR       Row Name 04/05/25 1110          Home Main Entrance    Number of Stairs, Main Entrance other (see comments)  elevator  -MR       Row Name 04/05/25 1110          Stairs Within Home, Primary    Number of Stairs, Within Home, Primary none  -MR       Row Name 04/05/25 1110          Cognition    Orientation Status (Cognition) oriented to;person;disoriented to;place;situation;time  -MR       Row Name 04/05/25 1110          Safety Issues/Impairments Affecting Functional Mobility    Safety Issues Affecting Function (Mobility) ability to follow commands;awareness of need for assistance;insight into deficits/self-awareness;judgment;safety precaution awareness;safety precautions  follow-through/compliance;sequencing abilities  -MR     Impairments Affecting Function (Mobility) balance;cognition;endurance/activity tolerance;strength;pain  -MR     Cognitive Impairments, Mobility Safety/Performance attention;awareness, need for assistance;insight into deficits/self-awareness;judgment;problem-solving/reasoning;safety precaution awareness;safety precaution follow-through;sequencing abilities  -MR               User Key  (r) = Recorded By, (t) = Taken By, (c) = Cosigned By      Initials Name Provider Type    MR Krissy Case OT Occupational Therapist                     Mobility/ADL's       Row Name 04/05/25 1111          Bed Mobility    Bed Mobility rolling right;rolling left  -MR     Rolling Left [place] (Bed Mobility) moderate assist (50% patient effort);1 person assist  -MR     Rolling Right [place] (Bed Mobility) moderate assist (50% patient effort);1 person assist  -MR     Assistive Device (Bed Mobility) bed rails;repositioning sheet  -MR       Row Name 04/05/25 1111          Transfers    Transfers bed-chair transfer;sit-stand transfer  -MR       Row Name 04/05/25 1111          Bed-Chair Transfer    Bed-Chair [place] (Transfers) dependent (less than 25% patient effort);2 person assist  -MR     Assistive Device (Bed-Chair Transfers) lift device  -MR       Row Name 04/05/25 1111          Sit-Stand Transfer    Sit-Stand [place] (Transfers) minimum assist (75% patient effort);2 person assist;verbal cues;nonverbal cues (demo/gesture)  -MR     Assistive Device (Sit-Stand Transfers) walker, front-wheeled  -MR       Row Name 04/05/25 1111          Activities of Daily Living    BADL Assessment/Intervention lower body dressing;upper body dressing  -MR       Row Name 04/05/25 1111          Mobility    Extremity Weight-bearing Status left upper extremity;left lower extremity  -MR     Left Upper Extremity (Weight-bearing Status) non weight-bearing (NWB)  -MR     Left Lower Extremity  (Weight-bearing Status) other (see comments)  Protected weight bearing AAT  -MR       Row Name 04/05/25 1111          Lower Body Dressing Assessment/Training    [place] Level (Lower Body Dressing) don;doff;socks;maximum assist (25% patient effort)  -MR     Position (Lower Body Dressing) supine  -MR       Row Name 04/05/25 1111          Upper Body Dressing Assessment/Training    [place] Level (Upper Body Dressing) don;maximum assist (25% patient effort)  -MR     Position (Upper Body Dressing) supine  -MR               User Key  (r) = Recorded By, (t) = Taken By, (c) = Cosigned By      Initials Name Provider Type     Krissy Case OT Occupational Therapist                   Obj/Interventions       Row Name 04/05/25 1112          Sensory Assessment (Somatosensory)    Sensory Assessment (Somatosensory) UE sensation intact  -MR       Row Name 04/05/25 1112          Vision Assessment/Intervention    Visual Impairment/Limitations WFL  -MR       Row Name 04/05/25 1112          Range of Motion Comprehensive    General Range of Motion bilateral upper extremity ROM WFL  -       Row Name 04/05/25 1112          Strength Comprehensive (MMT)    Comment, General Manual Muscle Testing (MMT) Assessment BUE grossly 3+/5, did not formally assess the LUE strength  -MR       Row Name 04/05/25 1112          Balance    Balance Assessment sitting static balance;sitting dynamic balance;standing static balance  -MR     Static Sitting Balance standby assist  -MR     Dynamic Sitting Balance contact guard  -MR     Position, Sitting Balance supported;sitting in chair  -MR     Static Standing Balance minimal assist;2-person assist  -MR     Position/Device Used, Standing Balance supported;walker, front-wheeled  -MR     Balance Interventions sitting;standing;sit to stand;supported;static;dynamic  -MR               User Key  (r) = Recorded By, (t) = Taken By, (c) = Cosigned By      Initials Name Provider Type    Krissy Prakash, OT  Occupational Therapist                   Goals/Plan       Row Name 04/05/25 1115          Bed Mobility Goal 1 (OT)    Activity/Assistive Device (Bed Mobility Goal 1, OT) sit to supine;supine to sit  -MR     [place] Level/Cues Needed (Bed Mobility Goal 1, OT) minimum assist (75% or more patient effort)  -MR     Time Frame (Bed Mobility Goal 1, OT) short term goal (STG);3 days  -MR     Progress/Outcomes (Bed Mobility Goal 1, OT) new goal  -MR       Row Name 04/05/25 1115          Transfer Goal 1 (OT)    Activity/Assistive Device (Transfer Goal 1, OT) sit-to-stand/stand-to-sit;toilet;commode, bedside without drop arms  -MR     [place] Level/Cues Needed (Transfer Goal 1, OT) contact guard required  -MR     Time Frame (Transfer Goal 1, OT) long term goal (LTG);5 days  -MR     Progress/Outcome (Transfer Goal 1, OT) new goal  -MR       Row Name 04/05/25 1115          Dressing Goal 1 (OT)    Activity/Device (Dressing Goal 1, OT) upper body dressing  -MR     [place]/Cues Needed (Dressing Goal 1, OT) moderate assist (50-74% patient effort)  -MR     Time Frame (Dressing Goal 1, OT) long term goal (LTG);5 days  -MR     Progress/Outcome (Dressing Goal 1, OT) new goal  -MR       Row Name 04/05/25 1115          Therapy Assessment/Plan (OT)    Planned Therapy Interventions (OT) activity tolerance training;adaptive equipment training;BADL retraining;functional balance retraining;transfer/mobility retraining;strengthening exercise;ROM/therapeutic exercise;patient/caregiver education/training;passive ROM/stretching;IADL retraining;neuromuscular control/coordination retraining;occupation/activity based interventions  -MR               User Key  (r) = Recorded By, (t) = Taken By, (c) = Cosigned By      Initials Name Provider Type    MR Krissy Case, OT Occupational Therapist                   Clinical Impression       Row Name 04/05/25 1113          Pain Assessment    Pain Location hip  -MR     Pain Side/Orientation  left  -MR       Row Name 04/05/25 1113          Pain Scale: FACES Pre/Post-Treatment    Pain: FACES Scale, Pretreatment 2-->hurts little bit  -MR     Posttreatment Pain Rating 2-->hurts little bit  -MR       Row Name 04/05/25 1113          Plan of Care Review    Plan of Care Reviewed With patient;spouse;family  -MR     Progress no change  Initial Eval  -MR     Outcome Evaluation Patient presenting below her functional baseline w/ mobility, transfers and balance requiring increased assist for ADLs. Pt requiring assist for bed mobility, transfers and LB dressing. Deficits warrant skilled OT services. Recommend SNF when medically appropriate for d/c.  -MR       Row Name 04/05/25 1113          Therapy Assessment/Plan (OT)    Patient/Family Therapy Goal Statement (OT) Return to PLOF  -MR     Rehab Potential (OT) good  -MR     Criteria for Skilled Therapeutic Interventions Met (OT) yes;meets criteria;skilled treatment is necessary  -MR     Therapy Frequency (OT) daily  -MR     Predicted Duration of Therapy Intervention (OT) 5 days  -MR       Row Name 04/05/25 1113          Therapy Plan Review/Discharge Plan (OT)    Anticipated Discharge Disposition (OT) skilled nursing facility  -MR       Row Name 04/05/25 1113          Positioning and Restraints    Pre-Treatment Position in bed  -MR     Post Treatment Position chair  -MR     In Chair notified nsg;reclined;sitting;call light within reach;encouraged to call for assist;exit alarm on;with family/caregiver;waffle cushion;legs elevated;heels elevated;LUE elevated  -MR               User Key  (r) = Recorded By, (t) = Taken By, (c) = Cosigned By      Initials Name Provider Type    Krissy Prakash, OT Occupational Therapist                   Outcome Measures       Row Name 04/05/25 1115          How much help from another is currently needed...    Putting on and taking off regular lower body clothing? 1  -MR     Bathing (including washing, rinsing, and drying) 2  -MR      Toileting (which includes using toilet bed pan or urinal) 1  -MR     Putting on and taking off regular upper body clothing 3  -MR     Taking care of personal grooming (such as brushing teeth) 3  -MR     Eating meals 3  -MR     AM-PAC 6 Clicks Score (OT) 13  -MR       Row Name 04/05/25 1034 04/05/25 0830       How much help from another person do you currently need...    Turning from your back to your side while in flat bed without using bedrails? 2  -AC 2  -SC    Moving from lying on back to sitting on the side of a flat bed without bedrails? 2  -AC 1  -SC    Moving to and from a bed to a chair (including a wheelchair)? 2  -AC 1  -SC    Standing up from a chair using your arms (e.g., wheelchair, bedside chair)? 2  -AC 1  -SC    Climbing 3-5 steps with a railing? 1  -AC 1  -SC    To walk in hospital room? 2  -AC 1  -SC    AM-PAC 6 Clicks Score (PT) 11  -AC 7  -SC    Highest Level of Mobility Goal 4 --> Transfer to chair/commode  -AC 2 --> Bed activities/dependent transfer  -SC      Row Name 04/05/25 1115 04/05/25 1034       Functional Assessment    Outcome Measure Options AM-PAC 6 Clicks Daily Activity (OT)  -MR AM-PAC 6 Clicks Basic Mobility (PT)  -AC              User Key  (r) = Recorded By, (t) = Taken By, (c) = Cosigned By      Initials Name Provider Type    SC Tomeka Arce, RN Registered Nurse    Krissy Prakash, OT Occupational Therapist    AC Corin Roberto, PT Physical Therapist                    Occupational Therapy Education       Title: PT OT SLP Therapies (In Progress)       Topic: Occupational Therapy (In Progress)       Point: ADL training (Done)       Learning Progress Summary            Patient Acceptance, E, VU by MR at 4/5/2025 1116   Family Acceptance, E, VU by MR at 4/5/2025 1116                      Point: Precautions (Done)       Learning Progress Summary            Patient Acceptance, E, VU by MR at 4/5/2025 1116   Family Acceptance, E, VU by MR at 4/5/2025 1116                       Point: Body mechanics (Done)       Learning Progress Summary            Patient Acceptance, E, VU by MR at 4/5/2025 1116   Family Acceptance, E, VU by MR at 4/5/2025 1116                                      User Key       Initials Effective Dates Name Provider Type Discipline    MR 09/22/22 -  Krissy Case, OT Occupational Therapist OT                  OT Recommendation and Plan  Planned Therapy Interventions (OT): activity tolerance training, adaptive equipment training, BADL retraining, functional balance retraining, transfer/mobility retraining, strengthening exercise, ROM/therapeutic exercise, patient/caregiver education/training, passive ROM/stretching, IADL retraining, neuromuscular control/coordination retraining, occupation/activity based interventions  Therapy Frequency (OT): daily  Plan of Care Review  Plan of Care Reviewed With: patient, spouse, family  Progress: no change (Initial Eval)  Outcome Evaluation: Patient presenting below her functional baseline w/ mobility, transfers and balance requiring increased assist for ADLs. Pt requiring assist for bed mobility, transfers and LB dressing. Deficits warrant skilled OT services. Recommend SNF when medically appropriate for d/c.     Time Calculation:   Evaluation Complexity (OT)  Review Occupational Profile/Medical/Therapy History Complexity: expanded/moderate complexity  Assessment, Occupational Performance/Identification of Deficit Complexity: 3-5 performance deficits  Clinical Decision Making Complexity (OT): detailed assessment/moderate complexity  Overall Complexity of Evaluation (OT): moderate complexity     Time Calculation- OT       Row Name 04/05/25 1116             Time Calculation- OT    OT Start Time 0928  -MR      OT Received On 04/05/25  -MR      OT Goal Re-Cert Due Date 04/15/25  -MR         Timed Charges    26326 - OT Self Care/Mgmt Minutes 8  -MR         Untimed Charges    OT Eval/Re-eval Minutes 46  -MR         Total Minutes    Timed  Charges Total Minutes 8  -MR      Untimed Charges Total Minutes 46  -MR       Total Minutes 54  -MR                User Key  (r) = Recorded By, (t) = Taken By, (c) = Cosigned By      Initials Name Provider Type    Krissy Prakash OT Occupational Therapist                  Therapy Charges for Today       Code Description Service Date Service Provider Modifiers Qty    [id number]  OT SELF CARE/MGMT/TRAIN EA 15 MIN 4/5/2025 Krissy Case OT GO 1    [id number]  OT EVAL MOD COMPLEXITY 4 4/5/2025 Krissy Case OT GO 1                 Krissy Case OT  4/5/2025

## 2025-04-05 NOTE — PLAN OF CARE
Goal Outcome Evaluation:  Plan of Care Reviewed With: patient, spouse        Progress: no change  Outcome Evaluation: PRBC x1 administered on shift. Pt w/ increased confusion, combative. No urine output, will straight cath.pt might benefit UA to r/o UTI (per spouse the aggresive behavior is not baseline).

## 2025-04-05 NOTE — PLAN OF CARE
Goal Outcome Evaluation:  Plan of Care Reviewed With: patient, spouse, family        Progress: no change  Outcome Evaluation: PT initial evaluation complete. Pt presents w/ generalized weakness, decreased activity tolerance, and significant confusion this date. Pt tolerated rolling and transferring to bedside chair w/ the mechanical lift well. In addition pt completed 2 STS from bedside chair w/ minAx2 and was able to participate in weight shifting however further mobility was limited due to significant fear and confusion. IPPT services warranted to address deficits listed above. D/c rec is SNF when medically ready for discharge for best outcome.    Anticipated Discharge Disposition (PT): inpatient rehabilitation facility

## 2025-04-05 NOTE — PROGRESS NOTES
[facility]  PROGRESS NOTE    Patient Name: Jenna Russell  : 1940  MRN: [id number]    Date of Admission: 4/3/2025  Primary Care Physician: Judi Rodriguez MD    Subjective   Subjective     CC:  Hip fracture    HPI:  Straight cathed overnight. UA obtained overnight and not consistent w infection.   Patient remains confused and aphasic  Patient's daughter and son-in-law at bedside.  Patient denied any pain.  She was able to urinate later this morning.      Objective   Objective     Vital Signs:   Temp:  [97.4 °F (36.3 °C)-98.3 °F (36.8 °C)] 97.4 °F (36.3 °C)  Heart Rate:  [59-73] 73  Resp:  [18] 18  BP: ()/(48-73) 156/73  Flow (L/min) (Oxygen Therapy):  [2] 2     Physical Exam:  Constitutional: No acute distress, awake, alert  HENT: NCAT, mucous membranes moist  Respiratory: Clear to auscultation bilaterally, respiratory effort normal   Cardiovascular: RRR  Gastrointestinal: Positive bowel sounds, soft, nontender, nondistended  Musculoskeletal: No bilateral ankle edema; left arm in splint  Psychiatric: Appropriate affect, cooperative  Neurologic: Alert, oriented to self, symmetric facies, speech clear, bit with expressive aphasia  Skin: No rashes on exposed skin    Results Reviewed:  LAB RESULTS:      Lab 25  1418 252   WBC 5.68 6.77   HEMOGLOBIN 6.9* 9.3*   HEMATOCRIT 21.0* 28.6*   PLATELETS 87* 114*   NEUTROS ABS 3.17 4.98   IMMATURE GRANS (ABS) 0.01 0.03   LYMPHS ABS 1.52 1.02   MONOS ABS 0.94* 0.68   EOS ABS 0.03 0.05   MCV 94.2 93.8         Lab 25  1312 25  2202   SODIUM 128* 130*   POTASSIUM 4.3 4.5   CHLORIDE 95* 94*   CO2 27.0 26.0   ANION GAP 6.0 10.0   BUN 11 14   CREATININE 0.56* 0.74   EGFR 91.3 80.9   GLUCOSE 89 119*   CALCIUM 8.2* 8.8         Lab 25  1312 25  2202   TOTAL PROTEIN 4.4* 5.6*   ALBUMIN 2.8* 3.4*   GLOBULIN 1.6 2.2   ALT (SGPT) <5 5   AST (SGOT) 11 16   BILIRUBIN 0.4 0.2   ALK PHOS 39 57                  Lab 04/04/25  1542   ABO TYPING O   RH TYPING Positive   ANTIBODY SCREEN Negative         Brief Urine Lab Results  (Last result in the past 365 days)        Color   Clarity   Blood   Leuk Est   Nitrite   Protein   CREAT   Urine HCG        04/05/25 0747 Yellow   Clear   Trace   Trace   Negative   Negative                   Microbiology Results Abnormal       None            CT Pelvis Without Contrast  Result Date: 4/4/2025  CT PELVIS WO CONTRAST Date of Exam: 4/4/2025 11:50 AM EDT Indication: hip fracture. Comparison: CT pelvis without contrast dated 9/4/2024 Technique: Axial CT images were obtained of the pelvis without contrast administration.  Reconstructed coronal and sagittal images were also obtained. Automated exposure control and iterative construction methods were used. Findings: A left total hip arthroplasty has been performed. Prosthetic components are in anatomic alignment. There are nondisplaced fractures in involving the left femoral greater trochanter and subtrochanteric region. No other fractures identified. A dynamic hip screw and intramedullary mimi have been placed on the right. The distal aspect of the IM mimi is not visualized on this study. Mild to moderate right hip osteoarthritis is noted. Moderate degenerative disc changes are noted in the lower lumbar spine. A 4.2 cm left pelvic cyst likely arises from the ovary. This was present previously measuring 4.8 cm. Visualized pelvic viscera otherwise appears unremarkable.     Impression: Impression: 1.Previous left total hip arthroplasty. 2.Nondisplaced fractures involving the left femoral greater trochanter and subtrochanteric shaft. 3.4.2 cm cystic mass on the left ovary appears simple by CT. Electronically Signed: René Rivers MD  4/4/2025 12:19 PM EDT  Workstation ID: [id number]    XR Wrist 3+ View Left  Result Date: 4/3/2025  XR WRIST 3+ VW LEFT Date of Exam: 4/3/2025 8:49 PM EDT Indication: fall/pain Comparison: None available.  Findings: Carpal alignment is intact. Mild scattered degenerative changes. There is likely nondisplaced fracture of the distal radius best seen on AP view.     Impression: Impression: Likely nondisplaced fracture of the distal radius best seen on AP view. Recommend correlation with point tenderness. Electronically Signed: Flex Vela MD  4/3/2025 9:39 PM EDT  Workstation ID: [id number]    XR Chest 1 View  Result Date: 4/3/2025  XR CHEST 1 VW Date of Exam: 4/3/2025 8:50 PM EDT Indication: fall/confusion Comparison: 2/20/2025 FINDINGS: No definite focal or diffuse pulmonary infiltrate is identified.  No pneumothorax or significant pleural effusion. Heart size appears unchanged. Occlusion device is present, likely in the left atrial appendage, as before. Calcific atherosclerosis of the aorta. Advanced arthropathy at the glenohumeral joints.     Impression: No radiographic findings of acute cardiopulmonary abnormality. Electronically Signed: Guerrero Luis  4/3/2025 9:37 PM EDT  Workstation ID: [id number]    XR Hip With or Without Pelvis 2 - 3 View Left  Result Date: 4/3/2025  XR HIP W OR WO PELVIS 2-3 VIEW LEFT Date of Exam: 4/3/2025 8:50 PM EDT Indication: fall/pain Comparison: Right femur radiograph 9/4/2024. CT pelvis 9/4/2024. Findings: Left hip arthroplasty. Cortical defects at the lateral aspect of the left proximal femur suspicious for an acute nondisplaced fracture. Partially imaged right proximal femoral fixation hardware. Degenerative changes at the right hip, bilateral sacroiliac joints, and pubic symphysis. Lower lumbar degenerative changes, incompletely imaged and evaluated. Vascular calcifications.     Impression: Impression: Cortical defects at the lateral aspect of the left proximal femur suspicious for an acute nondisplaced periprosthetic fracture. Electronically Signed: Fransisco Brooks  4/3/2025 9:36 PM EDT  Workstation ID: [id number]    CT Head Without Contrast  Result Date: 4/3/2025  CT HEAD WO  CONTRAST Date of Exam: 4/3/2025 8:46 PM EDT Indication: fall/head injury. Comparison: 11/23/2024 Technique: Axial CT images were obtained of the head without contrast administration.  Automated exposure control and iterative construction methods were used. Findings: No intracranial hemorrhage. Remote left temporoparietal lobe infarct. Gray-white matter differentiation is maintained without evidence of an acute infarction. Multiple foci of decreased attenuation are present within the subcortical, deep cerebral, and periventricular white matter consistent with chronic small vessel/microangiopathic ischemic changes. No extra-axial mass or collection. The ventricles and sulci are prominent commensurate with involutional changes. The posterior fossa appears grossly normal. Sellar and suprasellar structures are normal. Bilateral lens replacements. The paranasal sinuses, ethmoid air cells, and mastoid air cells are aerated. The bony calvarium is intact.     Impression: Impression: No acute intracranial pathology. Electronically Signed: Freddy Santos MD  4/3/2025 9:20 PM EDT  Workstation ID: [id number]      Results for orders placed during the hospital encounter of 11/23/24    Adult Transthoracic Echo Complete W/ Cont if Necessary Per Protocol (With Agitated Saline)    Interpretation Summary    Left ventricular ejection fraction appears to be 56 - 60%.    Left ventricular diastolic function was normal.    Left atrial volume is mildly increased.    Moderate aortic valve regurgitation is present.    Estimated right ventricular systolic pressure from tricuspid regurgitation is mildly elevated (35-45 mmHg). Calculated right ventricular systolic pressure from tricuspid regurgitation is 36 mmHg.      Current medications:  Scheduled Meds:aspirin, 81 mg, Oral, Every Other Day  atorvastatin, 40 mg, Oral, Nightly  Budesonide, 3 mg, Oral, Daily  divalproex, 500 mg, Oral, Q8H  [Held by provider] lisinopril, 10 mg, Oral, Daily  [Held by  provider] metoprolol succinate XL, 25 mg, Oral, Daily  pantoprazole, 40 mg, Oral, Daily  sodium chloride, 10 mL, Intravenous, Q12H  tamsulosin, 0.4 mg, Oral, Daily      Continuous Infusions:Pharmacy Consult,       PRN Meds:.  acetaminophen **OR** acetaminophen **OR** acetaminophen    [Held by provider] ALPRAZolam    senna-docusate sodium **AND** polyethylene glycol **AND** bisacodyl **AND** bisacodyl    Calcium Replacement - Follow Nurse / BPA Driven Protocol    Magnesium Standard Dose Replacement - Follow Nurse / BPA Driven Protocol    Morphine **AND** naloxone    nitroglycerin    ondansetron    oxyCODONE-acetaminophen    Pharmacy Consult    Phosphorus Replacement - Follow Nurse / BPA Driven Protocol    Potassium Replacement - Follow Nurse / BPA Driven Protocol    sodium chloride    sodium chloride    Assessment & Plan   Assessment & Plan     Active Hospital Problems    Diagnosis  POA    **Hip fracture [S72.009A]  Yes      Resolved Hospital Problems   No resolved problems to display.        Brief Hospital Course to date:  Jenna Russell is a 84 y.o. female with history of prior CVA with associated aphasia, HTN, HLD, prior A-fib, presented after a fall found to have a left periprosthetic hip fracture.  ER contacted Dr. Boudreaux from orthopedics.    Left periprosthetic hip fracture  -Orthopedics consulted  -SCDs for DVT prophylaxis + ASA BID  -As needed pain control  -CT pelvis per Ortho obtained and showed greater trochanter periprosthetic fracture with some extension into the lesser trochanter.  -No surgery planned; okay to eat; protected weight-bearing on a walker at all times with no active hip abduction  - Follow-up in 2 weeks outpatient with Dr. Hoyt    Acute on chronic anemia  -S/p 1 unit RBC, with improvement in hemoglobin to 9.1    Chronic thrombocytopenia  -Platelets were 114 on admission, continue to monitor    Urinary retention  -Continue urinary retention protocol, straight cath as needed  -Tamsulosin  started     Left radius fracture-orthopedic consult, splint for now    Hypotension, resolved  -s/p IVF    HLD-continue atorvastatin  HTN-hold metoprolol, lisinopril fow now   Seizure disorder-continue Depakote  Memory impairment/dementia-previously on donepezil; had worsening confusion overnight. Discussed option of CT head, but family declined bedside today; continue to monitor and re-orient  GERD-PPI  Documented history of prior A-fib-not on anticoagulation    Expected Discharge Location and Transportation: likely rehab  Expected Discharge   Expected discharge date/ time has not been documented.     VTE Prophylaxis:  Mechanical VTE prophylaxis orders are present.         AM-PAC 6 Clicks Score (PT): 7 (04/04/25 6229)    CODE STATUS:   Code Status and Medical Interventions: No CPR (Do Not Attempt to Resuscitate); Limited Support; No intubation (DNI)   Ordered at: 04/03/25 3031     Code Status (Patient has no pulse and is not breathing):    No CPR (Do Not Attempt to Resuscitate)     Medical Interventions (Patient has pulse or is breathing):    Limited Support     Medical Intervention Limits:    No intubation (DNI)       Clara Bacon MD  04/05/25

## 2025-04-06 LAB
ANION GAP SERPL CALCULATED.3IONS-SCNC: 9 MMOL/L (ref 5–15)
BUN SERPL-MCNC: 8 MG/DL (ref 8–23)
BUN/CREAT SERPL: 16.3 (ref 7–25)
CALCIUM SPEC-SCNC: 8.4 MG/DL (ref 8.6–10.5)
CHLORIDE SERPL-SCNC: 98 MMOL/L (ref 98–107)
CO2 SERPL-SCNC: 27 MMOL/L (ref 22–29)
CREAT SERPL-MCNC: 0.49 MG/DL (ref 0.57–1)
DEPRECATED RDW RBC AUTO: 43.9 FL (ref 37–54)
EGFRCR SERPLBLD CKD-EPI 2021: 94.2 ML/MIN/1.73
ERYTHROCYTE [DISTWIDTH] IN BLOOD BY AUTOMATED COUNT: 13.2 % (ref 12.3–15.4)
GLUCOSE SERPL-MCNC: 87 MG/DL (ref 65–99)
HCT VFR BLD AUTO: 24.5 % (ref 34–46.6)
HGB BLD-MCNC: 8.4 G/DL (ref 12–15.9)
MCH RBC QN AUTO: 31 PG (ref 26.6–33)
MCHC RBC AUTO-ENTMCNC: 34.3 G/DL (ref 31.5–35.7)
MCV RBC AUTO: 90.4 FL (ref 79–97)
PLATELET # BLD AUTO: 96 10*3/MM3 (ref 140–450)
PMV BLD AUTO: 9.3 FL (ref 6–12)
POTASSIUM SERPL-SCNC: 3.6 MMOL/L (ref 3.5–5.2)
POTASSIUM SERPL-SCNC: 4.5 MMOL/L (ref 3.5–5.2)
RBC # BLD AUTO: 2.71 10*6/MM3 (ref 3.77–5.28)
SODIUM SERPL-SCNC: 134 MMOL/L (ref 136–145)
WBC NRBC COR # BLD AUTO: 7.22 10*3/MM3 (ref 3.4–10.8)

## 2025-04-06 PROCEDURE — 85027 COMPLETE CBC AUTOMATED: CPT | Performed by: STUDENT IN AN ORGANIZED HEALTH CARE EDUCATION/TRAINING PROGRAM

## 2025-04-06 PROCEDURE — 84132 ASSAY OF SERUM POTASSIUM: CPT | Performed by: STUDENT IN AN ORGANIZED HEALTH CARE EDUCATION/TRAINING PROGRAM

## 2025-04-06 PROCEDURE — 97530 THERAPEUTIC ACTIVITIES: CPT

## 2025-04-06 PROCEDURE — 99232 SBSQ HOSP IP/OBS MODERATE 35: CPT | Performed by: STUDENT IN AN ORGANIZED HEALTH CARE EDUCATION/TRAINING PROGRAM

## 2025-04-06 PROCEDURE — 80048 BASIC METABOLIC PNL TOTAL CA: CPT | Performed by: STUDENT IN AN ORGANIZED HEALTH CARE EDUCATION/TRAINING PROGRAM

## 2025-04-06 RX ORDER — POTASSIUM CHLORIDE 1500 MG/1
40 TABLET, EXTENDED RELEASE ORAL EVERY 4 HOURS
Status: COMPLETED | OUTPATIENT
Start: 2025-04-06 | End: 2025-04-06

## 2025-04-06 RX ORDER — ALPRAZOLAM 0.25 MG
0.25 TABLET ORAL NIGHTLY PRN
Status: DISCONTINUED | OUTPATIENT
Start: 2025-04-06 | End: 2025-04-10 | Stop reason: HOSPADM

## 2025-04-06 RX ADMIN — Medication 10 ML: at 08:58

## 2025-04-06 RX ADMIN — Medication 10 ML: at 21:37

## 2025-04-06 RX ADMIN — POTASSIUM CHLORIDE 40 MEQ: 20 TABLET, EXTENDED RELEASE ORAL at 11:58

## 2025-04-06 RX ADMIN — METOPROLOL SUCCINATE 25 MG: 25 TABLET, EXTENDED RELEASE ORAL at 08:58

## 2025-04-06 RX ADMIN — Medication 5 MG: at 21:10

## 2025-04-06 RX ADMIN — ALPRAZOLAM 0.25 MG: 0.25 TABLET ORAL at 21:19

## 2025-04-06 RX ADMIN — TAMSULOSIN HYDROCHLORIDE 0.4 MG: 0.4 CAPSULE ORAL at 08:58

## 2025-04-06 RX ADMIN — POTASSIUM CHLORIDE 40 MEQ: 20 TABLET, EXTENDED RELEASE ORAL at 17:26

## 2025-04-06 RX ADMIN — SENNOSIDES AND DOCUSATE SODIUM 2 TABLET: 50; 8.6 TABLET ORAL at 17:26

## 2025-04-06 RX ADMIN — DIVALPROEX SODIUM 500 MG: 250 TABLET, DELAYED RELEASE ORAL at 21:19

## 2025-04-06 RX ADMIN — ASPIRIN 81 MG: 81 TABLET, COATED ORAL at 21:10

## 2025-04-06 RX ADMIN — ACETAMINOPHEN 650 MG: 325 TABLET, FILM COATED ORAL at 11:58

## 2025-04-06 RX ADMIN — ASPIRIN 81 MG: 81 TABLET, COATED ORAL at 08:58

## 2025-04-06 RX ADMIN — ACETAMINOPHEN 650 MG: 325 TABLET, FILM COATED ORAL at 21:30

## 2025-04-06 RX ADMIN — DIVALPROEX SODIUM 500 MG: 250 TABLET, DELAYED RELEASE ORAL at 08:58

## 2025-04-06 RX ADMIN — PANTOPRAZOLE SODIUM 40 MG: 40 TABLET, DELAYED RELEASE ORAL at 08:58

## 2025-04-06 RX ADMIN — POLYETHYLENE GLYCOL 3350 17 G: 17 POWDER, FOR SOLUTION ORAL at 17:27

## 2025-04-06 RX ADMIN — DIVALPROEX SODIUM 500 MG: 250 TABLET, DELAYED RELEASE ORAL at 14:15

## 2025-04-06 RX ADMIN — ATORVASTATIN CALCIUM 40 MG: 40 TABLET, FILM COATED ORAL at 21:10

## 2025-04-06 RX ADMIN — BUDESONIDE 3 MG: 3 CAPSULE, COATED PELLETS ORAL at 08:58

## 2025-04-06 NOTE — PLAN OF CARE
Goal Outcome Evaluation:      VSS on RA and patient alert to self. PRN PO bowel and pain medication administered. LUE in ace wrapped splint. Voiding spontaneously via pure wick. Family at bedside and patient has been calm and cooperative with all aspects of care. Up in chair during shift. Call light in reach

## 2025-04-06 NOTE — THERAPY TREATMENT NOTE
Patient Name: Jenna Russell  : 1940    MRN: [id number]                              Today's Date: 2025       Admit Date: 4/3/2025    Visit Dx:     ICD-10-CM ICD-9-CM   1. Periprosthetic hip fracture, initial encounter  M97.8XXA 996.44    Z96.649 V43.64   2. Closed fracture of distal end of left radius, unspecified fracture morphology, initial encounter  S52.502A 813.42   3. Facial laceration, initial encounter  S01.81XA 873.40     Patient Active Problem List   Diagnosis    PAF (paroxysmal atrial fibrillation)    HLD (hyperlipidemia)    Hypertension    History of CVA (cerebrovascular accident)    Combined receptive and expressive aphasia    Colitis, Clostridium difficile    Collagenous colitis    Seizure disorder    Closed right hip fracture, initial encounter    Anemia    Daily consumption of alcohol    Complex partial seizure    Right sided weakness    Severe protein-calorie malnutrition    Hip fracture     Past Medical History:   Diagnosis Date    Acute ischemic stroke     Arterial ischemic stroke 2016    Ataxic aphasia 2016    BP (high blood pressure) 2016    Cerebral infarction, left hemisphere 2016    Cyst of left ovary 2016    3cm    Expressive aphasia     H/O echocardiogram 2015    Global LV wall motion and contractility within normal limits. Normal LVSF.Normal LV diastolic filling. Left atrium midly dilated. RV mild- mod dilated. MIld aortic cusp sclerosis. No evidence of mitral valve prolapse. Mild mitral regurgitation.No pulmonary hypertension or perdicardial effusion. No dilation of aortic root. Venous system appears normal    H/O: stroke     HLD (hyperlipidemia) 2016    Left temporal lobe infarction     LOM (loss of memory)     Osteoarthritis 2016    Paroxysmal atrial fibrillation 2016    Thyroid nodule 2016    1.8x1.2cm    Weakness generalized     Wound infection after surgery     Left hip arthroplasty, staph aureus, 6 weeks IV Rocephin     Past  Surgical History:   Procedure Laterality Date    ABDOMINOPLASTY      BREAST ABSCESS INCISION AND DRAINAGE      HIP TROCHANTERIC NAILING WITH INTRAMEDULLARY HIP SCREW Right 3/11/2022    Procedure: HIP TROCHANTERIC NAILING WITH INTRAMEDULLARY HIP SCREW RIGHT;  Surgeon: Danny Lynne MD;  Location: [facility];  Service: Orthopedics;  Laterality: Right;    TOTAL HIP ARTHROPLASTY Left     Staph aureus wound infection      General Information       Row Name 04/06/25 1133          Physical Therapy Time and Intention    Document Type therapy note (daily note)  -     Mode of Treatment physical therapy  -       Row Name 04/06/25 1133          General Information    Patient Profile Reviewed yes  -     Existing Precautions/Restrictions fall;left;other (see comments)  Protected weight bearing AAT, L distal radius fx, no active L hip abduction  -     Barriers to Rehab medically complex;previous functional deficit;cognitive status  -       Row Name 04/06/25 1133          Cognition    Orientation Status (Cognition) oriented to;person;disoriented to;place;situation;time  -       Row Name 04/06/25 1133          Safety Issues/Impairments Affecting Functional Mobility    Safety Issues Affecting Function (Mobility) ability to follow commands;awareness of need for assistance;insight into deficits/self-awareness;judgment;safety precaution awareness;safety precautions follow-through/compliance;sequencing abilities  -     Impairments Affecting Function (Mobility) balance;cognition;endurance/activity tolerance;strength;pain  -     Cognitive Impairments, Mobility Safety/Performance attention;awareness, need for assistance;insight into deficits/self-awareness;judgment;problem-solving/reasoning;safety precaution awareness;safety precaution follow-through;sequencing abilities  -               User Key  (r) = Recorded By, (t) = Taken By, (c) = Cosigned By      Initials Name Provider Type    AC Corin Roberto, PT Physical  Therapist                   Mobility       Row Name 04/06/25 1135          Bed-Chair Transfer    Bed-Chair [place] (Transfers) dependent (less than 25% patient effort);2 person assist  -     Assistive Device (Bed-Chair Transfers) lift device  -       Row Name 04/06/25 1135          Sit-Stand Transfer    Sit-Stand [place] (Transfers) moderate assist (50% patient effort);2 person assist;verbal cues;nonverbal cues (demo/gesture)  -     Assistive Device (Sit-Stand Transfers) walker, front-wheeled  -     Comment, (Sit-Stand Transfer) Pt required modAx2 from bedside chair however was unable to fully weight bear through LLE due to pain and fear. Pt quickly requested to return to sitting and once in chair began to fall asleep.  -       Row Name 04/06/25 1135          Gait/Stairs (Locomotion)    Patient was able to Ambulate no, other medical factors prevent ambulation  -     Reason Patient was unable to Ambulate Excessive Weakness;Uncontrolled Pain  -       Row Name 04/06/25 1135          Mobility    Extremity Weight-bearing Status left upper extremity;left lower extremity  -     Left Upper Extremity (Weight-bearing Status) non weight-bearing (NWB)  -AC     Left Lower Extremity (Weight-bearing Status) other (see comments)  Protected weight bearing AAT  -               User Key  (r) = Recorded By, (t) = Taken By, (c) = Cosigned By      Initials Name Provider Type    AC Corin Roberto PT Physical Therapist                   Obj/Interventions       Row Name 04/06/25 1138          Balance    Balance Assessment sitting static balance;sitting dynamic balance;standing static balance  -     Static Sitting Balance standby assist  -     Dynamic Sitting Balance contact guard  -     Position, Sitting Balance unsupported;sitting in chair  -     Static Standing Balance moderate assist;2-person assist;verbal cues;non-verbal cues (demo/gesture)  -     Position/Device Used, Standing Balance  supported;walker, front-wheeled  -     Balance Interventions sitting;standing;sit to stand;supported;static;dynamic  -               User Key  (r) = Recorded By, (t) = Taken By, (c) = Cosigned By      Initials Name Provider Type    AC Corin Roberto, PT Physical Therapist                   Goals/Plan    No documentation.                  Clinical Impression       Row Name 04/06/25 1139          Pain    Pain Location hip  -AC     Pain Side/Orientation left  -AC     Pain Management Interventions activity modification encouraged;exercise or physical activity utilized;positioning techniques utilized  -     Response to Pain Interventions activity participation with tolerable pain  -       Row Name 04/06/25 1139          Pain Scale: FACES Pre/Post-Treatment    Pain: FACES Scale, Pretreatment 4-->hurts little more  -AC     Posttreatment Pain Rating 4-->hurts little more  -AC       Row Name 04/06/25 1139          Plan of Care Review    Plan of Care Reviewed With patient;spouse;child  -     Progress no change  -     Outcome Evaluation Pt continues to participate in therapy session w/ good effort however continues to be limited by pain and confusion. Pt completed 1 stand from bedside chair however required increased assistance this date and was unable to put more than 50% weight through LLE due to pain. Continue to progress poc as able.  -       Row Name 04/06/25 1139          Therapy Assessment/Plan (PT)    Rehab Potential (PT) fair  -     Criteria for Skilled Interventions Met (PT) yes  -AC     Therapy Frequency (PT) daily  -     Predicted Duration of Therapy Intervention (PT) 10 days  -       Row Name 04/06/25 1139          Vital Signs    O2 Delivery Pre Treatment room air  -AC     O2 Delivery Intra Treatment room air  -AC     O2 Delivery Post Treatment room air  -AC     Pre Patient Position Supine  -AC     Intra Patient Position Standing  -AC     Post Patient Position Sitting  -       Row Name 04/06/25  1139          Positioning and Restraints    Pre-Treatment Position in bed  -AC     Post Treatment Position chair  -AC     In Chair notified nsg;reclined;sitting;call light within reach;encouraged to call for assist;exit alarm on;waffle cushion;on mechanical lift sling;legs elevated  -               User Key  (r) = Recorded By, (t) = Taken By, (c) = Cosigned By      Initials Name Provider Type     Corin Roberto, YESY Physical Therapist                   Outcome Measures       Row Name 04/06/25 1147          How much help from another person do you currently need...    Turning from your back to your side while in flat bed without using bedrails? 1  -AC     Moving from lying on back to sitting on the side of a flat bed without bedrails? 1  -AC     Moving to and from a bed to a chair (including a wheelchair)? 1  -AC     Standing up from a chair using your arms (e.g., wheelchair, bedside chair)? 1  -AC     Climbing 3-5 steps with a railing? 1  -AC     To walk in hospital room? 1  -AC     AM-PAC 6 Clicks Score (PT) 6  -AC     Highest Level of Mobility Goal 2 --> Bed activities/dependent transfer  -       Row Name 04/06/25 1147          Functional Assessment    Outcome Measure Options AM-PAC 6 Clicks Basic Mobility (PT)  -               User Key  (r) = Recorded By, (t) = Taken By, (c) = Cosigned By      Initials Name Provider Type    AC Corin Roberto, PT Physical Therapist                                 Physical Therapy Education       Title: PT OT SLP Therapies (In Progress)       Topic: Physical Therapy (In Progress)       Point: Mobility training (In Progress)       Learning Progress Summary            Patient Acceptance, E, NR by  at 4/6/2025 1147    Acceptance, E, NR by  at 4/5/2025 1035                      Point: Home exercise program (In Progress)       Learning Progress Summary            Patient Acceptance, E, NR by  at 4/6/2025 1147    Acceptance, E, NR by  at 4/5/2025 1035                      Point:  Body mechanics (In Progress)       Learning Progress Summary            Patient Acceptance, E, NR by  at 4/6/2025 1147    Acceptance, E, NR by AC at 4/5/2025 1035                      Point: Precautions (In Progress)       Learning Progress Summary            Patient Acceptance, E, NR by  at 4/6/2025 1147    Acceptance, E, NR by  at 4/5/2025 1035                                      User Key       Initials Effective Dates Name Provider Type Discipline     07/11/24 -  Corin Roberto, PT Physical Therapist PT                  PT Recommendation and Plan  Planned Therapy Interventions (PT): balance training, bed mobility training, gait training, home exercise program, patient/family education, strengthening, transfer training, postural re-education, ROM (range of motion)  Progress: no change  Outcome Evaluation: Pt continues to participate in therapy session w/ good effort however continues to be limited by pain and confusion. Pt completed 1 stand from bedside chair however required increased assistance this date and was unable to put more than 50% weight through LLE due to pain. Continue to progress poc as able.     Time Calculation:   PT Evaluation Complexity  History, PT Evaluation Complexity: 1-2 personal factors and/or comorbidities  Examination of Body Systems (PT Eval Complexity): total of 3 or more elements  Clinical Presentation (PT Evaluation Complexity): evolving  Clinical Decision Making (PT Evaluation Complexity): moderate complexity  Overall Complexity (PT Evaluation Complexity): moderate complexity     PT Charges       Row Name 04/06/25 1148             Time Calculation    Start Time 1033  -AC      PT Received On 04/06/25  -      PT Goal Re-Cert Due Date 04/15/25  -AC         Time Calculation- PT    Total Timed Code Minutes- PT 26 minute(s)  -AC         Timed Charges    91967 - PT Therapeutic Activity Minutes 26  -AC         Total Minutes    Timed Charges Total Minutes 26  -AC       Total Minutes  26  -AC                User Key  (r) = Recorded By, (t) = Taken By, (c) = Cosigned By      Initials Name Provider Type    AC Corin Roberto, PT Physical Therapist                  Therapy Charges for Today       Code Description Service Date Service Provider Modifiers Qty    [id number]  PT THERAPEUTIC ACT EA 15 MIN 4/5/2025 Corin Roberto, PT GP 1    [id number]  PT EVAL MOD COMPLEXITY 4 4/5/2025 Corin Roberto, PT GP 1    [id number]  PT THERAPEUTIC ACT EA 15 MIN 4/6/2025 Corin Roberto, PT GP 2            PT G-Codes  Outcome Measure Options: AM-PAC 6 Clicks Basic Mobility (PT)  AM-PAC 6 Clicks Score (PT): 6  AM-PAC 6 Clicks Score (OT): 13  PT Discharge Summary  Anticipated Discharge Disposition (PT): inpatient rehabilitation facility    Corin Roberto PT  4/6/2025
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
## 2025-04-06 NOTE — PLAN OF CARE
Goal Outcome Evaluation:  Plan of Care Reviewed With: patient, spouse, child        Progress: no change  Outcome Evaluation: Pt continues to participate in therapy session w/ good effort however continues to be limited by pain and confusion. Pt completed 1 stand from bedside chair however required increased assistance this date and was unable to put more than 50% weight through LLE due to pain. Continue to progress poc as able.    Anticipated Discharge Disposition (PT): inpatient rehabilitation facility

## 2025-04-06 NOTE — PLAN OF CARE
Goal Outcome Evaluation:  Plan of Care Reviewed With: patient, spouse, child        Progress: no change  Outcome Evaluation: Ativan x1 requested d/t increase agitation. Daughter came to stay at bedside overnight, pt was more interactive with in and out confusion. Check bed when pt gets agitated (on observation, pt is more agitated when incontinent or retaining urine, then get more calmer after pt is clean). Still picking on Lt. arm's ace wrap. Awaiting rehab placement.

## 2025-04-07 PROCEDURE — 97116 GAIT TRAINING THERAPY: CPT

## 2025-04-07 PROCEDURE — 97530 THERAPEUTIC ACTIVITIES: CPT

## 2025-04-07 PROCEDURE — 99232 SBSQ HOSP IP/OBS MODERATE 35: CPT | Performed by: PHYSICIAN ASSISTANT

## 2025-04-07 RX ORDER — OXYCODONE HYDROCHLORIDE 5 MG/1
5 TABLET ORAL EVERY 4 HOURS PRN
Refills: 0 | Status: DISCONTINUED | OUTPATIENT
Start: 2025-04-07 | End: 2025-04-07

## 2025-04-07 RX ORDER — NALOXONE HCL 0.4 MG/ML
0.4 VIAL (ML) INJECTION
Status: DISCONTINUED | OUTPATIENT
Start: 2025-04-07 | End: 2025-04-10 | Stop reason: HOSPADM

## 2025-04-07 RX ORDER — ACETAMINOPHEN 500 MG
1000 TABLET ORAL EVERY 8 HOURS
Status: DISCONTINUED | OUTPATIENT
Start: 2025-04-07 | End: 2025-04-10 | Stop reason: HOSPADM

## 2025-04-07 RX ORDER — POLYETHYLENE GLYCOL 3350 17 G/17G
17 POWDER, FOR SOLUTION ORAL DAILY PRN
Status: DISCONTINUED | OUTPATIENT
Start: 2025-04-07 | End: 2025-04-10 | Stop reason: HOSPADM

## 2025-04-07 RX ORDER — OXYCODONE HYDROCHLORIDE 5 MG/1
2.5 TABLET ORAL EVERY 4 HOURS PRN
Refills: 0 | Status: DISCONTINUED | OUTPATIENT
Start: 2025-04-07 | End: 2025-04-10 | Stop reason: HOSPADM

## 2025-04-07 RX ORDER — BISACODYL 10 MG
10 SUPPOSITORY, RECTAL RECTAL DAILY PRN
Status: DISCONTINUED | OUTPATIENT
Start: 2025-04-07 | End: 2025-04-10 | Stop reason: HOSPADM

## 2025-04-07 RX ORDER — MORPHINE SULFATE 2 MG/ML
2 INJECTION, SOLUTION INTRAMUSCULAR; INTRAVENOUS EVERY 4 HOURS PRN
Status: DISCONTINUED | OUTPATIENT
Start: 2025-04-07 | End: 2025-04-09

## 2025-04-07 RX ORDER — AMOXICILLIN 250 MG
2 CAPSULE ORAL 2 TIMES DAILY
Status: DISCONTINUED | OUTPATIENT
Start: 2025-04-07 | End: 2025-04-10 | Stop reason: HOSPADM

## 2025-04-07 RX ORDER — DIVALPROEX SODIUM 250 MG/1
500 TABLET, DELAYED RELEASE ORAL EVERY 12 HOURS SCHEDULED
Status: DISCONTINUED | OUTPATIENT
Start: 2025-04-07 | End: 2025-04-10 | Stop reason: HOSPADM

## 2025-04-07 RX ORDER — BISACODYL 5 MG/1
5 TABLET, DELAYED RELEASE ORAL DAILY PRN
Status: DISCONTINUED | OUTPATIENT
Start: 2025-04-07 | End: 2025-04-10 | Stop reason: HOSPADM

## 2025-04-07 RX ADMIN — DIVALPROEX SODIUM 500 MG: 250 TABLET, DELAYED RELEASE ORAL at 18:28

## 2025-04-07 RX ADMIN — ALPRAZOLAM 0.25 MG: 0.25 TABLET ORAL at 22:21

## 2025-04-07 RX ADMIN — Medication 5 MG: at 21:21

## 2025-04-07 RX ADMIN — ACETAMINOPHEN 1000 MG: 500 TABLET, FILM COATED ORAL at 13:33

## 2025-04-07 RX ADMIN — SENNOSIDES AND DOCUSATE SODIUM 2 TABLET: 50; 8.6 TABLET ORAL at 21:21

## 2025-04-07 RX ADMIN — ACETAMINOPHEN 650 MG: 325 TABLET, FILM COATED ORAL at 02:48

## 2025-04-07 RX ADMIN — Medication 10 ML: at 09:08

## 2025-04-07 RX ADMIN — BUDESONIDE 3 MG: 3 CAPSULE, COATED PELLETS ORAL at 09:03

## 2025-04-07 RX ADMIN — BISACODYL 5 MG: 5 TABLET, COATED ORAL at 11:18

## 2025-04-07 RX ADMIN — DIVALPROEX SODIUM 500 MG: 250 TABLET, DELAYED RELEASE ORAL at 05:59

## 2025-04-07 RX ADMIN — METOPROLOL SUCCINATE 25 MG: 25 TABLET, EXTENDED RELEASE ORAL at 09:03

## 2025-04-07 RX ADMIN — PANTOPRAZOLE SODIUM 40 MG: 40 TABLET, DELAYED RELEASE ORAL at 09:03

## 2025-04-07 RX ADMIN — TAMSULOSIN HYDROCHLORIDE 0.4 MG: 0.4 CAPSULE ORAL at 09:03

## 2025-04-07 RX ADMIN — POLYETHYLENE GLYCOL 3350 17 G: 17 POWDER, FOR SOLUTION ORAL at 18:28

## 2025-04-07 RX ADMIN — ACETAMINOPHEN 650 MG: 325 TABLET, FILM COATED ORAL at 11:18

## 2025-04-07 RX ADMIN — ATORVASTATIN CALCIUM 40 MG: 40 TABLET, FILM COATED ORAL at 21:21

## 2025-04-07 RX ADMIN — SENNOSIDES AND DOCUSATE SODIUM 2 TABLET: 50; 8.6 TABLET ORAL at 13:32

## 2025-04-07 RX ADMIN — ACETAMINOPHEN 1000 MG: 500 TABLET, FILM COATED ORAL at 21:21

## 2025-04-07 RX ADMIN — Medication 10 ML: at 21:26

## 2025-04-07 RX ADMIN — OXYCODONE HYDROCHLORIDE 2.5 MG: 5 TABLET ORAL at 18:28

## 2025-04-07 RX ADMIN — ASPIRIN 81 MG: 81 TABLET, COATED ORAL at 21:21

## 2025-04-07 RX ADMIN — ASPIRIN 81 MG: 81 TABLET, COATED ORAL at 09:03

## 2025-04-07 NOTE — CASE MANAGEMENT/SOCIAL WORK
Continued Stay Note  [facility]     Patient Name: Jenna Russell  MRN: [id number]  Today's Date: 4/7/2025    Admit Date: 4/3/2025    Plan: rehab   Discharge Plan       Row Name 04/07/25 1415       Plan    Plan rehab    Patient/Family in Agreement with Plan yes    Plan Comments Met with patient and her wife at the bedside to discuss discharge plan. Patient and her wife are agreeable to therapy's recommendation for rehab. Referral sent to all [facility]; preference is [facility]. Patient choice list provided. CM will continue to follow.    Final Discharge Disposition Code 03 - skilled nursing facility (SNF)                   Discharge Codes    No documentation.                       Rashaad Jameson RN

## 2025-04-07 NOTE — PLAN OF CARE
Problem: Adult Inpatient Plan of Care  Goal: Absence of Hospital-Acquired Illness or Injury  Outcome: Progressing  Intervention: Identify and Manage Fall Risk  Description: Perform standard risk assessment on admission using a validated tool or comprehensive approach appropriate to the patient; reassess fall risk frequently, with change in status or transfer to another level of care.Communicate risk to interprofessional healthcare team; ensure fall risk visible cue.Determine need for increased observation, equipment and environmental modification, as well as use of supportive, nonskid footwear.Adjust safety measures to individual needs and identified risk factors.Reinforce the importance of active participation with fall risk prevention, safety, and physical activity with the patient and family.Perform regular intentional rounding to assess need for position change, pain assessment and personal needs, including assistance with toileting.  Recent Flowsheet Documentation  Taken 4/7/2025 0600 by Katelynn Henson, RN  Safety Promotion/Fall Prevention:   nonskid shoes/slippers when out of bed   fall prevention program maintained   elopement precautions   clutter free environment maintained  Taken 4/7/2025 0400 by Katelynn Henson, RN  Safety Promotion/Fall Prevention:   nonskid shoes/slippers when out of bed   fall prevention program maintained   elopement precautions   clutter free environment maintained  Taken 4/7/2025 0200 by Katelynn Henson RN  Safety Promotion/Fall Prevention:   nonskid shoes/slippers when out of bed   fall prevention program maintained   elopement precautions   clutter free environment maintained  Taken 4/7/2025 0000 by Katelynn Henson, RN  Safety Promotion/Fall Prevention:   nonskid shoes/slippers when out of bed   fall prevention program maintained   elopement precautions   clutter free environment maintained  Taken 4/6/2025 2000 by Katelynn Henson, RN  Safety Promotion/Fall  Prevention:   nonskid shoes/slippers when out of bed   fall prevention program maintained   elopement precautions   clutter free environment maintained  Intervention: Prevent Skin Injury  Description: Perform a screening for skin injury risk, such as pressure or moisture-associated skin damage on admission and at regular intervals throughout hospital stay.Keep all areas of skin (especially folds) clean and dry.Maintain adequate skin hydration.Relieve and redistribute pressure and protect bony prominences and skin at risk for injury; implement measures based on patient-specific risk factors.Match turning and repositioning schedule to clinical condition.Encourage weight shift frequently; assist with reposition if unable to complete independently.Float heels off bed; avoid pressure on the Achilles tendon.Keep skin free from extended contact with medical devices.Optimize nutrition and hydration.Encourage functional activity and mobility, as early as tolerated.Use aids (e.g., slide boards, mechanical lift) during transfer.  Recent Flowsheet Documentation  Taken 4/7/2025 0600 by Katelynn Henson RN  Body Position: supine  Skin Protection:   transparent dressing maintained   skin sealant/moisture barrier applied   incontinence pads utilized  Taken 4/7/2025 0400 by Katelynn Henson RN  Body Position:   tilted   right  Taken 4/7/2025 0200 by Katelynn Henson RN  Body Position: tilted  Taken 4/7/2025 0000 by Katelynn Henson RN  Body Position: supine  Skin Protection: incontinence pads utilized  Taken 4/6/2025 2000 by Katelynn Henson RN  Body Position:   turned   left   upper extremity elevated  Skin Protection:   incontinence pads utilized   silicone foam dressing in place   transparent dressing maintained  Intervention: Prevent and Manage VTE (Venous Thromboembolism) Risk  Description: Assess for VTE (venous thromboembolism) risk.Promote early mobilization; encourage both active and passive leg exercises, if  unable to ambulate.Initiate and maintain compression or other therapy, as indicated, based on identified risk in accordance with organizational protocol and provider order.Recognize the patient's individual risk for bleeding before initiating pharmacologic thromboprophylaxis.  Recent Flowsheet Documentation  Taken 4/7/2025 0200 by Katelynn Henson RN  VTE Prevention/Management:   SCDs (sequential compression devices) off   bilateral  Taken 4/7/2025 0000 by Katelynn Henson RN  VTE Prevention/Management:   SCDs (sequential compression devices) on   bilateral  Taken 4/6/2025 2000 by Katelynn Henson RN  VTE Prevention/Management: (agitates patient)   SCDs (sequential compression devices) off   patient refused intervention   other (see comments)  Intervention: Prevent Infection  Description: Maintain skin and mucous membrane integrity; promote hand, oral and pulmonary hygiene.Optimize fluid balance, nutrition, sleep and glycemic control to maximize infection resistance.Identify potential sources of infection early to prevent or mitigate progression of infection (e.g., wound, lines, devices).Evaluate ongoing need for invasive devices; remove promptly when no longer indicated.Review vaccination status.  Recent Flowsheet Documentation  Taken 4/7/2025 0600 by Katelynn Henson RN  Infection Prevention:   hand hygiene promoted   equipment surfaces disinfected   environmental surveillance performed   personal protective equipment utilized  Taken 4/7/2025 0400 by Katelynn Henson RN  Infection Prevention:   hand hygiene promoted   equipment surfaces disinfected   environmental surveillance performed   personal protective equipment utilized  Taken 4/7/2025 0200 by Katelynn Henson RN  Infection Prevention:   hand hygiene promoted   equipment surfaces disinfected   environmental surveillance performed   personal protective equipment utilized  Taken 4/7/2025 0000 by Katelynn Henson RN  Infection  Prevention:   hand hygiene promoted   equipment surfaces disinfected   environmental surveillance performed   personal protective equipment utilized  Taken 4/6/2025 2000 by Katelynn Henson RN  Infection Prevention:   hand hygiene promoted   equipment surfaces disinfected   environmental surveillance performed   personal protective equipment utilized  Goal: Optimal Comfort and Wellbeing  Outcome: Progressing  Intervention: Monitor Pain and Promote Comfort  Description: Assess pain level, treatment efficacy and patient response at regular intervals using a consistent pain scale.Consider the presence and impact of preexisting chronic pain.Encourage patient and caregiver involvement in pain assessment, interventions and safety measures.Promote activity; balance with sleep and rest to enhance healing.  Recent Flowsheet Documentation  Taken 4/7/2025 0200 by Katelynn Henson RN  Pain Management Interventions:   quiet environment facilitated   care clustered   relaxation techniques promoted  Taken 4/7/2025 0000 by Katelynn Henson RN  Pain Management Interventions: pain management plan reviewed with patient/caregiver  Taken 4/6/2025 2200 by Katelynn Henson RN  Pain Management Interventions: pain management plan reviewed with patient/caregiver  Taken 4/6/2025 2000 by Katelynn Henson RN  Pain Management Interventions: pain management plan reviewed with patient/caregiver  Intervention: Provide Person-Centered Care  Description: Use a family-focused approach to care; encourage support system presence and participation.Develop trust and rapport by proactively providing information, encouraging questions, addressing concerns and offering reassurance.Acknowledge emotional response to hospitalization.Recognize and utilize personal coping strategies and strengths; develop goals via shared decision-making.Honor spiritual and cultural preferences.  Recent Flowsheet Documentation  Taken 4/7/2025 0600 by Natividad  Katelynn MARTINEZ, RN  Trust Relationship/Rapport:   care explained   choices provided   questions answered   questions encouraged   reassurance provided   thoughts/feelings acknowledged  Taken 4/7/2025 0200 by Katelynn Henson, RN  Trust Relationship/Rapport:   care explained   choices provided  Taken 4/7/2025 0000 by Katelynn Henson, RN  Trust Relationship/Rapport:   care explained   reassurance provided  Taken 4/6/2025 2200 by Katelynn Henson, RN  Trust Relationship/Rapport:   care explained   choices provided   questions encouraged   questions answered  Taken 4/6/2025 2000 by Katelynn Henson, RN  Trust Relationship/Rapport:   care explained   choices provided   reassurance provided   questions answered   questions encouraged  Goal: Readiness for Transition of Care  Outcome: Progressing   Goal Outcome Evaluation:

## 2025-04-07 NOTE — PROGRESS NOTES
[facility] Medicine Services  PROGRESS NOTE    Patient Name: Jenna Russell  : 1940  MRN: [id number]    Date of Admission: 4/3/2025  Primary Care Physician: Judi Rodriguez MD    Subjective     CC: f/u L hip fracture    HPI:  Wife and daughter help with history. Patient in chair. Speech not always logical. Pain manageable at rest but severe when upright. Family concerned re: oxycodone side effects. Rehab referrals started     Objective     Vital Signs:   Temp:  [97.4 °F (36.3 °C)-98.3 °F (36.8 °C)] 97.8 °F (36.6 °C)  Heart Rate:  [53-71] 65  Resp:  [16-18] 18  BP: (124-145)/(56-96) 124/56     Physical Exam:  Constitutional: No acute distress, awake, alert and conversant. Sitting up in chair  HENT: NCAT, mucous membranes moist. Facial ecchymosis (L > R)  Respiratory: Clear to auscultation bilaterally, normal respiratory effort on room air   Cardiovascular: RRR  Gastrointestinal: Positive bowel sounds, soft, nontender, nondistended  Musculoskeletal: No bilateral ankle edema. LUE in splint.   Psychiatric: Appropriate affect, cooperative with exam  Neurologic: Moves all extremities spontaneously without focal deficits, speech clear but expressive aphasia noted     Results Reviewed:  LAB RESULTS:      Lab 25  1013 25  1022 25  1418 25  2202   WBC 7.22 6.12 5.68 6.77   HEMOGLOBIN 8.4* 9.1* 6.9* 9.3*   HEMATOCRIT 24.5* 27.1* 21.0* 28.6*   PLATELETS 96* 92* 87* 114*   NEUTROS ABS  --   --  3.17 4.98   IMMATURE GRANS (ABS)  --   --  0.01 0.03   LYMPHS ABS  --   --  1.52 1.02   MONOS ABS  --   --  0.94* 0.68   EOS ABS  --   --  0.03 0.05   MCV 90.4 90.9 94.2 93.8         Lab 257 25  1013 25  1023 25  1312 04/03/25  2202   SODIUM  --  134* 132* 128* 130*   POTASSIUM 4.5 3.6 3.8 4.3 4.5   CHLORIDE  --  98 97* 95* 94*   CO2  --  27.0 26.0 27.0 26.0   ANION GAP  --  9.0 9.0 6.0 10.0   BUN  --  8 7* 11 14   CREATININE  --  0.49* 0.57 0.56*  0.74   EGFR  --  94.2 90.9 91.3 80.9   GLUCOSE  --  87 107* 89 119*   CALCIUM  --  8.4* 8.9 8.2* 8.8         Lab 04/04/25  1312 04/03/25  2202   TOTAL PROTEIN 4.4* 5.6*   ALBUMIN 2.8* 3.4*   GLOBULIN 1.6 2.2   ALT (SGPT) <5 5   AST (SGOT) 11 16   BILIRUBIN 0.4 0.2   ALK PHOS 39 57                 Lab 04/04/25  1542   ABO TYPING O   RH TYPING Positive   ANTIBODY SCREEN Negative         Brief Urine Lab Results  (Last result in the past 365 days)        Color   Clarity   Blood   Leuk Est   Nitrite   Protein   CREAT   Urine HCG        04/05/25 0747 Yellow   Clear   Trace   Trace   Negative   Negative                   Microbiology Results Abnormal       None            No radiology results from the last 24 hrs      Results for orders placed during the hospital encounter of 11/23/24    Adult Transthoracic Echo Complete W/ Cont if Necessary Per Protocol (With Agitated Saline)    Interpretation Summary    Left ventricular ejection fraction appears to be 56 - 60%.    Left ventricular diastolic function was normal.    Left atrial volume is mildly increased.    Moderate aortic valve regurgitation is present.    Estimated right ventricular systolic pressure from tricuspid regurgitation is mildly elevated (35-45 mmHg). Calculated right ventricular systolic pressure from tricuspid regurgitation is 36 mmHg.      Current medications:  Scheduled Meds:acetaminophen, 1,000 mg, Oral, Q8H  aspirin, 81 mg, Oral, BID  atorvastatin, 40 mg, Oral, Nightly  Budesonide, 3 mg, Oral, Daily  divalproex, 500 mg, Oral, Q12H  [Held by provider] lisinopril, 10 mg, Oral, Daily  melatonin, 5 mg, Oral, Nightly  metoprolol succinate XL, 25 mg, Oral, Daily  pantoprazole, 40 mg, Oral, Daily  senna-docusate sodium, 2 tablet, Oral, BID  sodium chloride, 10 mL, Intravenous, Q12H  tamsulosin, 0.4 mg, Oral, Daily      Continuous Infusions:     PRN Meds:.  ALPRAZolam    senna-docusate sodium **AND** polyethylene glycol **AND** bisacodyl **AND** bisacodyl     Calcium Replacement - Follow Nurse / BPA Driven Protocol    Magnesium Standard Dose Replacement - Follow Nurse / BPA Driven Protocol    Morphine **AND** naloxone    nitroglycerin    ondansetron    oxyCODONE    Phosphorus Replacement - Follow Nurse / BPA Driven Protocol    Potassium Replacement - Follow Nurse / BPA Driven Protocol    sodium chloride    sodium chloride    Assessment & Plan   Assessment & Plan     Active Hospital Problems    Diagnosis  POA    **Hip fracture [S72.009A]  Yes      Resolved Hospital Problems   No resolved problems to display.     Brief Hospital Course to date:  Jenna Russell is an 84 y.o. female with PMH significant for HTN, HLD, PAF, prior CVA with residual expressive aphasia, seizure disorder and dementia. Brought to [facility] ED on 4/3/25 for evaluation after a fall. Found to have periprosthetic L hip fracture. Orthopedics recommended non-operative management.      Left periprosthetic hip fracture  - CT pelvis showed greater trochanter periprosthetic fracture with some extension into the lesser trochanter.  -Orthopedics consulted. Recommend nonoperative management. Protected WB with a walker at all times. No active hip ABduction   - SCDs for DVT prophylaxis + ASA BID  - Start APAP 1000mg TID, Oxycodone 2.5mg Q4H PRN. Avoid IV Morphine as able   - Follow up with Dr. Hoyt in 2 weeks    Left radius fracture  - Ortho has evaluated - nonoperative management  - Keep wrist splint in place until OP follow up    Acute on chronic anemia  - S/p 1 unit PRBC, with appropriate response    Chronic thrombocytopenia  - Platelets 114 on admission, down to 96 on 4/6  - CBC in AM    Urinary retention  - 4/5 UA reassuring  - Started on Tamsulosin  - Get bowels moving  - Continue urinary retention protocol, straight cath as needed    Hypotension, resolved  - s/p IVF    HLD-continue Atorvastatin  HTN, continue Metoprolol. Lisinopril on hold   Seizure disorder-continue Depakote BID  Memory  impairment/dementia-previously on donepezil  GERD-PPI  Documented history of prior A-fib-not on anticoagulation, continue Metoprolol. Continue to monitor on telemetry    Expected Discharge Location and Transportation: [facility]  Expected Discharge Expected Discharge Date: 4/8/2025; Expected Discharge Time:      VTE Prophylaxis:Mechanical VTE prophylaxis orders are present.    AM-PAC 6 Clicks Score (PT): 6 (04/07/25 1319)    CODE STATUS:   Code Status and Medical Interventions: No CPR (Do Not Attempt to Resuscitate); Limited Support; No intubation (DNI)   Ordered at: 04/03/25 1125     Code Status (Patient has no pulse and is not breathing):    No CPR (Do Not Attempt to Resuscitate)     Medical Interventions (Patient has pulse or is breathing):    Limited Support     Medical Intervention Limits:    No intubation (DNI)     Floresita Vicente PA-C  04/07/25

## 2025-04-07 NOTE — PLAN OF CARE
Goal Outcome Evaluation:  Plan of Care Reviewed With: patient, spouse, child        Progress: improving  Outcome Evaluation: Pt continues to be limited by pain, LLE weakness, and confusion this date. Pt was able to take 3 steps forward after completing 2 STS from bedside chair w/ modAx2 and BUE support this date. Pt did not demonstrate any buckling and was able to demonstrate a step through gait pattern however was limited by significant fear, confusion, difficulty sequening/following commands, and pain. Continue to progress poc as able.    Anticipated Discharge Disposition (PT): skilled nursing facility

## 2025-04-07 NOTE — THERAPY TREATMENT NOTE
Patient Name: Jenna Russell  : 1940    MRN: [id number]                              Today's Date: 2025       Admit Date: 4/3/2025    Visit Dx:     ICD-10-CM ICD-9-CM   1. Periprosthetic hip fracture, initial encounter  M97.8XXA 996.44    Z96.649 V43.64   2. Closed fracture of distal end of left radius, unspecified fracture morphology, initial encounter  S52.502A 813.42   3. Facial laceration, initial encounter  S01.81XA 873.40     Patient Active Problem List   Diagnosis    PAF (paroxysmal atrial fibrillation)    HLD (hyperlipidemia)    Hypertension    History of CVA (cerebrovascular accident)    Combined receptive and expressive aphasia    Colitis, Clostridium difficile    Collagenous colitis    Seizure disorder    Closed right hip fracture, initial encounter    Anemia    Daily consumption of alcohol    Complex partial seizure    Right sided weakness    Severe protein-calorie malnutrition    Hip fracture     Past Medical History:   Diagnosis Date    Acute ischemic stroke     Arterial ischemic stroke 2016    Ataxic aphasia 2016    BP (high blood pressure) 2016    Cerebral infarction, left hemisphere 2016    Cyst of left ovary 2016    3cm    Expressive aphasia     H/O echocardiogram 2015    Global LV wall motion and contractility within normal limits. Normal LVSF.Normal LV diastolic filling. Left atrium midly dilated. RV mild- mod dilated. MIld aortic cusp sclerosis. No evidence of mitral valve prolapse. Mild mitral regurgitation.No pulmonary hypertension or perdicardial effusion. No dilation of aortic root. Venous system appears normal    H/O: stroke     HLD (hyperlipidemia) 2016    Left temporal lobe infarction     LOM (loss of memory)     Osteoarthritis 2016    Paroxysmal atrial fibrillation 2016    Thyroid nodule 2016    1.8x1.2cm    Weakness generalized     Wound infection after surgery     Left hip arthroplasty, staph aureus, 6 weeks IV Rocephin     Past  Surgical History:   Procedure Laterality Date    ABDOMINOPLASTY      BREAST ABSCESS INCISION AND DRAINAGE      HIP TROCHANTERIC NAILING WITH INTRAMEDULLARY HIP SCREW Right 3/11/2022    Procedure: HIP TROCHANTERIC NAILING WITH INTRAMEDULLARY HIP SCREW RIGHT;  Surgeon: Danny Lynne MD;  Location: [facility];  Service: Orthopedics;  Laterality: Right;    TOTAL HIP ARTHROPLASTY Left     Staph aureus wound infection      General Information       Row Name 04/07/25 1111          Physical Therapy Time and Intention    Document Type therapy note (daily note)  -     Mode of Treatment physical therapy  -AC       Row Name 04/07/25 1111          General Information    Patient Profile Reviewed yes  -     Existing Precautions/Restrictions fall;left;other (see comments)  Protected weight bearing AAT, L distal radius fx, no active L hip abduction  -AC     Barriers to Rehab medically complex;previous functional deficit;cognitive status  -AC       Row Name 04/07/25 1111          Cognition    Orientation Status (Cognition) oriented to;person;disoriented to;place;situation;time  -AC       Row Name 04/07/25 1111          Safety Issues/Impairments Affecting Functional Mobility    Safety Issues Affecting Function (Mobility) ability to follow commands;awareness of need for assistance;insight into deficits/self-awareness;judgment;safety precaution awareness;safety precautions follow-through/compliance;sequencing abilities  -AC     Impairments Affecting Function (Mobility) balance;cognition;endurance/activity tolerance;strength;pain  -AC     Cognitive Impairments, Mobility Safety/Performance attention;awareness, need for assistance;insight into deficits/self-awareness;judgment;problem-solving/reasoning;safety precaution awareness;safety precaution follow-through;sequencing abilities  -AC               User Key  (r) = Recorded By, (t) = Taken By, (c) = Cosigned By      Initials Name Provider Type    AC Corin Roberto PT Physical  Therapist                   Mobility       Row Name 04/07/25 1113          Bed Mobility    Bed Mobility rolling right;rolling left  -AC     Rolling Left [place] (Bed Mobility) 1 person assist;moderate assist (50% patient effort)  -AC     Rolling Right [place] (Bed Mobility) moderate assist (50% patient effort);1 person assist  -AC     Assistive Device (Bed Mobility) bed rails;repositioning sheet  -AC     Comment, (Bed Mobility) Pt rolled to either side w/ modAx1 in order to place lift sling  -       Row Name 04/07/25 1113          Bed-Chair Transfer    Bed-Chair [place] (Transfers) dependent (less than 25% patient effort);2 person assist  -     Assistive Device (Bed-Chair Transfers) lift device  -       Row Name 04/07/25 1113          Sit-Stand Transfer    Sit-Stand [place] (Transfers) 2 person assist;verbal cues;nonverbal cues (demo/gesture);minimum assist (75% patient effort)  -     Assistive Device (Sit-Stand Transfers) walker, front-wheeled  -AC     Comment, (Sit-Stand Transfer) Pt complete 2 STS from bedside chair w/ minAx2 and LUE support. Pt demonstrated improved weight acceptance on LLE however continues to be limited by fear, confusion, and pain  -       Row Name 04/07/25 1113          Gait/Stairs (Locomotion)    [place] Level (Gait) moderate assist (50% patient effort);2 person assist;verbal cues;nonverbal cues (demo/gesture)  -     Assistive Device (Gait) other (see comments)  BUE support  -     Patient was able to Ambulate yes  -AC     Distance in Feet (Gait) 3  -AC     Deviations/Abnormal Patterns (Gait) bhavik decreased;festinating/shuffling;gait speed decreased;base of support, narrow  -AC     Bilateral Gait Deviations heel strike decreased;forward flexed posture  -AC     Left Sided Gait Deviations weight shift ability decreased  -     Comment, (Gait/Stairs) Pt took 3 steps forward w / BUE support and VC for step sequencing. Pt was able to complete a step  through gait pattern however was limited by significant pain and fear. Bedside chair was rolled to pt and pt slowly lowered to chair. No LOB or buckling noted  -       Row Name 04/07/25 1113          Mobility    Extremity Weight-bearing Status left upper extremity;left lower extremity  -AC     Left Upper Extremity (Weight-bearing Status) non weight-bearing (NWB)  -AC     Left Lower Extremity (Weight-bearing Status) other (see comments)  Protected weight bearing AAT  -AC               User Key  (r) = Recorded By, (t) = Taken By, (c) = Cosigned By      Initials Name Provider Type    AC Corin Roberto PT Physical Therapist                   Obj/Interventions       Row Name 04/07/25 1314          Balance    Balance Assessment sitting static balance;sitting dynamic balance;standing static balance;standing dynamic balance  -     Static Sitting Balance contact guard  -     Dynamic Sitting Balance minimal assist  -AC     Position, Sitting Balance unsupported;sitting in chair  -     Static Standing Balance minimal assist;2-person assist  -AC     Dynamic Standing Balance moderate assist;2-person assist  -AC     Position/Device Used, Standing Balance supported;other (see comments)  BUE support  -     Balance Interventions sitting;standing;sit to stand;supported;static;dynamic  -AC               User Key  (r) = Recorded By, (t) = Taken By, (c) = Cosigned By      Initials Name Provider Type     Corin Roberto PT Physical Therapist                   Goals/Plan    No documentation.                  Clinical Impression       Row Name 04/07/25 1316          Pain    Pain Location hip;shoulder  -AC     Pain Side/Orientation left  -AC     Pain Management Interventions activity modification encouraged;exercise or physical activity utilized;positioning techniques utilized  -AC     Response to Pain Interventions activity participation with tolerable pain  -       Row Name 04/07/25 1316          Pain Scale: FACES  Pre/Post-Treatment    Pain: FACES Scale, Pretreatment 4-->hurts little more  -AC     Posttreatment Pain Rating 4-->hurts little more  -AC       Row Name 04/07/25 1316          Plan of Care Review    Plan of Care Reviewed With patient;spouse;child  -     Progress improving  -     Outcome Evaluation Pt continues to be limited by pain, LLE weakness, and confusion this date. Pt was able to take 3 steps forward after completing 2 STS from bedside chair w/ modAx2 and BUE support this date. Pt did not demonstrate any buckling and was able to demonstrate a step through gait pattern however was limited by significant fear, confusion, difficulty sequening/following commands, and pain. Continue to progress poc as able.  -AC       Row Name 04/07/25 1316          Therapy Assessment/Plan (PT)    Rehab Potential (PT) fair  -     Criteria for Skilled Interventions Met (PT) yes  -AC     Therapy Frequency (PT) daily  -     Predicted Duration of Therapy Intervention (PT) 10 days  -       Row Name 04/07/25 1316          Vital Signs    O2 Delivery Pre Treatment room air  -AC     O2 Delivery Intra Treatment room air  -AC     O2 Delivery Post Treatment room air  -AC     Pre Patient Position Supine  -AC     Intra Patient Position Standing  -AC     Post Patient Position Sitting  -AC       Row Name 04/07/25 1316          Positioning and Restraints    Pre-Treatment Position in bed  -AC     Post Treatment Position chair  -AC     In Chair notified nsg;reclined;sitting;call light within reach;encouraged to call for assist;exit alarm on;on mechanical lift sling;waffle cushion;legs elevated;with family/caregiver  -               User Key  (r) = Recorded By, (t) = Taken By, (c) = Cosigned By      Initials Name Provider Type    AC Corin Roberto, PT Physical Therapist                   Outcome Measures       Row Name 04/07/25 5399          How much help from another person do you currently need...    Turning from your back to your side  while in flat bed without using bedrails? 1  -AC     Moving from lying on back to sitting on the side of a flat bed without bedrails? 1  -AC     Moving to and from a bed to a chair (including a wheelchair)? 1  -AC     Standing up from a chair using your arms (e.g., wheelchair, bedside chair)? 1  -AC     Climbing 3-5 steps with a railing? 1  -AC     To walk in hospital room? 1  -AC     AM-PAC 6 Clicks Score (PT) 6  -AC     Highest Level of Mobility Goal 2 --> Bed activities/dependent transfer  -AC       Row Name 04/07/25 1319          Functional Assessment    Outcome Measure Options AM-PAC 6 Clicks Basic Mobility (PT)  -               User Key  (r) = Recorded By, (t) = Taken By, (c) = Cosigned By      Initials Name Provider Type    Corin Aguiar, PT Physical Therapist                                 Physical Therapy Education       Title: PT OT SLP Therapies (In Progress)       Topic: Physical Therapy (In Progress)       Point: Mobility training (In Progress)       Learning Progress Summary            Patient Acceptance, E, NR,NL by  at 4/7/2025 1319    Acceptance, E, NR by  at 4/6/2025 1147    Acceptance, E, NR by  at 4/5/2025 1035                      Point: Home exercise program (In Progress)       Learning Progress Summary            Patient Acceptance, E, NR,NL by  at 4/7/2025 1319    Acceptance, E, NR by  at 4/6/2025 1147    Acceptance, E, NR by  at 4/5/2025 1035                      Point: Body mechanics (In Progress)       Learning Progress Summary            Patient Acceptance, E, NR,NL by  at 4/7/2025 1319    Acceptance, E, NR by  at 4/6/2025 1147    Acceptance, E, NR by  at 4/5/2025 1035                      Point: Precautions (In Progress)       Learning Progress Summary            Patient Acceptance, E, NR,NL by  at 4/7/2025 1319    Acceptance, E, NR by  at 4/6/2025 1147    Acceptance, E, NR by  at 4/5/2025 1035                                      User Key       Initials  Effective Dates Name Provider Type Discipline     07/11/24 -  Corin Roebrto PT Physical Therapist PT                  PT Recommendation and Plan  Planned Therapy Interventions (PT): balance training, bed mobility training, gait training, home exercise program, patient/family education, strengthening, transfer training, postural re-education, ROM (range of motion)  Progress: improving  Outcome Evaluation: Pt continues to be limited by pain, LLE weakness, and confusion this date. Pt was able to take 3 steps forward after completing 2 STS from bedside chair w/ modAx2 and BUE support this date. Pt did not demonstrate any buckling and was able to demonstrate a step through gait pattern however was limited by significant fear, confusion, difficulty sequening/following commands, and pain. Continue to progress poc as able.     Time Calculation:   PT Evaluation Complexity  History, PT Evaluation Complexity: 1-2 personal factors and/or comorbidities  Examination of Body Systems (PT Eval Complexity): total of 3 or more elements  Clinical Presentation (PT Evaluation Complexity): evolving  Clinical Decision Making (PT Evaluation Complexity): moderate complexity  Overall Complexity (PT Evaluation Complexity): moderate complexity     PT Charges       Row Name 04/07/25 1320             Time Calculation    Start Time 0942  -AC      PT Received On 04/07/25  -      PT Goal Re-Cert Due Date 04/15/25  -         Time Calculation- PT    Total Timed Code Minutes- PT 42 minute(s)  -AC         Timed Charges    03627 - Gait Training Minutes  11  -AC      73609 - PT Therapeutic Activity Minutes 31  -AC         Total Minutes    Timed Charges Total Minutes 42  -AC       Total Minutes 42  -AC                User Key  (r) = Recorded By, (t) = Taken By, (c) = Cosigned By      Initials Name Provider Type    AC Corin Roberto PT Physical Therapist                  Therapy Charges for Today       Code Description Service Date Service Provider  Modifiers Qty    20855562382 HC PT THERAPEUTIC ACT EA 15 MIN 4/6/2025 Corin Roberto, PT GP 2    76247695783 HC GAIT TRAINING EA 15 MIN 4/7/2025 Corin Roberto, PT GP 1    71093982731 HC PT THERAPEUTIC ACT EA 15 MIN 4/7/2025 Corin Roberto, PT GP 2            PT G-Codes  Outcome Measure Options: AM-PAC 6 Clicks Basic Mobility (PT)  AM-PAC 6 Clicks Score (PT): 6  AM-PAC 6 Clicks Score (OT): 13  PT Discharge Summary  Anticipated Discharge Disposition (PT): skilled nursing facility    Corin Roberto, PT  4/7/2025

## 2025-04-08 LAB
ANION GAP SERPL CALCULATED.3IONS-SCNC: 10 MMOL/L (ref 5–15)
BUN SERPL-MCNC: 17 MG/DL (ref 8–23)
BUN/CREAT SERPL: 30.9 (ref 7–25)
CALCIUM SPEC-SCNC: 8.3 MG/DL (ref 8.6–10.5)
CHLORIDE SERPL-SCNC: 99 MMOL/L (ref 98–107)
CO2 SERPL-SCNC: 22 MMOL/L (ref 22–29)
CREAT SERPL-MCNC: 0.55 MG/DL (ref 0.57–1)
DEPRECATED RDW RBC AUTO: 44.4 FL (ref 37–54)
EGFRCR SERPLBLD CKD-EPI 2021: 91.6 ML/MIN/1.73
ERYTHROCYTE [DISTWIDTH] IN BLOOD BY AUTOMATED COUNT: 13 % (ref 12.3–15.4)
GLUCOSE SERPL-MCNC: 91 MG/DL (ref 65–99)
HCT VFR BLD AUTO: 25.5 % (ref 34–46.6)
HGB BLD-MCNC: 8.2 G/DL (ref 12–15.9)
MAGNESIUM SERPL-MCNC: 1.7 MG/DL (ref 1.6–2.4)
MCH RBC QN AUTO: 30.4 PG (ref 26.6–33)
MCHC RBC AUTO-ENTMCNC: 32.2 G/DL (ref 31.5–35.7)
MCV RBC AUTO: 94.4 FL (ref 79–97)
PLATELET # BLD AUTO: 101 10*3/MM3 (ref 140–450)
PMV BLD AUTO: 9 FL (ref 6–12)
POTASSIUM SERPL-SCNC: 3.8 MMOL/L (ref 3.5–5.2)
RBC # BLD AUTO: 2.7 10*6/MM3 (ref 3.77–5.28)
SODIUM SERPL-SCNC: 131 MMOL/L (ref 136–145)
WBC NRBC COR # BLD AUTO: 5.67 10*3/MM3 (ref 3.4–10.8)

## 2025-04-08 PROCEDURE — 97530 THERAPEUTIC ACTIVITIES: CPT

## 2025-04-08 PROCEDURE — 83735 ASSAY OF MAGNESIUM: CPT | Performed by: PHYSICIAN ASSISTANT

## 2025-04-08 PROCEDURE — 85027 COMPLETE CBC AUTOMATED: CPT | Performed by: PHYSICIAN ASSISTANT

## 2025-04-08 PROCEDURE — 80048 BASIC METABOLIC PNL TOTAL CA: CPT | Performed by: PHYSICIAN ASSISTANT

## 2025-04-08 PROCEDURE — 97110 THERAPEUTIC EXERCISES: CPT

## 2025-04-08 PROCEDURE — 25010000002 MAGNESIUM SULFATE 4 GM/100ML SOLUTION: Performed by: PHYSICIAN ASSISTANT

## 2025-04-08 PROCEDURE — 99232 SBSQ HOSP IP/OBS MODERATE 35: CPT | Performed by: PHYSICIAN ASSISTANT

## 2025-04-08 RX ORDER — MAGNESIUM SULFATE HEPTAHYDRATE 40 MG/ML
4 INJECTION, SOLUTION INTRAVENOUS ONCE
Status: COMPLETED | OUTPATIENT
Start: 2025-04-08 | End: 2025-04-08

## 2025-04-08 RX ADMIN — Medication 5 MG: at 20:54

## 2025-04-08 RX ADMIN — OXYCODONE HYDROCHLORIDE 2.5 MG: 5 TABLET ORAL at 20:54

## 2025-04-08 RX ADMIN — ATORVASTATIN CALCIUM 40 MG: 40 TABLET, FILM COATED ORAL at 20:54

## 2025-04-08 RX ADMIN — PANTOPRAZOLE SODIUM 40 MG: 40 TABLET, DELAYED RELEASE ORAL at 08:46

## 2025-04-08 RX ADMIN — METOPROLOL SUCCINATE 25 MG: 25 TABLET, EXTENDED RELEASE ORAL at 08:46

## 2025-04-08 RX ADMIN — Medication 10 ML: at 08:46

## 2025-04-08 RX ADMIN — ASPIRIN 81 MG: 81 TABLET, COATED ORAL at 20:54

## 2025-04-08 RX ADMIN — SENNOSIDES AND DOCUSATE SODIUM 2 TABLET: 50; 8.6 TABLET ORAL at 08:46

## 2025-04-08 RX ADMIN — ACETAMINOPHEN 1000 MG: 500 TABLET, FILM COATED ORAL at 06:18

## 2025-04-08 RX ADMIN — OXYCODONE HYDROCHLORIDE 2.5 MG: 5 TABLET ORAL at 10:12

## 2025-04-08 RX ADMIN — Medication 10 ML: at 21:00

## 2025-04-08 RX ADMIN — ASPIRIN 81 MG: 81 TABLET, COATED ORAL at 08:46

## 2025-04-08 RX ADMIN — MAGNESIUM SULFATE IN WATER FOR 4 G: 40 INJECTION INTRAVENOUS at 08:45

## 2025-04-08 RX ADMIN — DIVALPROEX SODIUM 500 MG: 250 TABLET, DELAYED RELEASE ORAL at 17:44

## 2025-04-08 RX ADMIN — BUDESONIDE 3 MG: 3 CAPSULE, COATED PELLETS ORAL at 08:46

## 2025-04-08 RX ADMIN — ACETAMINOPHEN 1000 MG: 500 TABLET, FILM COATED ORAL at 20:56

## 2025-04-08 RX ADMIN — TAMSULOSIN HYDROCHLORIDE 0.4 MG: 0.4 CAPSULE ORAL at 08:46

## 2025-04-08 RX ADMIN — DIVALPROEX SODIUM 500 MG: 250 TABLET, DELAYED RELEASE ORAL at 06:18

## 2025-04-08 RX ADMIN — ACETAMINOPHEN 1000 MG: 500 TABLET, FILM COATED ORAL at 14:15

## 2025-04-08 NOTE — THERAPY TREATMENT NOTE
Patient Name: Jenna Russell  : 1940    MRN: [id number]                              Today's Date: 2025       Admit Date: 4/3/2025    Visit Dx:     ICD-10-CM ICD-9-CM   1. Periprosthetic hip fracture, initial encounter  M97.8XXA 996.44    Z96.649 V43.64   2. Closed fracture of distal end of left radius, unspecified fracture morphology, initial encounter  S52.502A 813.42   3. Facial laceration, initial encounter  S01.81XA 873.40     Patient Active Problem List   Diagnosis    PAF (paroxysmal atrial fibrillation)    HLD (hyperlipidemia)    Hypertension    History of CVA (cerebrovascular accident)    Combined receptive and expressive aphasia    Colitis, Clostridium difficile    Collagenous colitis    Seizure disorder    Closed right hip fracture, initial encounter    Anemia    Daily consumption of alcohol    Complex partial seizure    Right sided weakness    Severe protein-calorie malnutrition    Hip fracture     Past Medical History:   Diagnosis Date    Acute ischemic stroke     Arterial ischemic stroke 2016    Ataxic aphasia 2016    BP (high blood pressure) 2016    Cerebral infarction, left hemisphere 2016    Cyst of left ovary 2016    3cm    Expressive aphasia     H/O echocardiogram 2015    Global LV wall motion and contractility within normal limits. Normal LVSF.Normal LV diastolic filling. Left atrium midly dilated. RV mild- mod dilated. MIld aortic cusp sclerosis. No evidence of mitral valve prolapse. Mild mitral regurgitation.No pulmonary hypertension or perdicardial effusion. No dilation of aortic root. Venous system appears normal    H/O: stroke     HLD (hyperlipidemia) 2016    Left temporal lobe infarction     LOM (loss of memory)     Osteoarthritis 2016    Paroxysmal atrial fibrillation 2016    Thyroid nodule 2016    1.8x1.2cm    Weakness generalized     Wound infection after surgery     Left hip arthroplasty, staph aureus, 6 weeks IV Rocephin     Past  Surgical History:   Procedure Laterality Date    ABDOMINOPLASTY      BREAST ABSCESS INCISION AND DRAINAGE      HIP TROCHANTERIC NAILING WITH INTRAMEDULLARY HIP SCREW Right 3/11/2022    Procedure: HIP TROCHANTERIC NAILING WITH INTRAMEDULLARY HIP SCREW RIGHT;  Surgeon: Danny Lynne MD;  Location: [facility];  Service: Orthopedics;  Laterality: Right;    TOTAL HIP ARTHROPLASTY Left     Staph aureus wound infection      General Information       Row Name 04/08/25 1108          Physical Therapy Time and Intention    Document Type therapy note (daily note)  -SC     Mode of Treatment physical therapy  -SC       Row Name 04/08/25 1108          General Information    Patient Profile Reviewed yes  -SC     Existing Precautions/Restrictions fall;left;other (see comments)  no active hip abd, protected wt bearing AAT, L radius fx, Hx of CVA  -SC       Row Name 04/08/25 1108          Cognition    Orientation Status (Cognition) oriented to;person;disoriented to;place;situation;time  -SC       Row Name 04/08/25 1108          Safety Issues/Impairments Affecting Functional Mobility    Impairments Affecting Function (Mobility) balance;cognition;endurance/activity tolerance;strength;pain  -SC     Comment, Safety Issues/Impairments (Mobility) lethergic, following 50% commands, fearful of mobility  -SC               User Key  (r) = Recorded By, (t) = Taken By, (c) = Cosigned By      Initials Name Provider Type    SC Lizbeth Gardner PT Physical Therapist                   Mobility       Row Name 04/08/25 1109          Bed Mobility    Bed Mobility supine-sit  -SC     Supine-Sit [place] (Bed Mobility) dependent (less than 25% patient effort);2 person assist  -SC     Assistive Device (Bed Mobility) head of bed elevated;bed rails;repositioning sheet  -SC     Comment, (Bed Mobility) up to edge of bed with little participation. Leans to right on sitting. Time taken to work on midline orientation. ARjo walker platform used to rest  arm on and stabilize midline. Improved with time,  -SC       Row Name 04/08/25 1109          Transfers    Comment, (Transfers) STS from EOB with ARJO .L UE resting on platform. Time taken in standing to readjust legs. Knee immobilizer on L leg for stability  -SC       Row Name 04/08/25 1109          Sit-Stand Transfer    Sit-Stand [place] (Transfers) 2 person assist;verbal cues;nonverbal cues (demo/gesture);moderate assist (50% patient effort)  -SC     Assistive Device (Sit-Stand Transfers) other (see comments)  used arjo walker and PT boost to get to standing  -[facility] Name 04/08/25 1109          Gait/Stairs (Locomotion)    [place] Level (Gait) moderate assist (50% patient effort);2 person assist;verbal cues;nonverbal cues (demo/gesture)  -SC     Assistive Device (Gait) other (see comments)  ARJO walker used with L UE resting on pad  -SC     Distance in Feet (Gait) 6  -SC     Deviations/Abnormal Patterns (Gait) gait speed decreased;left sided deviations;stride length decreased;weight shifting decreased;base of support, narrow;antalgic  -SC     Bilateral Gait Deviations forward flexed posture  -SC     Left Sided Gait Deviations weight shift ability decreased  -SC     Comment, (Gait/Stairs) Focused on advancing legs with ambulation. Requierd some assistance to take steps with L leg. Encouraged upright posture. Followed by chair. Patient very fearful crying out loud.  -[facility] Name 04/08/25 1109          Mobility    Extremity Weight-bearing Status left upper extremity;left lower extremity  -SC     Left Upper Extremity (Weight-bearing Status) non weight-bearing (NWB)  -SC     Left Lower Extremity (Weight-bearing Status) other (see comments)  protected wt bearing  -SC               User Key  (r) = Recorded By, (t) = Taken By, (c) = Cosigned By      Initials Name Provider Type    SC Lizbeth Gardner PT Physical Therapist                   Obj/Interventions       [facility] Name 04/08/25 1116          Motor  Skills    Therapeutic Exercise hip;knee;ankle  -[facility] Name 04/08/25 1116          Hip (Therapeutic Exercise)    Hip (Therapeutic Exercise) AAROM (active assistive range of motion)  -SC     Hip AAROM (Therapeutic Exercise) left;flexion;extension;10 repetitions  -[facility] Name 04/08/25 1116          Knee (Therapeutic Exercise)    Knee (Therapeutic Exercise) isometric exercises;strengthening exercise  -SC     Knee Isometrics (Therapeutic Exercise) left;10 repetitions;quad sets  -SC     Knee Strengthening (Therapeutic Exercise) left;SAQ (short arc quad);10 repetitions  -[facility] Name 04/08/25 1116          Ankle (Therapeutic Exercise)    Ankle (Therapeutic Exercise) AROM (active range of motion)  -SC     Ankle AROM (Therapeutic Exercise) bilateral;dorsiflexion;plantarflexion;10 repetitions  -SC       Row Name 04/08/25 1116          Balance    Balance Assessment sitting static balance;standing static balance;standing dynamic balance  -SC     Static Sitting Balance moderate assist  -SC     Dynamic Sitting Balance moderate assist;2-person assist  -SC     Position, Sitting Balance unsupported  -SC     Static Standing Balance 2-person assist;maximum assist  -SC     Dynamic Standing Balance maximum assist;2-person assist  -SC     Position/Device Used, Standing Balance supported  -SC     Comment, Balance Leans to right on sitting. Time taken to work on midline orientation. [employer] walker platform used to rest arm on and stabilize midline. Improved with time.  -SC               User Key  (r) = Recorded By, (t) = Taken By, (c) = Cosigned By      Initials Name Provider Type    SC Lizbeth Gardner PT Physical Therapist                   Goals/Plan    No documentation.                  Clinical Impression       [facility] Name 04/08/25 1120          Pain    Pain Location hip  -SC     Pain Side/Orientation left  -SC     Pain Management Interventions positioning techniques utilized  -SC     Response to Pain Interventions  functional ability unchanged  -SC       Row Name 04/08/25 1120          Pain Scale: FACES Pre/Post-Treatment    Pain: FACES Scale, Pretreatment 0-->no hurt  -SC     Posttreatment Pain Rating 6-->hurts even more  -SC       Row Name 04/08/25 1120          Plan of Care Review    Plan of Care Reviewed With patient  -SC     Progress no change  -SC     Outcome Evaluation Patient is very fearful about moving. Knee immobilizer used for extra stability today however it made sitting down very difficult. She has significant balance deficits. Continue to recomment rehab  -[facility] 04/08/25 1120          Therapy Assessment/Plan (PT)    Patient/Family Therapy Goals Statement (PT) did not state  -SC     Rehab Potential (PT) fair  -SC     Criteria for Skilled Interventions Met (PT) yes  -SC     Therapy Frequency (PT) daily  -SC       Row Name 04/08/25 1120          Vital Signs    Pre Systolic BP Rehab 143  -SC     Pre Treatment Diastolic BP 60  -SC     Posttreatment Heart Rate (beats/min) 52  -SC     Post SpO2 (%) 100  -SC     O2 Delivery Post Treatment room air  -[facility] 04/08/25 1120          Positioning and Restraints    Pre-Treatment Position in bed  -SC     Post Treatment Position chair  -SC     In Chair notified nsg;reclined;sitting;call light within reach;encouraged to call for assist;exit alarm on;with family/caregiver  -SC               User Key  (r) = Recorded By, (t) = Taken By, (c) = Cosigned By      Initials Name Provider Type    SC Lizbeth Gardner, PT Physical Therapist                   Outcome Measures       Row Name 04/08/25 1124 04/08/25 0800       How much help from another person do you currently need...    Turning from your back to your side while in flat bed without using bedrails? 2  -SC 2  -AC    Moving from lying on back to sitting on the side of a flat bed without bedrails? 1  -SC 2  -AC    Moving to and from a bed to a chair (including a wheelchair)? 2  -SC 1  -AC    Standing up from a  chair using your arms (e.g., wheelchair, bedside chair)? 2  -SC 1  -AC    Climbing 3-5 steps with a railing? 1  -SC 1  -AC    To walk in hospital room? 2  -SC 1  -AC    AM-PAC 6 Clicks Score (PT) 10  -SC 8  -    Highest Level of Mobility Goal 4 --> Transfer to chair/commode  -SC 3 --> Sit at edge of bed  -      Row Name 04/08/25 1124          Functional Assessment    Outcome Measure Options AM-PAC 6 Clicks Basic Mobility (PT)  -SC               User Key  (r) = Recorded By, (t) = Taken By, (c) = Cosigned By      Initials Name Provider Type    SC Lizbeth Gardner PT Physical Therapist    Liane Keller RN Registered Nurse                                 Physical Therapy Education       Title: PT OT SLP Therapies (In Progress)       Topic: Physical Therapy (Done)       Point: Mobility training (Done)       Learning Progress Summary            Patient Acceptance, E, VU,NR by SC at 4/8/2025 1125    Comment: reviewed HEP    Acceptance, E, NR,NL by  at 4/7/2025 1319    Acceptance, E, NR by  at 4/6/2025 1147    Acceptance, E, NR by  at 4/5/2025 1035                      Point: Home exercise program (Done)       Learning Progress Summary            Patient Acceptance, E, VU,NR by SC at 4/8/2025 1125    Comment: reviewed HEP    Acceptance, E, NR,NL by  at 4/7/2025 1319    Acceptance, E, NR by  at 4/6/2025 1147    Acceptance, E, NR by  at 4/5/2025 1035                      Point: Body mechanics (Done)       Learning Progress Summary            Patient Acceptance, E, VU,NR by SC at 4/8/2025 1125    Comment: reviewed HEP    Acceptance, E, NR,NL by  at 4/7/2025 1319    Acceptance, E, NR by  at 4/6/2025 1147    Acceptance, E, NR by  at 4/5/2025 1035                      Point: Precautions (Done)       Learning Progress Summary            Patient Acceptance, E, VU,NR by SC at 4/8/2025 1125    Comment: reviewed HEP    Acceptance, E, NR,NL by  at 4/7/2025 1319    Acceptance, E, NR by  at 4/6/2025 1147     Acceptance, E, NR by  at 4/5/2025 1035                                      User Key       Initials Effective Dates Name Provider Type Discipline    SC 02/03/23 -  Lizbeth Gardner PT Physical Therapist PT    AC 07/11/24 -  Corin Roberto PT Physical Therapist PT                  PT Recommendation and Plan     Progress: no change  Outcome Evaluation: Patient is very fearful about moving. Knee immobilizer used for extra stability today however it made sitting down very difficult. She has significant balance deficits. Continue to recomment rehab     Time Calculation:         PT Charges       Row Name 04/08/25 1032             Time Calculation    Start Time 1032  -SC      PT Received On 04/08/25  -SC      PT Goal Re-Cert Due Date 04/15/25  -SC         Timed Charges    50340 - PT Therapeutic Exercise Minutes 10  -SC      09303 - Gait Training Minutes  5  -SC      11046 - PT Therapeutic Activity Minutes 15  -SC         Total Minutes    Timed Charges Total Minutes 30  -SC       Total Minutes 30  -SC                User Key  (r) = Recorded By, (t) = Taken By, (c) = Cosigned By      Initials Name Provider Type    SC Lizbeth Gardner PT Physical Therapist                  Therapy Charges for Today       Code Description Service Date Service Provider Modifiers Qty    08220991196 HC PT THER PROC EA 15 MIN 4/8/2025 Lizbeth Gardner, PT GP 1    17655597824 HC PT THERAPEUTIC ACT EA 15 MIN 4/8/2025 Lizbeth Gardner, PT GP 1    66433588055 HC PT THER SUPP EA 15 MIN 4/8/2025 Lizbeth Gardner, PT GP 2            PT G-Codes  Outcome Measure Options: AM-PAC 6 Clicks Basic Mobility (PT)  AM-PAC 6 Clicks Score (PT): 10  AM-PAC 6 Clicks Score (OT): 13  PT Discharge Summary  Anticipated Discharge Disposition (PT): skilled nursing facility    Lizbeth Gardner PT  4/8/2025

## 2025-04-08 NOTE — PROGRESS NOTES
"          Clinical Nutrition Assessment     Patient Name: Jenna Russell  YOB: 1940  MRN: [id number]  Date of Encounter: 04/08/25 12:01 EDT  Admission date: 4/3/2025  Reason for Visit: Follow-up protocol    Assessment   Nutrition Assessment   Admission Diagnosis:  Hip fracture [S72.009A]    Problem List:    Hip fracture      PMH:   She  has a past medical history of Acute ischemic stroke, Arterial ischemic stroke (9/8/2016), 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    PSH:  She  has a past surgical history that includes Total hip arthroplasty (Left); Abdominoplasty; Breast Abscess Incision and Drainage; and Hip Trochanteric Nailing (Right, 3/11/2022).    Applicable Nutrition History:   [place]    Malnutrition Hx:  ASM (11/25/24)    Anthropometrics     Height: Height: 170.2 cm (67\")  Last Filed Weight: Weight: 52 kg (114 lb 10.2 oz) (04/04/25 0044)  Method: Weight Method: Stated  BMI: BMI (Calculated): 18    UBW:     Weight      Weight (kg) Weight (lbs) Weight Method   9/4/2024 53.1 kg  117 lb 1 oz     11/23/2024 53.978 kg  119 lb  Bed scale    11/24/2024 54 kg  119 lb 0.8 oz     2/20/2025 52.617 kg  116 lb  Stated    4/3/2025 52 kg  114 lb 10.2 oz  Stated    4/4/2025 52 kg  114 lb 10.2 oz       Weight change: No significant changes    Nutrition Focused Physical Exam    Date:  4/4       Patient meets criteria for malnutrition diagnosis, see MSA note.     Subjective   Reported/Observed/Food/Nutrition Related History:   4/8  Eating about 25-75% of meals per documentation.  Family at bedside at time of " visit, reports patient appetite is fair. She does not like magic cup or fortified yogurt.  Family feels patient will eat regular yogurt. They would like to try Boost with dinner, if she likes it, send more often.  Ice cream with lunch and dinner.     4/4  Pt laying in bed at time of visit, spouse present in room at time of visit. Pt aroused to voice however current confusion reported. Primary nutrition hx provided by spouse. Reports pt having a healthy appetite however 2/2 not taking medication for microscopic colitis has diarrhea following intake. Suspect malabsorption. No concern for dysphagia. Tried ONS on prior admission, disliked; agreeable to trial alternative supplements. Known food allergy listed in chart.     Current Nutrition Prescription   PO: Diet: Regular/House; Fluid Consistency: Thin (IDDSI 0)  Oral Nutrition Supplement: Magic cup BID; Fortified yogurt daily   Intake: 3 Days: 42% x 6 meals     Assessment & Plan   Nutrition Diagnosis   Date:  4/4 Updated:  Problem Malnutrition, chronic severe   Etiology ? malabsorption   Signs/Symptoms severe muscle wasting and severe subcutaneous fat loss   Status: New    Date:  4/4            Updated:  4/8  Problem Inadequate oral intake    Etiology Clinical status   Signs/Symptoms 42% x 6 meals    Status: Active    Goal:   Nutrition to support treatment and Increase intake      Nutrition Intervention      Follow treatment progress, Care plan reviewed, Await begin PO    Nutrition POC:  Boost with dinner daily (vanilla)  Yogurt with Breakfast daily   Ice cream with lunch and dinner  Menu prefs noted; encouraged intake     Monitoring/Evaluation:   Per protocol, I&O, Pertinent labs, Symptoms, POC/GOC    Pura Quiles RD  Time Spent: 25min

## 2025-04-08 NOTE — PLAN OF CARE
Goal Outcome Evaluation:  Plan of Care Reviewed With: patient, significant other        Progress: no change  Outcome Evaluation: Alert to self only. VSS. Up with the lift, very fearful about falling. PO medication controlling pain. Purewick in place. Sleeping off and on most of the day today.

## 2025-04-08 NOTE — PROGRESS NOTES
[facility] Medicine Services  PROGRESS NOTE    Patient Name: Jenna Russell  : 1940  MRN: [id number]    Date of Admission: 4/3/2025  Primary Care Physician: Judi Rodriguez MD    Subjective     CC: f/u L hip fracture    HPI:  In bed. Wife and daughter are at bedside. Patient slept well last night, reports LUE pain.     Objective     Vital Signs:   Temp:  [96.6 °F (35.9 °C)-98.4 °F (36.9 °C)] 96.6 °F (35.9 °C)  Heart Rate:  [51-67] 51  Resp:  [14-18] 14  BP: (115-168)/(53-71) 115/58     Physical Exam:  Constitutional: No acute distress, awake, alert and conversant. Sitting up in chair  HENT: NCAT, mucous membranes moist. Facial ecchymosis (L > R)  Respiratory: Clear to auscultation bilaterally, normal respiratory effort on room air   Cardiovascular: RRR  Gastrointestinal: Positive bowel sounds, soft, nontender, nondistended  Musculoskeletal: No bilateral ankle edema. LUE in splint.   Psychiatric: Appropriate affect, cooperative with exam  Neurologic: Moves all extremities spontaneously without focal deficits, speech clear but expressive aphasia noted     Results Reviewed:  LAB RESULTS:      Lab 25  0540 25  1013 25  1022 25  1418 25  2202   WBC 5.67 7.22 6.12 5.68 6.77   HEMOGLOBIN 8.2* 8.4* 9.1* 6.9* 9.3*   HEMATOCRIT 25.5* 24.5* 27.1* 21.0* 28.6*   PLATELETS 101* 96* 92* 87* 114*   NEUTROS ABS  --   --   --  3.17 4.98   IMMATURE GRANS (ABS)  --   --   --  0.01 0.03   LYMPHS ABS  --   --   --  1.52 1.02   MONOS ABS  --   --   --  0.94* 0.68   EOS ABS  --   --   --  0.03 0.05   MCV 94.4 90.4 90.9 94.2 93.8         Lab 25  0540 25  2127 25  1013 25  1023 25  1312 25  2202   SODIUM 131*  --  134* 132* 128* 130*   POTASSIUM 3.8 4.5 3.6 3.8 4.3 4.5   CHLORIDE 99  --  98 97* 95* 94*   CO2 22.0  --  27.0 26.0 27.0 26.0   ANION GAP 10.0  --  9.0 9.0 6.0 10.0   BUN 17  --  8 7* 11 14   CREATININE 0.55*  --  0.49* 0.57  0.56* 0.74   EGFR 91.6  --  94.2 90.9 91.3 80.9   GLUCOSE 91  --  87 107* 89 119*   CALCIUM 8.3*  --  8.4* 8.9 8.2* 8.8   MAGNESIUM 1.7  --   --   --   --   --          Lab 04/04/25  1312 04/03/25  2202   TOTAL PROTEIN 4.4* 5.6*   ALBUMIN 2.8* 3.4*   GLOBULIN 1.6 2.2   ALT (SGPT) <5 5   AST (SGOT) 11 16   BILIRUBIN 0.4 0.2   ALK PHOS 39 57         Lab 04/04/25  1542   ABO TYPING O   RH TYPING Positive   ANTIBODY SCREEN Negative     Brief Urine Lab Results  (Last result in the past 365 days)        Color   Clarity   Blood   Leuk Est   Nitrite   Protein   CREAT   Urine HCG        04/05/25 0747 Yellow   Clear   Trace   Trace   Negative   Negative                 Microbiology Results Abnormal       None          No radiology results from the last 24 hrs    Results for orders placed during the hospital encounter of 11/23/24    Adult Transthoracic Echo Complete W/ Cont if Necessary Per Protocol (With Agitated Saline)    Interpretation Summary    Left ventricular ejection fraction appears to be 56 - 60%.    Left ventricular diastolic function was normal.    Left atrial volume is mildly increased.    Moderate aortic valve regurgitation is present.    Estimated right ventricular systolic pressure from tricuspid regurgitation is mildly elevated (35-45 mmHg). Calculated right ventricular systolic pressure from tricuspid regurgitation is 36 mmHg.    Current medications:  Scheduled Meds:acetaminophen, 1,000 mg, Oral, Q8H  aspirin, 81 mg, Oral, BID  atorvastatin, 40 mg, Oral, Nightly  Budesonide, 3 mg, Oral, Daily  divalproex, 500 mg, Oral, Q12H  [Held by provider] lisinopril, 10 mg, Oral, Daily  melatonin, 5 mg, Oral, Nightly  metoprolol succinate XL, 25 mg, Oral, Daily  pantoprazole, 40 mg, Oral, Daily  senna-docusate sodium, 2 tablet, Oral, BID  sodium chloride, 10 mL, Intravenous, Q12H  tamsulosin, 0.4 mg, Oral, Daily      Continuous Infusions:     PRN Meds:.  ALPRAZolam    senna-docusate sodium **AND** polyethylene glycol  **AND** bisacodyl **AND** bisacodyl    Calcium Replacement - Follow Nurse / BPA Driven Protocol    Magnesium Standard Dose Replacement - Follow Nurse / BPA Driven Protocol    Morphine **AND** naloxone    nitroglycerin    ondansetron    oxyCODONE    Phosphorus Replacement - Follow Nurse / BPA Driven Protocol    Potassium Replacement - Follow Nurse / BPA Driven Protocol    sodium chloride    sodium chloride    Assessment & Plan     Active Hospital Problems    Diagnosis  POA    **Hip fracture [S72.009A]  Yes      Resolved Hospital Problems   No resolved problems to display.     Brief Hospital Course to date:  Jenna Russell is an 84 y.o. female with PMH significant for HTN, HLD, PAF, prior CVA with residual expressive aphasia, seizure disorder and dementia. Brought to [facility] ED on 4/3/25 for evaluation after a fall. Found to have periprosthetic L hip fracture. Orthopedics recommended non-operative management.      Left periprosthetic hip fracture  - CT pelvis showed greater trochanter periprosthetic fracture with some extension into the lesser trochanter.  -Orthopedics consulted. Recommend nonoperative management. Protected WB with a walker at all times. No active hip ABduction   - SCDs for DVT prophylaxis + ASA BID  - Continue APAP 1000mg TID, Oxycodone 2.5mg Q4H PRN  - Follow up with Dr. Hoyt in 2 weeks    Left radius fracture  - Ortho has evaluated - nonoperative management  - Keep wrist splint in place until OP follow up    Acute on chronic anemia  - S/p 1 unit PRBC, with appropriate response    Chronic thrombocytopenia  - Platelets 114 on admission, down to 96 on 4/6  - 101 today - overall stable     Urinary retention  - 4/5 UA reassuring  - Started on Tamsulosin  - Keep bowels moving  - Continue urinary retention protocol, straight cath as needed    Hypotension, resolved  - s/p IVF    HLD-continue Atorvastatin  HTN, continue Metoprolol. Lisinopril on hold   Seizure disorder-continue Depakote BID  Memory  impairment/dementia-previously on Donepezil  GERD-PPI  Documented history of prior A-fib-not on anticoagulation, continue Metoprolol. Continue to monitor on telemetry    Expected Discharge Location and Transportation: [facility]  Expected Discharge Expected Discharge Date: 4/9/2025; Expected Discharge Time:      VTE Prophylaxis:Mechanical VTE prophylaxis orders are present.    AM-PAC 6 Clicks Score (PT): 10 (04/08/25 1124)    CODE STATUS:   Code Status and Medical Interventions: No CPR (Do Not Attempt to Resuscitate); Limited Support; No intubation (DNI)   Ordered at: 04/03/25 7100     Code Status (Patient has no pulse and is not breathing):    No CPR (Do Not Attempt to Resuscitate)     Medical Interventions (Patient has pulse or is breathing):    Limited Support     Medical Intervention Limits:    No intubation (DNI)     Floresita Vicente PA-C  04/08/25

## 2025-04-08 NOTE — PLAN OF CARE
Goal Outcome Evaluation:                                            Problem: Adult Inpatient Plan of Care  Goal: Absence of Hospital-Acquired Illness or Injury  Intervention: Identify and Manage Fall Risk  Description: Perform standard risk assessment on admission using a validated tool or comprehensive approach appropriate to the patient; reassess fall risk frequently, with change in status or transfer to another level of care.Communicate risk to interprofessional healthcare team; ensure fall risk visible cue.Determine need for increased observation, equipment and environmental modification, as well as use of supportive, nonskid footwear.Adjust safety measures to individual needs and identified risk factors.Reinforce the importance of active participation with fall risk prevention, safety, and physical activity with the patient and family.Perform regular intentional rounding to assess need for position change, pain assessment and personal needs, including assistance with toileting.  Recent Flowsheet Documentation  Taken 4/8/2025 0400 by Katelynn Henson, RN  Safety Promotion/Fall Prevention:   nonskid shoes/slippers when out of bed   fall prevention program maintained   elopement precautions   clutter free environment maintained  Taken 4/8/2025 0200 by Katelynn Henson, RN  Safety Promotion/Fall Prevention:   nonskid shoes/slippers when out of bed   fall prevention program maintained   elopement precautions   clutter free environment maintained  Taken 4/8/2025 0000 by Katelynn Henson, RN  Safety Promotion/Fall Prevention:   nonskid shoes/slippers when out of bed   fall prevention program maintained   elopement precautions   clutter free environment maintained  Taken 4/7/2025 2200 by Katelynn Henson, RN  Safety Promotion/Fall Prevention:   nonskid shoes/slippers when out of bed   fall prevention program maintained   elopement precautions   clutter free environment maintained  Taken 4/7/2025 2000 by  Katelynn Henson, RN  Safety Promotion/Fall Prevention:   nonskid shoes/slippers when out of bed   fall prevention program maintained   elopement precautions   clutter free environment maintained  Intervention: Prevent Skin Injury  Description: Perform a screening for skin injury risk, such as pressure or moisture-associated skin damage on admission and at regular intervals throughout hospital stay.Keep all areas of skin (especially folds) clean and dry.Maintain adequate skin hydration.Relieve and redistribute pressure and protect bony prominences and skin at risk for injury; implement measures based on patient-specific risk factors.Match turning and repositioning schedule to clinical condition.Encourage weight shift frequently; assist with reposition if unable to complete independently.Float heels off bed; avoid pressure on the Achilles tendon.Keep skin free from extended contact with medical devices.Optimize nutrition and hydration.Encourage functional activity and mobility, as early as tolerated.Use aids (e.g., slide boards, mechanical lift) during transfer.  Recent Flowsheet Documentation  Taken 4/8/2025 0400 by Katelynn Henson RN  Body Position:   right   tilted  Skin Protection: incontinence pads utilized  Taken 4/8/2025 0300 by Katelynn Henson RN  Skin Protection: pulse oximeter probe site changed  Taken 4/8/2025 0200 by Katelynn Henson RN  Body Position:   left   tilted  Skin Protection: incontinence pads utilized  Taken 4/8/2025 0000 by Katelynn Henson RN  Body Position: supine  Skin Protection:   silicone foam dressing in place   transparent dressing maintained   incontinence pads utilized  Taken 4/7/2025 2200 by Katelynn Henson RN  Body Position:   weight shifting   upper extremity elevated  Skin Protection: incontinence pads utilized  Taken 4/7/2025 2000 by Katelynn Henson RN  Body Position:   left   weight shifting  Skin Protection: incontinence pads utilized  Intervention:  Prevent and Manage VTE (Venous Thromboembolism) Risk  Description: Assess for VTE (venous thromboembolism) risk.Promote early mobilization; encourage both active and passive leg exercises, if unable to ambulate.Initiate and maintain compression or other therapy, as indicated, based on identified risk in accordance with organizational protocol and provider order.Recognize the patient's individual risk for bleeding before initiating pharmacologic thromboprophylaxis.  Recent Flowsheet Documentation  Taken 4/8/2025 0400 by Katelynn Henson RN  VTE Prevention/Management:   SCDs (sequential compression devices) off   bilateral  Taken 4/8/2025 0200 by Katelynn Henson RN  VTE Prevention/Management: (increased agitation; aspirin)   SCDs (sequential compression devices) off   bilateral   other (see comments)  Taken 4/8/2025 0000 by Katelynn Henson RN  VTE Prevention/Management: (increased agitation; aspirin)   SCDs (sequential compression devices) off   bilateral   other (see comments)  Taken 4/7/2025 2200 by Katelynn Henson RN  VTE Prevention/Management: (increased agitation; aspirin)   SCDs (sequential compression devices) off   patient refused intervention   other (see comments)  Taken 4/7/2025 2000 by Katelynn Henson RN  VTE Prevention/Management: (increases agitation; aspirin)   SCDs (sequential compression devices) off   bilateral   patient refused intervention   other (see comments)  Intervention: Prevent Infection  Description: Maintain skin and mucous membrane integrity; promote hand, oral and pulmonary hygiene.Optimize fluid balance, nutrition, sleep and glycemic control to maximize infection resistance.Identify potential sources of infection early to prevent or mitigate progression of infection (e.g., wound, lines, devices).Evaluate ongoing need for invasive devices; remove promptly when no longer indicated.Review vaccination status.  Recent Flowsheet Documentation  Taken 4/8/2025 0400 by  Katelynn Henson, RN  Infection Prevention:   hand hygiene promoted   equipment surfaces disinfected   environmental surveillance performed   personal protective equipment utilized  Taken 4/8/2025 0200 by Katelynn Henson RN  Infection Prevention:   hand hygiene promoted   equipment surfaces disinfected   environmental surveillance performed   personal protective equipment utilized  Taken 4/8/2025 0000 by Katelynn Henson RN  Infection Prevention:   hand hygiene promoted   equipment surfaces disinfected   environmental surveillance performed   personal protective equipment utilized  Taken 4/7/2025 2200 by Katelynn Henson RN  Infection Prevention:   hand hygiene promoted   equipment surfaces disinfected   environmental surveillance performed   personal protective equipment utilized  Taken 4/7/2025 2000 by Katelynn Henson RN  Infection Prevention:   hand hygiene promoted   equipment surfaces disinfected   environmental surveillance performed   personal protective equipment utilized  Goal: Optimal Comfort and Wellbeing  Outcome: Progressing  Intervention: Monitor Pain and Promote Comfort  Description: Assess pain level, treatment efficacy and patient response at regular intervals using a consistent pain scale.Consider the presence and impact of preexisting chronic pain.Encourage patient and caregiver involvement in pain assessment, interventions and safety measures.Promote activity; balance with sleep and rest to enhance healing.  Recent Flowsheet Documentation  Taken 4/8/2025 0400 by Katelynn Henson, RN  Pain Management Interventions: quiet environment facilitated  Taken 4/7/2025 2200 by Katelynn Henson RN  Pain Management Interventions:   quiet environment facilitated   diversional activity provided   pain management plan reviewed with patient/caregiver   pillow support provided   position adjusted  Taken 4/7/2025 2000 by Katelynn Henson RN  Pain Management Interventions:   pain management  plan reviewed with patient/caregiver   quiet environment facilitated   care clustered  Intervention: Provide Person-Centered Care  Description: Use a family-focused approach to care; encourage support system presence and participation.Develop trust and rapport by proactively providing information, encouraging questions, addressing concerns and offering reassurance.Acknowledge emotional response to hospitalization.Recognize and utilize personal coping strategies and strengths; develop goals via shared decision-making.Honor spiritual and cultural preferences.  Recent Flowsheet Documentation  Taken 4/7/2025 2000 by Katelynn Henson, RN  Trust Relationship/Rapport:   care explained   choices provided   reassurance provided  Goal: Readiness for Transition of Care  Outcome: Progressing

## 2025-04-08 NOTE — DISCHARGE PLACEMENT REQUEST
"Jenna Russell (84 y.o. Female)       Date of Birth   1940    Social Security Number       Address   [address] [facility] [address]    Home Phone   [phone]    MRN   [id number]       Christian   None    Marital Status                               Admission Date   4/3/2025    Admission Type   Emergency    Admitting Provider   Lamont Mane DO    Attending Provider   Lamont Mane DO    Department, Room/Bed   [facility] 3H, S369/1       Discharge Date       Discharge Disposition       Discharge Destination                                 Attending Provider: Lamont Mane DO    Allergies: Penicillins, Pistachio Nut    Isolation: None   Infection: None   Code Status: No CPR    Ht: 170.2 cm (67\")   Wt: 52 kg (114 lb 10.2 oz)    Admission Cmt: None   Principal Problem: Hip fracture [S72.009A]                   Active Insurance as of 4/3/2025       Primary Coverage       Payor Plan Insurance Group Employer/Plan Group    HU[employer]NA MEDICARE REPLACEMENT [employer] MEDICARE ADVANTAGE GROUP [id number]       Payor Plan Address Payor Plan Phone Number Payor Plan Fax Number Effective Dates    [address] [address] [phone]  1/1/2018 - None Entered    [facility] [address]         Subscriber Name Subscriber Birth Date Member ID       JENNA RUSSELL 1940 [id number]                     Emergency Contacts        (Rel.) Home Phone Work Phone Mobile Phone    Lianna Martinez (Spouse) [phone] -- [phone]    VIRI SALGADO (Daughter) [phone] -- [phone]    BRIAN LEVINE (Relative) [phone] -- [phone]    ERASMOFAISAL (Grandchild) [phone] -- [phone]                 Physician Progress Notes (last 48 hours)        Floresita Vicente PA-C at 04/07/25 1446       Attestation signed by Clara Bacon MD at 04/07/25 1645      I have reviewed this documentation and agree.                          [facility] " Hospital Medicine Services  PROGRESS NOTE    Patient Name: Jenna Russell  : 1940  MRN: [id number]    Date of Admission: 4/3/2025  Primary Care Physician: Judi Rodriguez MD    Subjective     CC: f/u L hip fracture    HPI:  Wife and daughter help with history. Patient in chair. Speech not always logical. Pain manageable at rest but severe when upright. Family concerned re: oxycodone side effects. Rehab referrals started     Objective     Vital Signs:   Temp:  [97.4 °F (36.3 °C)-98.3 °F (36.8 °C)] 97.8 °F (36.6 °C)  Heart Rate:  [53-71] 65  Resp:  [16-18] 18  BP: (124-145)/(56-96) 124/56     Physical Exam:  Constitutional: No acute distress, awake, alert and conversant. Sitting up in chair  HENT: NCAT, mucous membranes moist. Facial ecchymosis (L > R)  Respiratory: Clear to auscultation bilaterally, normal respiratory effort on room air   Cardiovascular: RRR  Gastrointestinal: Positive bowel sounds, soft, nontender, nondistended  Musculoskeletal: No bilateral ankle edema. LUE in splint.   Psychiatric: Appropriate affect, cooperative with exam  Neurologic: Moves all extremities spontaneously without focal deficits, speech clear but expressive aphasia noted     Results Reviewed:  LAB RESULTS:      Lab 25  1013 25  1022 25  1418 25  2202   WBC 7.22 6.12 5.68 6.77   HEMOGLOBIN 8.4* 9.1* 6.9* 9.3*   HEMATOCRIT 24.5* 27.1* 21.0* 28.6*   PLATELETS 96* 92* 87* 114*   NEUTROS ABS  --   --  3.17 4.98   IMMATURE GRANS (ABS)  --   --  0.01 0.03   LYMPHS ABS  --   --  1.52 1.02   MONOS ABS  --   --  0.94* 0.68   EOS ABS  --   --  0.03 0.05   MCV 90.4 90.9 94.2 93.8         Lab 257 25  1013 25  1023 25  1312 25  2202   SODIUM  --  134* 132* 128* 130*   POTASSIUM 4.5 3.6 3.8 4.3 4.5   CHLORIDE  --  98 97* 95* 94*   CO2  --  27.0 26.0 27.0 26.0   ANION GAP  --  9.0 9.0 6.0 10.0   BUN  --  8 7* 11 14   CREATININE  --  0.49* 0.57 0.56* 0.74   EGFR  --  94.2 90.9  91.3 80.9   GLUCOSE  --  87 107* 89 119*   CALCIUM  --  8.4* 8.9 8.2* 8.8         Lab 04/04/25  1312 04/03/25  2202   TOTAL PROTEIN 4.4* 5.6*   ALBUMIN 2.8* 3.4*   GLOBULIN 1.6 2.2   ALT (SGPT) <5 5   AST (SGOT) 11 16   BILIRUBIN 0.4 0.2   ALK PHOS 39 57                 Lab 04/04/25  1542   ABO TYPING O   RH TYPING Positive   ANTIBODY SCREEN Negative         Brief Urine Lab Results  (Last result in the past 365 days)        Color   Clarity   Blood   Leuk Est   Nitrite   Protein   CREAT   Urine HCG        04/05/25 0747 Yellow   Clear   Trace   Trace   Negative   Negative                   Microbiology Results Abnormal       None            No radiology results from the last 24 hrs      Results for orders placed during the hospital encounter of 11/23/24    Adult Transthoracic Echo Complete W/ Cont if Necessary Per Protocol (With Agitated Saline)    Interpretation Summary    Left ventricular ejection fraction appears to be 56 - 60%.    Left ventricular diastolic function was normal.    Left atrial volume is mildly increased.    Moderate aortic valve regurgitation is present.    Estimated right ventricular systolic pressure from tricuspid regurgitation is mildly elevated (35-45 mmHg). Calculated right ventricular systolic pressure from tricuspid regurgitation is 36 mmHg.      Current medications:  Scheduled Meds:acetaminophen, 1,000 mg, Oral, Q8H  aspirin, 81 mg, Oral, BID  atorvastatin, 40 mg, Oral, Nightly  Budesonide, 3 mg, Oral, Daily  divalproex, 500 mg, Oral, Q12H  [Held by provider] lisinopril, 10 mg, Oral, Daily  melatonin, 5 mg, Oral, Nightly  metoprolol succinate XL, 25 mg, Oral, Daily  pantoprazole, 40 mg, Oral, Daily  senna-docusate sodium, 2 tablet, Oral, BID  sodium chloride, 10 mL, Intravenous, Q12H  tamsulosin, 0.4 mg, Oral, Daily      Continuous Infusions:     PRN Meds:.  ALPRAZolam    senna-docusate sodium **AND** polyethylene glycol **AND** bisacodyl **AND** bisacodyl    Calcium Replacement - Follow  Nurse / BPA Driven Protocol    Magnesium Standard Dose Replacement - Follow Nurse / BPA Driven Protocol    Morphine **AND** naloxone    nitroglycerin    ondansetron    oxyCODONE    Phosphorus Replacement - Follow Nurse / BPA Driven Protocol    Potassium Replacement - Follow Nurse / BPA Driven Protocol    sodium chloride    sodium chloride    Assessment & Plan   Assessment & Plan     Active Hospital Problems    Diagnosis  POA    **Hip fracture [S72.009A]  Yes      Resolved Hospital Problems   No resolved problems to display.     Brief Hospital Course to date:  Jenna Russell is an 84 y.o. female with PMH significant for HTN, HLD, PAF, prior CVA with residual expressive aphasia, seizure disorder and dementia. Brought to [facility] ED on 4/3/25 for evaluation after a fall. Found to have periprosthetic L hip fracture. Orthopedics recommended non-operative management.      Left periprosthetic hip fracture  - CT pelvis showed greater trochanter periprosthetic fracture with some extension into the lesser trochanter.  -Orthopedics consulted. Recommend nonoperative management. Protected WB with a walker at all times. No active hip ABduction   - SCDs for DVT prophylaxis + ASA BID  - Start APAP 1000mg TID, Oxycodone 2.5mg Q4H PRN. Avoid IV Morphine as able   - Follow up with Dr. Hoyt in 2 weeks    Left radius fracture  - Ortho has evaluated - nonoperative management  - Keep wrist splint in place until OP follow up    Acute on chronic anemia  - S/p 1 unit PRBC, with appropriate response    Chronic thrombocytopenia  - Platelets 114 on admission, down to 96 on 4/6  - CBC in AM    Urinary retention  - 4/5 UA reassuring  - Started on Tamsulosin  - Get bowels moving  - Continue urinary retention protocol, straight cath as needed    Hypotension, resolved  - s/p IVF    HLD-continue Atorvastatin  HTN, continue Metoprolol. Lisinopril on hold   Seizure disorder-continue Depakote BID  Memory impairment/dementia-previously on  donepezil  GERD-PPI  Documented history of prior A-fib-not on anticoagulation, continue Metoprolol. Continue to monitor on telemetry    Expected Discharge Location and Transportation: SNF  Expected Discharge Expected Discharge Date: 2025; Expected Discharge Time:      VTE Prophylaxis:Mechanical VTE prophylaxis orders are present.    AM-PAC 6 Clicks Score (PT): 6 (25 1319)    CODE STATUS:   Code Status and Medical Interventions: No CPR (Do Not Attempt to Resuscitate); Limited Support; No intubation (DNI)   Ordered at: 25 0870     Code Status (Patient has no pulse and is not breathing):    No CPR (Do Not Attempt to Resuscitate)     Medical Interventions (Patient has pulse or is breathing):    Limited Support     Medical Intervention Limits:    No intubation (DNI)     Floresita Vicente PA-C  25        Electronically signed by Clara Bacon MD at 25 1648          Physical Therapy Notes (most recent note)        Lizbteh Gardner, PT at 25 1032  Version 1 of 1         Patient Name: Jenna Russell  : 1940    MRN: [id number]                              Today's Date: 2025       Admit Date: 4/3/2025    Visit Dx:     ICD-10-CM ICD-9-CM   1. Periprosthetic hip fracture, initial encounter  M97.8XXA 996.44    Z96.649 V43.64   2. Closed fracture of distal end of left radius, unspecified fracture morphology, initial encounter  S52.502A 813.42   3. Facial laceration, initial encounter  S01.81XA 873.40     Patient Active Problem List   Diagnosis    PAF (paroxysmal atrial fibrillation)    HLD (hyperlipidemia)    Hypertension    History of CVA (cerebrovascular accident)    Combined receptive and expressive aphasia    Colitis, Clostridium difficile    Collagenous colitis    Seizure disorder    Closed right hip fracture, initial encounter    Anemia    Daily consumption of alcohol    Complex partial seizure    Right sided weakness    Severe protein-calorie malnutrition    Hip fracture      Past Medical History:   Diagnosis Date    Acute ischemic stroke     Arterial ischemic stroke 9/8/2016    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 Normal LVSF.Normal LV diastolic filling. Left atrium midly dilated. RV mild- mod dilated. MIld aortic cusp sclerosis. No evidence of mitral valve prolapse. Mild mitral regurgitation.No pulmonary hypertension or perdicardial effusion. No dilation of aortic root. Venous system appears normal    H/O: stroke     HLD (hyperlipidemia) 9/8/2016    Left temporal lobe infarction     LOM (loss of memory)     Osteoarthritis 9/8/2016    Paroxysmal atrial fibrillation 9/8/2016    Thyroid nodule 9/8/2016    1.8x1.2cm    Weakness generalized     Wound infection after surgery     Left hip arthroplasty, staph aureus, 6 weeks IV Rocephin     Past Surgical History:   Procedure Laterality Date    ABDOMINOPLASTY      BREAST ABSCESS INCISION AND DRAINAGE      HIP TROCHANTERIC NAILING WITH INTRAMEDULLARY HIP SCREW Right 3/11/2022    Procedure: HIP TROCHANTERIC NAILING WITH INTRAMEDULLARY HIP SCREW RIGHT;  Surgeon: Danny Lynne MD;  Location: [facility];  Service: Orthopedics;  Laterality: Right;    TOTAL HIP ARTHROPLASTY Left     Staph aureus wound infection      General Information       Row Name 04/08/25 1108          Physical Therapy Time and Intention    Document Type therapy note (daily note)  -SC     Mode of Treatment physical therapy  -SC       Row Name 04/08/25 1108          General Information    Patient Profile Reviewed yes  -SC     Existing Precautions/Restrictions fall;left;other (see comments)  no active hip abd, protected wt bearing AAT, L radius fx, Hx of CVA  -SC       Row Name 04/08/25 1108          Cognition    Orientation Status (Cognition)  oriented to;person;disoriented to;place;situation;time  -SC       Row Name 04/08/25 1108          Safety Issues/Impairments Affecting Functional Mobility    Impairments Affecting Function (Mobility) balance;cognition;endurance/activity tolerance;strength;pain  -SC     Comment, Safety Issues/Impairments (Mobility) lethergic, following 50% commands, fearful of mobility  -SC               User Key  (r) = Recorded By, (t) = Taken By, (c) = Cosigned By      Initials Name Provider Type    SC Lizbeth Gardner PT Physical Therapist                   Mobility       Row Name 04/08/25 1109          Bed Mobility    Bed Mobility supine-sit  -SC     Supine-Sit [place] (Bed Mobility) dependent (less than 25% patient effort);2 person assist  -SC     Assistive Device (Bed Mobility) head of bed elevated;bed rails;repositioning sheet  -SC     Comment, (Bed Mobility) up to edge of bed with little participation. Leans to right on sitting. Time taken to work on midline orientation. ARjo walker platform used to rest arm on and stabilize midline. Improved with time,  -SC       Row Name 04/08/25 1109          Transfers    Comment, (Transfers) STS from EOB with ARJO .L UE resting on platform. Time taken in standing to readjust legs. Knee immobilizer on L leg for stability  -SC       Row Name 04/08/25 1109          Sit-Stand Transfer    Sit-Stand [place] (Transfers) 2 person assist;verbal cues;nonverbal cues (demo/gesture);moderate assist (50% patient effort)  -SC     Assistive Device (Sit-Stand Transfers) other (see comments)  used arjo walker and PT boost to get to standing  -SC       Row Name 04/08/25 1109          Gait/Stairs (Locomotion)    [place] Level (Gait) moderate assist (50% patient effort);2 person assist;verbal cues;nonverbal cues (demo/gesture)  -SC     Assistive Device (Gait) other (see comments)  ARJO walker used with L UE resting on pad  -SC     Distance in Feet (Gait) 6  -SC     Deviations/Abnormal Patterns  (Gait) gait speed decreased;left sided deviations;stride length decreased;weight shifting decreased;base of support, narrow;antalgic  -SC     Bilateral Gait Deviations forward flexed posture  -SC     Left Sided Gait Deviations weight shift ability decreased  -SC     Comment, (Gait/Stairs) Focused on advancing legs with ambulation. Requierd some assistance to take steps with L leg. Encouraged upright posture. Followed by chair. Patient very fearful crying out loud.  -[facility] Name 04/08/25 1109          Mobility    Extremity Weight-bearing Status left upper extremity;left lower extremity  -SC     Left Upper Extremity (Weight-bearing Status) non weight-bearing (NWB)  -SC     Left Lower Extremity (Weight-bearing Status) other (see comments)  protected wt bearing  -SC               User Key  (r) = Recorded By, (t) = Taken By, (c) = Cosigned By      Initials Name Provider Type    SC Lizbeth Gardner PT Physical Therapist                   Obj/Interventions       [facility] Name 04/08/25 1116          Motor Skills    Therapeutic Exercise hip;knee;ankle  -SC       Row Name 04/08/25 1116          Hip (Therapeutic Exercise)    Hip (Therapeutic Exercise) AAROM (active assistive range of motion)  -SC     Hip AAROM (Therapeutic Exercise) left;flexion;extension;10 repetitions  -[facility] Name 04/08/25 1116          Knee (Therapeutic Exercise)    Knee (Therapeutic Exercise) isometric exercises;strengthening exercise  -SC     Knee Isometrics (Therapeutic Exercise) left;10 repetitions;quad sets  -SC     Knee Strengthening (Therapeutic Exercise) left;SAQ (short arc quad);10 repetitions  -[facility] Name 04/08/25 1116          Ankle (Therapeutic Exercise)    Ankle (Therapeutic Exercise) AROM (active range of motion)  -SC     Ankle AROM (Therapeutic Exercise) bilateral;dorsiflexion;plantarflexion;10 repetitions  -[facility] Name 04/08/25 1116          Balance    Balance Assessment sitting static balance;standing static  balance;standing dynamic balance  -SC     Static Sitting Balance moderate assist  -SC     Dynamic Sitting Balance moderate assist;2-person assist  -SC     Position, Sitting Balance unsupported  -SC     Static Standing Balance 2-person assist;maximum assist  -SC     Dynamic Standing Balance maximum assist;2-person assist  -SC     Position/Device Used, Standing Balance supported  -SC     Comment, Balance Leans to right on sitting. Time taken to work on midline orientation. Vanderdroid walker platform used to rest arm on and stabilize midline. Improved with time.  -SC               User Key  (r) = Recorded By, (t) = Taken By, (c) = Cosigned By      Initials Name Provider Type    SC Lizbeth Gardner, PT Physical Therapist                   Goals/Plan    No documentation.                  Clinical Impression       Row Name 04/08/25 1120          Pain    Pain Location hip  -SC     Pain Side/Orientation left  -SC     Pain Management Interventions positioning techniques utilized  -SC     Response to Pain Interventions functional ability unchanged  -SC       Row Name 04/08/25 1120          Pain Scale: FACES Pre/Post-Treatment    Pain: FACES Scale, Pretreatment 0-->no hurt  -SC     Posttreatment Pain Rating 6-->hurts even more  -SC       Row Name 04/08/25 1120          Plan of Care Review    Plan of Care Reviewed With patient  -SC     Progress no change  -SC     Outcome Evaluation Patient is very fearful about moving. Knee immobilizer used for extra stability today however it made sitting down very difficult. She has significant balance deficits. Continue to recomment rehab  -[facility] Name 04/08/25 1120          Therapy Assessment/Plan (PT)    Patient/Family Therapy Goals Statement (PT) did not state  -SC     Rehab Potential (PT) fair  -SC     Criteria for Skilled Interventions Met (PT) yes  -SC     Therapy Frequency (PT) daily  -[facility] Name 04/08/25 1120          Vital Signs    Pre Systolic BP Rehab 143  -SC     Pre  Treatment Diastolic BP 60  -SC     Posttreatment Heart Rate (beats/min) 52  -SC     Post SpO2 (%) 100  -SC     O2 Delivery Post Treatment room air  -SC       Row Name 04/08/25 1120          Positioning and Restraints    Pre-Treatment Position in bed  -SC     Post Treatment Position chair  -SC     In Chair notified nsg;reclined;sitting;call light within reach;encouraged to call for assist;exit alarm on;with family/caregiver  -SC               User Key  (r) = Recorded By, (t) = Taken By, (c) = Cosigned By      Initials Name Provider Type    SC Lizbeth Gardner, PT Physical Therapist                   Outcome Measures       Row Name 04/08/25 1124 04/08/25 0800       How much help from another person do you currently need...    Turning from your back to your side while in flat bed without using bedrails? 2  -SC 2  -AC    Moving from lying on back to sitting on the side of a flat bed without bedrails? 1  -SC 2  -AC    Moving to and from a bed to a chair (including a wheelchair)? 2  -SC 1  -AC    Standing up from a chair using your arms (e.g., wheelchair, bedside chair)? 2  -SC 1  -AC    Climbing 3-5 steps with a railing? 1  -SC 1  -AC    To walk in hospital room? 2  -SC 1  -AC    AM-PAC 6 Clicks Score (PT) 10  -SC 8  -    Highest Level of Mobility Goal 4 --> Transfer to chair/commode  -SC 3 --> Sit at edge of bed  -      Row Name 04/08/25 1124          Functional Assessment    Outcome Measure Options AM-PAC 6 Clicks Basic Mobility (PT)  -SC               User Key  (r) = Recorded By, (t) = Taken By, (c) = Cosigned By      Initials Name Provider Type    SC Lizbeth Gardner, PT Physical Therapist    AC Liane Waters RN Registered Nurse                                 Physical Therapy Education       Title: PT OT SLP Therapies (In Progress)       Topic: Physical Therapy (Done)       Point: Mobility training (Done)       Learning Progress Summary            Patient Acceptance, ELEON,NR by SC at 4/8/2025 1125    Comment:  reviewed HEP    Acceptance, E, NR,NL by  at 4/7/2025 1319    Acceptance, E, NR by AC at 4/6/2025 1147    Acceptance, E, NR by  at 4/5/2025 1035                      Point: Home exercise program (Done)       Learning Progress Summary            Patient Acceptance, E, VU,NR by SC at 4/8/2025 1125    Comment: reviewed HEP    Acceptance, E, NR,NL by  at 4/7/2025 1319    Acceptance, E, NR by  at 4/6/2025 1147    Acceptance, E, NR by AC at 4/5/2025 1035                      Point: Body mechanics (Done)       Learning Progress Summary            Patient Acceptance, E, VU,NR by SC at 4/8/2025 1125    Comment: reviewed HEP    Acceptance, E, NR,NL by  at 4/7/2025 1319    Acceptance, E, NR by  at 4/6/2025 1147    Acceptance, E, NR by  at 4/5/2025 1035                      Point: Precautions (Done)       Learning Progress Summary            Patient Acceptance, E, VU,NR by SC at 4/8/2025 1125    Comment: reviewed HEP    Acceptance, E, NR,NL by  at 4/7/2025 1319    Acceptance, E, NR by  at 4/6/2025 1147    Acceptance, E, NR by  at 4/5/2025 1035                                      User Key       Initials Effective Dates Name Provider Type Discipline    SC 02/03/23 -  Lizbeth Gardner, PT Physical Therapist PT     07/11/24 -  Corin Roberto PT Physical Therapist PT                  PT Recommendation and Plan     Progress: no change  Outcome Evaluation: Patient is very fearful about moving. Knee immobilizer used for extra stability today however it made sitting down very difficult. She has significant balance deficits. Continue to recomment rehab     Time Calculation:         PT Charges       Row Name 04/08/25 1032             Time Calculation    Start Time 1032  -SC      PT Received On 04/08/25  -SC      PT Goal Re-Cert Due Date 04/15/25  -SC         Timed Charges    25439 - PT Therapeutic Exercise Minutes 10  -SC      17161 - Gait Training Minutes  5  -SC      46629 - PT Therapeutic Activity Minutes 15  -SC          Total Minutes    Timed Charges Total Minutes 30  -SC       Total Minutes 30  -SC                User Key  (r) = Recorded By, (t) = Taken By, (c) = Cosigned By      Initials Name Provider Type    SC Lizbeth Gardner PT Physical Therapist                  Therapy Charges for Today       Code Description Service Date Service Provider Modifiers Qty    00165621849  PT THER PROC EA 15 MIN 2025 Lizbeth Gardner, PT GP 1    87395948058 HC PT THERAPEUTIC ACT EA 15 MIN 2025 Lizbeth Gardner, PT GP 1    65373706855 HC PT THER SUPP EA 15 MIN 2025 Lizbeth Gardner, PT GP 2            PT G-Codes  Outcome Measure Options: AM-PAC 6 Clicks Basic Mobility (PT)  AM-PAC 6 Clicks Score (PT): 10  AM-PAC 6 Clicks Score (OT): 13  PT Discharge Summary  Anticipated Discharge Disposition (PT): skilled nursing facility    Lizbeth Gardner PT  2025      Electronically signed by Lizbeth Gardner PT at 25 1126          Occupational Therapy Notes (most recent note)        Krissy Case, OT at 25 0928          Patient Name: Jenna Russell  : 1940    MRN: [id number]                              Today's Date: 2025       Admit Date: 4/3/2025    Visit Dx:     ICD-10-CM ICD-9-CM   1. Periprosthetic hip fracture, initial encounter  M97.8XXA 996.44    Z96.649 V43.64   2. Closed fracture of distal end of left radius, unspecified fracture morphology, initial encounter  S52.502A 813.42   3. Facial laceration, initial encounter  S01.81XA 873.40     Patient Active Problem List   Diagnosis    PAF (paroxysmal atrial fibrillation)    HLD (hyperlipidemia)    Hypertension    History of CVA (cerebrovascular accident)    Combined receptive and expressive aphasia    Colitis, Clostridium difficile    Collagenous colitis    Seizure disorder    Closed right hip fracture, initial encounter    Anemia    Daily consumption of alcohol    Complex partial seizure    Right sided weakness    Severe protein-calorie malnutrition     Hip fracture     Past Medical History:   Diagnosis Date    Acute ischemic stroke     Arterial ischemic stroke 9/8/2016    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 Normal LVSF.Normal LV diastolic filling. Left atrium midly dilated. RV mild- mod dilated. MIld aortic cusp sclerosis. No evidence of mitral valve prolapse. Mild mitral regurgitation.No pulmonary hypertension or perdicardial effusion. No dilation of aortic root. Venous system appears normal    H/O: stroke     HLD (hyperlipidemia) 9/8/2016    Left temporal lobe infarction     LOM (loss of memory)     Osteoarthritis 9/8/2016    Paroxysmal atrial fibrillation 9/8/2016    Thyroid nodule 9/8/2016    1.8x1.2cm    Weakness generalized     Wound infection after surgery     Left hip arthroplasty, staph aureus, 6 weeks IV Rocephin     Past Surgical History:   Procedure Laterality Date    ABDOMINOPLASTY      BREAST ABSCESS INCISION AND DRAINAGE      HIP TROCHANTERIC NAILING WITH INTRAMEDULLARY HIP SCREW Right 3/11/2022    Procedure: HIP TROCHANTERIC NAILING WITH INTRAMEDULLARY HIP SCREW RIGHT;  Surgeon: Danny Lynne MD;  Location: [facility];  Service: Orthopedics;  Laterality: Right;    TOTAL HIP ARTHROPLASTY Left     Staph aureus wound infection      General Information       Row Name 04/05/25 1110          OT Time and Intention    Document Type evaluation  -MR     Mode of Treatment occupational therapy  -MR       Row Name 04/05/25 1110          General Information    Patient Profile Reviewed yes  -MR     Prior Level of Function independent:;gait;transfer;bed mobility  Pt has significant dementia at baseline. Per wife pt has had several close falls due to forgetting to use walker at home  -MR     Existing Precautions/Restrictions  fall;left;other (see comments)  Protected weight bearing AAT, L distal radius fx, no active L hip abduction  -MR     Barriers to Rehab medically complex;previous functional deficit;cognitive status  -MR       Row Name 04/05/25 1110          Living Environment    Current Living Arrangements home  -MR     People in Home spouse  -MR       Row Name 04/05/25 1110          Home Main Entrance    Number of Stairs, Main Entrance other (see comments)  elevator  -MR       Row Name 04/05/25 1110          Stairs Within Home, Primary    Number of Stairs, Within Home, Primary none  -MR       Row Name 04/05/25 1110          Cognition    Orientation Status (Cognition) oriented to;person;disoriented to;place;situation;time  -MR       Row Name 04/05/25 1110          Safety Issues/Impairments Affecting Functional Mobility    Safety Issues Affecting Function (Mobility) ability to follow commands;awareness of need for assistance;insight into deficits/self-awareness;judgment;safety precaution awareness;safety precautions follow-through/compliance;sequencing abilities  -MR     Impairments Affecting Function (Mobility) balance;cognition;endurance/activity tolerance;strength;pain  -MR     Cognitive Impairments, Mobility Safety/Performance attention;awareness, need for assistance;insight into deficits/self-awareness;judgment;problem-solving/reasoning;safety precaution awareness;safety precaution follow-through;sequencing abilities  -MR               User Key  (r) = Recorded By, (t) = Taken By, (c) = Cosigned By      Initials Name Provider Type    Krissy Prakash OT Occupational Therapist                     Mobility/ADL's       Row Name 04/05/25 1111          Bed Mobility    Bed Mobility rolling right;rolling left  -MR     Rolling Left [place] (Bed Mobility) moderate assist (50% patient effort);1 person assist  -MR     Rolling Right [place] (Bed Mobility) moderate assist (50% patient effort);1 person assist  -MR     Assistive  Device (Bed Mobility) bed rails;repositioning sheet  -MR       Row Name 04/05/25 1111          Transfers    Transfers bed-chair transfer;sit-stand transfer  -MR       Row Name 04/05/25 1111          Bed-Chair Transfer    Bed-Chair [place] (Transfers) dependent (less than 25% patient effort);2 person assist  -MR     Assistive Device (Bed-Chair Transfers) lift device  -MR       Row Name 04/05/25 1111          Sit-Stand Transfer    Sit-Stand [place] (Transfers) minimum assist (75% patient effort);2 person assist;verbal cues;nonverbal cues (demo/gesture)  -MR     Assistive Device (Sit-Stand Transfers) walker, front-wheeled  -MR       Row Name 04/05/25 1111          Activities of Daily Living    BADL Assessment/Intervention lower body dressing;upper body dressing  -MR       Row Name 04/05/25 1111          Mobility    Extremity Weight-bearing Status left upper extremity;left lower extremity  -MR     Left Upper Extremity (Weight-bearing Status) non weight-bearing (NWB)  -MR     Left Lower Extremity (Weight-bearing Status) other (see comments)  Protected weight bearing AAT  -MR       Row Name 04/05/25 1111          Lower Body Dressing Assessment/Training    [place] Level (Lower Body Dressing) don;doff;socks;maximum assist (25% patient effort)  -MR     Position (Lower Body Dressing) supine  -MR       Row Name 04/05/25 1111          Upper Body Dressing Assessment/Training    [place] Level (Upper Body Dressing) don;maximum assist (25% patient effort)  -MR     Position (Upper Body Dressing) supine  -MR               User Key  (r) = Recorded By, (t) = Taken By, (c) = Cosigned By      Initials Name Provider Type    MR Krissy Case OT Occupational Therapist                   Obj/Interventions       Row Name 04/05/25 1112          Sensory Assessment (Somatosensory)    Sensory Assessment (Somatosensory) UE sensation intact  -MR       Row Name 04/05/25 1112          Vision Assessment/Intervention    Visual  Impairment/Limitations WFL  -MR       Row Name 04/05/25 1112          Range of Motion Comprehensive    General Range of Motion bilateral upper extremity ROM WFL  -MR       Row Name 04/05/25 1112          Strength Comprehensive (MMT)    Comment, General Manual Muscle Testing (MMT) Assessment BUE grossly 3+/5, did not formally assess the LUE strength  -MR       Row Name 04/05/25 1112          Balance    Balance Assessment sitting static balance;sitting dynamic balance;standing static balance  -MR     Static Sitting Balance standby assist  -MR     Dynamic Sitting Balance contact guard  -MR     Position, Sitting Balance supported;sitting in chair  -MR     Static Standing Balance minimal assist;2-person assist  -MR     Position/Device Used, Standing Balance supported;walker, front-wheeled  -MR     Balance Interventions sitting;standing;sit to stand;supported;static;dynamic  -MR               User Key  (r) = Recorded By, (t) = Taken By, (c) = Cosigned By      Initials Name Provider Type    Krissy Prakash, OT Occupational Therapist                   Goals/Plan       Row Name 04/05/25 1115          Bed Mobility Goal 1 (OT)    Activity/Assistive Device (Bed Mobility Goal 1, OT) sit to supine;supine to sit  -MR     [place] Level/Cues Needed (Bed Mobility Goal 1, OT) minimum assist (75% or more patient effort)  -MR     Time Frame (Bed Mobility Goal 1, OT) short term goal (STG);3 days  -MR     Progress/Outcomes (Bed Mobility Goal 1, OT) new goal  -       Row Name 04/05/25 1115          Transfer Goal 1 (OT)    Activity/Assistive Device (Transfer Goal 1, OT) sit-to-stand/stand-to-sit;toilet;commode, bedside without drop arms  -MR     [place] Level/Cues Needed (Transfer Goal 1, OT) contact guard required  -MR     Time Frame (Transfer Goal 1, OT) long term goal (LTG);5 days  -MR     Progress/Outcome (Transfer Goal 1, OT) new goal  -       Row Name 04/05/25 1115          Dressing Goal 1 (OT)    Activity/Device  (Dressing Goal 1, OT) upper body dressing  -MR     [place]/Cues Needed (Dressing Goal 1, OT) moderate assist (50-74% patient effort)  -MR     Time Frame (Dressing Goal 1, OT) long term goal (LTG);5 days  -MR     Progress/Outcome (Dressing Goal 1, OT) new goal  -MR       Row Name 04/05/25 1115          Therapy Assessment/Plan (OT)    Planned Therapy Interventions (OT) activity tolerance training;adaptive equipment training;BADL retraining;functional balance retraining;transfer/mobility retraining;strengthening exercise;ROM/therapeutic exercise;patient/caregiver education/training;passive ROM/stretching;IADL retraining;neuromuscular control/coordination retraining;occupation/activity based interventions  -MR               User Key  (r) = Recorded By, (t) = Taken By, (c) = Cosigned By      Initials Name Provider Type    Krissy Prakash, OT Occupational Therapist                   Clinical Impression       Row Name 04/05/25 1113          Pain Assessment    Pain Location hip  -MR     Pain Side/Orientation left  -MR       Row Name 04/05/25 1113          Pain Scale: FACES Pre/Post-Treatment    Pain: FACES Scale, Pretreatment 2-->hurts little bit  -MR     Posttreatment Pain Rating 2-->hurts little bit  -MR       Row Name 04/05/25 1113          Plan of Care Review    Plan of Care Reviewed With patient;spouse;family  -MR     Progress no change  Initial Eval  -MR     Outcome Evaluation Patient presenting below her functional baseline w/ mobility, transfers and balance requiring increased assist for ADLs. Pt requiring assist for bed mobility, transfers and LB dressing. Deficits warrant skilled OT services. Recommend SNF when medically appropriate for d/c.  -MR       Row Name 04/05/25 1113          Therapy Assessment/Plan (OT)    Patient/Family Therapy Goal Statement (OT) Return to PLOF  -MR     Rehab Potential (OT) good  -MR     Criteria for Skilled Therapeutic Interventions Met (OT) yes;meets criteria;skilled treatment  is necessary  -MR     Therapy Frequency (OT) daily  -MR     Predicted Duration of Therapy Intervention (OT) 5 days  -MR       Row Name 04/05/25 1113          Therapy Plan Review/Discharge Plan (OT)    Anticipated Discharge Disposition (OT) skilled nursing facility  -MR       Row Name 04/05/25 1113          Positioning and Restraints    Pre-Treatment Position in bed  -MR     Post Treatment Position chair  -MR     In Chair notified nsg;reclined;sitting;call light within reach;encouraged to call for assist;exit alarm on;with family/caregiver;waffle cushion;legs elevated;heels elevated;LUE elevated  -MR               User Key  (r) = Recorded By, (t) = Taken By, (c) = Cosigned By      Initials Name Provider Type    MR Krissy Case, OT Occupational Therapist                   Outcome Measures       Row Name 04/05/25 1115          How much help from another is currently needed...    Putting on and taking off regular lower body clothing? 1  -MR     Bathing (including washing, rinsing, and drying) 2  -MR     Toileting (which includes using toilet bed pan or urinal) 1  -MR     Putting on and taking off regular upper body clothing 3  -MR     Taking care of personal grooming (such as brushing teeth) 3  -MR     Eating meals 3  -MR     AM-PAC 6 Clicks Score (OT) 13  -MR       Row Name 04/05/25 1034 04/05/25 0830       How much help from another person do you currently need...    Turning from your back to your side while in flat bed without using bedrails? 2  -AC 2  -SC    Moving from lying on back to sitting on the side of a flat bed without bedrails? 2  -AC 1  -SC    Moving to and from a bed to a chair (including a wheelchair)? 2  -AC 1  -SC    Standing up from a chair using your arms (e.g., wheelchair, bedside chair)? 2  -AC 1  -SC    Climbing 3-5 steps with a railing? 1  -AC 1  -SC    To walk in hospital room? 2  -AC 1  -SC    AM-PAC 6 Clicks Score (PT) 11  -AC 7  -SC    Highest Level of Mobility Goal 4 --> Transfer to  chair/commode  -AC 2 --> Bed activities/dependent transfer  -SC      Row Name 04/05/25 1115 04/05/25 1034       Functional Assessment    Outcome Measure Options AM-PAC 6 Clicks Daily Activity (OT)  -MR AM-PAC 6 Clicks Basic Mobility (PT)  -AC              User Key  (r) = Recorded By, (t) = Taken By, (c) = Cosigned By      Initials Name Provider Type    SC Tomeka Arce, RN Registered Nurse    Krissy Prakash, OT Occupational Therapist    AC Corin Roberto, PT Physical Therapist                    Occupational Therapy Education       Title: PT OT SLP Therapies (In Progress)       Topic: Occupational Therapy (In Progress)       Point: ADL training (Done)       Learning Progress Summary            Patient Acceptance, E, VU by MR at 4/5/2025 1116   Family Acceptance, E, VU by MR at 4/5/2025 1116                      Point: Precautions (Done)       Learning Progress Summary            Patient Acceptance, E, VU by MR at 4/5/2025 1116   Family Acceptance, E, VU by MR at 4/5/2025 1116                      Point: Body mechanics (Done)       Learning Progress Summary            Patient Acceptance, E, VU by MR at 4/5/2025 1116   Family Acceptance, E, VU by MR at 4/5/2025 1116                                      User Key       Initials Effective Dates Name Provider Type Discipline    MR 09/22/22 -  Krissy Case, OT Occupational Therapist OT                  OT Recommendation and Plan  Planned Therapy Interventions (OT): activity tolerance training, adaptive equipment training, BADL retraining, functional balance retraining, transfer/mobility retraining, strengthening exercise, ROM/therapeutic exercise, patient/caregiver education/training, passive ROM/stretching, IADL retraining, neuromuscular control/coordination retraining, occupation/activity based interventions  Therapy Frequency (OT): daily  Plan of Care Review  Plan of Care Reviewed With: patient, spouse, family  Progress: no change (Initial Eval)  Outcome  Evaluation: Patient presenting below her functional baseline w/ mobility, transfers and balance requiring increased assist for ADLs. Pt requiring assist for bed mobility, transfers and LB dressing. Deficits warrant skilled OT services. Recommend SNF when medically appropriate for d/c.     Time Calculation:   Evaluation Complexity (OT)  Review Occupational Profile/Medical/Therapy History Complexity: expanded/moderate complexity  Assessment, Occupational Performance/Identification of Deficit Complexity: 3-5 performance deficits  Clinical Decision Making Complexity (OT): detailed assessment/moderate complexity  Overall Complexity of Evaluation (OT): moderate complexity     Time Calculation- OT       Row Name 04/05/25 1116             Time Calculation- OT    OT Start Time 0928  -MR      OT Received On 04/05/25  -MR      OT Goal Re-Cert Due Date 04/15/25  -MR         Timed Charges    92425 - OT Self Care/Mgmt Minutes 8  -MR         Untimed Charges    OT Eval/Re-eval Minutes 46  -MR         Total Minutes    Timed Charges Total Minutes 8  -MR      Untimed Charges Total Minutes 46  -MR       Total Minutes 54  -MR                User Key  (r) = Recorded By, (t) = Taken By, (c) = Cosigned By      Initials Name Provider Type    MR Krissy Case OT Occupational Therapist                  Therapy Charges for Today       Code Description Service Date Service Provider Modifiers Qty    [id number]  OT SELF CARE/MGMT/TRAIN EA 15 MIN 4/5/2025 Krissy Case OT GO 1    [id number]  OT EVAL MOD COMPLEXITY 4 4/5/2025 Krissy Case OT GO 1                 Krissy Case OT  4/5/2025    Electronically signed by Krissy Case OT at 04/05/25 1118

## 2025-04-08 NOTE — CASE MANAGEMENT/SOCIAL WORK
Continued Stay Note  [facility]     Patient Name: Jenna Russell  MRN: [id number]  Today's Date: 4/8/2025    Admit Date: 4/3/2025    Plan: SNF   Discharge Plan       Row Name 04/08/25 1413       Plan    Plan SNF    Patient/Family in Agreement with Plan yes    Plan Comments Spoke with patient's daughter and spouse at bedside to discuss disposition.  CM notified by Vannesa at [facility] that they are unable to offer a rehab bed at any location.  New referrals sent to Aditi at [facility] and faxed to [facility].  Case Management will continue to follow.                   Discharge Codes    No documentation.                       Devorah Gaston RN

## 2025-04-08 NOTE — PLAN OF CARE
Goal Outcome Evaluation:  Plan of Care Reviewed With: patient        Progress: no change  Outcome Evaluation: Patient is very fearful about moving. Knee immobilizer used for extra stability today however it made sitting down very difficult. She has significant balance deficits. Continue to recomment rehab    Anticipated Discharge Disposition (PT): skilled nursing facility

## 2025-04-09 LAB
ANION GAP SERPL CALCULATED.3IONS-SCNC: 11 MMOL/L (ref 5–15)
BUN SERPL-MCNC: 16 MG/DL (ref 8–23)
BUN/CREAT SERPL: 33.3 (ref 7–25)
CALCIUM SPEC-SCNC: 8.5 MG/DL (ref 8.6–10.5)
CHLORIDE SERPL-SCNC: 98 MMOL/L (ref 98–107)
CO2 SERPL-SCNC: 23 MMOL/L (ref 22–29)
CREAT SERPL-MCNC: 0.48 MG/DL (ref 0.57–1)
EGFRCR SERPLBLD CKD-EPI 2021: 94.7 ML/MIN/1.73
GLUCOSE SERPL-MCNC: 84 MG/DL (ref 65–99)
MAGNESIUM SERPL-MCNC: 2.2 MG/DL (ref 1.6–2.4)
POTASSIUM SERPL-SCNC: 3.5 MMOL/L (ref 3.5–5.2)
POTASSIUM SERPL-SCNC: 4.5 MMOL/L (ref 3.5–5.2)
SODIUM SERPL-SCNC: 132 MMOL/L (ref 136–145)

## 2025-04-09 PROCEDURE — 99232 SBSQ HOSP IP/OBS MODERATE 35: CPT | Performed by: PHYSICIAN ASSISTANT

## 2025-04-09 PROCEDURE — 97530 THERAPEUTIC ACTIVITIES: CPT

## 2025-04-09 PROCEDURE — 80048 BASIC METABOLIC PNL TOTAL CA: CPT | Performed by: PHYSICIAN ASSISTANT

## 2025-04-09 PROCEDURE — 84132 ASSAY OF SERUM POTASSIUM: CPT | Performed by: PHYSICIAN ASSISTANT

## 2025-04-09 PROCEDURE — 97110 THERAPEUTIC EXERCISES: CPT

## 2025-04-09 PROCEDURE — 83735 ASSAY OF MAGNESIUM: CPT | Performed by: PHYSICIAN ASSISTANT

## 2025-04-09 RX ORDER — POTASSIUM CHLORIDE 1500 MG/1
40 TABLET, EXTENDED RELEASE ORAL EVERY 4 HOURS
Status: COMPLETED | OUTPATIENT
Start: 2025-04-09 | End: 2025-04-09

## 2025-04-09 RX ORDER — OXYCODONE HYDROCHLORIDE 5 MG/1
5 TABLET ORAL DAILY PRN
Refills: 0 | Status: DISCONTINUED | OUTPATIENT
Start: 2025-04-09 | End: 2025-04-10 | Stop reason: HOSPADM

## 2025-04-09 RX ADMIN — ACETAMINOPHEN 1000 MG: 500 TABLET, FILM COATED ORAL at 06:31

## 2025-04-09 RX ADMIN — POTASSIUM CHLORIDE 40 MEQ: 20 TABLET, EXTENDED RELEASE ORAL at 18:15

## 2025-04-09 RX ADMIN — ASPIRIN 81 MG: 81 TABLET, COATED ORAL at 20:51

## 2025-04-09 RX ADMIN — ATORVASTATIN CALCIUM 40 MG: 40 TABLET, FILM COATED ORAL at 20:50

## 2025-04-09 RX ADMIN — SENNOSIDES AND DOCUSATE SODIUM 2 TABLET: 50; 8.6 TABLET ORAL at 08:32

## 2025-04-09 RX ADMIN — TAMSULOSIN HYDROCHLORIDE 0.4 MG: 0.4 CAPSULE ORAL at 08:32

## 2025-04-09 RX ADMIN — POTASSIUM CHLORIDE 40 MEQ: 20 TABLET, EXTENDED RELEASE ORAL at 14:08

## 2025-04-09 RX ADMIN — Medication 10 ML: at 08:33

## 2025-04-09 RX ADMIN — Medication 5 MG: at 20:51

## 2025-04-09 RX ADMIN — ACETAMINOPHEN 1000 MG: 500 TABLET, FILM COATED ORAL at 20:53

## 2025-04-09 RX ADMIN — OXYCODONE HYDROCHLORIDE 5 MG: 5 TABLET ORAL at 10:55

## 2025-04-09 RX ADMIN — METOPROLOL SUCCINATE 25 MG: 25 TABLET, EXTENDED RELEASE ORAL at 08:32

## 2025-04-09 RX ADMIN — DIVALPROEX SODIUM 500 MG: 250 TABLET, DELAYED RELEASE ORAL at 06:31

## 2025-04-09 RX ADMIN — ALPRAZOLAM 0.25 MG: 0.25 TABLET ORAL at 22:41

## 2025-04-09 RX ADMIN — BUDESONIDE 3 MG: 3 CAPSULE, COATED PELLETS ORAL at 08:32

## 2025-04-09 RX ADMIN — ACETAMINOPHEN 1000 MG: 500 TABLET, FILM COATED ORAL at 14:08

## 2025-04-09 RX ADMIN — DIVALPROEX SODIUM 500 MG: 250 TABLET, DELAYED RELEASE ORAL at 17:20

## 2025-04-09 RX ADMIN — Medication 10 ML: at 20:57

## 2025-04-09 RX ADMIN — OXYCODONE HYDROCHLORIDE 2.5 MG: 5 TABLET ORAL at 20:52

## 2025-04-09 RX ADMIN — PANTOPRAZOLE SODIUM 40 MG: 40 TABLET, DELAYED RELEASE ORAL at 08:32

## 2025-04-09 RX ADMIN — SENNOSIDES AND DOCUSATE SODIUM 2 TABLET: 50; 8.6 TABLET ORAL at 20:51

## 2025-04-09 RX ADMIN — ASPIRIN 81 MG: 81 TABLET, COATED ORAL at 08:32

## 2025-04-09 NOTE — PROGRESS NOTES
[facility] Medicine Services  PROGRESS NOTE    Patient Name: Jenna Russell  : 1940  MRN: [id number]    Date of Admission: 4/3/2025  Primary Care Physician: Judi Rodriguez MD    Subjective     CC: f/u L hip fracture    HPI:  In bed. Daughter and son at bedside. Rested well last night. PT was difficult yesterday. Discussed options with family (wife on speakerphone) - will trial Oxycodone 5mg before PT, otherwise, continue 2.5mg for PRN dosing. Family pleased she has been offered rehab bed     Objective     Vital Signs:   Temp:  [96.7 °F (35.9 °C)-97.5 °F (36.4 °C)] 97.5 °F (36.4 °C)  Heart Rate:  [56-64] 64  Resp:  [16-18] 18  BP: (116-150)/(50-95) 118/65     Physical Exam:  Constitutional: No acute distress, awake, alert and conversant. Sitting up in chair  HENT: NCAT, mucous membranes moist. L facial ecchymosis  Respiratory: Clear to auscultation bilaterally, normal respiratory effort on room air   Cardiovascular: RRR  Gastrointestinal: Positive bowel sounds, soft, nontender, nondistended  Musculoskeletal: No bilateral ankle edema. LUE in splint.   Psychiatric: Appropriate affect, cooperative with exam  Neurologic: Moves all extremities spontaneously without focal deficits, speech clear but expressive aphasia noted     Results Reviewed:  LAB RESULTS:      Lab 25  0540 25  1013 25  1022 25  1418 25  2202   WBC 5.67 7.22 6.12 5.68 6.77   HEMOGLOBIN 8.2* 8.4* 9.1* 6.9* 9.3*   HEMATOCRIT 25.5* 24.5* 27.1* 21.0* 28.6*   PLATELETS 101* 96* 92* 87* 114*   NEUTROS ABS  --   --   --  3.17 4.98   IMMATURE GRANS (ABS)  --   --   --  0.01 0.03   LYMPHS ABS  --   --   --  1.52 1.02   MONOS ABS  --   --   --  0.94* 0.68   EOS ABS  --   --   --  0.03 0.05   MCV 94.4 90.4 90.9 94.2 93.8         Lab 25  1026 25  0540 25  2127 25  1013 25  1023 25  1312   SODIUM 132* 131*  --  134* 132* 128*   POTASSIUM 3.5 3.8 4.5 3.6 3.8 4.3    CHLORIDE 98 99  --  98 97* 95*   CO2 23.0 22.0  --  27.0 26.0 27.0   ANION GAP 11.0 10.0  --  9.0 9.0 6.0   BUN 16 17  --  8 7* 11   CREATININE 0.48* 0.55*  --  0.49* 0.57 0.56*   EGFR 94.7 91.6  --  94.2 90.9 91.3   GLUCOSE 84 91  --  87 107* 89   CALCIUM 8.5* 8.3*  --  8.4* 8.9 8.2*   MAGNESIUM 2.2 1.7  --   --   --   --          Lab 04/04/25  1312 04/03/25  2202   TOTAL PROTEIN 4.4* 5.6*   ALBUMIN 2.8* 3.4*   GLOBULIN 1.6 2.2   ALT (SGPT) <5 5   AST (SGOT) 11 16   BILIRUBIN 0.4 0.2   ALK PHOS 39 57         Lab 04/04/25  1542   ABO TYPING O   RH TYPING Positive   ANTIBODY SCREEN Negative     Brief Urine Lab Results  (Last result in the past 365 days)        Color   Clarity   Blood   Leuk Est   Nitrite   Protein   CREAT   Urine HCG        04/05/25 0747 Yellow   Clear   Trace   Trace   Negative   Negative                 Microbiology Results Abnormal       None          No radiology results from the last 24 hrs    Results for orders placed during the hospital encounter of 11/23/24    Adult Transthoracic Echo Complete W/ Cont if Necessary Per Protocol (With Agitated Saline)    Interpretation Summary    Left ventricular ejection fraction appears to be 56 - 60%.    Left ventricular diastolic function was normal.    Left atrial volume is mildly increased.    Moderate aortic valve regurgitation is present.    Estimated right ventricular systolic pressure from tricuspid regurgitation is mildly elevated (35-45 mmHg). Calculated right ventricular systolic pressure from tricuspid regurgitation is 36 mmHg.    Current medications:  Scheduled Meds:acetaminophen, 1,000 mg, Oral, Q8H  aspirin, 81 mg, Oral, BID  atorvastatin, 40 mg, Oral, Nightly  Budesonide, 3 mg, Oral, Daily  divalproex, 500 mg, Oral, Q12H  [Held by provider] lisinopril, 10 mg, Oral, Daily  melatonin, 5 mg, Oral, Nightly  metoprolol succinate XL, 25 mg, Oral, Daily  pantoprazole, 40 mg, Oral, Daily  potassium chloride ER, 40 mEq, Oral, Q4H  senna-docusate  sodium, 2 tablet, Oral, BID  sodium chloride, 10 mL, Intravenous, Q12H  tamsulosin, 0.4 mg, Oral, Daily      Continuous Infusions:     PRN Meds:.  ALPRAZolam    senna-docusate sodium **AND** polyethylene glycol **AND** bisacodyl **AND** bisacodyl    Calcium Replacement - Follow Nurse / BPA Driven Protocol    Magnesium Standard Dose Replacement - Follow Nurse / BPA Driven Protocol    [DISCONTINUED] Morphine **AND** naloxone    nitroglycerin    ondansetron    oxyCODONE    oxyCODONE    Phosphorus Replacement - Follow Nurse / BPA Driven Protocol    Potassium Replacement - Follow Nurse / BPA Driven Protocol    sodium chloride    sodium chloride    Assessment & Plan     Active Hospital Problems    Diagnosis  POA    **Hip fracture [S72.009A]  Yes      Resolved Hospital Problems   No resolved problems to display.     Brief Hospital Course to date:  Jenna Russell is an 84 y.o. female with PMH significant for HTN, HLD, PAF, prior CVA with residual expressive aphasia, seizure disorder and dementia. Brought to [facility] ED on 4/3/25 for evaluation after a fall. Found to have periprosthetic L hip fracture. Orthopedics recommended non-operative management.      Left periprosthetic hip fracture  - CT pelvis showed greater trochanter periprosthetic fracture with some extension into the lesser trochanter.  -Orthopedics consulted. Recommend nonoperative management. Protected WB with a walker at all times. No active hip ABduction   - SCDs for DVT prophylaxis + ASA BID  - Continue APAP 1000mg TID, Oxycodone 2.5mg Q4H PRN - add 5mg daily PRN to be given before PT. Low threshold to DC if she has increasing sedation or AMS  - Follow up with Dr. Hoyt in 2 weeks    Left radius fracture  - Ortho has evaluated - nonoperative management  - Keep wrist splint in place until OP follow up    Acute on chronic anemia  - S/p 1 unit PRBC, with appropriate response    Chronic thrombocytopenia  - Platelets 114 on admission, down to 96 on 4/6  - 101 today  - overall stable     Urinary retention  - 4/5 UA reassuring  - Started on Tamsulosin  - Keep bowels moving  - Continue urinary retention protocol, straight cath as needed    Hypotension, resolved  - s/p IVF    HLD-continue Atorvastatin  HTN, continue Metoprolol. Lisinopril on hold   Seizure disorder-continue Depakote BID  Memory impairment/dementia-previously on Donepezil  GERD-PPI  Documented history of prior A-fib-not on anticoagulation, continue Metoprolol. Continue to monitor on telemetry    Expected Discharge Location and Transportation: [facility]  Expected Discharge Expected Discharge Date: 4/11/2025; Expected Discharge Time:      VTE Prophylaxis:Mechanical VTE prophylaxis orders are present.    AM-PAC 6 Clicks Score (PT): 12 (04/09/25 2237)    CODE STATUS:   Code Status and Medical Interventions: No CPR (Do Not Attempt to Resuscitate); Limited Support; No intubation (DNI)   Ordered at: 04/03/25 9029     Code Status (Patient has no pulse and is not breathing):    No CPR (Do Not Attempt to Resuscitate)     Medical Interventions (Patient has pulse or is breathing):    Limited Support     Medical Intervention Limits:    No intubation (DNI)     Floresita Vicente PA-C  04/09/25

## 2025-04-09 NOTE — PLAN OF CARE
Goal Outcome Evaluation:  Plan of Care Reviewed With: patient, family        Progress: improving  Outcome Evaluation: Pt. continues to present below baseline function w/generalized weakness, balance deficits and acute NWB status affecting her ability to safely participate in functional mobility. She performed bed mobility and transfers w/min-max assist of 2. Pt. tolerated ther-ex well. Continue acute PT POC to progress as able.    Anticipated Discharge Disposition (PT): skilled nursing facility

## 2025-04-09 NOTE — THERAPY TREATMENT NOTE
Patient Name: Jenna Russell  : 1940    MRN: [id number]                              Today's Date: 2025       Admit Date: 4/3/2025    Visit Dx:     ICD-10-CM ICD-9-CM   1. Periprosthetic hip fracture, initial encounter  M97.8XXA 996.44    Z96.649 V43.64   2. Closed fracture of distal end of left radius, unspecified fracture morphology, initial encounter  S52.502A 813.42   3. Facial laceration, initial encounter  S01.81XA 873.40     Patient Active Problem List   Diagnosis    PAF (paroxysmal atrial fibrillation)    HLD (hyperlipidemia)    Hypertension    History of CVA (cerebrovascular accident)    Combined receptive and expressive aphasia    Colitis, Clostridium difficile    Collagenous colitis    Seizure disorder    Closed right hip fracture, initial encounter    Anemia    Daily consumption of alcohol    Complex partial seizure    Right sided weakness    Severe protein-calorie malnutrition    Hip fracture     Past Medical History:   Diagnosis Date    Acute ischemic stroke     Arterial ischemic stroke 2016    Ataxic aphasia 2016    BP (high blood pressure) 2016    Cerebral infarction, left hemisphere 2016    Cyst of left ovary 2016    3cm    Expressive aphasia     H/O echocardiogram 2015    Global LV wall motion and contractility within normal limits. Normal LVSF.Normal LV diastolic filling. Left atrium midly dilated. RV mild- mod dilated. MIld aortic cusp sclerosis. No evidence of mitral valve prolapse. Mild mitral regurgitation.No pulmonary hypertension or perdicardial effusion. No dilation of aortic root. Venous system appears normal    H/O: stroke     HLD (hyperlipidemia) 2016    Left temporal lobe infarction     LOM (loss of memory)     Osteoarthritis 2016    Paroxysmal atrial fibrillation 2016    Thyroid nodule 2016    1.8x1.2cm    Weakness generalized     Wound infection after surgery     Left hip arthroplasty, staph aureus, 6 weeks IV Rocephin     Past  Surgical History:   Procedure Laterality Date    ABDOMINOPLASTY      BREAST ABSCESS INCISION AND DRAINAGE      HIP TROCHANTERIC NAILING WITH INTRAMEDULLARY HIP SCREW Right 3/11/2022    Procedure: HIP TROCHANTERIC NAILING WITH INTRAMEDULLARY HIP SCREW RIGHT;  Surgeon: Danny Lynne MD;  Location: [facility];  Service: Orthopedics;  Laterality: Right;    TOTAL HIP ARTHROPLASTY Left     Staph aureus wound infection      General Information       Row Name 04/09/25 1315          Physical Therapy Time and Intention    Document Type therapy note (daily note)  -     Mode of Treatment physical therapy  -       Row Name 04/09/25 1315          General Information    Patient Profile Reviewed yes  -SS     Existing Precautions/Restrictions fall;left;other (see comments)  no active hip abd, protected WBAT, L radius fx, Hx of CVA  w/aphasia  -     Barriers to Rehab medically complex;previous functional deficit;cognitive status  -       Row Name 04/09/25 1315          Cognition    Orientation Status (Cognition) unable/difficult to assess  does not appropriately answer when asked  -       Row Name 04/09/25 1315          Safety Issues/Impairments Affecting Functional Mobility    Safety Issues Affecting Function (Mobility) awareness of need for assistance;insight into deficits/self-awareness;judgment;problem-solving;safety precautions follow-through/compliance;sequencing abilities;safety precaution awareness  -     Impairments Affecting Function (Mobility) balance;cognition;endurance/activity tolerance;strength;pain;postural/trunk control  -     Cognitive Impairments, Mobility Safety/Performance attention;awareness, need for assistance;insight into deficits/self-awareness;judgment;problem-solving/reasoning;safety precaution awareness;safety precaution follow-through;sequencing abilities  -               User Key  (r) = Recorded By, (t) = Taken By, (c) = Cosigned By      Initials Name Provider Type    SS Larry  Carolann, PT Physical Therapist                   Mobility       Row Name 04/09/25 1317          Bed Mobility    Bed Mobility scooting/bridging;supine-sit  -     Scooting/Bridging [place] (Bed Mobility) moderate assist (50% patient effort);2 person assist;verbal cues;nonverbal cues (demo/gesture)  -     Supine-Sit [place] (Bed Mobility) moderate assist (50% patient effort);2 person assist;verbal cues;nonverbal cues (demo/gesture)  -     Assistive Device (Bed Mobility) head of bed elevated;bed rails;repositioning sheet  -     Comment, (Bed Mobility) V/TC for sequencing  -       Row Name 04/09/25 1317          Bed-Chair Transfer    Bed-Chair [place] (Transfers) maximum assist (25% patient effort);2 person assist;verbal cues;nonverbal cues (demo/gesture)  -     Assistive Device (Bed-Chair Transfers) other (see comments)  BUE support  -     Comment, (Bed-Chair Transfer) V/TC for LE set up, sequencing  -       Row Name 04/09/25 1317          Sit-Stand Transfer    Sit-Stand Gilmer (Transfers) minimum assist (75% patient effort);2 person assist;verbal cues;nonverbal cues (demo/gesture)  -     Assistive Device (Sit-Stand Transfers) other (see comments)  BUE support  -     Comment, (Sit-Stand Transfer) V/TC for LE set up, hand placement, lowering with eccentric control  -       Row Name 04/09/25 1317          Gait/Stairs (Locomotion)    Comment, (Gait/Stairs) deferred formal gait as pt. was attempting to scoot/drag feet on floor vs. foot clearance for stepping  -       Row Name 04/09/25 1317          Mobility    Extremity Weight-bearing Status left upper extremity;left lower extremity  -     Left Upper Extremity (Weight-bearing Status) non weight-bearing (NWB)  -     Left Lower Extremity (Weight-bearing Status) weight-bearing as tolerated (WBAT)  protected  -               User Key  (r) = Recorded By, (t) = Taken By, (c) = Cosigned By      Initials Name Provider Type    SS  Carolann Juarez, YESY Physical Therapist                   Obj/Interventions       Row Name 04/09/25 1319          Motor Skills    Therapeutic Exercise hip;knee;ankle  -       Row Name 04/09/25 1319          Hip (Therapeutic Exercise)    Hip (Therapeutic Exercise) strengthening exercise  -     Hip Strengthening (Therapeutic Exercise) bilateral;heel slides;marching while seated;10 repetitions  -       Row Name 04/09/25 1319          Knee (Therapeutic Exercise)    Knee (Therapeutic Exercise) strengthening exercise  -     Knee Strengthening (Therapeutic Exercise) bilateral;SLR (straight leg raise);LAQ (long arc quad);10 repetitions  min assist LLE  -       Row Name 04/09/25 1319          Ankle (Therapeutic Exercise)    Ankle (Therapeutic Exercise) AROM (active range of motion)  -     Ankle AROM (Therapeutic Exercise) bilateral;dorsiflexion;plantarflexion;10 repetitions  -       Row Name 04/09/25 1319          Balance    Balance Assessment sitting static balance;sitting dynamic balance;standing static balance;standing dynamic balance  -     Static Sitting Balance contact guard  -     Dynamic Sitting Balance minimal assist  -     Position, Sitting Balance unsupported;sitting edge of bed  -     Static Standing Balance minimal assist;2-person assist  -     Dynamic Standing Balance maximum assist;2-person assist  -     Position/Device Used, Standing Balance supported;other (see comments)  BUE support  -     Balance Interventions sitting;standing;sit to stand;supported;static;dynamic  -               User Key  (r) = Recorded By, (t) = Taken By, (c) = Cosigned By      Initials Name Provider Type     Carolann Juarez PT Physical Therapist                   Goals/Plan    No documentation.                  Clinical Impression       Row Name 04/09/25 1320          Pain Scale: FACES Pre/Post-Treatment    Pain: FACES Scale, Pretreatment 0-->no hurt  -SS     Posttreatment Pain Rating 0-->no hurt  -SS      Pre/Posttreatment Pain Comment no signs/symptoms this date of pain  -       Row Name 04/09/25 1320          Plan of Care Review    Plan of Care Reviewed With patient;family  -     Progress improving  -     Outcome Evaluation Pt. continues to present below baseline function w/generalized weakness, balance deficits and acute NWB status affecting her ability to safely participate in functional mobility. She performed bed mobility and transfers w/min-max assist of 2. Pt. tolerated ther-ex well. Continue acute PT POC to progress as able.  -       Row Name 04/09/25 1320          Therapy Assessment/Plan (PT)    Rehab Potential (PT) fair  -     Criteria for Skilled Interventions Met (PT) yes;meets criteria;skilled treatment is necessary  -     Therapy Frequency (PT) daily  -       Row Name 04/09/25 1320          Vital Signs    Pre Systolic BP Rehab 118  -SS     Pre Treatment Diastolic BP 65  -SS     Pretreatment Heart Rate (beats/min) 65  -SS     Pre SpO2 (%) 97  -SS     O2 Delivery Pre Treatment room air  -     Pre Patient Position Supine  -       Row Name 04/09/25 1320          Positioning and Restraints    Pre-Treatment Position in bed  -SS     Post Treatment Position chair  -SS     In Chair notified nsg;reclined;call light within reach;encouraged to call for assist;exit alarm on;with family/caregiver;waffle cushion;legs elevated;LUE elevated;on mechanical lift sling  -               User Key  (r) = Recorded By, (t) = Taken By, (c) = Cosigned By      Initials Name Provider Type     Carolann Juarez, PT Physical Therapist                   Outcome Measures       Row Name 04/09/25 1327 04/09/25 0800       How much help from another person do you currently need...    Turning from your back to your side while in flat bed without using bedrails? 2  -SS 2  -AC    Moving from lying on back to sitting on the side of a flat bed without bedrails? 2  -SS 1  -AC    Moving to and from a bed to a chair (including a  wheelchair)? 2  -SS 1  -AC    Standing up from a chair using your arms (e.g., wheelchair, bedside chair)? 3  -SS 2  -AC    Climbing 3-5 steps with a railing? 1  -SS 1  -AC    To walk in hospital room? 2  -SS 1  -AC    AM-PAC 6 Clicks Score (PT) 12  -SS 8  -AC    Highest Level of Mobility Goal 4 --> Transfer to chair/commode  - 3 --> Sit at edge of bed  -AC      Row Name 04/09/25 1327          Functional Assessment    Outcome Measure Options AM-PAC 6 Clicks Basic Mobility (PT)  -               User Key  (r) = Recorded By, (t) = Taken By, (c) = Cosigned By      Initials Name Provider Type     Liane Waters RN Registered Nurse    Carolann Kitchen, PT Physical Therapist                                 Physical Therapy Education       Title: PT OT SLP Therapies (In Progress)       Topic: Physical Therapy (Done)       Point: Mobility training (Done)       Learning Progress Summary            Patient Eager, E, VU,DU,NR by  at 4/9/2025 1327    Comment: Reviewed safety/technique w/bed mobility, transfers, HEP, PT POC    Acceptance, E, VU,NR by SC at 4/8/2025 1125    Comment: reviewed HEP    Acceptance, E, NR,NL by  at 4/7/2025 1319    Acceptance, E, NR by  at 4/6/2025 1147    Acceptance, E, NR by  at 4/5/2025 1035   Family Eager, E, VU,DU,NR by  at 4/9/2025 1327    Comment: Reviewed safety/technique w/bed mobility, transfers, HEP, PT POC                      Point: Home exercise program (Done)       Learning Progress Summary            Patient Eager, E, VU,DU,NR by  at 4/9/2025 1327    Comment: Reviewed safety/technique w/bed mobility, transfers, HEP, PT POC    Acceptance, E, VU,NR by SC at 4/8/2025 1125    Comment: reviewed HEP    Acceptance, E, NR,NL by  at 4/7/2025 1319    Acceptance, E, NR by  at 4/6/2025 1147    Acceptance, E, NR by  at 4/5/2025 1035   Family Eager, E, VU,DU,NR by  at 4/9/2025 1327    Comment: Reviewed safety/technique w/bed mobility, transfers, HEP, PT POC                       Point: Body mechanics (Done)       Learning Progress Summary            Patient Eager, E, VU,DU,NR by  at 4/9/2025 1327    Comment: Reviewed safety/technique w/bed mobility, transfers, HEP, PT POC    Acceptance, E, VU,NR by SC at 4/8/2025 1125    Comment: reviewed HEP    Acceptance, E, NR,NL by  at 4/7/2025 1319    Acceptance, E, NR by  at 4/6/2025 1147    Acceptance, E, NR by  at 4/5/2025 1035   Family Eager, E, VU,DU,NR by  at 4/9/2025 1327    Comment: Reviewed safety/technique w/bed mobility, transfers, HEP, PT POC                      Point: Precautions (Done)       Learning Progress Summary            Patient Eager, E, VU,DU,NR by  at 4/9/2025 1327    Comment: Reviewed safety/technique w/bed mobility, transfers, HEP, PT POC    Acceptance, E, VU,NR by SC at 4/8/2025 1125    Comment: reviewed HEP    Acceptance, E, NR,NL by  at 4/7/2025 1319    Acceptance, E, NR by  at 4/6/2025 1147    Acceptance, E, NR by  at 4/5/2025 1035   Family Eager, E, VU,DU,NR by  at 4/9/2025 1327    Comment: Reviewed safety/technique w/bed mobility, transfers, HEP, PT POC                                      User Key       Initials Effective Dates Name Provider Type Discipline    SC 02/03/23 -  Lizbeth Gardner, PT Physical Therapist PT     06/01/21 -  Carolann Juarez, PT Physical Therapist PT     07/11/24 -  Corin Roberto, PT Physical Therapist PT                  PT Recommendation and Plan     Progress: improving  Outcome Evaluation: Pt. continues to present below baseline function w/generalized weakness, balance deficits and acute NWB status affecting her ability to safely participate in functional mobility. She performed bed mobility and transfers w/min-max assist of 2. Pt. tolerated ther-ex well. Continue acute PT POC to progress as able.     Time Calculation:         PT Charges       Row Name 04/09/25 1328             Time Calculation    Start Time 1112  -SS      PT Received On 04/09/25  -         Timed Charges     29466 - PT Therapeutic Exercise Minutes 10  -SS      72164 - PT Therapeutic Activity Minutes 13  -SS         Total Minutes    Timed Charges Total Minutes 23  -SS       Total Minutes 23  -SS                User Key  (r) = Recorded By, (t) = Taken By, (c) = Cosigned By      Initials Name Provider Type    SS Carolann Juarez PT Physical Therapist                  Therapy Charges for Today       Code Description Service Date Service Provider Modifiers Qty    79773759801 HC PT THER PROC EA 15 MIN 4/9/2025 Carolann Juarez, PT GP 1    21911552721 HC PT THERAPEUTIC ACT EA 15 MIN 4/9/2025 Carolann Juarez, PT GP 1    14045390859 HC PT THER SUPP EA 15 MIN 4/9/2025 Carolann Juarez, PT GP 2            PT G-Codes  Outcome Measure Options: AM-PAC 6 Clicks Basic Mobility (PT)  AM-PAC 6 Clicks Score (PT): 12  AM-PAC 6 Clicks Score (OT): 13  PT Discharge Summary  Anticipated Discharge Disposition (PT): skilled nursing facility    Carolann Juarez PT  4/9/2025

## 2025-04-09 NOTE — DISCHARGE PLACEMENT REQUEST
"Jenna Russell (84 y.o. Female)       Date of Birth   1940    Social Security Number       Address   [address] [facility] [address]    Home Phone   [phone]    MRN   [id number]       Taoism   None    Marital Status                               Admission Date   4/3/2025    Admission Type   Emergency    Admitting Provider   Lamont Mane DO    Attending Provider   Lamont Mane DO    Department, Room/Bed   [facility] 3H, S369/1       Discharge Date       Discharge Disposition       Discharge Destination                                 Attending Provider: Lamont Mane DO    Allergies: Penicillins, Pistachio Nut    Isolation: None   Infection: None   Code Status: No CPR    Ht: 170.2 cm (67\")   Wt: 52 kg (114 lb 10.2 oz)    Admission Cmt: None   Principal Problem: Hip fracture [S72.009A]                   Active Insurance as of 4/3/2025       Primary Coverage       Payor Plan Insurance Group Employer/Plan Group    HUMANA MEDICARE REPLACEMENT HU[employer] MEDICARE ADVANTAGE GROUP [id number]       Payor Plan Address Payor Plan Phone Number Payor Plan Fax Number Effective Dates    [address] [address] [phone]  2018 - None Entered    [facility] [address]         Subscriber Name Subscriber Birth Date Member ID       JENNA RUSSELL 1940 [id number]                     Emergency Contacts        (Rel.) Home Phone Work Phone Mobile Phone    Lianna Martinez (Spouse) [phone] -- [phone]    VIRI SALGADO (Daughter) [phone] -- [phone]    BRIAN LEVINE (Relative) [phone] -- [phone]    ERASMOFAISAL JESSICA (Grandchild) [phone] -- [phone]                 History & Physical        Rodrick Rodriguez Jr., MD at 25 2359           [facility]   Patient Name: Jenna Russell  : 1940  MRN: [id number]  Primary Care Physician:  Judi Rodriguez, " MD  Date of admission: 4/3/2025    Subjective  Subjective     Chief Complaint: Fall    HPI:    Jenna Russell is a 84 y.o. female past medical history of CVA, hypertension who presented to the emergency department after a fall.  Patient was reportedly walking without her walker and slipped and fell.  She did not lose consciousness.  History was taken from significant other at bedside.  She landed on her left side.  In the emergency department she has noted to have left periprosthetic femur fracture along with left radial fracture.  Orthopedics has been called and patient will be admitted for ongoing monitoring management.  Patient denies any acute complaints.  However, she is currently oriented x 1.  Significant other does endorse confusion at baseline which she states has gotten worse over the past few months.  Patient specifically denied any fevers, chills, sweats, nausea, vomiting, chest pain, shortness of breath, palpitations, abdominal pain diarrhea constipation, dysuria, weakness.      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 Normal LVSF.Normal LV diastolic filling. Left atrium midly dilated. RV mild- mod dilated. MIld aortic cusp sclerosis. No evidence of mitral valve prolapse. Mild mitral regurgitation.No pulmonary hypertension or perdicardial effusion. No dilation of aortic root. Venous system appears normal    H/O: stroke     HLD (hyperlipidemia) 9/8/2016    Left temporal lobe infarction     LOM (loss of memory)     Osteoarthritis 9/8/2016    Paroxysmal atrial fibrillation 9/8/2016    Thyroid nodule 9/8/2016    1.8x1.2cm    Weakness  generalized     Wound infection after surgery     Left hip arthroplasty, staph aureus, 6 weeks IV Rocephin         Past Surgical History:  Past Surgical History:   Procedure Laterality Date    ABDOMINOPLASTY      BREAST ABSCESS INCISION AND DRAINAGE      HIP TROCHANTERIC NAILING WITH INTRAMEDULLARY HIP SCREW Right 3/11/2022    Procedure: HIP TROCHANTERIC NAILING WITH INTRAMEDULLARY HIP SCREW RIGHT;  Surgeon: Danny Lynne MD;  Location: [facility];  Service: Orthopedics;  Laterality: Right;    TOTAL HIP ARTHROPLASTY Left     Staph aureus wound infection         Family History:   Family History   Problem Relation Age of Onset    Stroke Mother     Other Mother         Cardiac disorder    Diabetes Mother     Hypertension Mother     Mental illness Mother     Heart attack Father     Heart failure Father     No Known Problems Sister     Heart attack Maternal Grandfather     Heart disease Paternal Grandfather     No Known Problems Sister          Social History:   Social History     Tobacco Use    Smoking status: Former     Current packs/day: 0.00     Types: Cigarettes     Start date:      Quit date:      Years since quittin.2    Smokeless tobacco: Never    Tobacco comments:     OV says she quit smoking within the past year   Vaping Use    Vaping status: Never Used   Substance Use Topics    Alcohol use: Yes     Alcohol/week: 14.0 standard drinks of alcohol     Types: 14 Glasses of wine per week    Drug use: No         Home Medications:  ALPRAZolam, Budesonide, Diclofenac Sodium, Vitamin D, acetaminophen, aspirin, atorvastatin, calcium carbonate EX, divalproex, docusate sodium, folic acid, hyoscyamine, lidocaine, lisinopril, melatonin, metoprolol succinate XL, pantoprazole, and polyethylene glycol    Allergies:  Allergies   Allergen Reactions    Penicillins Anaphylaxis    Pistachio Nut Unknown - High Severity     Daughter reported        Review of Systems   All systems were reviewed and negative except  for: Left hip pain    Objective  Objective     Vitals:   Temp:  [98.1 °F (36.7 °C)] 98.1 °F (36.7 °C)  Heart Rate:  [49-60] 50  Resp:  [18] 18  BP: ()/(55-91) 105/58  Flow (L/min) (Oxygen Therapy):  [2] 2    Physical Exam    Constitutional: Awake, alert, no acute distress   Eyes: Pupils equal, sclerae anicteric, no conjunctival injection   HENT: Left supraorbital laceration   Neck: Supple, no thyromegaly, no lymphadenopathy, trachea midline   Respiratory: Clear to auscultation bilaterally, nonlabored respirations    Cardiovascular: RRR, no murmurs, rubs, or gallops, palpable pedal pulses bilaterally   Gastrointestinal: Positive bowel sounds, soft, nontender, nondistended   Musculoskeletal: Left lower extremity limited range of motion due to pain   Psychiatric: Appropriate affect, cooperative   Neurologic: Oriented x 1, strength symmetric in all extremities, Cranial Nerves grossly intact to confrontation, speech clear   Skin: No rashes     Result Review   Result Review:  I have personally reviewed the results from the time of this admission to 4/3/2025 23:59 EDT and agree with these findings:  [x]  Laboratory  []  Microbiology  [x]  Radiology  []  EKG/Telemetry   []  Cardiology/Vascular   []  Pathology  []  Old records  []  Other:      Assessment & Plan  Assessment / Plan     Assessment/Plan:   Left periprosthetic hip fracture: Orthopedics called from ER, appreciate recommendations.  Supportive care and pain control.  Will keep patient n.p.o. for now with gentle hydration.  PT/OT and VTE prophylaxis when okay with orthopedics.  Left radius fracture: Orthopedics called as above.  Will appreciate the recommendations going forward.  Supportive care and pain control.  Hypertension: Add back home as appropriate      VTE Prophylaxis:  Mechanical VTE prophylaxis orders are signed & held.          CODE STATUS:    Code Status (Patient has no pulse and is not breathing): No CPR (Do Not Attempt to Resuscitate)  Medical  Interventions (Patient has pulse or is breathing): Limited Support  Medical Intervention Limits: No intubation (DNI)      Admission Status:  I believe this patient meets inpatient status.    Electronically signed by Rodrick Rodriguez Jr, MD, 25, 11:59 PM EDT.             Electronically signed by Rodrick Rodriguez Jr., MD at 25 0045          Physician Progress Notes (most recent note)        Floresita Vicente PA-C at 25 1404       Attestation signed by Lamont Mane DO at 25 1615      I have reviewed this documentation and agree.                          [facility]  PROGRESS NOTE    Patient Name: Jenna Russell  : 1940  MRN: [id number]    Date of Admission: 4/3/2025  Primary Care Physician: Judi Rodriguez MD    Subjective     CC: f/u L hip fracture    HPI:  In bed. Wife and daughter are at bedside. Patient slept well last night, reports LUE pain.     Objective     Vital Signs:   Temp:  [96.6 °F (35.9 °C)-98.4 °F (36.9 °C)] 96.6 °F (35.9 °C)  Heart Rate:  [51-67] 51  Resp:  [14-18] 14  BP: (115-168)/(53-71) 115/58     Physical Exam:  Constitutional: No acute distress, awake, alert and conversant. Sitting up in chair  HENT: NCAT, mucous membranes moist. Facial ecchymosis (L > R)  Respiratory: Clear to auscultation bilaterally, normal respiratory effort on room air   Cardiovascular: RRR  Gastrointestinal: Positive bowel sounds, soft, nontender, nondistended  Musculoskeletal: No bilateral ankle edema. LUE in splint.   Psychiatric: Appropriate affect, cooperative with exam  Neurologic: Moves all extremities spontaneously without focal deficits, speech clear but expressive aphasia noted     Results Reviewed:  LAB RESULTS:      Lab 25  0540 25  1013 25  1022 25  1418 25  2202   WBC 5.67 7.22 6.12 5.68 6.77   HEMOGLOBIN 8.2* 8.4* 9.1* 6.9* 9.3*   HEMATOCRIT 25.5* 24.5* 27.1* 21.0* 28.6*   PLATELETS 101* 96* 92*  87* 114*   NEUTROS ABS  --   --   --  3.17 4.98   IMMATURE GRANS (ABS)  --   --   --  0.01 0.03   LYMPHS ABS  --   --   --  1.52 1.02   MONOS ABS  --   --   --  0.94* 0.68   EOS ABS  --   --   --  0.03 0.05   MCV 94.4 90.4 90.9 94.2 93.8         Lab 04/08/25  0540 04/06/25  2127 04/06/25  1013 04/05/25  1023 04/04/25  1312 04/03/25  2202   SODIUM 131*  --  134* 132* 128* 130*   POTASSIUM 3.8 4.5 3.6 3.8 4.3 4.5   CHLORIDE 99  --  98 97* 95* 94*   CO2 22.0  --  27.0 26.0 27.0 26.0   ANION GAP 10.0  --  9.0 9.0 6.0 10.0   BUN 17  --  8 7* 11 14   CREATININE 0.55*  --  0.49* 0.57 0.56* 0.74   EGFR 91.6  --  94.2 90.9 91.3 80.9   GLUCOSE 91  --  87 107* 89 119*   CALCIUM 8.3*  --  8.4* 8.9 8.2* 8.8   MAGNESIUM 1.7  --   --   --   --   --          Lab 04/04/25  1312 04/03/25  2202   TOTAL PROTEIN 4.4* 5.6*   ALBUMIN 2.8* 3.4*   GLOBULIN 1.6 2.2   ALT (SGPT) <5 5   AST (SGOT) 11 16   BILIRUBIN 0.4 0.2   ALK PHOS 39 57         Lab 04/04/25  1542   ABO TYPING O   RH TYPING Positive   ANTIBODY SCREEN Negative     Brief Urine Lab Results  (Last result in the past 365 days)        Color   Clarity   Blood   Leuk Est   Nitrite   Protein   CREAT   Urine HCG        04/05/25 0747 Yellow   Clear   Trace   Trace   Negative   Negative                 Microbiology Results Abnormal       None          No radiology results from the last 24 hrs    Results for orders placed during the hospital encounter of 11/23/24    Adult Transthoracic Echo Complete W/ Cont if Necessary Per Protocol (With Agitated Saline)    Interpretation Summary    Left ventricular ejection fraction appears to be 56 - 60%.    Left ventricular diastolic function was normal.    Left atrial volume is mildly increased.    Moderate aortic valve regurgitation is present.    Estimated right ventricular systolic pressure from tricuspid regurgitation is mildly elevated (35-45 mmHg). Calculated right ventricular systolic pressure from tricuspid regurgitation is 36  mmHg.    Current medications:  Scheduled Meds:acetaminophen, 1,000 mg, Oral, Q8H  aspirin, 81 mg, Oral, BID  atorvastatin, 40 mg, Oral, Nightly  Budesonide, 3 mg, Oral, Daily  divalproex, 500 mg, Oral, Q12H  [Held by provider] lisinopril, 10 mg, Oral, Daily  melatonin, 5 mg, Oral, Nightly  metoprolol succinate XL, 25 mg, Oral, Daily  pantoprazole, 40 mg, Oral, Daily  senna-docusate sodium, 2 tablet, Oral, BID  sodium chloride, 10 mL, Intravenous, Q12H  tamsulosin, 0.4 mg, Oral, Daily      Continuous Infusions:     PRN Meds:.  ALPRAZolam    senna-docusate sodium **AND** polyethylene glycol **AND** bisacodyl **AND** bisacodyl    Calcium Replacement - Follow Nurse / BPA Driven Protocol    Magnesium Standard Dose Replacement - Follow Nurse / BPA Driven Protocol    Morphine **AND** naloxone    nitroglycerin    ondansetron    oxyCODONE    Phosphorus Replacement - Follow Nurse / BPA Driven Protocol    Potassium Replacement - Follow Nurse / BPA Driven Protocol    sodium chloride    sodium chloride    Assessment & Plan     Active Hospital Problems    Diagnosis  POA    **Hip fracture [S72.009A]  Yes      Resolved Hospital Problems   No resolved problems to display.     Brief Hospital Course to date:  Jenna Russell is an 84 y.o. female with PMH significant for HTN, HLD, PAF, prior CVA with residual expressive aphasia, seizure disorder and dementia. Brought to [facility] ED on 4/3/25 for evaluation after a fall. Found to have periprosthetic L hip fracture. Orthopedics recommended non-operative management.      Left periprosthetic hip fracture  - CT pelvis showed greater trochanter periprosthetic fracture with some extension into the lesser trochanter.  -Orthopedics consulted. Recommend nonoperative management. Protected WB with a walker at all times. No active hip ABduction   - SCDs for DVT prophylaxis + ASA BID  - Continue APAP 1000mg TID, Oxycodone 2.5mg Q4H PRN  - Follow up with Dr. Hoyt in 2 weeks    Left radius fracture  -  Ortho has evaluated - nonoperative management  - Keep wrist splint in place until OP follow up    Acute on chronic anemia  - S/p 1 unit PRBC, with appropriate response    Chronic thrombocytopenia  - Platelets 114 on admission, down to 96 on   - 101 today - overall stable     Urinary retention  -  UA reassuring  - Started on Tamsulosin  - Keep bowels moving  - Continue urinary retention protocol, straight cath as needed    Hypotension, resolved  - s/p IVF    HLD-continue Atorvastatin  HTN, continue Metoprolol. Lisinopril on hold   Seizure disorder-continue Depakote BID  Memory impairment/dementia-previously on Donepezil  GERD-PPI  Documented history of prior A-fib-not on anticoagulation, continue Metoprolol. Continue to monitor on telemetry    Expected Discharge Location and Transportation: SNF  Expected Discharge Expected Discharge Date: 2025; Expected Discharge Time:      VTE Prophylaxis:Mechanical VTE prophylaxis orders are present.    AM-PAC 6 Clicks Score (PT): 10 (25 1124)    CODE STATUS:   Code Status and Medical Interventions: No CPR (Do Not Attempt to Resuscitate); Limited Support; No intubation (DNI)   Ordered at: 25 4849     Code Status (Patient has no pulse and is not breathing):    No CPR (Do Not Attempt to Resuscitate)     Medical Interventions (Patient has pulse or is breathing):    Limited Support     Medical Intervention Limits:    No intubation (DNI)     Floresita Vicente PA-C  25        Electronically signed by Lamont Mane DO at 25 1615          Physical Therapy Notes (most recent note)        Lizbeth Gardner, PT at 25 1032  Version 1 of 1         Patient Name: Jenna Russell  : 1940    MRN: [id number]                              Today's Date: 2025       Admit Date: 4/3/2025    Visit Dx:     ICD-10-CM ICD-9-CM   1. Periprosthetic hip fracture, initial encounter  M97.8XXA 996.44    Z96.649 V43.64   2. Closed fracture of distal end  of left radius, unspecified fracture morphology, initial encounter  S52.502A 813.42   3. Facial laceration, initial encounter  S01.81XA 873.40     Patient Active Problem List   Diagnosis    PAF (paroxysmal atrial fibrillation)    HLD (hyperlipidemia)    Hypertension    History of CVA (cerebrovascular accident)    Combined receptive and expressive aphasia    Colitis, Clostridium difficile    Collagenous colitis    Seizure disorder    Closed right hip fracture, initial encounter    Anemia    Daily consumption of alcohol    Complex partial seizure    Right sided weakness    Severe protein-calorie malnutrition    Hip fracture     Past Medical History:   Diagnosis Date    Acute ischemic stroke     Arterial ischemic stroke 9/8/2016    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 Normal LVSF.Normal LV diastolic filling. Left atrium midly dilated. RV mild- mod dilated. MIld aortic cusp sclerosis. No evidence of mitral valve prolapse. Mild mitral regurgitation.No pulmonary hypertension or perdicardial effusion. No dilation of aortic root. Venous system appears normal    H/O: stroke     HLD (hyperlipidemia) 9/8/2016    Left temporal lobe infarction     LOM (loss of memory)     Osteoarthritis 9/8/2016    Paroxysmal atrial fibrillation 9/8/2016    Thyroid nodule 9/8/2016    1.8x1.2cm    Weakness generalized     Wound infection after surgery     Left hip arthroplasty, staph aureus, 6 weeks IV Rocephin     Past Surgical History:   Procedure Laterality Date    ABDOMINOPLASTY      BREAST ABSCESS INCISION AND DRAINAGE      HIP TROCHANTERIC NAILING WITH INTRAMEDULLARY HIP SCREW Right 3/11/2022    Procedure: HIP TROCHANTERIC NAILING WITH INTRAMEDULLARY HIP SCREW RIGHT;  Surgeon: Danny Lynne MD;  Location:  [facility];  Service: Orthopedics;  Laterality: Right;    TOTAL HIP ARTHROPLASTY Left     Staph aureus wound infection      General Information       Row Name 04/08/25 1108          Physical Therapy Time and Intention    Document Type therapy note (daily note)  -SC     Mode of Treatment physical therapy  -SC       Row Name 04/08/25 1108          General Information    Patient Profile Reviewed yes  -SC     Existing Precautions/Restrictions fall;left;other (see comments)  no active hip abd, protected wt bearing AAT, L radius fx, Hx of CVA  -SC       Row Name 04/08/25 1108          Cognition    Orientation Status (Cognition) oriented to;person;disoriented to;place;situation;time  -SC       Row Name 04/08/25 1108          Safety Issues/Impairments Affecting Functional Mobility    Impairments Affecting Function (Mobility) balance;cognition;endurance/activity tolerance;strength;pain  -SC     Comment, Safety Issues/Impairments (Mobility) lethergic, following 50% commands, fearful of mobility  -SC               User Key  (r) = Recorded By, (t) = Taken By, (c) = Cosigned By      Initials Name Provider Type    SC Lizbeth Gardner, PT Physical Therapist                   Mobility       Row Name 04/08/25 1109          Bed Mobility    Bed Mobility supine-sit  -SC     Supine-Sit [place] (Bed Mobility) dependent (less than 25% patient effort);2 person assist  -SC     Assistive Device (Bed Mobility) head of bed elevated;bed rails;repositioning sheet  -SC     Comment, (Bed Mobility) up to edge of bed with little participation. Leans to right on sitting. Time taken to work on midline orientation. ARjo walker platform used to rest arm on and stabilize midline. Improved with time,  -SC       Row Name 04/08/25 1109          Transfers    Comment, (Transfers) STS from EOB with ARJO .L UE resting on platform. Time taken in standing to readjust legs. Knee immobilizer on L leg for stability  -SC       Row Name 04/08/25 1109           Sit-Stand Transfer    Sit-Stand [place] (Transfers) 2 person assist;verbal cues;nonverbal cues (demo/gesture);moderate assist (50% patient effort)  -SC     Assistive Device (Sit-Stand Transfers) other (see comments)  used arjo walker and PT boost to get to standing  -[facility] Name 04/08/25 1109          Gait/Stairs (Locomotion)    [place] Level (Gait) moderate assist (50% patient effort);2 person assist;verbal cues;nonverbal cues (demo/gesture)  -SC     Assistive Device (Gait) other (see comments)  ARJO walker used with L UE resting on pad  -SC     Distance in Feet (Gait) 6  -SC     Deviations/Abnormal Patterns (Gait) gait speed decreased;left sided deviations;stride length decreased;weight shifting decreased;base of support, narrow;antalgic  -SC     Bilateral Gait Deviations forward flexed posture  -SC     Left Sided Gait Deviations weight shift ability decreased  -SC     Comment, (Gait/Stairs) Focused on advancing legs with ambulation. Requierd some assistance to take steps with L leg. Encouraged upright posture. Followed by chair. Patient very fearful crying out loud.  -[facility] Name 04/08/25 1109          Mobility    Extremity Weight-bearing Status left upper extremity;left lower extremity  -SC     Left Upper Extremity (Weight-bearing Status) non weight-bearing (NWB)  -SC     Left Lower Extremity (Weight-bearing Status) other (see comments)  protected wt bearing  -SC               User Key  (r) = Recorded By, (t) = Taken By, (c) = Cosigned By      Initials Name Provider Type    SC Lizbeth Gardner PT Physical Therapist                   Obj/Interventions       [facility] Name 04/08/25 1116          Motor Skills    Therapeutic Exercise hip;knee;ankle  -[facility] Name 04/08/25 1116          Hip (Therapeutic Exercise)    Hip (Therapeutic Exercise) AAROM (active assistive range of motion)  -SC     Hip AAROM (Therapeutic Exercise) left;flexion;extension;10 repetitions  -[facility] Name 04/08/25 1116           Knee (Therapeutic Exercise)    Knee (Therapeutic Exercise) isometric exercises;strengthening exercise  -SC     Knee Isometrics (Therapeutic Exercise) left;10 repetitions;quad sets  -SC     Knee Strengthening (Therapeutic Exercise) left;SAQ (short arc quad);10 repetitions  -SC       Row Name 04/08/25 1116          Ankle (Therapeutic Exercise)    Ankle (Therapeutic Exercise) AROM (active range of motion)  -SC     Ankle AROM (Therapeutic Exercise) bilateral;dorsiflexion;plantarflexion;10 repetitions  -SC       Row Name 04/08/25 1116          Balance    Balance Assessment sitting static balance;standing static balance;standing dynamic balance  -SC     Static Sitting Balance moderate assist  -SC     Dynamic Sitting Balance moderate assist;2-person assist  -SC     Position, Sitting Balance unsupported  -SC     Static Standing Balance 2-person assist;maximum assist  -SC     Dynamic Standing Balance maximum assist;2-person assist  -SC     Position/Device Used, Standing Balance supported  -SC     Comment, Balance Leans to right on sitting. Time taken to work on midline orientation. [employer] walker platform used to rest arm on and stabilize midline. Improved with time.  -SC               User Key  (r) = Recorded By, (t) = Taken By, (c) = Cosigned By      Initials Name Provider Type    SC Lizbeth Gardner, PT Physical Therapist                   Goals/Plan    No documentation.                  Clinical Impression       Row Name 04/08/25 1120          Pain    Pain Location hip  -SC     Pain Side/Orientation left  -SC     Pain Management Interventions positioning techniques utilized  -SC     Response to Pain Interventions functional ability unchanged  -SC       Row Name 04/08/25 1120          Pain Scale: FACES Pre/Post-Treatment    Pain: FACES Scale, Pretreatment 0-->no hurt  -SC     Posttreatment Pain Rating 6-->hurts even more  -SC       Row Name 04/08/25 1120          Plan of Care Review    Plan of Care Reviewed With  patient  -SC     Progress no change  -SC     Outcome Evaluation Patient is very fearful about moving. Knee immobilizer used for extra stability today however it made sitting down very difficult. She has significant balance deficits. Continue to recomment rehab  -SC       Row Name 04/08/25 1120          Therapy Assessment/Plan (PT)    Patient/Family Therapy Goals Statement (PT) did not state  -SC     Rehab Potential (PT) fair  -SC     Criteria for Skilled Interventions Met (PT) yes  -SC     Therapy Frequency (PT) daily  -SC       Row Name 04/08/25 1120          Vital Signs    Pre Systolic BP Rehab 143  -SC     Pre Treatment Diastolic BP 60  -SC     Posttreatment Heart Rate (beats/min) 52  -SC     Post SpO2 (%) 100  -SC     O2 Delivery Post Treatment room air  -SC       Row Name 04/08/25 1120          Positioning and Restraints    Pre-Treatment Position in bed  -SC     Post Treatment Position chair  -SC     In Chair notified nsg;reclined;sitting;call light within reach;encouraged to call for assist;exit alarm on;with family/caregiver  -SC               User Key  (r) = Recorded By, (t) = Taken By, (c) = Cosigned By      Initials Name Provider Type    SC Lizbeth Gardner, PT Physical Therapist                   Outcome Measures       Row Name 04/08/25 1124 04/08/25 0800       How much help from another person do you currently need...    Turning from your back to your side while in flat bed without using bedrails? 2  -SC 2  -AC    Moving from lying on back to sitting on the side of a flat bed without bedrails? 1  -SC 2  -AC    Moving to and from a bed to a chair (including a wheelchair)? 2  -SC 1  -AC    Standing up from a chair using your arms (e.g., wheelchair, bedside chair)? 2  -SC 1  -AC    Climbing 3-5 steps with a railing? 1  -SC 1  -AC    To walk in hospital room? 2  -SC 1  -AC    AM-PAC 6 Clicks Score (PT) 10  -SC 8  -    Highest Level of Mobility Goal 4 --> Transfer to chair/commode  -SC 3 --> Sit at edge of  bed  -      Row Name 04/08/25 1124          Functional Assessment    Outcome Measure Options AM-PAC 6 Clicks Basic Mobility (PT)  -SC               User Key  (r) = Recorded By, (t) = Taken By, (c) = Cosigned By      Initials Name Provider Type    SC Lizbeth Gardner, PT Physical Therapist     Liane Waters RN Registered Nurse                                 Physical Therapy Education       Title: PT OT SLP Therapies (In Progress)       Topic: Physical Therapy (Done)       Point: Mobility training (Done)       Learning Progress Summary            Patient Acceptance, E, VU,NR by SC at 4/8/2025 1125    Comment: reviewed HEP    Acceptance, E, NR,NL by  at 4/7/2025 1319    Acceptance, E, NR by  at 4/6/2025 1147    Acceptance, E, NR by  at 4/5/2025 1035                      Point: Home exercise program (Done)       Learning Progress Summary            Patient Acceptance, E, VU,NR by SC at 4/8/2025 1125    Comment: reviewed HEP    Acceptance, E, NR,NL by  at 4/7/2025 1319    Acceptance, E, NR by  at 4/6/2025 1147    Acceptance, E, NR by  at 4/5/2025 1035                      Point: Body mechanics (Done)       Learning Progress Summary            Patient Acceptance, E, VU,NR by SC at 4/8/2025 1125    Comment: reviewed HEP    Acceptance, E, NR,NL by  at 4/7/2025 1319    Acceptance, E, NR by  at 4/6/2025 1147    Acceptance, E, NR by  at 4/5/2025 1035                      Point: Precautions (Done)       Learning Progress Summary            Patient Acceptance, E, VU,NR by SC at 4/8/2025 1125    Comment: reviewed HEP    Acceptance, E, NR,NL by  at 4/7/2025 1319    Acceptance, E, NR by  at 4/6/2025 1147    Acceptance, E, NR by  at 4/5/2025 1035                                      User Key       Initials Effective Dates Name Provider Type Discipline    SC 02/03/23 -  Lizbeth Gardner, PT Physical Therapist PT     07/11/24 -  Corin Roberto PT Physical Therapist PT                  PT Recommendation and  Plan     Progress: no change  Outcome Evaluation: Patient is very fearful about moving. Knee immobilizer used for extra stability today however it made sitting down very difficult. She has significant balance deficits. Continue to recomment rehab     Time Calculation:         PT Charges       Row Name 25 1032             Time Calculation    Start Time 1032  -SC      PT Received On 25  -SC      PT Goal Re-Cert Due Date 04/15/25  -SC         Timed Charges    61517 - PT Therapeutic Exercise Minutes 10  -SC      95760 - Gait Training Minutes  5  -SC      11104 - PT Therapeutic Activity Minutes 15  -SC         Total Minutes    Timed Charges Total Minutes 30  -SC       Total Minutes 30  -SC                User Key  (r) = Recorded By, (t) = Taken By, (c) = Cosigned By      Initials Name Provider Type    SC Lizbeth Gardner PT Physical Therapist                  Therapy Charges for Today       Code Description Service Date Service Provider Modifiers Qty    11468117684  PT THER PROC EA 15 MIN 2025 Lizbeth Gardner, PT GP 1    16813687060 HC PT THERAPEUTIC ACT EA 15 MIN 2025 Lizbeth Gardner, PT GP 1    59666470603 HC PT THER SUPP EA 15 MIN 2025 Lizbeth Gardner, PT GP 2            PT G-Codes  Outcome Measure Options: AM-PAC 6 Clicks Basic Mobility (PT)  AM-PAC 6 Clicks Score (PT): 10  AM-PAC 6 Clicks Score (OT): 13  PT Discharge Summary  Anticipated Discharge Disposition (PT): skilled nursing facility    Lizbeth Gardner PT  2025      Electronically signed by Lizbeth Gardner PT at 25 1126          Occupational Therapy Notes (most recent note)        Krissy Case, OT at 25 8616          Patient Name: Jenna Russell  : 1940    MRN: [id number]                              Today's Date: 2025       Admit Date: 4/3/2025    Visit Dx:     ICD-10-CM ICD-9-CM   1. Periprosthetic hip fracture, initial encounter  M97.8XXA 996.44    Z96.649 V43.64   2. Closed fracture of distal end   [facility] OR;  Service: Orthopedics;  Laterality: Right;    TOTAL HIP ARTHROPLASTY Left     Staph aureus wound infection      General Information       Row Name 04/05/25 1110          OT Time and Intention    Document Type evaluation  -MR     Mode of Treatment occupational therapy  -MR       Row Name 04/05/25 1110          General Information    Patient Profile Reviewed yes  -MR     Prior Level of Function independent:;gait;transfer;bed mobility  Pt has significant dementia at baseline. Per wife pt has had several close falls due to forgetting to use walker at home  -MR     Existing Precautions/Restrictions fall;left;other (see comments)  Protected weight bearing AAT, L distal radius fx, no active L hip abduction  -MR     Barriers to Rehab medically complex;previous functional deficit;cognitive status  -MR       Row Name 04/05/25 1110          Living Environment    Current Living Arrangements home  -MR     People in Home spouse  -MR       Row Name 04/05/25 1110          Home Main Entrance    Number of Stairs, Main Entrance other (see comments)  elevator  -MR       Row Name 04/05/25 1110          Stairs Within Home, Primary    Number of Stairs, Within Home, Primary none  -MR       Row Name 04/05/25 1110          Cognition    Orientation Status (Cognition) oriented to;person;disoriented to;place;situation;time  -MR       Row Name 04/05/25 1110          Safety Issues/Impairments Affecting Functional Mobility    Safety Issues Affecting Function (Mobility) ability to follow commands;awareness of need for assistance;insight into deficits/self-awareness;judgment;safety precaution awareness;safety precautions follow-through/compliance;sequencing abilities  -MR     Impairments Affecting Function (Mobility) balance;cognition;endurance/activity tolerance;strength;pain  -MR     Cognitive Impairments, Mobility Safety/Performance attention;awareness, need for assistance;insight into  deficits/self-awareness;judgment;problem-solving/reasoning;safety precaution awareness;safety precaution follow-through;sequencing abilities  -MR               User Key  (r) = Recorded By, (t) = Taken By, (c) = Cosigned By      Initials Name Provider Type    MR Krissy Case, OT Occupational Therapist                     Mobility/ADL's       Row Name 04/05/25 1111          Bed Mobility    Bed Mobility rolling right;rolling left  -MR     Rolling Left Arrey (Bed Mobility) moderate assist (50% patient effort);1 person assist  -MR     Rolling Right Arrey (Bed Mobility) moderate assist (50% patient effort);1 person assist  -MR     Assistive Device (Bed Mobility) bed rails;repositioning sheet  -MR       Row Name 04/05/25 1111          Transfers    Transfers bed-chair transfer;sit-stand transfer  -MR       Row Name 04/05/25 1111          Bed-Chair Transfer    Bed-Chair Arrey (Transfers) dependent (less than 25% patient effort);2 person assist  -MR     Assistive Device (Bed-Chair Transfers) lift device  -MR       Row Name 04/05/25 1111          Sit-Stand Transfer    Sit-Stand Arrey (Transfers) minimum assist (75% patient effort);2 person assist;verbal cues;nonverbal cues (demo/gesture)  -MR     Assistive Device (Sit-Stand Transfers) walker, front-wheeled  -MR       Row Name 04/05/25 1111          Activities of Daily Living    BADL Assessment/Intervention lower body dressing;upper body dressing  -MR       Row Name 04/05/25 1111          Mobility    Extremity Weight-bearing Status left upper extremity;left lower extremity  -MR     Left Upper Extremity (Weight-bearing Status) non weight-bearing (NWB)  -MR     Left Lower Extremity (Weight-bearing Status) other (see comments)  Protected weight bearing AAT  -MR       Row Name 04/05/25 1111          Lower Body Dressing Assessment/Training    Arrey Level (Lower Body Dressing) don;doff;socks;maximum assist (25% patient effort)  -MR     Position  (Lower Body Dressing) supine  -MR       Row Name 04/05/25 1111          Upper Body Dressing Assessment/Training    [place] Level (Upper Body Dressing) don;maximum assist (25% patient effort)  -MR     Position (Upper Body Dressing) supine  -MR               User Key  (r) = Recorded By, (t) = Taken By, (c) = Cosigned By      Initials Name Provider Type    Krissy Prakash OT Occupational Therapist                   Obj/Interventions       Row Name 04/05/25 1112          Sensory Assessment (Somatosensory)    Sensory Assessment (Somatosensory) UE sensation intact  -MR       Row Name 04/05/25 1112          Vision Assessment/Intervention    Visual Impairment/Limitations WFL  -MR       Row Name 04/05/25 1112          Range of Motion Comprehensive    General Range of Motion bilateral upper extremity ROM WFL  -MR       Row Name 04/05/25 1112          Strength Comprehensive (MMT)    Comment, General Manual Muscle Testing (MMT) Assessment BUE grossly 3+/5, did not formally assess the LUE strength  -MR       Row Name 04/05/25 1112          Balance    Balance Assessment sitting static balance;sitting dynamic balance;standing static balance  -MR     Static Sitting Balance standby assist  -MR     Dynamic Sitting Balance contact guard  -MR     Position, Sitting Balance supported;sitting in chair  -MR     Static Standing Balance minimal assist;2-person assist  -MR     Position/Device Used, Standing Balance supported;walker, front-wheeled  -MR     Balance Interventions sitting;standing;sit to stand;supported;static;dynamic  -MR               User Key  (r) = Recorded By, (t) = Taken By, (c) = Cosigned By      Initials Name Provider Type    Krissy Prakash, OT Occupational Therapist                   Goals/Plan       Row Name 04/05/25 1115          Bed Mobility Goal 1 (OT)    Activity/Assistive Device (Bed Mobility Goal 1, OT) sit to supine;supine to sit  -MR     [place] Level/Cues Needed (Bed Mobility Goal 1, OT) minimum  assist (75% or more patient effort)  -MR     Time Frame (Bed Mobility Goal 1, OT) short term goal (STG);3 days  -MR     Progress/Outcomes (Bed Mobility Goal 1, OT) new goal  -MR       Row Name 04/05/25 1115          Transfer Goal 1 (OT)    Activity/Assistive Device (Transfer Goal 1, OT) sit-to-stand/stand-to-sit;toilet;commode, bedside without drop arms  -MR     Hunt Level/Cues Needed (Transfer Goal 1, OT) contact guard required  -MR     Time Frame (Transfer Goal 1, OT) long term goal (LTG);5 days  -MR     Progress/Outcome (Transfer Goal 1, OT) new goal  -MR       Row Name 04/05/25 1115          Dressing Goal 1 (OT)    Activity/Device (Dressing Goal 1, OT) upper body dressing  -MR     Hunt/Cues Needed (Dressing Goal 1, OT) moderate assist (50-74% patient effort)  -MR     Time Frame (Dressing Goal 1, OT) long term goal (LTG);5 days  -MR     Progress/Outcome (Dressing Goal 1, OT) new goal  -MR       Row Name 04/05/25 1115          Therapy Assessment/Plan (OT)    Planned Therapy Interventions (OT) activity tolerance training;adaptive equipment training;BADL retraining;functional balance retraining;transfer/mobility retraining;strengthening exercise;ROM/therapeutic exercise;patient/caregiver education/training;passive ROM/stretching;IADL retraining;neuromuscular control/coordination retraining;occupation/activity based interventions  -MR               User Key  (r) = Recorded By, (t) = Taken By, (c) = Cosigned By      Initials Name Provider Type     Krissy Case, OT Occupational Therapist                   Clinical Impression       Row Name 04/05/25 1113          Pain Assessment    Pain Location hip  -MR     Pain Side/Orientation left  -MR       Row Name 04/05/25 1113          Pain Scale: FACES Pre/Post-Treatment    Pain: FACES Scale, Pretreatment 2-->hurts little bit  -MR     Posttreatment Pain Rating 2-->hurts little bit  -MR       Row Name 04/05/25 1113          Plan of Care Review    Plan of Care  Reviewed With patient;spouse;family  -MR     Progress no change  Initial Eval  -MR     Outcome Evaluation Patient presenting below her functional baseline w/ mobility, transfers and balance requiring increased assist for ADLs. Pt requiring assist for bed mobility, transfers and LB dressing. Deficits warrant skilled OT services. Recommend SNF when medically appropriate for d/c.  -MR       Row Name 04/05/25 1113          Therapy Assessment/Plan (OT)    Patient/Family Therapy Goal Statement (OT) Return to PLOF  -MR     Rehab Potential (OT) good  -MR     Criteria for Skilled Therapeutic Interventions Met (OT) yes;meets criteria;skilled treatment is necessary  -MR     Therapy Frequency (OT) daily  -MR     Predicted Duration of Therapy Intervention (OT) 5 days  -MR       Row Name 04/05/25 1113          Therapy Plan Review/Discharge Plan (OT)    Anticipated Discharge Disposition (OT) skilled nursing facility  -MR       Row Name 04/05/25 1113          Positioning and Restraints    Pre-Treatment Position in bed  -MR     Post Treatment Position chair  -MR     In Chair notified nsg;reclined;sitting;call light within reach;encouraged to call for assist;exit alarm on;with family/caregiver;waffle cushion;legs elevated;heels elevated;LUE elevated  -MR               User Key  (r) = Recorded By, (t) = Taken By, (c) = Cosigned By      Initials Name Provider Type    MR Krissy Case, OT Occupational Therapist                   Outcome Measures       Row Name 04/05/25 1115          How much help from another is currently needed...    Putting on and taking off regular lower body clothing? 1  -MR     Bathing (including washing, rinsing, and drying) 2  -MR     Toileting (which includes using toilet bed pan or urinal) 1  -MR     Putting on and taking off regular upper body clothing 3  -MR     Taking care of personal grooming (such as brushing teeth) 3  -MR     Eating meals 3  -MR     AM-PAC 6 Clicks Score (OT) 13  -MR       Row Name  04/05/25 1034 04/05/25 0830       How much help from another person do you currently need...    Turning from your back to your side while in flat bed without using bedrails? 2  -AC 2  -SC    Moving from lying on back to sitting on the side of a flat bed without bedrails? 2  -AC 1  -SC    Moving to and from a bed to a chair (including a wheelchair)? 2  -AC 1  -SC    Standing up from a chair using your arms (e.g., wheelchair, bedside chair)? 2  -AC 1  -SC    Climbing 3-5 steps with a railing? 1  -AC 1  -SC    To walk in hospital room? 2  -AC 1  -SC    AM-PAC 6 Clicks Score (PT) 11  -AC 7  -SC    Highest Level of Mobility Goal 4 --> Transfer to chair/commode  -AC 2 --> Bed activities/dependent transfer  -SC      Row Name 04/05/25 1115 04/05/25 1034       Functional Assessment    Outcome Measure Options AM-PAC 6 Clicks Daily Activity (OT)  -MR AM-PAC 6 Clicks Basic Mobility (PT)  -AC              User Key  (r) = Recorded By, (t) = Taken By, (c) = Cosigned By      Initials Name Provider Type    SC Tomeka Arce, RN Registered Nurse    Krissy Prakash, OT Occupational Therapist    AC Corin Roberto, PT Physical Therapist                    Occupational Therapy Education       Title: PT OT SLP Therapies (In Progress)       Topic: Occupational Therapy (In Progress)       Point: ADL training (Done)       Learning Progress Summary            Patient Acceptance, E, VU by MR at 4/5/2025 1116   Family Acceptance, E, VU by MR at 4/5/2025 1116                      Point: Precautions (Done)       Learning Progress Summary            Patient Acceptance, E, VU by MR at 4/5/2025 1116   Family Acceptance, E, VU by MR at 4/5/2025 1116                      Point: Body mechanics (Done)       Learning Progress Summary            Patient Acceptance, E, VU by MR at 4/5/2025 1116   Family Acceptance, E, VU by MR at 4/5/2025 1116                                      User Key       Initials Effective Dates Name Provider Type Discipline     MR 09/22/22 -  Krissy Case OT Occupational Therapist OT                  OT Recommendation and Plan  Planned Therapy Interventions (OT): activity tolerance training, adaptive equipment training, BADL retraining, functional balance retraining, transfer/mobility retraining, strengthening exercise, ROM/therapeutic exercise, patient/caregiver education/training, passive ROM/stretching, IADL retraining, neuromuscular control/coordination retraining, occupation/activity based interventions  Therapy Frequency (OT): daily  Plan of Care Review  Plan of Care Reviewed With: patient, spouse, family  Progress: no change (Initial Eval)  Outcome Evaluation: Patient presenting below her functional baseline w/ mobility, transfers and balance requiring increased assist for ADLs. Pt requiring assist for bed mobility, transfers and LB dressing. Deficits warrant skilled OT services. Recommend SNF when medically appropriate for d/c.     Time Calculation:   Evaluation Complexity (OT)  Review Occupational Profile/Medical/Therapy History Complexity: expanded/moderate complexity  Assessment, Occupational Performance/Identification of Deficit Complexity: 3-5 performance deficits  Clinical Decision Making Complexity (OT): detailed assessment/moderate complexity  Overall Complexity of Evaluation (OT): moderate complexity     Time Calculation- OT       Row Name 04/05/25 1116             Time Calculation- OT    OT Start Time 0928  -MR      OT Received On 04/05/25  -MR      OT Goal Re-Cert Due Date 04/15/25  -MR         Timed Charges    35483 - OT Self Care/Mgmt Minutes 8  -MR         Untimed Charges    OT Eval/Re-eval Minutes 46  -MR         Total Minutes    Timed Charges Total Minutes 8  -MR      Untimed Charges Total Minutes 46  -MR       Total Minutes 54  -MR                User Key  (r) = Recorded By, (t) = Taken By, (c) = Cosigned By      Initials Name Provider Type     Krissy Case, OT Occupational Therapist                  Therapy  Charges for Today       Code Description Service Date Service Provider Modifiers Qty    32827388856 HC OT SELF CARE/MGMT/TRAIN EA 15 MIN 4/5/2025 Krissy aCse OT GO 1    38735990299 HC OT EVAL MOD COMPLEXITY 4 4/5/2025 Krissy Case OT GO 1                 Krissy Case OT  4/5/2025    Electronically signed by Krissy Case OT at 04/05/25 1114

## 2025-04-09 NOTE — CASE MANAGEMENT/SOCIAL WORK
Continued Stay Note  [facility]     Patient Name: Jenna Russell  MRN: [id number]  Today's Date: 4/9/2025    Admit Date: 4/3/2025    Plan: skilled rehab at [facility], pending insurance approval   Discharge Plan       Row Name 04/09/25 1206       Plan    Plan skilled rehab at [facility], pending insurance approval    Patient/Family in Agreement with Plan yes    Plan Comments Met with patient and her wife at the bedside to discuss discharge plan. Patient's plan is skilled rehab at [facility], pending insurance approval. Precertification initiated today and pending. IMM delivered to patient's wife at bedside. CM will continue to follow.    Final Discharge Disposition Code 03 - skilled nursing facility (SNF)                   Discharge Codes    No documentation.                 Expected Discharge Date and Time       Expected Discharge Date Expected Discharge Time    Apr 9, 2025               Rashaad Jameson RN

## 2025-04-10 ENCOUNTER — APPOINTMENT (OUTPATIENT)
Facility: HOSPITAL | Age: 85
DRG: 535 | End: 2025-04-10
Payer: MEDICARE

## 2025-04-10 VITALS
WEIGHT: 114.64 LBS | HEIGHT: 67 IN | RESPIRATION RATE: 16 BRPM | BODY MASS INDEX: 17.99 KG/M2 | OXYGEN SATURATION: 99 % | SYSTOLIC BLOOD PRESSURE: 128 MMHG | TEMPERATURE: 97.5 F | DIASTOLIC BLOOD PRESSURE: 64 MMHG | HEART RATE: 61 BPM

## 2025-04-10 LAB
ANION GAP SERPL CALCULATED.3IONS-SCNC: 6 MMOL/L (ref 5–15)
BUN SERPL-MCNC: 14 MG/DL (ref 8–23)
BUN/CREAT SERPL: 32.6 (ref 7–25)
CALCIUM SPEC-SCNC: 8.6 MG/DL (ref 8.6–10.5)
CHLORIDE SERPL-SCNC: 103 MMOL/L (ref 98–107)
CO2 SERPL-SCNC: 24 MMOL/L (ref 22–29)
CREAT SERPL-MCNC: 0.43 MG/DL (ref 0.57–1)
DEPRECATED RDW RBC AUTO: 45.3 FL (ref 37–54)
EGFRCR SERPLBLD CKD-EPI 2021: 97.2 ML/MIN/1.73
ERYTHROCYTE [DISTWIDTH] IN BLOOD BY AUTOMATED COUNT: 13.2 % (ref 12.3–15.4)
GLUCOSE SERPL-MCNC: 91 MG/DL (ref 65–99)
HCT VFR BLD AUTO: 26.6 % (ref 34–46.6)
HGB BLD-MCNC: 8.7 G/DL (ref 12–15.9)
MAGNESIUM SERPL-MCNC: 1.9 MG/DL (ref 1.6–2.4)
MCH RBC QN AUTO: 30.9 PG (ref 26.6–33)
MCHC RBC AUTO-ENTMCNC: 32.7 G/DL (ref 31.5–35.7)
MCV RBC AUTO: 94.3 FL (ref 79–97)
PLATELET # BLD AUTO: 150 10*3/MM3 (ref 140–450)
PMV BLD AUTO: 8.7 FL (ref 6–12)
POTASSIUM SERPL-SCNC: 4.7 MMOL/L (ref 3.5–5.2)
RBC # BLD AUTO: 2.82 10*6/MM3 (ref 3.77–5.28)
SODIUM SERPL-SCNC: 133 MMOL/L (ref 136–145)
WBC NRBC COR # BLD AUTO: 6.96 10*3/MM3 (ref 3.4–10.8)

## 2025-04-10 PROCEDURE — 83735 ASSAY OF MAGNESIUM: CPT | Performed by: PHYSICIAN ASSISTANT

## 2025-04-10 PROCEDURE — 80048 BASIC METABOLIC PNL TOTAL CA: CPT | Performed by: PHYSICIAN ASSISTANT

## 2025-04-10 PROCEDURE — 85027 COMPLETE CBC AUTOMATED: CPT | Performed by: PHYSICIAN ASSISTANT

## 2025-04-10 PROCEDURE — 99239 HOSP IP/OBS DSCHRG MGMT >30: CPT | Performed by: INTERNAL MEDICINE

## 2025-04-10 RX ORDER — TAMSULOSIN HYDROCHLORIDE 0.4 MG/1
0.4 CAPSULE ORAL DAILY
Qty: 30 CAPSULE | Refills: 0 | Status: SHIPPED | OUTPATIENT
Start: 2025-04-11

## 2025-04-10 RX ORDER — DIVALPROEX SODIUM 500 MG/1
500 TABLET, DELAYED RELEASE ORAL EVERY 12 HOURS SCHEDULED
Start: 2025-04-10

## 2025-04-10 RX ORDER — ASPIRIN 81 MG/1
81 TABLET ORAL 2 TIMES DAILY
Start: 2025-04-10 | End: 2025-06-09

## 2025-04-10 RX ORDER — OXYCODONE HYDROCHLORIDE 5 MG/1
2.5 TABLET ORAL EVERY 4 HOURS PRN
Qty: 12 TABLET | Refills: 0 | Status: SHIPPED | OUTPATIENT
Start: 2025-04-10

## 2025-04-10 RX ORDER — BUDESONIDE 3 MG/1
6 CAPSULE, COATED PELLETS ORAL DAILY
Start: 2025-04-10

## 2025-04-10 RX ORDER — LISINOPRIL 20 MG/1
20 TABLET ORAL DAILY
Start: 2025-04-10

## 2025-04-10 RX ORDER — ALPRAZOLAM 0.25 MG
0.25 TABLET ORAL NIGHTLY PRN
Qty: 3 TABLET | Refills: 0 | Status: SHIPPED | OUTPATIENT
Start: 2025-04-10

## 2025-04-10 RX ADMIN — BUDESONIDE 3 MG: 3 CAPSULE, COATED PELLETS ORAL at 08:53

## 2025-04-10 RX ADMIN — TAMSULOSIN HYDROCHLORIDE 0.4 MG: 0.4 CAPSULE ORAL at 08:54

## 2025-04-10 RX ADMIN — DIVALPROEX SODIUM 500 MG: 250 TABLET, DELAYED RELEASE ORAL at 08:54

## 2025-04-10 RX ADMIN — ASPIRIN 81 MG: 81 TABLET, COATED ORAL at 08:53

## 2025-04-10 RX ADMIN — ACETAMINOPHEN 1000 MG: 500 TABLET, FILM COATED ORAL at 08:54

## 2025-04-10 RX ADMIN — OXYCODONE HYDROCHLORIDE 2.5 MG: 5 TABLET ORAL at 12:21

## 2025-04-10 RX ADMIN — METOPROLOL SUCCINATE 25 MG: 25 TABLET, EXTENDED RELEASE ORAL at 08:54

## 2025-04-10 RX ADMIN — PANTOPRAZOLE SODIUM 40 MG: 40 TABLET, DELAYED RELEASE ORAL at 08:54

## 2025-04-10 RX ADMIN — SENNOSIDES AND DOCUSATE SODIUM 2 TABLET: 50; 8.6 TABLET ORAL at 08:53

## 2025-04-10 RX ADMIN — Medication 10 ML: at 08:59

## 2025-04-10 NOTE — PLAN OF CARE
Problem: Adult Inpatient Plan of Care  Goal: Plan of Care Review  4/10/2025 0510 by Amara Connell RN  Outcome: Not Progressing  4/10/2025 0510 by Amara Connell RN  Outcome: Not Progressing  Goal: Patient-Specific Goal (Individualized)  4/10/2025 0510 by Amara Connell RN  Outcome: Not Progressing  4/10/2025 0510 by Amara Connell RN  Outcome: Not Progressing  Goal: Readiness for Transition of Care  4/10/2025 0510 by Amara Connell RN  Outcome: Not Progressing  4/10/2025 0510 by Amara Connell RN  Outcome: Not Progressing     Problem: Orthopaedic Fracture  Goal: Absence of Embolism Signs and Symptoms  4/10/2025 0510 by Amara Connell RN  Outcome: Not Progressing  4/10/2025 0510 by Amara Connell RN  Outcome: Not Progressing  Intervention: Prevent or Manage Embolism Risk  Recent Flowsheet Documentation  Taken 4/9/2025 2052 by Amara Connell RN  VTE Prevention/Management: patient refused intervention  Goal: Optimal Functional Ability  4/10/2025 0510 by Amara Connell RN  Outcome: Not Progressing  4/10/2025 0510 by Amara Connell RN  Outcome: Not Progressing  Intervention: Optimize Functional Ability  Recent Flowsheet Documentation  Taken 4/10/2025 0400 by Amara Connell RN  Positioning/Transfer Devices:   pillows   in use  Self-Care Promotion: independence encouraged  Taken 4/10/2025 0200 by Amara Connell RN  Positioning/Transfer Devices:   pillows   in use  Self-Care Promotion: independence encouraged  Taken 4/10/2025 0000 by Amara Connell RN  Self-Care Promotion: independence encouraged  Taken 4/9/2025 2122 by Amara Connell RN  Positioning/Transfer Devices:   pillows   in use  Self-Care Promotion: independence encouraged  Taken 4/9/2025 2052 by Amara Connell RN  Activity Management: activity encouraged  Positioning/Transfer Devices:   pillows   in use  Self-Care Promotion: independence encouraged  Range of Motion: active ROM (range of motion) encouraged  Goal: Optimal Pain Control and Function  4/10/2025 0510 by Becki  BRENDAN Maloney  Outcome: Not Progressing  4/10/2025 0510 by Amara Connell RN  Outcome: Not Progressing  Intervention: Manage Acute Orthopaedic-Related Pain  Recent Flowsheet Documentation  Taken 4/10/2025 0400 by Amara Connell RN  Sleep/Rest Enhancement:   family presence promoted   room darkened  Taken 4/10/2025 0000 by Amara Connell RN  Sleep/Rest Enhancement:   family presence promoted   regular sleep/rest pattern promoted  Taken 4/9/2025 2122 by Amara Connell RN  Sleep/Rest Enhancement:   family presence promoted   regular sleep/rest pattern promoted   room darkened  Taken 4/9/2025 2052 by Amara Connell RN  Pain Management Interventions:   pillow support provided   quiet environment facilitated   pain medication given  Sleep/Rest Enhancement: family presence promoted     Problem: Adult Inpatient Plan of Care  Goal: Absence of Hospital-Acquired Illness or Injury  4/10/2025 0510 by Amara Connell RN  Outcome: Progressing  4/10/2025 0510 by Amara Connell RN  Outcome: Not Progressing  Intervention: Identify and Manage Fall Risk  Recent Flowsheet Documentation  Taken 4/10/2025 0400 by Amara Connell RN  Safety Promotion/Fall Prevention: activity supervised  Taken 4/10/2025 0200 by Amara Connell RN  Safety Promotion/Fall Prevention:   activity supervised   safety round/check completed  Taken 4/9/2025 2052 by Amara Connell RN  Safety Promotion/Fall Prevention:   activity supervised   assistive device/personal items within reach   clutter free environment maintained   fall prevention program maintained   toileting scheduled   safety round/check completed   room organization consistent  Intervention: Prevent Skin Injury  Recent Flowsheet Documentation  Taken 4/10/2025 0400 by Amara Connell RN  Body Position: position maintained  Skin Protection:   incontinence pads utilized   silicone foam dressing in place  Taken 4/10/2025 0200 by Amara Connell RN  Body Position: position maintained  Skin Protection: incontinence pads  utilized  Taken 4/10/2025 0000 by Amara Connell RN  Body Position: position maintained  Skin Protection: incontinence pads utilized  Taken 4/9/2025 2241 by Amara Connell RN  Body Position: position maintained  Taken 4/9/2025 2122 by Amara Connell RN  Body Position: position maintained  Skin Protection: incontinence pads utilized  Taken 4/9/2025 2052 by Amara Connell RN  Body Position: position maintained  Skin Protection: incontinence pads utilized  Intervention: Prevent and Manage VTE (Venous Thromboembolism) Risk  Recent Flowsheet Documentation  Taken 4/9/2025 2052 by Amara Connell RN  VTE Prevention/Management: patient refused intervention  Intervention: Prevent Infection  Recent Flowsheet Documentation  Taken 4/10/2025 0400 by Amara Connell RN  Infection Prevention: environmental surveillance performed  Taken 4/10/2025 0200 by Amara Connell RN  Infection Prevention: environmental surveillance performed  Taken 4/9/2025 2122 by Amara Connell RN  Infection Prevention: environmental surveillance performed  Taken 4/9/2025 2052 by Amara Connell RN  Infection Prevention:   environmental surveillance performed   rest/sleep promoted  Goal: Optimal Comfort and Wellbeing  4/10/2025 0510 by Amara Connell RN  Outcome: Progressing  4/10/2025 0510 by Amara Connell RN  Outcome: Not Progressing  Intervention: Monitor Pain and Promote Comfort  Recent Flowsheet Documentation  Taken 4/9/2025 2052 by Amara Connell RN  Pain Management Interventions:   pillow support provided   quiet environment facilitated   pain medication given  Intervention: Provide Person-Centered Care  Recent Flowsheet Documentation  Taken 4/9/2025 2241 by Amara Connell RN  Trust Relationship/Rapport:   care explained   emotional support provided   empathic listening provided   reassurance provided   thoughts/feelings acknowledged  Taken 4/9/2025 2052 by Amara Connell RN  Trust Relationship/Rapport: thoughts/feelings acknowledged     Problem: Fall Injury  Risk  Goal: Absence of Fall and Fall-Related Injury  4/10/2025 0510 by Amara Connell RN  Outcome: Progressing  4/10/2025 0510 by Amara Connell RN  Outcome: Not Progressing  Intervention: Identify and Manage Contributors  Recent Flowsheet Documentation  Taken 4/10/2025 0400 by Amara Connell RN  Self-Care Promotion: independence encouraged  Taken 4/10/2025 0200 by Amara Connell RN  Self-Care Promotion: independence encouraged  Taken 4/10/2025 0000 by Amara Connell RN  Self-Care Promotion: independence encouraged  Taken 4/9/2025 2122 by Amara Connell RN  Self-Care Promotion: independence encouraged  Taken 4/9/2025 2052 by Amara Connell RN  Medication Review/Management: medications reviewed  Self-Care Promotion: independence encouraged  Intervention: Promote Injury-Free Environment  Recent Flowsheet Documentation  Taken 4/10/2025 0400 by Amara Connell RN  Safety Promotion/Fall Prevention: activity supervised  Taken 4/10/2025 0200 by Amara Connell RN  Safety Promotion/Fall Prevention:   activity supervised   safety round/check completed  Taken 4/9/2025 2052 by Amara Connell RN  Safety Promotion/Fall Prevention:   activity supervised   assistive device/personal items within reach   clutter free environment maintained   fall prevention program maintained   toileting scheduled   safety round/check completed   room organization consistent     Problem: Skin Injury Risk Increased  Goal: Skin Health and Integrity  4/10/2025 0510 by Amara Connell RN  Outcome: Progressing  4/10/2025 0510 by Amara Connell RN  Outcome: Not Progressing  Intervention: Optimize Skin Protection  Recent Flowsheet Documentation  Taken 4/10/2025 0400 by Amara Connell RN  Pressure Reduction Techniques:   frequent weight shift encouraged   weight shift assistance provided  Head of Bed (HOB) Positioning: HOB elevated  Pressure Reduction Devices: pressure-redistributing mattress utilized  Skin Protection:   incontinence pads utilized   silicone foam dressing  in place  Taken 4/10/2025 0200 by Amara Connell RN  Pressure Reduction Techniques: frequent weight shift encouraged  Head of Bed (HOB) Positioning: hospitals elevated  Pressure Reduction Devices: pressure-redistributing mattress utilized  Skin Protection: incontinence pads utilized  Taken 4/10/2025 0000 by Amara Connell RN  Pressure Reduction Techniques: frequent weight shift encouraged  Head of Bed (HOB) Positioning: hospitals elevated  Pressure Reduction Devices: pressure-redistributing mattress utilized  Skin Protection: incontinence pads utilized  Taken 4/9/2025 2122 by Amara Connell RN  Pressure Reduction Techniques:   frequent weight shift encouraged   weight shift assistance provided  Head of Bed (hospitals) Positioning: hospitals elevated  Pressure Reduction Devices: pressure-redistributing mattress utilized  Skin Protection: incontinence pads utilized  Taken 4/9/2025 2052 by Amara Connell RN  Activity Management: activity encouraged  Pressure Reduction Techniques:   frequent weight shift encouraged   weight shift assistance provided  Head of Bed (hospitals) Positioning: hospitals elevated  Pressure Reduction Devices: pressure-redistributing mattress utilized  Skin Protection: incontinence pads utilized  Intervention: Promote and Optimize Oral Intake  Recent Flowsheet Documentation  Taken 4/10/2025 0400 by Amara Connell RN  Oral Nutrition Promotion: rest periods promoted  Taken 4/10/2025 0200 by Amara Connell RN  Oral Nutrition Promotion: rest periods promoted  Taken 4/10/2025 0000 by Amara Connell RN  Oral Nutrition Promotion: rest periods promoted  Taken 4/9/2025 2122 by Amara Connell RN  Oral Nutrition Promotion: rest periods promoted  Taken 4/9/2025 2052 by Amara Connell RN  Oral Nutrition Promotion: rest periods promoted     Problem: Comorbidity Management  Goal: Blood Pressure in Desired Range  4/10/2025 0510 by Amara Connell RN  Outcome: Progressing  4/10/2025 0510 by Amara Connell RN  Outcome: Not Progressing  Intervention: Maintain  Blood Pressure Management  Recent Flowsheet Documentation  Taken 4/9/2025 2052 by Amara Connell RN  Medication Review/Management: medications reviewed  Goal: Maintenance of Osteoarthritis Symptom Control  4/10/2025 0510 by Amara Connell RN  Outcome: Progressing  4/10/2025 0510 by Amara Connell RN  Outcome: Not Progressing  Intervention: Maintain Osteoarthritis Symptom Control  Recent Flowsheet Documentation  Taken 4/9/2025 2052 by Amara Connell RN  Activity Management: activity encouraged  Assistive Device Utilized: gait belt  Medication Review/Management: medications reviewed  Goal: Maintenance of Seizure Control  4/10/2025 0510 by Amara oCnnell RN  Outcome: Progressing  4/10/2025 0510 by Amara Connell RN  Outcome: Not Progressing  Intervention: Maintain Seizure Symptom Control  Recent Flowsheet Documentation  Taken 4/10/2025 0400 by Amara Connell RN  Sensory Stimulation Regulation: lighting decreased  Seizure Precautions: activity supervised  Taken 4/10/2025 0200 by Amara Connell RN  Sensory Stimulation Regulation: lighting decreased  Seizure Precautions:   activity supervised   clutter-free environment maintained  Taken 4/10/2025 0000 by Amara Connell RN  Sensory Stimulation Regulation: lighting decreased  Seizure Precautions:   activity supervised   clutter-free environment maintained  Taken 4/9/2025 2241 by Amara Connell RN  Sensory Stimulation Regulation:   lighting decreased   quiet environment promoted  Taken 4/9/2025 2122 by Amara Connell RN  Sensory Stimulation Regulation: quiet environment promoted  Seizure Precautions:   activity supervised   clutter-free environment maintained  Taken 4/9/2025 2052 by Amara Connell RN  Sensory Stimulation Regulation:   television on   quiet environment promoted  Medication Review/Management: medications reviewed  Seizure Precautions:   clutter-free environment maintained   activity supervised     Problem: Orthopaedic Fracture  Goal: Bowel Elimination  4/10/2025 0510 by  Connell, Amara, RN  Outcome: Progressing  4/10/2025 0510 by Amara Connell RN  Outcome: Not Progressing  Intervention: Promote Effective Bowel Elimination  Recent Flowsheet Documentation  Taken 4/9/2025 2052 by Amara Connell RN  Bowel Elimination Promotion: adequate fluid intake promoted  Goal: Fracture Stability  4/10/2025 0510 by Amara Connell RN  Outcome: Progressing  4/10/2025 0510 by Amara Connell RN  Outcome: Not Progressing  Intervention: Promote Fracture Stability and Healing  Recent Flowsheet Documentation  Taken 4/10/2025 0400 by Amara Connell RN  Fracture Immobilization: immobilization device maintained  Taken 4/10/2025 0200 by Amara Connell RN  Fracture Immobilization: immobilization device maintained  Taken 4/10/2025 0000 by Amara Connell RN  Fracture Immobilization: immobilization device maintained  Taken 4/9/2025 2122 by Amara Connell RN  Fracture Immobilization: immobilization device maintained  Taken 4/9/2025 2052 by Amara Connell RN  Fracture Immobilization: immobilization device maintained  Goal: Absence of Infection Signs and Symptoms  4/10/2025 0510 by Amara Connell RN  Outcome: Progressing  4/10/2025 0510 by Amara Connell RN  Outcome: Not Progressing  Intervention: Prevent or Manage Infection  Recent Flowsheet Documentation  Taken 4/10/2025 0400 by Amara Connell RN  Infection Management: other (see comments)  Taken 4/10/2025 0200 by Amara Connell RN  Infection Management: other (see comments)  Taken 4/10/2025 0000 by Amara Connell RN  Infection Management: other (see comments)  Taken 4/9/2025 2122 by Amara Connell RN  Infection Management: aseptic technique maintained  Taken 4/9/2025 2052 by Amara Connell RN  Infection Management: aseptic technique maintained  Goal: Effective Tissue Perfusion  4/10/2025 0510 by Amara Connell RN  Outcome: Progressing  4/10/2025 0510 by Amara Connell RN  Outcome: Not Progressing  Intervention: Prevent or Manage Neurovascular Compromise  Recent Flowsheet  Documentation  Taken 4/10/2025 0400 by Amara Connell RN  Compartment Syndrome Management: extremity placed at heart level  Neurovascular Pressure Management: Cast: extremity positioned at heart level  Taken 4/10/2025 0200 by Amara Connell RN  Compartment Syndrome Management: extremity placed at heart level  Neurovascular Pressure Management: Cast: extremity positioned at heart level  Taken 4/10/2025 0000 by Amara Connell RN  Compartment Syndrome Management: extremity placed at heart level  Neurovascular Pressure Management: Cast: extremity positioned at heart level  Taken 4/9/2025 2241 by Amara Connell RN  Compartment Syndrome Management: extremity placed at heart level  Neurovascular Pressure Management: Cast: extremity positioned at heart level  Taken 4/9/2025 2122 by Amara Connell RN  Compartment Syndrome Management: extremity placed at heart level  Neurovascular Pressure Management: Cast: extremity positioned at heart level  Taken 4/9/2025 2052 by Amara Connell RN  Compartment Syndrome Management: extremity placed at heart level  Neurovascular Pressure Management: Cast: extremity positioned at heart level

## 2025-04-10 NOTE — CASE MANAGEMENT/SOCIAL WORK
Case Management Discharge Note      Final Note: Patient's plan is skilled nursing at [facility] today 4/10.  ambulance will transport patient at 1300. PCS filled out and faxed to [facility]. Nurse to call report to [phone]. Facility liaison will pull discharge summary from [facility]. Patient and her wife updated at bedside and agreeable to plan. No further discharge needs identified.         Selected Continued Care - Admitted Since 4/3/2025       Destination Coordination complete.      Service Provider Services Address Phone Fax Patient Preferred    [facility] [address] [facility] [id number] [phone] --       Internal Comment last updated by Devorah Gaston RN 4/8/2025 1413    LVM and referral for [facility]                          Durable Medical Equipment    No services have been selected for the patient.                Dialysis/Infusion    No services have been selected for the patient.                Home Medical Care    No services have been selected for the patient.                Therapy    No services have been selected for the patient.                Community Resources    No services have been selected for the patient.                Community & DME    No services have been selected for the patient.                    Transportation Services  Ambulance: [facility]  [facility] Ambulance Status: Accepted    Final Discharge Disposition Code: 03 - skilled nursing facility (SNF)

## 2025-04-10 NOTE — DISCHARGE SUMMARY
[facility] Medicine Services  DISCHARGE SUMMARY    Patient Name: Jenna Russell  : 1940  MRN: [id number]    Date of Admission: 4/3/2025  7:57 PM  Date of Discharge:  4/10/2025  Primary Care Physician: Judi Rodriguez MD    Consults       No orders found from 3/5/2025 to 2025.            Hospital Course       Active Hospital Problems    Diagnosis  POA    **Hip fracture [S72.009A]  Yes      Resolved Hospital Problems   No resolved problems to display.          Hospital Course:  Jenna Russell is a 84 y.o. female w/ HTN, HLD, pAF, seizure d/o, Hx stroke, residual aphasia, dementia, who was walking w/o her walker and had a slip w/ fall. Imaging on arrival showed a LT periprosthetic hip fracture and LT radial fracture. She was admitted for pain control and orthopedic eval. She was seen by Dr. Momin who recommended non-operative management and outpatient follow up. She has worked w/ PT/OT during her stay which is sometimes limited by pain however has drowsiness w/ higher doses of narcotics. Other hospital considerations include drop in Hgb 9.3 to 6.9 on admission, received 1U PRBC, there was likely a factor of dilutional component from IVF and lab error as Hgb inc'd to 9.1 after and has remained stable in the 8's. She has a chronic thrombocytopenia, my partner previously d/w ortho and was placed on ASA 81mg BID for DVT ppx. She was started on flomax for acute urinary retention, this can likely be discontinued in the future once she is more ambulatory. She is discharging to rehab this afternoon.    Left periprosthetic hip fracture 2/2 fall  LT radius fracture  -seen by ortho, rec'd non-operative management and will see in 2 weeks in f/u; she is to have protected WB of the LT LE at all times w/ no hip abduction; wrist splint to remain on LT UE until follow up  -per previous partner's discussions, ASA 81mg BID for dvt ppx  -prn tylenol and oxycodone for pain  -Dr. Momin f/u 2  weeks    Chronic anemia  -s/p 1U PRBC this admit, likely had dilutional component +/- lab error; has been stable    Chronic thrombocytopenia  -??depakote  -150 at last check    Acute urinary retention  -started on flomax, will cont for now, can try discontinuing as patient returns to former mobility    Hx stroke/HTN/HLD/Seizure d/o/GERD/prior Afib/dementia/cognitive impairment - home meds      Discharge Follow Up Recommendations for outpatient labs/diagnostics:   PCP 1-2 weeks post hospital follow up  Dr. Momin, orthopedics, 2 weeks    Day of Discharge     HPI:   Family at bedside, patient overall doing well for the circumstance. Forgets that she has a broken femur at times    Vital Signs:   Temp:  [97.4 °F (36.3 °C)-97.8 °F (36.6 °C)] 97.5 °F (36.4 °C)  Heart Rate:  [61-69] 61  Resp:  [16-18] 16  BP: (111-154)/(59-75) 128/64      Physical Exam:  Constitutional: Awake, alert, sitting up in bed in NAD  HENT: Areas of ecchymosis around LT side of face  Respiratory: Clear to auscultation bilaterally, respiratory effort normal   Cardiovascular: RRR, palpable pedal pulses  Gastrointestinal: Positive bowel sounds, soft, nontender, nondistended  Musculoskeletal: LT UE in splint  Psychiatric: Calm, cooperative  Neurologic: Expressive aphasia    Pertinent  and/or Most Recent Results     LAB RESULTS:      Lab 04/10/25  0450 04/08/25  0540 04/06/25  1013 04/05/25  1022 04/04/25  1418 04/03/25  2202   WBC 6.96 5.67 7.22 6.12 5.68 6.77   HEMOGLOBIN 8.7* 8.2* 8.4* 9.1* 6.9* 9.3*   HEMATOCRIT 26.6* 25.5* 24.5* 27.1* 21.0* 28.6*   PLATELETS 150 101* 96* 92* 87* 114*   NEUTROS ABS  --   --   --   --  3.17 4.98   IMMATURE GRANS (ABS)  --   --   --   --  0.01 0.03   LYMPHS ABS  --   --   --   --  1.52 1.02   MONOS ABS  --   --   --   --  0.94* 0.68   EOS ABS  --   --   --   --  0.03 0.05   MCV 94.3 94.4 90.4 90.9 94.2 93.8         Lab 04/10/25  0450 04/09/25  2251 04/09/25  1026 04/08/25  0540 04/06/25  2127 04/06/25  1013  04/05/25  1023   SODIUM 133*  --  132* 131*  --  134* 132*   POTASSIUM 4.7 4.5 3.5 3.8 4.5 3.6 3.8   CHLORIDE 103  --  98 99  --  98 97*   CO2 24.0  --  23.0 22.0  --  27.0 26.0   ANION GAP 6.0  --  11.0 10.0  --  9.0 9.0   BUN 14  --  16 17  --  8 7*   CREATININE 0.43*  --  0.48* 0.55*  --  0.49* 0.57   EGFR 97.2  --  94.7 91.6  --  94.2 90.9   GLUCOSE 91  --  84 91  --  87 107*   CALCIUM 8.6  --  8.5* 8.3*  --  8.4* 8.9   MAGNESIUM 1.9  --  2.2 1.7  --   --   --          Lab 04/04/25  1312 04/03/25  2202   TOTAL PROTEIN 4.4* 5.6*   ALBUMIN 2.8* 3.4*   GLOBULIN 1.6 2.2   ALT (SGPT) <5 5   AST (SGOT) 11 16   BILIRUBIN 0.4 0.2   ALK PHOS 39 57                 Lab 04/04/25  1542   ABO TYPING O   RH TYPING Positive   ANTIBODY SCREEN Negative         Brief Urine Lab Results  (Last result in the past 365 days)        Color   Clarity   Blood   Leuk Est   Nitrite   Protein   CREAT   Urine HCG        04/05/25 0747 Yellow   Clear   Trace   Trace   Negative   Negative                 Microbiology Results (last 10 days)       ** No results found for the last 240 hours. **            CT Pelvis Without Contrast  Result Date: 4/4/2025  CT PELVIS WO CONTRAST Date of Exam: 4/4/2025 11:50 AM EDT Indication: hip fracture. Comparison: CT pelvis without contrast dated 9/4/2024 Technique: Axial CT images were obtained of the pelvis without contrast administration.  Reconstructed coronal and sagittal images were also obtained. Automated exposure control and iterative construction methods were used. Findings: A left total hip arthroplasty has been performed. Prosthetic components are in anatomic alignment. There are nondisplaced fractures in involving the left femoral greater trochanter and subtrochanteric region. No other fractures identified. A dynamic hip screw and intramedullary mimi have been placed on the right. The distal aspect of the IM mimi is not visualized on this study. Mild to moderate right hip osteoarthritis is noted.  Moderate degenerative disc changes are noted in the lower lumbar spine. A 4.2 cm left pelvic cyst likely arises from the ovary. This was present previously measuring 4.8 cm. Visualized pelvic viscera otherwise appears unremarkable.     Impression: 1.Previous left total hip arthroplasty. 2.Nondisplaced fractures involving the left femoral greater trochanter and subtrochanteric shaft. 3.4.2 cm cystic mass on the left ovary appears simple by CT. Electronically Signed: René Rivers MD  4/4/2025 12:19 PM EDT  Workstation ID: [id number]    XR Wrist 3+ View Left  Result Date: 4/3/2025  XR WRIST 3+ VW LEFT Date of Exam: 4/3/2025 8:49 PM EDT Indication: fall/pain Comparison: None available. Findings: Carpal alignment is intact. Mild scattered degenerative changes. There is likely nondisplaced fracture of the distal radius best seen on AP view.     Impression: Likely nondisplaced fracture of the distal radius best seen on AP view. Recommend correlation with point tenderness. Electronically Signed: Flex Vela MD  4/3/2025 9:39 PM EDT  Workstation ID: [id number]    XR Chest 1 View  Result Date: 4/3/2025  XR CHEST 1 VW Date of Exam: 4/3/2025 8:50 PM EDT Indication: fall/confusion Comparison: 2/20/2025 FINDINGS: No definite focal or diffuse pulmonary infiltrate is identified.  No pneumothorax or significant pleural effusion. Heart size appears unchanged. Occlusion device is present, likely in the left atrial appendage, as before. Calcific atherosclerosis of the aorta. Advanced arthropathy at the glenohumeral joints.     No radiographic findings of acute cardiopulmonary abnormality. Electronically Signed: Guerrero Luis  4/3/2025 9:37 PM EDT  Workstation ID: [id number]    XR Hip With or Without Pelvis 2 - 3 View Left  Result Date: 4/3/2025  XR HIP W OR WO PELVIS 2-3 VIEW LEFT Date of Exam: 4/3/2025 8:50 PM EDT Indication: fall/pain Comparison: Right femur radiograph 9/4/2024. CT pelvis 9/4/2024. Findings: Left hip arthroplasty.  Cortical defects at the lateral aspect of the left proximal femur suspicious for an acute nondisplaced fracture. Partially imaged right proximal femoral fixation hardware. Degenerative changes at the right hip, bilateral sacroiliac joints, and pubic symphysis. Lower lumbar degenerative changes, incompletely imaged and evaluated. Vascular calcifications.     Impression: Cortical defects at the lateral aspect of the left proximal femur suspicious for an acute nondisplaced periprosthetic fracture. Electronically Signed: Fransisco Brooks  4/3/2025 9:36 PM EDT  Workstation ID: [id number]    CT Head Without Contrast  Result Date: 4/3/2025  CT HEAD WO CONTRAST Date of Exam: 4/3/2025 8:46 PM EDT Indication: fall/head injury. Comparison: 11/23/2024 Technique: Axial CT images were obtained of the head without contrast administration.  Automated exposure control and iterative construction methods were used. Findings: No intracranial hemorrhage. Remote left temporoparietal lobe infarct. Gray-white matter differentiation is maintained without evidence of an acute infarction. Multiple foci of decreased attenuation are present within the subcortical, deep cerebral, and periventricular white matter consistent with chronic small vessel/microangiopathic ischemic changes. No extra-axial mass or collection. The ventricles and sulci are prominent commensurate with involutional changes. The posterior fossa appears grossly normal. Sellar and suprasellar structures are normal. Bilateral lens replacements. The paranasal sinuses, ethmoid air cells, and mastoid air cells are aerated. The bony calvarium is intact.     Impression: No acute intracranial pathology. Electronically Signed: Freddy Santos MD  4/3/2025 9:20 PM EDT  Workstation ID: [id number]              Results for orders placed during the hospital encounter of 11/23/24    Adult Transthoracic Echo Complete W/ Cont if Necessary Per Protocol (With Agitated Saline)    Interpretation Summary     Left ventricular ejection fraction appears to be 56 - 60%.    Left ventricular diastolic function was normal.    Left atrial volume is mildly increased.    Moderate aortic valve regurgitation is present.    Estimated right ventricular systolic pressure from tricuspid regurgitation is mildly elevated (35-45 mmHg). Calculated right ventricular systolic pressure from tricuspid regurgitation is 36 mmHg.      Discharge Details        Discharge Medications        New Medications        Instructions Start Date   melatonin 5 MG tablet tablet   5 mg, Oral, Nightly      oxyCODONE 5 MG immediate release tablet  Commonly known as: ROXICODONE   2.5 mg, Oral, Every 4 Hours PRN      tamsulosin 0.4 MG capsule 24 hr capsule  Commonly known as: FLOMAX   0.4 mg, Oral, Daily   Start Date: April 11, 2025            Changes to Medications        Instructions Start Date   ALPRAZolam 0.25 MG tablet  Commonly known as: XANAX  What changed:   medication strength  when to take this   0.25 mg, Oral, Nightly PRN      aspirin 81 MG EC tablet  What changed: when to take this   81 mg, Oral, 2 Times Daily      divalproex 500 MG DR tablet  Commonly known as: DEPAKOTE  What changed: when to take this   500 mg, Oral, Every 12 Hours Scheduled      lisinopril 20 MG tablet  Commonly known as: PRINIVILZESTRIL  What changed: when to take this   20 mg, Oral, Daily             Continue These Medications        Instructions Start Date   acetaminophen 325 MG tablet  Commonly known as: TYLENOL   650 mg, Oral, Every 4 Hours PRN      atorvastatin 80 MG tablet  Commonly known as: LIPITOR   80 mg, Oral, Nightly      Budesonide 3 MG 24 hr capsule  Commonly known as: ENTOCORT EC   6 mg, Oral, Daily      calcium carbonate  MG chewable tablet  Commonly known as: TUMS EX   750 mg, Oral, 2 Times Daily PRN      Co Q-10 200 MG capsule   400 mg, Oral, Daily      Diclofenac Sodium 1 % gel gel  Commonly known as: VOLTAREN   2 g, Topical, 4 Times Daily      hyoscyamine  0.125 MG tablet  Commonly known as: ANASPAZ,LEVSIN   0.125 mg, Oral, Every 4 Hours PRN      lidocaine 5 %  Commonly known as: LIDODERM   1 patch, Transdermal, Every 24 Hours Scheduled, Remove & Discard patch within 12 hours or as directed by MD      metoprolol succinate XL 25 MG 24 hr tablet  Commonly known as: TOPROL-XL   25 mg, Nightly      pantoprazole 40 MG EC tablet  Commonly known as: PROTONIX   40 mg, Oral, Daily      polyethylene glycol 17 g packet  Commonly known as: MIRALAX   17 g, Oral, Daily      spironolactone 25 MG tablet  Commonly known as: ALDACTONE   25 mg, Daily      vitamin B-12 1000 MCG tablet  Commonly known as: CYANOCOBALAMIN   1,000 mcg, Daily      vitamin C 250 MG tablet  Commonly known as: ASCORBIC ACID   250 mg, 2 Times Daily      Vitamin D 50 MCG (2000 UT) capsule   2,000 Units, Daily             Stop These Medications      ibuprofen 200 MG tablet  Commonly known as: ADVIL,MOTRIN              Allergies   Allergen Reactions    Penicillins Anaphylaxis    Pistachio Nut Unknown - High Severity     Daughter reported          Discharge Disposition:  Rehab Facility or Unit (DC - External)    Diet:  Hospital:  Diet Order   Procedures    Diet: Regular/House; Fluid Consistency: Thin (IDDSI 0)            CODE STATUS:    Code Status and Medical Interventions: No CPR (Do Not Attempt to Resuscitate); Limited Support; No intubation (DNI)   Ordered at: 04/03/25 5339     Code Status (Patient has no pulse and is not breathing):    No CPR (Do Not Attempt to Resuscitate)     Medical Interventions (Patient has pulse or is breathing):    Limited Support     Medical Intervention Limits:    No intubation (DNI)       Future Appointments   Date Time Provider Department [place]   4/10/2025  1:00 PM MED 14 [facility] None       Additional Instructions for the Follow-ups that You Need to Schedule       Discharge Follow-up with PCP   As directed       Currently Documented PCP:    Judi Rodriguez MD    PCP Phone  Number:    [phone]     Follow Up Details: 1-2 weeks        Discharge Follow-up with Specified Provider: Dr. Momin, orthopedics, 2 weeks   As directed      To: Dr. Momin, orthopedics, 2 weeks                      Lamont Mane DO  04/10/25      Time Spent on Discharge:  I spent  40  minutes on this discharge activity which included: face-to-face encounter with the patient, reviewing the data in the system, coordination of the care with the nursing staff as well as consultants, documentation, and entering orders.

## 2025-04-10 NOTE — PLAN OF CARE
Goal Outcome Evaluation:  Plan of Care Reviewed With: patient        Progress: improving  Outcome Evaluation: DC'd to [facility], report called to BRENDAN Conde

## (undated) DEVICE — PAD ARMBRD SURG CONVOL 7.5X20X2IN

## (undated) DEVICE — PK MAJ FX HIP 10

## (undated) DEVICE — SPNG GZ WOVN 4X4IN 12PLY 10/BX STRL

## (undated) DEVICE — LEGGINGS, PAIR, 29X43, STERILE: Brand: MEDLINE

## (undated) DEVICE — ANTIBACTERIAL UNDYED BRAIDED (POLYGLACTIN 910), SYNTHETIC ABSORBABLE SUTURE: Brand: COATED VICRYL

## (undated) DEVICE — GLV SURG SENSICARE PI ORTHO SZ7.5 LF STRL

## (undated) DEVICE — C-ARM: Brand: UNBRANDED

## (undated) DEVICE — CVR HNDL LIGHT RIGID

## (undated) DEVICE — GLV SURG SIGNATURE TOUCH PF LTX 8 STRL BX/50

## (undated) DEVICE — SPK10295 ORTHOPEDIC FRACTURE KIT: Brand: SPK10295 ORTHOPEDIC FRACTURE KIT

## (undated) DEVICE — 4.0MM LONG AO PILOT DRILL: Brand: TRIGEN

## (undated) DEVICE — GUIDE PIN 3.2MM X 343MM: Brand: TRIGEN

## (undated) DEVICE — STERILE PVP: Brand: MEDLINE INDUSTRIES, INC.